# Patient Record
Sex: FEMALE | Race: WHITE | Employment: UNEMPLOYED | URBAN - METROPOLITAN AREA
[De-identification: names, ages, dates, MRNs, and addresses within clinical notes are randomized per-mention and may not be internally consistent; named-entity substitution may affect disease eponyms.]

---

## 2018-03-15 ENCOUNTER — OFFICE VISIT (OUTPATIENT)
Dept: FAMILY MEDICINE CLINIC | Age: 58
End: 2018-03-15
Payer: COMMERCIAL

## 2018-03-15 VITALS
BODY MASS INDEX: 26.16 KG/M2 | DIASTOLIC BLOOD PRESSURE: 90 MMHG | OXYGEN SATURATION: 97 % | HEIGHT: 65 IN | WEIGHT: 157 LBS | HEART RATE: 88 BPM | SYSTOLIC BLOOD PRESSURE: 140 MMHG

## 2018-03-15 DIAGNOSIS — M15.9 PRIMARY OSTEOARTHRITIS INVOLVING MULTIPLE JOINTS: ICD-10-CM

## 2018-03-15 DIAGNOSIS — G56.03 BILATERAL CARPAL TUNNEL SYNDROME: ICD-10-CM

## 2018-03-15 DIAGNOSIS — Z13.220 ENCOUNTER FOR LIPID SCREENING FOR CARDIOVASCULAR DISEASE: ICD-10-CM

## 2018-03-15 DIAGNOSIS — Z13.6 ENCOUNTER FOR LIPID SCREENING FOR CARDIOVASCULAR DISEASE: ICD-10-CM

## 2018-03-15 DIAGNOSIS — I10 ESSENTIAL HYPERTENSION: Primary | ICD-10-CM

## 2018-03-15 DIAGNOSIS — G89.29 CHRONIC MIDLINE LOW BACK PAIN WITHOUT SCIATICA: ICD-10-CM

## 2018-03-15 DIAGNOSIS — F17.200 TOBACCO DEPENDENCE: ICD-10-CM

## 2018-03-15 DIAGNOSIS — M54.50 CHRONIC MIDLINE LOW BACK PAIN WITHOUT SCIATICA: ICD-10-CM

## 2018-03-15 PROCEDURE — 99203 OFFICE O/P NEW LOW 30 MIN: CPT | Performed by: NURSE PRACTITIONER

## 2018-03-15 PROCEDURE — 96127 BRIEF EMOTIONAL/BEHAV ASSMT: CPT | Performed by: NURSE PRACTITIONER

## 2018-03-15 RX ORDER — LOSARTAN POTASSIUM 25 MG/1
25 TABLET ORAL
Status: ON HOLD | COMMUNITY
Start: 2017-12-04 | End: 2022-04-05 | Stop reason: HOSPADM

## 2018-03-15 RX ORDER — MELOXICAM 7.5 MG/1
15 TABLET ORAL
COMMUNITY
Start: 2017-12-04 | End: 2020-08-12 | Stop reason: ALTCHOICE

## 2018-03-15 RX ORDER — VITAMIN B COMPLEX
1 CAPSULE ORAL DAILY
COMMUNITY
End: 2022-04-14

## 2018-03-15 RX ORDER — AMLODIPINE BESYLATE 10 MG/1
10 TABLET ORAL
COMMUNITY
Start: 2017-12-04 | End: 2020-08-12

## 2018-03-15 ASSESSMENT — PATIENT HEALTH QUESTIONNAIRE - PHQ9
7. TROUBLE CONCENTRATING ON THINGS, SUCH AS READING THE NEWSPAPER OR WATCHING TELEVISION: 0
1. LITTLE INTEREST OR PLEASURE IN DOING THINGS: 3
SUM OF ALL RESPONSES TO PHQ9 QUESTIONS 1 & 2: 3
SUM OF ALL RESPONSES TO PHQ QUESTIONS 1-9: 12
9. THOUGHTS THAT YOU WOULD BE BETTER OFF DEAD, OR OF HURTING YOURSELF: 0
4. FEELING TIRED OR HAVING LITTLE ENERGY: 3
6. FEELING BAD ABOUT YOURSELF - OR THAT YOU ARE A FAILURE OR HAVE LET YOURSELF OR YOUR FAMILY DOWN: 0
5. POOR APPETITE OR OVEREATING: 3
3. TROUBLE FALLING OR STAYING ASLEEP: 3
2. FEELING DOWN, DEPRESSED OR HOPELESS: 0
8. MOVING OR SPEAKING SO SLOWLY THAT OTHER PEOPLE COULD HAVE NOTICED. OR THE OPPOSITE, BEING SO FIGETY OR RESTLESS THAT YOU HAVE BEEN MOVING AROUND A LOT MORE THAN USUAL: 0
10. IF YOU CHECKED OFF ANY PROBLEMS, HOW DIFFICULT HAVE THESE PROBLEMS MADE IT FOR YOU TO DO YOUR WORK, TAKE CARE OF THINGS AT HOME, OR GET ALONG WITH OTHER PEOPLE: 0
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
## 2018-03-22 ENCOUNTER — HOSPITAL ENCOUNTER (OUTPATIENT)
Age: 58
Discharge: HOME | End: 2018-03-24
Payer: COMMERCIAL

## 2018-03-22 ENCOUNTER — HOSPITAL ENCOUNTER (OUTPATIENT)
Dept: GENERAL RADIOLOGY | Age: 58
Discharge: HOME | End: 2018-03-24
Payer: COMMERCIAL

## 2018-03-22 ENCOUNTER — HOSPITAL ENCOUNTER (OUTPATIENT)
Age: 58
Discharge: HOME | End: 2018-03-22
Payer: COMMERCIAL

## 2018-03-22 DIAGNOSIS — Z13.6 ENCOUNTER FOR LIPID SCREENING FOR CARDIOVASCULAR DISEASE: ICD-10-CM

## 2018-03-22 DIAGNOSIS — I10 ESSENTIAL HYPERTENSION: ICD-10-CM

## 2018-03-22 DIAGNOSIS — Z13.220 ENCOUNTER FOR LIPID SCREENING FOR CARDIOVASCULAR DISEASE: ICD-10-CM

## 2018-03-22 DIAGNOSIS — M54.50 CHRONIC MIDLINE LOW BACK PAIN WITHOUT SCIATICA: ICD-10-CM

## 2018-03-22 DIAGNOSIS — R73.01 IFG (IMPAIRED FASTING GLUCOSE): Primary | ICD-10-CM

## 2018-03-22 DIAGNOSIS — G89.29 CHRONIC MIDLINE LOW BACK PAIN WITHOUT SCIATICA: ICD-10-CM

## 2018-03-22 LAB
ALBUMIN SERPL-MCNC: 4.6 G/DL (ref 3.5–5.2)
ALBUMIN/GLOBULIN RATIO: ABNORMAL (ref 1–2.5)
ALP BLD-CCNC: 105 U/L (ref 35–104)
ALT SERPL-CCNC: 20 U/L (ref 5–33)
ANION GAP SERPL CALCULATED.3IONS-SCNC: 11 MMOL/L (ref 9–17)
AST SERPL-CCNC: 22 U/L
BILIRUB SERPL-MCNC: 0.62 MG/DL (ref 0.3–1.2)
BUN BLDV-MCNC: 16 MG/DL (ref 6–20)
BUN/CREAT BLD: ABNORMAL (ref 9–20)
CALCIUM SERPL-MCNC: 9.2 MG/DL (ref 8.6–10.4)
CHLORIDE BLD-SCNC: 99 MMOL/L (ref 98–107)
CHOLESTEROL/HDL RATIO: 3.1
CHOLESTEROL: 208 MG/DL
CO2: 28 MMOL/L (ref 20–31)
CREAT SERPL-MCNC: 0.5 MG/DL (ref 0.5–0.9)
GFR AFRICAN AMERICAN: >60 ML/MIN
GFR NON-AFRICAN AMERICAN: >60 ML/MIN
GFR SERPL CREATININE-BSD FRML MDRD: ABNORMAL ML/MIN/{1.73_M2}
GFR SERPL CREATININE-BSD FRML MDRD: ABNORMAL ML/MIN/{1.73_M2}
GLUCOSE BLD-MCNC: 150 MG/DL (ref 70–99)
HDLC SERPL-MCNC: 67 MG/DL
LDL CHOLESTEROL: 129 MG/DL (ref 0–130)
POTASSIUM SERPL-SCNC: 4.4 MMOL/L (ref 3.7–5.3)
SODIUM BLD-SCNC: 138 MMOL/L (ref 135–144)
TOTAL PROTEIN: 7.4 G/DL (ref 6.4–8.3)
TRIGL SERPL-MCNC: 59 MG/DL
VLDLC SERPL CALC-MCNC: ABNORMAL MG/DL (ref 1–30)

## 2018-03-22 PROCEDURE — 72100 X-RAY EXAM L-S SPINE 2/3 VWS: CPT

## 2018-03-22 PROCEDURE — 80053 COMPREHEN METABOLIC PANEL: CPT

## 2018-03-22 PROCEDURE — 80061 LIPID PANEL: CPT

## 2018-03-22 PROCEDURE — 36415 COLL VENOUS BLD VENIPUNCTURE: CPT

## 2018-03-26 ENCOUNTER — OFFICE VISIT (OUTPATIENT)
Dept: ORTHOPEDIC SURGERY | Age: 58
End: 2018-03-26
Payer: COMMERCIAL

## 2018-03-26 DIAGNOSIS — M19.041 OSTEOARTHRITIS OF BOTH HANDS, UNSPECIFIED OSTEOARTHRITIS TYPE: ICD-10-CM

## 2018-03-26 DIAGNOSIS — M19.042 OSTEOARTHRITIS OF BOTH HANDS, UNSPECIFIED OSTEOARTHRITIS TYPE: ICD-10-CM

## 2018-03-26 DIAGNOSIS — M79.641 PAIN IN BOTH HANDS: Primary | ICD-10-CM

## 2018-03-26 DIAGNOSIS — M79.642 PAIN IN BOTH HANDS: Primary | ICD-10-CM

## 2018-03-26 PROCEDURE — 99203 OFFICE O/P NEW LOW 30 MIN: CPT | Performed by: ORTHOPAEDIC SURGERY

## 2018-03-26 NOTE — LETTER
3/26/2018    Emanuel Sanches NP  [address] [facility], [address] [facility]    RE: [facility]    Dear Dr. Megan Vargas,    Thank you for allowing me to participate in the care of Ms. Chuck Appiah. I had the opportunity to evaluate the patient on 3/26/2018. I believe she has osteoarthritis involving both hands. She may benefit from a work up for rheumatoid disease given the bilateral nature of her symptoms and involvement of multiple joints. Attached you will find my evaluation and recommendations. Thanks again for the confidence you have expressed in me by allowing my participation in the care of your patient. I will keep you apprised of further developments in the patients treatment course as it progresses. If I can be of further assistance in any fashion, please feel free to contact me at your convenience.     Sincerely,        Chinmay Rhoades  Shoulder and Elbow Surgery

## 2018-03-26 NOTE — PROGRESS NOTES
ORTHOPEDIC PATIENT EVALUATION      HPI / Chief Complaint  Sameer Ribeiro is a 60 y.o. old right-hand-dominant female who presents for evaluation of bilateral hand pain that has been ongoing for the past 2 months. She denies any precipitating trauma or injury. Her pain diffusely involves both hands and is intermittent in occurrence. It hurts the most after use although she reports that it can occur with or without activity. She reports stiffness especially in the morning as well as intermittent swelling, again especially in the morning. She denies any numbness or tingling. Her symptoms have gotten worse over the course of the past month. She has been on Mobic apparently for a long period of time due arthritis involving multiple joints. Past Medical History  Nyasia Mackay  has a past medical history of Chronic back pain; Hypertension; Osteoarthritis; and Pneumothorax. Past Surgical History  Nyasia Mackay  has a past surgical history that includes back surgery; Carpal tunnel release (Right); Appendectomy; and Tubal ligation. Current Medications  Current Outpatient Prescriptions   Medication Sig Dispense Refill    amLODIPine (NORVASC) 10 MG tablet Take 10 mg by mouth      losartan (COZAAR) 25 MG tablet Take 25 mg by mouth      meloxicam (MOBIC) 7.5 MG tablet Take 15 mg by mouth      b complex vitamins capsule Take 1 capsule by mouth daily       No current facility-administered medications for this visit. Allergies  Allergies have been reviewed. Destiny is allergic to penicillins. Social History  Destiny  reports that she has been smoking Cigarettes. She has a 20.00 pack-year smoking history. She has never used smokeless tobacco. She reports that she does not drink alcohol or use drugs. Family History  Destiny's family history includes Diabetes in her father; Heart Disease in her father and mother; High Blood Pressure in her father; High Cholesterol in her father and mother.       Review of Systems   History

## 2018-03-29 ENCOUNTER — INITIAL CONSULT (OUTPATIENT)
Dept: PAIN MANAGEMENT | Age: 58
End: 2018-03-29
Payer: COMMERCIAL

## 2018-03-29 VITALS
RESPIRATION RATE: 16 BRPM | WEIGHT: 156.4 LBS | HEART RATE: 85 BPM | BODY MASS INDEX: 26.06 KG/M2 | HEIGHT: 65 IN | TEMPERATURE: 99 F | SYSTOLIC BLOOD PRESSURE: 128 MMHG | DIASTOLIC BLOOD PRESSURE: 73 MMHG | OXYGEN SATURATION: 99 %

## 2018-03-29 DIAGNOSIS — Z98.890 HISTORY OF LUMBAR LAMINECTOMY: ICD-10-CM

## 2018-03-29 DIAGNOSIS — G89.29 CHRONIC BILATERAL LOW BACK PAIN WITHOUT SCIATICA: ICD-10-CM

## 2018-03-29 DIAGNOSIS — G89.29 CHRONIC NECK PAIN: ICD-10-CM

## 2018-03-29 DIAGNOSIS — M25.511 CHRONIC RIGHT SHOULDER PAIN: ICD-10-CM

## 2018-03-29 DIAGNOSIS — M54.50 CHRONIC BILATERAL LOW BACK PAIN WITHOUT SCIATICA: ICD-10-CM

## 2018-03-29 DIAGNOSIS — M54.2 CHRONIC NECK PAIN: ICD-10-CM

## 2018-03-29 DIAGNOSIS — M51.36 DDD (DEGENERATIVE DISC DISEASE), LUMBAR: ICD-10-CM

## 2018-03-29 DIAGNOSIS — G89.29 CHRONIC RIGHT SHOULDER PAIN: ICD-10-CM

## 2018-03-29 DIAGNOSIS — M79.643 CHRONIC HAND PAIN, UNSPECIFIED LATERALITY: Primary | ICD-10-CM

## 2018-03-29 DIAGNOSIS — G89.29 CHRONIC HAND PAIN, UNSPECIFIED LATERALITY: Primary | ICD-10-CM

## 2018-03-29 PROBLEM — M51.369 DDD (DEGENERATIVE DISC DISEASE), LUMBAR: Status: ACTIVE | Noted: 2018-03-29

## 2018-03-29 PROCEDURE — 99244 OFF/OP CNSLTJ NEW/EST MOD 40: CPT | Performed by: ANESTHESIOLOGY

## 2018-03-29 RX ORDER — DULOXETIN HYDROCHLORIDE 20 MG/1
20 CAPSULE, DELAYED RELEASE ORAL DAILY
Qty: 30 CAPSULE | Refills: 1 | Status: SHIPPED | OUTPATIENT
Start: 2018-03-29 | End: 2020-08-12 | Stop reason: ALTCHOICE

## 2018-03-29 ASSESSMENT — ENCOUNTER SYMPTOMS
GASTROINTESTINAL NEGATIVE: 1
EYES NEGATIVE: 1
ALLERGIC/IMMUNOLOGIC NEGATIVE: 1
BACK PAIN: 1
RESPIRATORY NEGATIVE: 1
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
## 2018-08-05 ENCOUNTER — HOSPITAL ENCOUNTER (EMERGENCY)
Age: 58
Discharge: HOME | End: 2018-08-05
Attending: EMERGENCY MEDICINE
Payer: COMMERCIAL

## 2018-08-05 VITALS
RESPIRATION RATE: 16 BRPM | BODY MASS INDEX: 24.99 KG/M2 | HEART RATE: 98 BPM | TEMPERATURE: 99.1 F | DIASTOLIC BLOOD PRESSURE: 70 MMHG | WEIGHT: 150 LBS | HEIGHT: 65 IN | SYSTOLIC BLOOD PRESSURE: 164 MMHG | OXYGEN SATURATION: 95 %

## 2018-08-05 DIAGNOSIS — M79.641 CHRONIC PAIN OF RIGHT HAND: Primary | ICD-10-CM

## 2018-08-05 DIAGNOSIS — G89.29 CHRONIC PAIN OF RIGHT HAND: Primary | ICD-10-CM

## 2018-08-05 PROCEDURE — 6370000000 HC RX 637 (ALT 250 FOR IP): Performed by: PHYSICIAN ASSISTANT

## 2018-08-05 PROCEDURE — 99283 EMERGENCY DEPT VISIT LOW MDM: CPT

## 2018-08-05 RX ORDER — HYDROCODONE BITARTRATE AND ACETAMINOPHEN 5; 325 MG/1; MG/1
1 TABLET ORAL EVERY 6 HOURS PRN
Qty: 12 TABLET | Refills: 0 | Status: SHIPPED | OUTPATIENT
Start: 2018-08-05 | End: 2018-08-08

## 2018-08-05 RX ORDER — HYDROCODONE BITARTRATE AND ACETAMINOPHEN 5; 325 MG/1; MG/1
1 TABLET ORAL ONCE
Status: COMPLETED | OUTPATIENT
Start: 2018-08-05 | End: 2018-08-05

## 2018-08-05 RX ADMIN — HYDROCODONE BITARTRATE AND ACETAMINOPHEN 1 TABLET: 5; 325 TABLET ORAL at 18:49

## 2018-08-05 ASSESSMENT — PAIN DESCRIPTION - PAIN TYPE: TYPE: ACUTE PAIN

## 2018-08-05 ASSESSMENT — PAIN DESCRIPTION - ORIENTATION: ORIENTATION: RIGHT

## 2018-08-05 ASSESSMENT — PAIN SCALES - GENERAL
PAINLEVEL_OUTOF10: 8
PAINLEVEL_OUTOF10: 8

## 2018-08-05 ASSESSMENT — PAIN DESCRIPTION - LOCATION: LOCATION: ARM;WRIST

## 2018-08-05 ASSESSMENT — PAIN DESCRIPTION - DESCRIPTORS: DESCRIPTORS: ACHING

## 2018-08-05 NOTE — ED NOTES
Pt c/o chronic right forearm and wrist pain . Pt has been dx with arthritis to the right hand . Pt states pain has worsened and is not tolerable .       Ivonne Fernandez RN  08/05/18 [id number]
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
## 2018-08-17 ENCOUNTER — TELEPHONE (OUTPATIENT)
Dept: ORTHOPEDIC SURGERY | Age: 58
End: 2018-08-17

## 2018-09-04 ENCOUNTER — OFFICE VISIT (OUTPATIENT)
Dept: ORTHOPEDIC SURGERY | Age: 58
End: 2018-09-04
Payer: COMMERCIAL

## 2018-09-04 VITALS — HEIGHT: 65 IN | WEIGHT: 150 LBS | BODY MASS INDEX: 24.99 KG/M2

## 2018-09-04 DIAGNOSIS — M67.40 GANGLION OF JOINT: ICD-10-CM

## 2018-09-04 DIAGNOSIS — M79.643 CHRONIC HAND PAIN, UNSPECIFIED LATERALITY: Primary | ICD-10-CM

## 2018-09-04 DIAGNOSIS — M18.0 ARTHRITIS OF CARPOMETACARPAL (CMC) JOINT OF BOTH THUMBS: ICD-10-CM

## 2018-09-04 DIAGNOSIS — G89.29 CHRONIC HAND PAIN, UNSPECIFIED LATERALITY: Primary | ICD-10-CM

## 2018-09-04 PROCEDURE — 99203 OFFICE O/P NEW LOW 30 MIN: CPT | Performed by: ORTHOPAEDIC SURGERY

## 2018-09-04 RX ORDER — NAPROXEN 500 MG/1
500 TABLET ORAL DAILY
Qty: 60 TABLET | Refills: 0 | Status: ON HOLD | OUTPATIENT
Start: 2018-09-04 | End: 2022-04-05 | Stop reason: HOSPADM

## 2020-08-12 ENCOUNTER — OFFICE VISIT (OUTPATIENT)
Dept: FAMILY MEDICINE CLINIC | Age: 60
End: 2020-08-12
Payer: COMMERCIAL

## 2020-08-12 VITALS
WEIGHT: 158 LBS | HEART RATE: 85 BPM | DIASTOLIC BLOOD PRESSURE: 88 MMHG | BODY MASS INDEX: 26.29 KG/M2 | SYSTOLIC BLOOD PRESSURE: 130 MMHG | TEMPERATURE: 97.8 F | OXYGEN SATURATION: 99 %

## 2020-08-12 PROCEDURE — 99213 OFFICE O/P EST LOW 20 MIN: CPT | Performed by: NURSE PRACTITIONER

## 2020-08-12 PROCEDURE — G0444 DEPRESSION SCREEN ANNUAL: HCPCS | Performed by: NURSE PRACTITIONER

## 2020-08-12 RX ORDER — IBUPROFEN 800 MG/1
TABLET ORAL
Status: ON HOLD | COMMUNITY
End: 2022-04-05 | Stop reason: HOSPADM

## 2020-08-12 ASSESSMENT — ENCOUNTER SYMPTOMS
CONSTIPATION: 0
NAUSEA: 0
SHORTNESS OF BREATH: 0
ABDOMINAL PAIN: 0
COUGH: 0
RESPIRATORY NEGATIVE: 1
GASTROINTESTINAL NEGATIVE: 1
EYES NEGATIVE: 1
VOMITING: 0
DIARRHEA: 0

## 2020-08-12 ASSESSMENT — PATIENT HEALTH QUESTIONNAIRE - PHQ9
SUM OF ALL RESPONSES TO PHQ QUESTIONS 1-9: 6
4. FEELING TIRED OR HAVING LITTLE ENERGY: 0
5. POOR APPETITE OR OVEREATING: 0
10. IF YOU CHECKED OFF ANY PROBLEMS, HOW DIFFICULT HAVE THESE PROBLEMS MADE IT FOR YOU TO DO YOUR WORK, TAKE CARE OF THINGS AT HOME, OR GET ALONG WITH OTHER PEOPLE: 0
8. MOVING OR SPEAKING SO SLOWLY THAT OTHER PEOPLE COULD HAVE NOTICED. OR THE OPPOSITE, BEING SO FIGETY OR RESTLESS THAT YOU HAVE BEEN MOVING AROUND A LOT MORE THAN USUAL: 0
2. FEELING DOWN, DEPRESSED OR HOPELESS: 0
1. LITTLE INTEREST OR PLEASURE IN DOING THINGS: 3
3. TROUBLE FALLING OR STAYING ASLEEP: 3
6. FEELING BAD ABOUT YOURSELF - OR THAT YOU ARE A FAILURE OR HAVE LET YOURSELF OR YOUR FAMILY DOWN: 0
9. THOUGHTS THAT YOU WOULD BE BETTER OFF DEAD, OR OF HURTING YOURSELF: 0
SUM OF ALL RESPONSES TO PHQ QUESTIONS 1-9: 6
SUM OF ALL RESPONSES TO PHQ9 QUESTIONS 1 & 2: 3
7. TROUBLE CONCENTRATING ON THINGS, SUCH AS READING THE NEWSPAPER OR WATCHING TELEVISION: 0

## 2020-08-12 NOTE — PATIENT INSTRUCTIONS
Patient Education        Hand Arthritis: Exercises  Introduction  Here are some examples of exercises for you to try. The exercises may be suggested for a condition or for rehabilitation. Start each exercise slowly. Ease off the exercises if you start to have pain. You will be told when to start these exercises and which ones will work best for you. How to do the exercises  Tendon glides   1. In this exercise, the steps follow one another to a make a continuous movement. 2. With your affected hand, point your fingers and thumb straight up. Your wrist should be relaxed, following the line of your fingers and thumb. 3. Curl your fingers so that the top two joints in them are bent, and your fingers wrap down. Your fingertips should touch or be near the base of your fingers. Your fingers will look like a hook. 4. Make a fist by bending your knuckles. Your thumb can gently rest against your index (pointing) finger. 5. Unwind your fingers slightly so that your fingertips can touch the base of your palm. Your thumb can rest against your index finger. 6. Move back to your starting position, with your fingers and thumb pointing up. 7. Repeat the series of motions 8 to 12 times. 8. Switch hands and repeat steps 1 through 6, even if only one hand is sore. Intrinsic flexion   1. Rest your affected hand on a table and bend the large joints where your fingers connect to your hand. Keep your thumb and the other joints in your fingers straight. 2. Slowly straighten your fingers. Your wrist should be relaxed, following the line of your fingers and thumb. 3. Move back to your starting position, with your hand bent. 4. Repeat 8 to 12 times. 5. Switch hands and repeat steps 1 through 4, even if only one hand is sore. Finger extension   1. Place your affected hand flat on a table. 2. Lift and then lower one finger at a time off the table. 3. Repeat 8 to 12 times.   4. Switch hands and repeat steps 1 through 3, even if only one hand is sore. MP extension   1. Place your good hand on a table, palm up. Put your affected hand on top of your good hand with your fingers wrapped around the thumb of your good hand like you are making a fist.  2. Slowly uncurl the joints of your affected hand where your fingers connect to your hand so that only the top two joints of your fingers are bent. Your fingers will look like a hook. 3. Move back to your starting position, with your fingers wrapped around your good thumb. 4. Repeat 8 to 12 times. 5. Switch hands and repeat steps 1 through 4, even if only one hand is sore. PIP extension (with MP extension)   1. Place your good hand on a table, palm up. Put your affected hand on top of your good hand, palm up. 2. Use the thumb and fingers of your good hand to grasp below the middle joint of one finger of your affected hand. 3. Straighten the last two joints of that finger. 4. Repeat 8 to 12 times. 5. Repeat steps 1 through 4 with each finger. 6. Switch hands and repeat steps 1 through 5, even if only one hand is sore. DIP flexion   1. With your good hand, grasp one finger of your affected hand. Your thumb will be on the top side of your finger just below the joint that is closest to your fingernail. 2. Slowly bend your affected finger only at the joint closest to your fingernail. 3. Repeat 8 to 12 times. 4. Repeat steps 1 through 3 with each finger. 5. Switch hands and repeat steps 1 through 4, even if only one hand is sore. Follow-up care is a key part of your treatment and safety. Be sure to make and go to all appointments, and call your doctor if you are having problems. It's also a good idea to know your test results and keep a list of the medicines you take. Where can you learn more? Go to https://[employer]. org and sign in to your [facility] account. Enter U798 in the [facility] box to learn more about \"Hand Arthritis: Exercises. \"     If you do not have an account, please click on the \"Sign Up Now\" link. Current as of: March 2, 2020               Content Version: 12.5  © 2006-2020 [employer]. Care instructions adapted under license by [facility] ([facility]). If you have questions about a medical condition or this instruction, always ask your healthcare professional. [address] any warranty or liability for your use of this information.
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
## 2020-08-23 ASSESSMENT — ENCOUNTER SYMPTOMS: BACK PAIN: 1

## 2021-01-30 NOTE — COMMUNICATION BODY
pain  Aching throbbing pain that fluctuates with activity level onset several years ago symptoms have been steady over years. Pain aggravates with lifting arm. No previous diagnostic workup, injections, surgery for right shoulder    1. Chronic hand pain, unspecified laterality     2. DDD (degenerative disc disease), lumbar     3. History of lumbar laminectomy     4. Chronic bilateral low back pain without sciatica  DULoxetine (CYMBALTA) 20 MG extended release capsule    Ambulatory referral to Physical Therapy   5. Chronic neck pain  DULoxetine (CYMBALTA) 20 MG extended release capsule    Ambulatory referral to Physical Therapy    XR CERVICAL SPINE (2-3 VIEWS)   6. Chronic right shoulder pain  DULoxetine (CYMBALTA) 20 MG extended release capsule    Ambulatory referral to Physical Therapy    US Extremity Right Non Vasc Complete           Plan:     - Advised the patient to proceed with occupational therapy and discussed rheumatology referral with primary care physician as advised by orthopedic physician for chronic bilateral hand pain    - Plain lumbar spine x-rays films were reviewed it showed multilevel lumbar facet arthropathy, degenerative disc disease and laminectomy. Her clinical presentation is suggestive of facet mediated pain. Patient have not tried conservative measures with formal course of physical therapy therefore I will refer her for physical therapy and will start her on Cymbalta for musculoskeletal pain. - Physical exam for right shoulder suggest that chronic right shoulder pain could potentially be related to right rotator cuff impingement.   I will obtain an ultrasound of right shoulder    Orders Placed This Encounter   Procedures    XR CERVICAL SPINE (2-3 VIEWS)     Standing Status:   Future     Standing Expiration Date:   3/29/2019    US Extremity Right Non Vasc Complete     Standing Status:   Future     Standing Expiration Date:   3/29/2019    Ambulatory referral to Physical Therapy
Discussed risks of drinking

## 2022-03-11 ENCOUNTER — TELEPHONE (OUTPATIENT)
Dept: FAMILY MEDICINE CLINIC | Age: 62
End: 2022-03-11

## 2022-03-11 NOTE — TELEPHONE ENCOUNTER
lmovm for Patient to call the office. We need to find out what Urgent Care she was seen at.  We need to get the records

## 2022-03-11 NOTE — TELEPHONE ENCOUNTER
Spoke to the Physician at [facility]. She states the A1C was 12.9 %. Patient can  a copy.    lmovm for Destiny to call the office. [facility] will give Destiny a print out of A1C, but states she can't fax it to the office. \She wasn't there for that, so she didn't really stress on that. Told patient to follow up with PCP sooner than later.

## 2022-03-11 NOTE — TELEPHONE ENCOUNTER
Pt called in stating that she went to Urgent care for a uti and she stated they gave her the results and they told her that she has a lot of sugar in her urine it was a  12.9%.     Please advise

## 2022-03-11 NOTE — TELEPHONE ENCOUNTER
Which urgent care did she go to? I would like to get the results.  Please inform patient that we will have to review results before we can change any plan of care and may wait to do so until her apt 3/29

## 2022-03-22 ENCOUNTER — APPOINTMENT (OUTPATIENT)
Dept: GENERAL RADIOLOGY | Age: 62
DRG: 234 | End: 2022-03-22
Payer: COMMERCIAL

## 2022-03-22 ENCOUNTER — HOSPITAL ENCOUNTER (INPATIENT)
Age: 62
LOS: 15 days | Discharge: HOME HEALTH | DRG: 234 | End: 2022-04-06
Attending: EMERGENCY MEDICINE | Admitting: INTERNAL MEDICINE
Payer: COMMERCIAL

## 2022-03-22 DIAGNOSIS — I21.4 NSTEMI (NON-ST ELEVATED MYOCARDIAL INFARCTION) (HCC): ICD-10-CM

## 2022-03-22 DIAGNOSIS — E87.1 HYPONATREMIA: ICD-10-CM

## 2022-03-22 DIAGNOSIS — R07.9 CHEST PAIN, UNSPECIFIED TYPE: Primary | ICD-10-CM

## 2022-03-22 DIAGNOSIS — G89.18 POST-OP PAIN: ICD-10-CM

## 2022-03-22 PROBLEM — E11.9 TYPE 2 DIABETES MELLITUS WITHOUT COMPLICATION, WITHOUT LONG-TERM CURRENT USE OF INSULIN (HCC): Status: ACTIVE | Noted: 2022-03-22

## 2022-03-22 PROBLEM — R77.8 ELEVATED TROPONIN: Status: ACTIVE | Noted: 2022-03-22

## 2022-03-22 PROBLEM — R79.89 ELEVATED TROPONIN: Status: ACTIVE | Noted: 2022-03-22

## 2022-03-22 LAB
ABSOLUTE EOS #: 0.29 K/UL (ref 0–0.44)
ABSOLUTE IMMATURE GRANULOCYTE: 0.03 K/UL (ref 0–0.3)
ABSOLUTE LYMPH #: 1.94 K/UL (ref 1.1–3.7)
ABSOLUTE MONO #: 0.6 K/UL (ref 0.1–1.2)
ANION GAP SERPL CALCULATED.3IONS-SCNC: 14 MMOL/L (ref 9–17)
BASOPHILS # BLD: 1 % (ref 0–2)
BASOPHILS ABSOLUTE: 0.1 K/UL (ref 0–0.2)
BUN BLDV-MCNC: 11 MG/DL (ref 8–23)
CALCIUM SERPL-MCNC: 9.4 MG/DL (ref 8.6–10.4)
CHLORIDE BLD-SCNC: 98 MMOL/L (ref 98–107)
CO2: 21 MMOL/L (ref 20–31)
CREAT SERPL-MCNC: 0.62 MG/DL (ref 0.5–0.9)
D-DIMER QUANTITATIVE: 0.38 MG/L FEU
EOSINOPHILS RELATIVE PERCENT: 4 % (ref 1–4)
FLU A ANTIGEN: NEGATIVE
FLU B ANTIGEN: NEGATIVE
GFR AFRICAN AMERICAN: >60 ML/MIN
GFR NON-AFRICAN AMERICAN: >60 ML/MIN
GFR SERPL CREATININE-BSD FRML MDRD: ABNORMAL ML/MIN/{1.73_M2}
GLUCOSE BLD-MCNC: 329 MG/DL (ref 70–99)
HCT VFR BLD CALC: 43.3 % (ref 36.3–47.1)
HCT VFR BLD CALC: 45.2 % (ref 36.3–47.1)
HEMOGLOBIN: 14.9 G/DL (ref 11.9–15.1)
HEMOGLOBIN: 15.5 G/DL (ref 11.9–15.1)
IMMATURE GRANULOCYTES: 0 %
LYMPHOCYTES # BLD: 26 % (ref 24–43)
MAGNESIUM: 2 MG/DL (ref 1.6–2.6)
MCH RBC QN AUTO: 30.3 PG (ref 25.2–33.5)
MCH RBC QN AUTO: 30.3 PG (ref 25.2–33.5)
MCHC RBC AUTO-ENTMCNC: 34.3 G/DL (ref 28.4–34.8)
MCHC RBC AUTO-ENTMCNC: 34.4 G/DL (ref 28.4–34.8)
MCV RBC AUTO: 88 FL (ref 82.6–102.9)
MCV RBC AUTO: 88.3 FL (ref 82.6–102.9)
MONOCYTES # BLD: 8 % (ref 3–12)
NRBC AUTOMATED: 0 PER 100 WBC
NRBC AUTOMATED: 0 PER 100 WBC
PARTIAL THROMBOPLASTIN TIME: 23.6 SEC (ref 20.5–30.5)
PARTIAL THROMBOPLASTIN TIME: 25.5 SEC (ref 20.5–30.5)
PDW BLD-RTO: 12 % (ref 11.8–14.4)
PDW BLD-RTO: 12.1 % (ref 11.8–14.4)
PLATELET # BLD: 230 K/UL (ref 138–453)
PLATELET # BLD: 234 K/UL (ref 138–453)
PMV BLD AUTO: 10.8 FL (ref 8.1–13.5)
PMV BLD AUTO: 11.2 FL (ref 8.1–13.5)
POTASSIUM SERPL-SCNC: 3.8 MMOL/L (ref 3.7–5.3)
PRO-BNP: 20 PG/ML
RBC # BLD: 4.92 M/UL (ref 3.95–5.11)
RBC # BLD: 5.12 M/UL (ref 3.95–5.11)
SARS-COV-2, RAPID: NOT DETECTED
SEG NEUTROPHILS: 61 % (ref 36–65)
SEGMENTED NEUTROPHILS ABSOLUTE COUNT: 4.52 K/UL (ref 1.5–8.1)
SODIUM BLD-SCNC: 133 MMOL/L (ref 135–144)
SPECIMEN DESCRIPTION: NORMAL
TROPONIN, HIGH SENSITIVITY: 16 NG/L (ref 0–14)
TROPONIN, HIGH SENSITIVITY: 55 NG/L (ref 0–14)
TROPONIN, HIGH SENSITIVITY: 76 NG/L (ref 0–14)
TROPONIN, HIGH SENSITIVITY: 81 NG/L (ref 0–14)
WBC # BLD: 7.3 K/UL (ref 3.5–11.3)
WBC # BLD: 7.5 K/UL (ref 3.5–11.3)

## 2022-03-22 PROCEDURE — 6360000002 HC RX W HCPCS

## 2022-03-22 PROCEDURE — 85379 FIBRIN DEGRADATION QUANT: CPT

## 2022-03-22 PROCEDURE — 80048 BASIC METABOLIC PNL TOTAL CA: CPT

## 2022-03-22 PROCEDURE — 71045 X-RAY EXAM CHEST 1 VIEW: CPT

## 2022-03-22 PROCEDURE — 87635 SARS-COV-2 COVID-19 AMP PRB: CPT

## 2022-03-22 PROCEDURE — 7100000000 HC PACU RECOVERY - FIRST 15 MIN

## 2022-03-22 PROCEDURE — 2500000003 HC RX 250 WO HCPCS

## 2022-03-22 PROCEDURE — 93458 L HRT ARTERY/VENTRICLE ANGIO: CPT

## 2022-03-22 PROCEDURE — C1894 INTRO/SHEATH, NON-LASER: HCPCS

## 2022-03-22 PROCEDURE — 85027 COMPLETE CBC AUTOMATED: CPT

## 2022-03-22 PROCEDURE — 85025 COMPLETE CBC W/AUTO DIFF WBC: CPT

## 2022-03-22 PROCEDURE — 2709999900 HC NON-CHARGEABLE SUPPLY

## 2022-03-22 PROCEDURE — 6360000004 HC RX CONTRAST MEDICATION

## 2022-03-22 PROCEDURE — 85730 THROMBOPLASTIN TIME PARTIAL: CPT

## 2022-03-22 PROCEDURE — 87804 INFLUENZA ASSAY W/OPTIC: CPT

## 2022-03-22 PROCEDURE — 93005 ELECTROCARDIOGRAM TRACING: CPT | Performed by: STUDENT IN AN ORGANIZED HEALTH CARE EDUCATION/TRAINING PROGRAM

## 2022-03-22 PROCEDURE — 6360000002 HC RX W HCPCS: Performed by: STUDENT IN AN ORGANIZED HEALTH CARE EDUCATION/TRAINING PROGRAM

## 2022-03-22 PROCEDURE — 6370000000 HC RX 637 (ALT 250 FOR IP): Performed by: STUDENT IN AN ORGANIZED HEALTH CARE EDUCATION/TRAINING PROGRAM

## 2022-03-22 PROCEDURE — 83735 ASSAY OF MAGNESIUM: CPT

## 2022-03-22 PROCEDURE — 2580000003 HC RX 258: Performed by: STUDENT IN AN ORGANIZED HEALTH CARE EDUCATION/TRAINING PROGRAM

## 2022-03-22 PROCEDURE — 99284 EMERGENCY DEPT VISIT MOD MDM: CPT

## 2022-03-22 PROCEDURE — B2151ZZ FLUOROSCOPY OF LEFT HEART USING LOW OSMOLAR CONTRAST: ICD-10-PCS | Performed by: INTERNAL MEDICINE

## 2022-03-22 PROCEDURE — 2060000000 HC ICU INTERMEDIATE R&B

## 2022-03-22 PROCEDURE — 4A023N7 MEASUREMENT OF CARDIAC SAMPLING AND PRESSURE, LEFT HEART, PERCUTANEOUS APPROACH: ICD-10-PCS | Performed by: INTERNAL MEDICINE

## 2022-03-22 PROCEDURE — 84484 ASSAY OF TROPONIN QUANT: CPT

## 2022-03-22 PROCEDURE — 83880 ASSAY OF NATRIURETIC PEPTIDE: CPT

## 2022-03-22 PROCEDURE — 36415 COLL VENOUS BLD VENIPUNCTURE: CPT

## 2022-03-22 PROCEDURE — 99223 1ST HOSP IP/OBS HIGH 75: CPT | Performed by: INTERNAL MEDICINE

## 2022-03-22 PROCEDURE — 6370000000 HC RX 637 (ALT 250 FOR IP): Performed by: INTERNAL MEDICINE

## 2022-03-22 PROCEDURE — B2111ZZ FLUOROSCOPY OF MULTIPLE CORONARY ARTERIES USING LOW OSMOLAR CONTRAST: ICD-10-PCS | Performed by: INTERNAL MEDICINE

## 2022-03-22 PROCEDURE — 7100000001 HC PACU RECOVERY - ADDTL 15 MIN

## 2022-03-22 PROCEDURE — C1887 CATHETER, GUIDING: HCPCS

## 2022-03-22 RX ORDER — SODIUM CHLORIDE 9 MG/ML
INJECTION, SOLUTION INTRAVENOUS CONTINUOUS
Status: DISCONTINUED | OUTPATIENT
Start: 2022-03-22 | End: 2022-03-27

## 2022-03-22 RX ORDER — DEXTROSE MONOHYDRATE 25 G/50ML
12.5 INJECTION, SOLUTION INTRAVENOUS PRN
Status: DISCONTINUED | OUTPATIENT
Start: 2022-03-22 | End: 2022-03-29

## 2022-03-22 RX ORDER — SODIUM CHLORIDE 0.9 % (FLUSH) 0.9 %
10 SYRINGE (ML) INJECTION PRN
Status: DISCONTINUED | OUTPATIENT
Start: 2022-03-22 | End: 2022-03-29

## 2022-03-22 RX ORDER — SODIUM CHLORIDE 0.9 % (FLUSH) 0.9 %
5-40 SYRINGE (ML) INJECTION EVERY 12 HOURS SCHEDULED
Status: DISCONTINUED | OUTPATIENT
Start: 2022-03-22 | End: 2022-03-29

## 2022-03-22 RX ORDER — SODIUM CHLORIDE 9 MG/ML
25 INJECTION, SOLUTION INTRAVENOUS PRN
Status: DISCONTINUED | OUTPATIENT
Start: 2022-03-22 | End: 2022-03-23

## 2022-03-22 RX ORDER — ACETAMINOPHEN 650 MG/1
650 SUPPOSITORY RECTAL EVERY 6 HOURS PRN
Status: DISCONTINUED | OUTPATIENT
Start: 2022-03-22 | End: 2022-03-22

## 2022-03-22 RX ORDER — ACETAMINOPHEN 325 MG/1
650 TABLET ORAL EVERY 4 HOURS PRN
Status: DISCONTINUED | OUTPATIENT
Start: 2022-03-22 | End: 2022-03-22

## 2022-03-22 RX ORDER — NICOTINE POLACRILEX 4 MG
15 LOZENGE BUCCAL PRN
Status: DISCONTINUED | OUTPATIENT
Start: 2022-03-22 | End: 2022-03-29

## 2022-03-22 RX ORDER — LOSARTAN POTASSIUM 25 MG/1
25 TABLET ORAL DAILY
Status: DISCONTINUED | OUTPATIENT
Start: 2022-03-22 | End: 2022-03-23

## 2022-03-22 RX ORDER — SODIUM CHLORIDE 9 MG/ML
25 INJECTION, SOLUTION INTRAVENOUS PRN
Status: DISCONTINUED | OUTPATIENT
Start: 2022-03-22 | End: 2022-03-29

## 2022-03-22 RX ORDER — ONDANSETRON 4 MG/1
4 TABLET, ORALLY DISINTEGRATING ORAL EVERY 8 HOURS PRN
Status: DISCONTINUED | OUTPATIENT
Start: 2022-03-22 | End: 2022-03-29

## 2022-03-22 RX ORDER — HEPARIN SODIUM 1000 [USP'U]/ML
2000 INJECTION, SOLUTION INTRAVENOUS; SUBCUTANEOUS PRN
Status: DISCONTINUED | OUTPATIENT
Start: 2022-03-22 | End: 2022-03-29

## 2022-03-22 RX ORDER — ASPIRIN 81 MG/1
81 TABLET, CHEWABLE ORAL DAILY
Status: DISCONTINUED | OUTPATIENT
Start: 2022-03-22 | End: 2022-03-29

## 2022-03-22 RX ORDER — ONDANSETRON 2 MG/ML
4 INJECTION INTRAMUSCULAR; INTRAVENOUS EVERY 6 HOURS PRN
Status: DISCONTINUED | OUTPATIENT
Start: 2022-03-22 | End: 2022-03-29

## 2022-03-22 RX ORDER — METOPROLOL TARTRATE 50 MG/1
25 TABLET, FILM COATED ORAL 2 TIMES DAILY
Status: DISCONTINUED | OUTPATIENT
Start: 2022-03-22 | End: 2022-03-29

## 2022-03-22 RX ORDER — HEPARIN SODIUM 1000 [USP'U]/ML
4000 INJECTION, SOLUTION INTRAVENOUS; SUBCUTANEOUS PRN
Status: DISCONTINUED | OUTPATIENT
Start: 2022-03-22 | End: 2022-03-29

## 2022-03-22 RX ORDER — DEXTROSE MONOHYDRATE 50 MG/ML
100 INJECTION, SOLUTION INTRAVENOUS PRN
Status: DISCONTINUED | OUTPATIENT
Start: 2022-03-22 | End: 2022-03-29

## 2022-03-22 RX ORDER — ACETAMINOPHEN 325 MG/1
650 TABLET ORAL EVERY 6 HOURS PRN
Status: DISCONTINUED | OUTPATIENT
Start: 2022-03-22 | End: 2022-03-22

## 2022-03-22 RX ORDER — HEPARIN SODIUM 1000 [USP'U]/ML
4000 INJECTION, SOLUTION INTRAVENOUS; SUBCUTANEOUS ONCE
Status: COMPLETED | OUTPATIENT
Start: 2022-03-22 | End: 2022-03-22

## 2022-03-22 RX ORDER — SODIUM CHLORIDE 0.9 % (FLUSH) 0.9 %
5-40 SYRINGE (ML) INJECTION PRN
Status: DISCONTINUED | OUTPATIENT
Start: 2022-03-22 | End: 2022-03-29

## 2022-03-22 RX ORDER — ATORVASTATIN CALCIUM 80 MG/1
40 TABLET, FILM COATED ORAL NIGHTLY
Status: DISCONTINUED | OUTPATIENT
Start: 2022-03-22 | End: 2022-03-29

## 2022-03-22 RX ADMIN — ATORVASTATIN CALCIUM 40 MG: 80 TABLET, FILM COATED ORAL at 23:18

## 2022-03-22 RX ADMIN — HEPARIN SODIUM 4000 UNITS: 1000 INJECTION INTRAVENOUS; SUBCUTANEOUS at 11:34

## 2022-03-22 RX ADMIN — LOSARTAN POTASSIUM 25 MG: 25 TABLET, FILM COATED ORAL at 13:16

## 2022-03-22 RX ADMIN — METOPROLOL TARTRATE 25 MG: 25 TABLET ORAL at 23:18

## 2022-03-22 RX ADMIN — METOPROLOL TARTRATE 25 MG: 25 TABLET ORAL at 13:16

## 2022-03-22 RX ADMIN — Medication 12 UNITS/KG/HR: at 11:40

## 2022-03-22 RX ADMIN — HEPARIN SODIUM 4000 UNITS: 1000 INJECTION INTRAVENOUS; SUBCUTANEOUS at 23:01

## 2022-03-22 ASSESSMENT — ENCOUNTER SYMPTOMS
SHORTNESS OF BREATH: 1
NAUSEA: 0
SINUS PRESSURE: 0
RHINORRHEA: 0
BACK PAIN: 0
CHEST TIGHTNESS: 1
WHEEZING: 0
PHOTOPHOBIA: 0
VOMITING: 0
ABDOMINAL PAIN: 0
DIARRHEA: 0
CHEST TIGHTNESS: 0
SORE THROAT: 0
COUGH: 0

## 2022-03-22 NOTE — Clinical Note
Discharge Plan[de-identified] Other/[facility])   Telemetry/Cardiac Monitoring Required?: No

## 2022-03-22 NOTE — ED PROVIDER NOTES
portions of this note were completed with a voice recognition program.  Efforts were made to edit the dictations but occasionally words are mis-transcribed.)    Klaudia Medel MD,, MD, F.A.C.E.P.   Attending Emergency Physician       Klaudia Medel MD  03/22/22 [id number]

## 2022-03-22 NOTE — ED PROVIDER NOTES
[address]  ED  Emergency Department Encounter  EmergencyMedicine Resident     Pt Name:Destiny Chacon  MRN: [id number]  [place] 1960  Date of evaluation: 3/22/22  PCP:  LAKSHMI Kumari CNP    CHIEF COMPLAINT       Chief Complaint   Patient presents with    Chest Pain       HISTORY OF PRESENT ILLNESS  (Location/Symptom, Timing/Onset, Context/Setting, Quality, Duration, Modifying Factors, Severity.)      Chapis Lizarraga is a 60 y.o. female who presents with midsternal chest pain. Patient reports that she woke up from her sleep tonight and had midsternal chest pressure. States pain is nonradiating. Did have some mild associated shortness of breath and diaphoresis. Patient received aspirin and nitroglycerin by EMS prior to arrival.  She denies any significant cardiac history. Does report that she is a former smoker, last smoked in November. Has never been formally diagnosed with COPD. PAST MEDICAL / SURGICAL / SOCIAL / FAMILY HISTORY      has a past medical history of Chronic back pain, Hypertension, Osteoarthritis, and Pneumothorax.       has a past surgical history that includes back surgery; Carpal tunnel release (Right); Appendectomy; and Tubal ligation.       Social History     Socioeconomic History    Marital status: Single     Spouse name: Not on file    Number of children: Not on file    Years of education: Not on file    Highest education level: Not on file   Occupational History    Occupation: [employer]    Tobacco Use    Smoking status: Current Every Day Smoker     Packs/day: 0.50     Years: 40.00     Pack years: 20.00     Types: Cigarettes    Smokeless tobacco: Never Used    Tobacco comment: \"off and on throughout the years\"   Vaping Use    Vaping Use: Never used   Substance and Sexual Activity    Alcohol use: No    Drug use: No    Sexual activity: Not on file   Other Topics Concern    Not on file   Social History Narrative    Not on file     Social Determinants of Health     Financial Resource Strain:     Difficulty of Paying Living Expenses: Not on file   Food Insecurity:     Worried About Running Out of Food in the Last Year: Not on file    Brian of Food in the Last Year: Not on file   Transportation Needs:     Lack of Transportation (Medical): Not on file    Lack of Transportation (Non-Medical):  Not on file   Physical Activity:     Days of Exercise per Week: Not on file    Minutes of Exercise per Session: Not on file   Stress:     Feeling of Stress : Not on file   Social Connections:     Frequency of Communication with Friends and Family: Not on file    Frequency of Social Gatherings with Friends and Family: Not on file    Attends Buddhist Services: Not on file    Active Member of [address] [place] [address] [place] or Organizations: Not on file    Attends Club or Organization Meetings: Not on file    Marital Status: Not on file   Intimate Partner Violence:     Fear of Current or Ex-Partner: Not on file    Emotionally Abused: Not on file    Physically Abused: Not on file    Sexually Abused: Not on file   Housing Stability:     Unable to Pay for Housing in the Last Year: Not on file    Number of [place] in the Last Year: Not on file    Unstable Housing in the Last Year: Not on file       Family History   Problem Relation Age of Onset    Heart Disease Mother         secondary to scarlet fever    High Cholesterol Mother     Heart Disease Father     High Blood Pressure Father     High Cholesterol Father     Diabetes Father     Heart Attack Father     Heart Surgery Father         CABG     No Known Problems Sister     No Known Problems Maternal Grandmother     No Known Problems Maternal Grandfather     No Known Problems Paternal Grandmother     Diabetes Paternal Grandfather     Diabetes Brother     Liver Disease Brother     Alcohol Abuse Brother     Diabetes Paternal Cousin         COD        Allergies:  Penicillins    Home Medications:  Prior to Admission medications    Medication Sig Start Date End Date Taking? Authorizing Provider   ibuprofen (ADVIL;MOTRIN) 800 MG tablet ibuprofen 800 mg tablet    Historical Provider, MD   naproxen (NAPROSYN) 500 MG tablet Take 1 tablet by mouth daily With meals 9/4/18 9/4/19  Desire Daugherty MD   losartan (COZAAR) 25 MG tablet Take 25 mg by mouth 12/4/17   Historical Provider, MD   b complex vitamins capsule Take 1 capsule by mouth daily    Historical Provider, MD       REVIEW OF SYSTEMS    (2-9 systems for level 4, 10 or more for level 5)      Review of Systems   Constitutional: Positive for diaphoresis and fatigue. Eyes: Negative for visual disturbance. Respiratory: Positive for chest tightness and shortness of breath. Negative for cough and wheezing. Cardiovascular: Positive for chest pain. Gastrointestinal: Negative for abdominal pain, nausea and vomiting. Genitourinary: Negative for dysuria and flank pain. Musculoskeletal: Negative for neck pain. Skin: Negative for rash and wound. Neurological: Negative for syncope, weakness, light-headedness, numbness and headaches. Psychiatric/Behavioral: Negative for confusion. PHYSICAL EXAM   (up to 7 for level 4, 8 or more for level 5)      INITIAL VITALS:   BP (!) 177/101   Pulse 98   Temp 98.7 °F (37.1 °C) (Oral)   Resp 27   Wt 178 lb (80.7 kg)   SpO2 90%   BMI 29.62 kg/m²     Physical Exam  Constitutional:       General: She is not in acute distress. Appearance: She is not toxic-appearing. HENT:      Head: Normocephalic and atraumatic. Cardiovascular:      Rate and Rhythm: Normal rate. Heart sounds: No murmur heard. No friction rub. No gallop. Pulmonary:      Breath sounds: No wheezing, rhonchi or rales. Abdominal:      Tenderness: There is no abdominal tenderness. Musculoskeletal:      Right lower leg: No edema. Left lower leg: No edema. Skin:     Findings: No bruising or rash.    Neurological:      Mental Status: She is alert. Motor: No weakness. DIFFERENTIAL  DIAGNOSIS     PLAN (LABS / IMAGING / EKG):  Orders Placed This Encounter   Procedures    XR CHEST PORTABLE    CBC with Auto Differential    Troponin    Basic Metabolic Panel w/ Reflex to MG    D-Dimer, Quantitative    Brain Natriuretic Peptide    EKG 12 Lead       MEDICATIONS ORDERED:  No orders of the defined types were placed in this encounter.       DDX: STEMI, NSTEMI, coronary vasospasm, bronchitis, pneumonia, pulmonary embolism    DIAGNOSTIC RESULTS / EMERGENCY DEPARTMENT COURSE / MDM   LAB RESULTS:  Results for orders placed or performed during the hospital encounter of 03/22/22   CBC with Auto Differential   Result Value Ref Range    WBC 7.5 3.5 - 11.3 k/uL    RBC 4.92 3.95 - 5.11 m/uL    Hemoglobin 14.9 11.9 - 15.1 g/dL    Hematocrit 43.3 36.3 - 47.1 %    MCV 88.0 82.6 - 102.9 fL    MCH 30.3 25.2 - 33.5 pg    MCHC 34.4 28.4 - 34.8 g/dL    RDW 12.1 11.8 - 14.4 %    Platelets 761 895 - 281 k/uL    MPV 10.8 8.1 - 13.5 fL    NRBC Automated 0.0 0.0 per 100 WBC    Seg Neutrophils 61 36 - 65 %    Lymphocytes 26 24 - 43 %    Monocytes 8 3 - 12 %    Eosinophils % 4 1 - 4 %    Basophils 1 0 - 2 %    Immature Granulocytes 0 0 %    Segs Absolute 4.52 1.50 - 8.10 k/uL    Absolute Lymph # 1.94 1.10 - 3.70 k/uL    Absolute Mono # 0.60 0.10 - 1.20 k/uL    Absolute Eos # 0.29 0.00 - 0.44 k/uL    Basophils Absolute 0.10 0.00 - 0.20 k/uL    Absolute Immature Granulocyte 0.03 0.00 - 0.30 k/uL   Troponin   Result Value Ref Range    Troponin, High Sensitivity 16 (H) 0 - 14 ng/L   Basic Metabolic Panel w/ Reflex to MG   Result Value Ref Range    Glucose 329 (H) 70 - 99 mg/dL    BUN 11 8 - 23 mg/dL    CREATININE 0.62 0.50 - 0.90 mg/dL    Calcium 9.4 8.6 - 10.4 mg/dL    Sodium 133 (L) 135 - 144 mmol/L    Potassium 3.8 3.7 - 5.3 mmol/L    Chloride 98 98 - 107 mmol/L    CO2 21 20 - 31 mmol/L    Anion Gap 14 9 - 17 mmol/L    GFR Non-African American >60 >60 mL/min    GFR  >60 >60 mL/min    GFR Comment         D-Dimer, Quantitative   Result Value Ref Range    D-Dimer, Quant 0.38 mg/L FEU   Brain Natriuretic Peptide   Result Value Ref Range    Pro-BNP 20 <300 pg/mL       IMPRESSION/ ED Course: 79-year-old female no acute distress presenting with midsternal chest pressure. Received aspirin and nitroglycerin prior to arrival by EMS. Hemodynamically stable. Patient states chest pain is significantly improved at this time compared to when she called EMS. Labs remarkable for hyperglycemia glucose 329 but no anion gap elevation. D-dimer negative. CBC unremarkable. BNP normal.  Initial troponin 16. Awaiting repeat troponin, radiologist her potation chest x-ray. Patient signed out to oncoming resident. Anticipate admission    RADIOLOGY:  Chest x-ray ordered, radiologist read pending      EKG  EKG Interpretation    Interpreted by me    Rhythm: normal sinus   Rate: normal  Axis: Left axis deviation  Ectopy: none  Conduction: normal  ST Segments: no acute change  T Waves: no acute change  Q Waves: none    Clinical Impression: Nonspecific EKG, left axis deviation, no evidence of acute ischemia    All EKG's are interpreted by the Emergency Department Physician who either signs or Co-signs this chart in the absence of a cardiologist.      CONSULTS:  None    CRITICAL CARE:  Please see attending note    FINAL IMPRESSION      1. Chest pain, unspecified type          DISPOSITION / PLAN     DISPOSITION        PATIENT REFERRED TO:  No follow-up provider specified.     DISCHARGE MEDICATIONS:  New Prescriptions    No medications on file       Jl Nuñez DO  Emergency Medicine Resident    (Please note that portions of thisnote were completed with a voice recognition program.  Efforts were made to edit the dictations but occasionally words are mis-transcribed.)        Jl Nuñez DO  Resident  03/22/22 [id number]

## 2022-03-22 NOTE — ED NOTES
Transport bedside to transport pt to [facility], pt denies needs prior to transport.       Rand Pascual RN  03/22/22 2003

## 2022-03-22 NOTE — ED NOTES
Resident at bedside at this time to discuss plan of care for admission.       Rosa Elena Mccoy RN  03/22/22 0884

## 2022-03-22 NOTE — ED NOTES
Report given to Cath Lab RN who verbalized understanding. Pt to go to cath lab with transport.       Stu Scanlon RN  03/22/22 [id number]

## 2022-03-22 NOTE — OP NOTE
[facility]    CARDIAC CATHETERIZATION    Date:   3/22/2022  Patient name:  Megan Malin  Date of admission:  3/22/2022  5:31 AM  MRN:   [id number]  YOB: 1960    Operators:  Primary:   Petey Forbes MD (Attending Physician)        Procedure performed:       [x] Left Heart Catheterization. [] Graft Angiography. [x] Left Ventriculography. [] Right Heart Catheterization. [x] Coronary Angiography. [] Aortic Valve Studies. [] PCI:      [] Other:       Pre Procedure Conscious Sedation Data:  ASA Class:    [] I [] II [x] III [] IV    Mallampati Class:  [x] I [] II [] III [] IV      Indication:  -ACS    Procedure:  Access:  [] Femoral  [x] Radial  artery       [x] Right  [] Left    Procedure: After informed consent was obtained with explanation of the risks and benefits, patient was brought to the cath lab. The access area was prepped and draped in sterile fashion. 1% lidocaine was used for local block. The artery was cannulated with 6  Fr sheath with brisk arterial blood return. The side port was frequently flushed and aspirated with normal saline. LMCA: Ostial 25% stenosis    LAD: Mid 100% stenosis, collateral from RCA    D1: Proximal 70% stenosis, and mid 90% stenosis    LCx: Mid 75% stenosis    OM1 and OM2 ostial 80% stenosis    RCA: Ostial 30% stenosis    PDA: Proximal 80% stenosis    PL branch 75% stenosis      Procedure Summary  Multivessel CAD  Preserved LV function  Recommendations  CV surgery consult for CABG      Estimated Blood Loss: 5 mL        ____________________________________________________________________    History and Risk Factors    [x] Hypertension     [] Family history of CAD  [x] Hyperlipidemia     [] Cerebrovascular Disease   [] Prior MI       [] Peripheral Vascular disease   [] Prior PCI              [] Diabetes Mellitus    [] Left Main PCI. [] Currently on Dialysis. [] Prior CABG. [] Currently smoker.     [] Cardiac Arrest outside of healthcare facility. [] Yes    [] No        Witnessed     [] Yes   [] No     Arrest after arrival of EMS  [] Yes   [] No     [] Cardiac Arrest at other Facility. [] Yes   [] No    Pre-Procedure Information. Heart Failure       [] Yes    [x] No        Class  [] I      [] II  [] III    [] IV. New Diagnosis    [] Yes  [] No    HF Type      [] Systolic   [] Diastolic          [] Unknown. Diagnostic Test:   EKG       [] Normal   [x] Abnormal    New antiarrhythmia medications:    [] Yes   [] No   New onset atrial fibrillation / Flutter     [] Yes   [] No   ECG Abnormalities:      [] V. Fib   [] Marcie V. Tach           [] NS V. T   [] New LBBB           [] T. Inv  []  ST dev > 0.5 mm         [] PVC's freq  [] PVC's infrequent    Stress Test Performed:      [] Yes    [x] No     Type:     [] Stress Echo   [] Exercise Stress Test (no imaging)      [] Stress Nuclear  [] Stress Imaging     Results   [] Negative   [] Positive        [] Indeterminate  [] Unavailable     If Positive/ Risk / Extent of Ischemia:       [] Low  [] Intermediate         [] High  [] Unavailable      Cardiac CTA Performed:     [] Yes    [x] No      Results   [] CAD   [] Non obstructive CAD      [] No CAD   [] Uncertain      [] Unknown   [] Structural Disease. Pre Procedure Medications:   [x] Yes    [] No         [x] ASA   [] Beta Blockers      [] Nitrate   [] Ca Channel Blockers      [] Ranolazine   [] Statin       [] Plavix/Others antiplatelets      Electronically signed on 3/22/2022 at 12:56 PM by:    Emily Irby MD  Fellow, [address]         I have reviewed the case / procedure with resident / fellow  I have examined the patient personally  Patient agree with treatment plan as discussed before, final arrangement based on my evaluation and exam.    Risk and benefit of procedure planned were explained in details.     Procedure was performed by me personally, with all aspect of the procedure being done using standard protocol. Note was modified based on my own assessment and treatment.     Michael Vogel MD  [facility]

## 2022-03-22 NOTE — PROGRESS NOTES
Orders to stop Heparin gtt until vasc band removed. Heparin gtt stopped at 18:45     Start air removal at 20:45     Resume Heparin when vasc band is removed and pressure dressing in place.

## 2022-03-22 NOTE — PROGRESS NOTES
[facility]   Department of Internal Medicine - Staff Internal Medicine Teaching Service        Senior Note    Date: 3/22/2022  Patient Name: Syd Mathias  MRN: [id number]  Date of Admission: 3/22/2022  5:31 AM  YOB: 1960  Primary Care Physician: LAKSHMI Mata - CNP  Attending Physician: Maggie Metzger MD   Room: 17/17    Brief Summary:-  Syd Mathias is an 60 y.o. female who presented to the ER on 3/22/22 with chief complaints of left sided chest pressure that woke her up at 4am. Pain was described as pressure like, lasting for an hour and only resolved after she was given Nitroglycerin by EMS. No similar episode in the past.    She also reports a 3 week history of exertional dyspnea that resolves if she takes a break to rest. She reports going for a walk a week ago and needing to sit down at every bench. This is not her baseline. She quit smoking in November, 2021 but reports smoking a half pack on average for about 40 years. She has family history of heart attack in her mother around age 36 and a heart attack in her father around age 79. She does not routinely follow up with a PCP and has not been diagnosed with hypertension or diabetes but chart review suggests that she has these conditions. Recent medical history significant for Covid-19 infection in January 2022 where her only symptoms were fatigue that she states she has never fully recovered from. She also had a UTI in the last month for which she was prescribed antibiotics. Review of Systems   Constitutional: Negative for chills, diaphoresis and fever. HENT: Negative for rhinorrhea, sinus pressure and sore throat. Eyes: Negative for photophobia. Respiratory: Positive for shortness of breath. Negative for cough and chest tightness. Cardiovascular: Negative for chest pain (resolved). Gastrointestinal: Negative for abdominal pain, diarrhea, nausea and vomiting. Genitourinary: Negative for dysuria, frequency and urgency. Musculoskeletal: Negative for back pain and neck stiffness. Skin: Negative for rash. Neurological: Negative for seizures and speech difficulty. Psychiatric/Behavioral: Negative for agitation, confusion and decreased concentration. Physical Exam -  Constitutional:  Alert, cooperative and no distress. On 2L O2 through NC satting at 93%. Mental Status:  Oriented to person, place and time and normal affect. Lungs:  Bilateral air entry present, lung fields clear. Normal effort. Heart:  Regular rate and rhythm, no murmur. Abdomen:  Soft, nontender, nondistended, normal bowel sounds. Extremities:  No edema, redness, tenderness in the calves. Skin:  Warm, dry, no gross lesions or rashes. Assessment and Plan:-    Principal Problem:    NSTEMI (non-ST elevated myocardial infarction) [facility])  Active Problems:    Essential hypertension    Type 2 diabetes mellitus without complication, without long-term current use of insulin (HCC)    Elevated troponin  Resolved Problems:    * No resolved hospital problems. *    Patient is 65-year-old female likely with hypertension, diabetes, long-term smoker presenting with chest pain suggestive of angina. Troponins uptrending. EKG does not show ischemic changes. Patient was loaded with aspirin and given nitro by EMS. Cardiology was consulted in the ER. Heparin drip started. Patient is being admitted to [facility] internal medicine team for NSTEMI. Appreciate cardiology recommendations for further management.     Vince Rapp MD  PGY-3, Internal Medicine Resident  [facility] [employer]         3/22/2022, 10:39 AM

## 2022-03-22 NOTE — CARE COORDINATION
Case Management Initial Discharge Plan  Destiny Bueno,             Met with:patient to discuss discharge plans. Information verified: address, contacts, phone number, , insurance Yes  Insurance Provider: jefry    Emergency Contact/Next of Kin name & number: Louie Srinivasan verified as per face sheet  Who are involved in patient's support system? Daughter and 3 grandsons live with pt    PCP: LAKSHMI Kelley CNP  Date of last visit:  but has apt pending       Discharge Planning    Living Arrangements:    Family lives with pt    Home has 2 stories  2 stairs to climb to get into front door, flight of stairs to climb to reach second floor  Location of bedroom/bathroom in home main    Patient able to perform ADL's:Independent    Current Services (outpatient & in home) DME  DME equipment: none  DME provider: na    Is patient receiving oral anticoagulation therapy? No    If indicated:   Physician managing anticoagulation treatment: na  Where does patient obtain lab work for ATC treatment? na      Potential Assistance Needed:       Patient agreeable to home care: No  [place] of choice provided:  n/a    Prior SNF/Rehab Placement and Facility: none  Agreeable to SNF/Rehab: No  [place] of choice provided: n/a     Evaluation: n/a    Expected Discharge date:       Patient expects to be discharged to:   home    If home: is the family and/or caregiver wiling & able to provide support at home? yes  Who will be providing this support? Daughter and her children live with pt    Follow Up Appointment: Best Day/ Time:      Transportation provider: daughter   Transportation arrangements needed for discharge: No    Readmission Risk              Risk of Unplanned Readmission:  5             Does patient have a readmission risk score greater than 14?: No  If yes, follow-up appointment must be made within 7 days of discharge.      Goals of Care: pain control      Educated pt on transitional options, provided freedom of choice and are agreeable with plan      Discharge Plan: goal is home, has transportation          Electronically signed by Saloni Miller RN on 3/22/22 at 10:19 AM EDT

## 2022-03-22 NOTE — ED PROVIDER NOTES
[address] [facility]     Emergency Department     Faculty Attestation    I performed a history and physical examination of the patient and discussed management with the resident. I have reviewed and agree with the residents findings including all diagnostic interpretations, and treatment plans as written. Any areas of disagreement are noted on the chart. I was personally present for the key portions of any procedures. I have documented in the chart those procedures where I was not present during the key portions. I have reviewed the emergency nurses triage note. I agree with the chief complaint, past medical history, past surgical history, allergies, medications, social and family history as documented unless otherwise noted below. Documentation of the HPI, Physical Exam and Medical Decision Making performed by scribmarco a is based on my personal performance of the HPI, PE and MDM. For Physician Assistant/ Nurse Practitioner cases/documentation I have personally evaluated this patient and have completed at least one if not all key elements of the E/M (history, physical exam, and MDM). Additional findings are as noted. 59 yo F c/o cp with diaphoresis, no nausea, no injury  Asa 324 mg per ems  pe Summer RN escort for exam: sbp 170's, gcs 15, neck supple, abdomen non tender, no distension, no rigidity, no guarding, no calf tenderness or swelling,     D dimer-, trop 16, care turned over to day shift,     EKG Interpretation    Interpreted by me  Sinus, heart rate 95, no ischemia, left axis, QT corrected 459    CRITICAL CARE: There was a high probability of clinically significant/life threatening deterioration in this patient's condition which required my urgent intervention. Total critical care time was 5 minutes. This excludes any time for separately reportable procedures.        Lee 24, DO  03/22/22 John 18, DO  03/22/22 [id number]
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
## 2022-03-22 NOTE — ED PROVIDER NOTES
[address] [facility]  Emergency Department  Emergency Medicine Resident Sign-out     Care of Max Nunes was assumed from Dr. Jose F Nguyen and is being seen for Chest Pain  . The patient's initial evaluation and plan have been discussed with the prior provider who initially evaluated the patient. EMERGENCY DEPARTMENT COURSE / MEDICAL DECISION MAKING:       MEDICATIONS GIVEN:  Orders Placed This Encounter   Medications    heparin (porcine) injection 4,000 Units    heparin (porcine) injection 4,000 Units    heparin (porcine) injection 2,000 Units    heparin 25,000 units in 0.9% sodium chloride 250 mL infusion    losartan (COZAAR) tablet 25 mg    metoprolol tartrate (LOPRESSOR) tablet 25 mg       LABS / RADIOLOGY:     Labs Reviewed   TROPONIN - Abnormal; Notable for the following components:       Result Value    Troponin, High Sensitivity 16 (*)     All other components within normal limits   TROPONIN - Abnormal; Notable for the following components:    Troponin, High Sensitivity 55 (*)     All other components within normal limits   BASIC METABOLIC PANEL W/ REFLEX TO MG FOR LOW K - Abnormal; Notable for the following components:    Glucose 329 (*)     Sodium 133 (*)     All other components within normal limits   TROPONIN - Abnormal; Notable for the following components:    Troponin, High Sensitivity 76 (*)     All other components within normal limits   CBC - Abnormal; Notable for the following components:    RBC 5.12 (*)     Hemoglobin 15.5 (*)     All other components within normal limits   COVID-19, RAPID   RAPID INFLUENZA A/B ANTIGENS   CULTURE, URINE   CBC WITH AUTO DIFFERENTIAL   D-DIMER, QUANTITATIVE   BRAIN NATRIURETIC PEPTIDE   APTT   TROPONIN   TROPONIN   APTT   APTT   URINALYSIS   TROPONIN   POCT GLUCOSE   POCT GLUCOSE       No results found. RECENT VITALS:     Temp: 98.7 °F (37.1 °C),  Pulse: 80, Resp: 17, BP: 139/85, SpO2: 91 %      This patient is a 60 y.o.  Female with complaints of chest pain, received aspirin and nitro per EMS. Patient initial troponin of 16, awaiting repeat troponin. On chart review, patient does not have any information for cardiac work-up. Plan to admit patient to observation unit for cardiac with stratification. Heart score for ACS = 6  1. History - 2  Slightly suspicious: 0  Moderately suspicious: 1  Highly suspicious: 2  2. EKG - 1  Normal: 0  Non-specific repolarization disturbance:1  Significant ST depression: 2  3. Age - 1  <45: 0  45-64: 1  >65: 2  4. Risk Factors - 1  None known: 0  1-2: 1  >3: 2  5. Initial Troponin - 1  Normal limit: 0  1-3 x normal limit: 1  >3 x normal limit: 2        ED Course as of 03/22/22 1611   Tue Mar 22, 2022   0729 Reassessed, she is 90% on room air, placed on 2 L nasal cannula. [AN]   [id number] Patient found to have an increase in her troponin from 16-56. Patient to be started on heparin for NSTEMI. Cardiology on board. Patient admitted to medicine team.   [AN]      ED Course User Index  [AN] Iram Bryan MD       OUTSTANDING TASKS / RECOMMENDATIONS:    1. Repeat troponin  2. Placement to OBS     FINAL IMPRESSION:     1. Chest pain, unspecified type    2. NSTEMI (non-ST elevated myocardial infarction) ([facility] [address]        DISPOSITION:         DISPOSITION:  []  Discharge   []  Transfer -    [x]  Admission -  Internal medicine   []  Against Medical Advice   []  Eloped   FOLLOW-UP: No follow-up provider specified.    DISCHARGE MEDICATIONS: New Prescriptions    No medications on file          Iram Bryan MD  Emergency Medicine Resident  [facility]        Iram Bryan MD  Resident  03/22/22 3233       Iram Bryan MD  Resident  03/22/22 7621

## 2022-03-22 NOTE — PROGRESS NOTES
Patient received post cath to [facility] room 6. Assessment obtained. Post cath pathway initiated. Right radial site with TR band inflated with 10 mL of air intact. No hematoma noted. Patient without complaints.

## 2022-03-22 NOTE — CONSULTS
[facility]    Consult                        Today's Date: 3/22/2022  Patient Name: Ramses Gonzalez  Date of admission: 3/22/2022  5:31 AM  Patient's age: 60 y. o., 1960  Admission Dx: NSTEMI (non-ST elevated myocardial infarction) ([facility] [I21.4]    Reason for Consult: NSTEMI     Requesting Physician: No admitting provider for patient encounter. CHIEF COMPLAINT:  Chest pain    History Obtained From:  patient    HISTORY OF PRESENT ILLNESS:      The patient is a 60 y.o.  female who is admitted to the hospital for Chest pain  Chest pain, left sided. Lasted for one hour, resolved by nitro. Associated with SOB. No dizziness or syncope. Past Medical History:   has a past medical history of Chronic back pain, Hypertension, Osteoarthritis, and Pneumothorax. Past Surgical History:   has a past surgical history that includes back surgery; Carpal tunnel release (Right); Appendectomy; and Tubal ligation. Home Medications:    Prior to Admission medications    Medication Sig Start Date End Date Taking? Authorizing Provider   ibuprofen (ADVIL;MOTRIN) 800 MG tablet ibuprofen 800 mg tablet    Historical Provider, MD   naproxen (NAPROSYN) 500 MG tablet Take 1 tablet by mouth daily With meals 9/4/18 9/4/19  Faith López MD   losartan (COZAAR) 25 MG tablet Take 25 mg by mouth 12/4/17   Historical Provider, MD   b complex vitamins capsule Take 1 capsule by mouth daily    Historical Provider, MD       Allergies:  Penicillins    Social History:   reports that she has been smoking cigarettes. She has a 20.00 pack-year smoking history. She has never used smokeless tobacco. She reports that she does not drink alcohol and does not use drugs.      Family History: family history includes Alcohol Abuse in her brother; Diabetes in her brother, father, paternal cousin, and paternal grandfather; Heart Attack in her father; Heart Disease in her father and mother; Heart Surgery in her father; High Blood Pressure in her father; High Cholesterol in her father and mother; Liver Disease in her brother; No Known Problems in her maternal grandfather, maternal grandmother, paternal grandmother, and sister. No h/o sudden cardiac death. No for premature CAD    REVIEW OF SYSTEMS:    · Constitutional: there has been no unanticipated weight loss. There's been No change in energy level, No change in activity level. · Eyes: No visual changes or diplopia. No scleral icterus. · ENT: No Headaches, hearing loss or vertigo. No mouth sores or sore throat. · Cardiovascular: Yes chest pain, Yes dyspnea on exertion, No palpitations or No loss of consciousness. No cough, hemoptysis, No pleuritic pain, or phlebitis. · Respiratory: No cough or wheezing, no sputum production. No hematemesis. · Gastrointestinal: No abdominal pain, appetite loss, blood in stools. No change in bowel or bladder habits. · Genitourinary: No dysuria, trouble voiding, or hematuria. · Musculoskeletal:  No gait disturbance, No weakness or joint complaints. · Integumentary: No rash or pruritis. · Neurological: No headache, diplopia, change in muscle strength, numbness or tingling. No change in gait, balance, coordination, mood, affect, memory, mentation, behavior. · Psychiatric: No anxiety, or depression. · Endocrine: No temperature intolerance. No excessive thirst, fluid intake, or urination. No tremor. · Hematologic/Lymphatic: No abnormal bruising or bleeding, blood clots or swollen lymph nodes. · Allergic/Immunologic: No nasal congestion or hives. PHYSICAL EXAM:      BP (!) 177/101   Pulse 90   Temp 98.7 °F (37.1 °C) (Oral)   Resp 27   Wt 178 lb (80.7 kg)   SpO2 90%   BMI 29.62 kg/m²    Constitutional and General Appearance: alert, cooperative, no distress and appears stated age  HEENT: PERRL, no cervical lymphadenopathy. No masses palpable.  Normal oral mucosa  Respiratory:  · Normal excursion and expansion without use of accessory muscles  · Resp Auscultation: Good respiratory effort. No for increased work of breathing. On auscultation: clear to auscultation bilaterally  Cardiovascular:  · The apical impulse is not displaced  · Heart tones are crisp and normal. regular S1 and S2.  · Jugular venous pulsation Normal  · The carotid upstroke is normal in amplitude and contour without delay or bruit  · Peripheral pulses are symmetrical and full  Abdomen:  · No masses or tenderness  · Bowel sounds present  Extremities:  ·  No Cyanosis or Clubbing  ·  Lower extremity edema: No  · Skin: Warm and dry  Neurological:  · Alert and oriented. · Moves all extremities well  · No abnormalities of mood, affect, memory, mentation, or behavior are noted    DATA:    Diagnostics:    EKG: NSR. Nonspecific ST changes. Labs:     CBC:   Recent Labs     03/22/22  0549 03/22/22  1039   WBC 7.5 7.3   HGB 14.9 15.5*   HCT 43.3 45.2    234     BMP:   Recent Labs     03/22/22  0549   *   K 3.8   CO2 21   BUN 11   CREATININE 0.62   LABGLOM >60   GLUCOSE 329*     BNP: No results for input(s): BNP in the last 72 hours. PT/INR: No results for input(s): PROTIME, INR in the last 72 hours. APTT:  Recent Labs     03/22/22  1039   APTT 23.6     CARDIAC ENZYMES:No results for input(s): CKTOTAL, CKMB, CKMBINDEX, TROPONINI in the last 72 hours. FASTING LIPID PANEL:  Lab Results   Component Value Date    HDL 67 03/22/2018    TRIG 59 03/22/2018     LIVER PROFILE:No results for input(s): AST, ALT, LABALBU in the last 72 hours. IMPRESSION/RECOMMENDATIONS:  NSTEMI. Currently chest pain free. IV heparin. Discussed proceeding with coronary angiography and possible PCI. Benefits, risks and alternatives explained and agree to proceed. Discussed with patient and Nurse.     Jeff Pardo MD, MD  [facility] Cardiology Consult           [phone]

## 2022-03-22 NOTE — ED TRIAGE NOTES
Patient arrived to ED via EMS for c/o sudden CP that occurred an hour ago. Patient reports she was woken up by CP mid-sternal that radiates to left arm. EMS reports giving patient 324 mg of aspirin and 0.8 mg of Nitro. Patient denies SOB, N/V, and dizziness at this time.   Dr. Rocael Estrella and Dr. Gloria Barrios is at bedside for eval.

## 2022-03-23 ENCOUNTER — APPOINTMENT (OUTPATIENT)
Dept: CT IMAGING | Age: 62
DRG: 234 | End: 2022-03-23
Payer: COMMERCIAL

## 2022-03-23 LAB
-: NORMAL
ABSOLUTE EOS #: 0.31 K/UL (ref 0–0.44)
ABSOLUTE IMMATURE GRANULOCYTE: 0.03 K/UL (ref 0–0.3)
ABSOLUTE LYMPH #: 3.03 K/UL (ref 1.1–3.7)
ABSOLUTE MONO #: 0.58 K/UL (ref 0.1–1.2)
ANION GAP SERPL CALCULATED.3IONS-SCNC: 13 MMOL/L (ref 9–17)
BASOPHILS # BLD: 1 % (ref 0–2)
BASOPHILS ABSOLUTE: 0.1 K/UL (ref 0–0.2)
BILIRUBIN URINE: NEGATIVE
BUN BLDV-MCNC: 11 MG/DL (ref 8–23)
CALCIUM SERPL-MCNC: 9 MG/DL (ref 8.6–10.4)
CASTS UA: NORMAL /LPF (ref 0–8)
CHLORIDE BLD-SCNC: 102 MMOL/L (ref 98–107)
CO2: 22 MMOL/L (ref 20–31)
COLOR: YELLOW
CREAT SERPL-MCNC: 0.66 MG/DL (ref 0.5–0.9)
CULTURE: NORMAL
EKG ATRIAL RATE: 79 BPM
EKG ATRIAL RATE: 84 BPM
EKG ATRIAL RATE: 95 BPM
EKG P AXIS: 50 DEGREES
EKG P AXIS: 50 DEGREES
EKG P AXIS: 59 DEGREES
EKG P-R INTERVAL: 172 MS
EKG P-R INTERVAL: 182 MS
EKG P-R INTERVAL: 196 MS
EKG Q-T INTERVAL: 366 MS
EKG Q-T INTERVAL: 386 MS
EKG Q-T INTERVAL: 398 MS
EKG QRS DURATION: 88 MS
EKG QRS DURATION: 90 MS
EKG QRS DURATION: 92 MS
EKG QTC CALCULATION (BAZETT): 442 MS
EKG QTC CALCULATION (BAZETT): 459 MS
EKG QTC CALCULATION (BAZETT): 470 MS
EKG R AXIS: -24 DEGREES
EKG R AXIS: -24 DEGREES
EKG R AXIS: -25 DEGREES
EKG T AXIS: 31 DEGREES
EKG T AXIS: 34 DEGREES
EKG T AXIS: 41 DEGREES
EKG VENTRICULAR RATE: 79 BPM
EKG VENTRICULAR RATE: 84 BPM
EKG VENTRICULAR RATE: 95 BPM
EOSINOPHILS RELATIVE PERCENT: 4 % (ref 1–4)
EPITHELIAL CELLS UA: NORMAL /HPF (ref 0–5)
ESTIMATED AVERAGE GLUCOSE: 278 MG/DL
GFR AFRICAN AMERICAN: >60 ML/MIN
GFR NON-AFRICAN AMERICAN: >60 ML/MIN
GFR SERPL CREATININE-BSD FRML MDRD: ABNORMAL ML/MIN/{1.73_M2}
GLUCOSE BLD-MCNC: 185 MG/DL (ref 65–105)
GLUCOSE BLD-MCNC: 215 MG/DL (ref 65–105)
GLUCOSE BLD-MCNC: 261 MG/DL (ref 70–99)
GLUCOSE BLD-MCNC: 294 MG/DL (ref 65–105)
GLUCOSE URINE: ABNORMAL
HBA1C MFR BLD: 11.3 % (ref 4–6)
HCT VFR BLD CALC: 42.5 % (ref 36.3–47.1)
HEMOGLOBIN: 14.6 G/DL (ref 11.9–15.1)
IMMATURE GRANULOCYTES: 0 %
INR BLD: 1.1
KETONES, URINE: NEGATIVE
LEUKOCYTE ESTERASE, URINE: ABNORMAL
LV EF: 52 %
LVEF MODALITY: NORMAL
LYMPHOCYTES # BLD: 38 % (ref 24–43)
MCH RBC QN AUTO: 30.7 PG (ref 25.2–33.5)
MCHC RBC AUTO-ENTMCNC: 34.4 G/DL (ref 28.4–34.8)
MCV RBC AUTO: 89.5 FL (ref 82.6–102.9)
MONOCYTES # BLD: 7 % (ref 3–12)
NITRITE, URINE: NEGATIVE
NRBC AUTOMATED: 0 PER 100 WBC
PARTIAL THROMBOPLASTIN TIME: 49.1 SEC (ref 20.5–30.5)
PARTIAL THROMBOPLASTIN TIME: 56 SEC (ref 20.5–30.5)
PARTIAL THROMBOPLASTIN TIME: 60.6 SEC (ref 20.5–30.5)
PARTIAL THROMBOPLASTIN TIME: >120 SEC (ref 20.5–30.5)
PDW BLD-RTO: 12 % (ref 11.8–14.4)
PH UA: 5.5 (ref 5–8)
PLATELET # BLD: 209 K/UL (ref 138–453)
PMV BLD AUTO: 10.8 FL (ref 8.1–13.5)
POTASSIUM SERPL-SCNC: 4.1 MMOL/L (ref 3.7–5.3)
PROTEIN UA: NEGATIVE
PROTHROMBIN TIME: 11.4 SEC (ref 9.1–12.3)
RBC # BLD: 4.75 M/UL (ref 3.95–5.11)
RBC UA: NORMAL /HPF (ref 0–4)
SEG NEUTROPHILS: 50 % (ref 36–65)
SEGMENTED NEUTROPHILS ABSOLUTE COUNT: 4 K/UL (ref 1.5–8.1)
SODIUM BLD-SCNC: 137 MMOL/L (ref 135–144)
SPECIFIC GRAVITY UA: 1.01 (ref 1–1.03)
SPECIMEN DESCRIPTION: NORMAL
TROPONIN, HIGH SENSITIVITY: 65 NG/L (ref 0–14)
TURBIDITY: CLEAR
URINE HGB: NEGATIVE
UROBILINOGEN, URINE: NORMAL
WBC # BLD: 8.1 K/UL (ref 3.5–11.3)
WBC UA: NORMAL /HPF (ref 0–5)

## 2022-03-23 PROCEDURE — 6370000000 HC RX 637 (ALT 250 FOR IP): Performed by: STUDENT IN AN ORGANIZED HEALTH CARE EDUCATION/TRAINING PROGRAM

## 2022-03-23 PROCEDURE — 6360000002 HC RX W HCPCS: Performed by: STUDENT IN AN ORGANIZED HEALTH CARE EDUCATION/TRAINING PROGRAM

## 2022-03-23 PROCEDURE — 87086 URINE CULTURE/COLONY COUNT: CPT

## 2022-03-23 PROCEDURE — 2060000000 HC ICU INTERMEDIATE R&B

## 2022-03-23 PROCEDURE — 82947 ASSAY GLUCOSE BLOOD QUANT: CPT

## 2022-03-23 PROCEDURE — 97161 PT EVAL LOW COMPLEX 20 MIN: CPT

## 2022-03-23 PROCEDURE — 36415 COLL VENOUS BLD VENIPUNCTURE: CPT

## 2022-03-23 PROCEDURE — 93970 EXTREMITY STUDY: CPT

## 2022-03-23 PROCEDURE — 87641 MR-STAPH DNA AMP PROBE: CPT

## 2022-03-23 PROCEDURE — 93010 ELECTROCARDIOGRAM REPORT: CPT | Performed by: INTERNAL MEDICINE

## 2022-03-23 PROCEDURE — 94761 N-INVAS EAR/PLS OXIMETRY MLT: CPT

## 2022-03-23 PROCEDURE — 81001 URINALYSIS AUTO W/SCOPE: CPT

## 2022-03-23 PROCEDURE — 6370000000 HC RX 637 (ALT 250 FOR IP): Performed by: NURSE PRACTITIONER

## 2022-03-23 PROCEDURE — 83036 HEMOGLOBIN GLYCOSYLATED A1C: CPT

## 2022-03-23 PROCEDURE — 84484 ASSAY OF TROPONIN QUANT: CPT

## 2022-03-23 PROCEDURE — 93880 EXTRACRANIAL BILAT STUDY: CPT

## 2022-03-23 PROCEDURE — 99233 SBSQ HOSP IP/OBS HIGH 50: CPT | Performed by: INTERNAL MEDICINE

## 2022-03-23 PROCEDURE — 86900 BLOOD TYPING SEROLOGIC ABO: CPT

## 2022-03-23 PROCEDURE — 97535 SELF CARE MNGMENT TRAINING: CPT

## 2022-03-23 PROCEDURE — 85025 COMPLETE CBC W/AUTO DIFF WBC: CPT

## 2022-03-23 PROCEDURE — 6370000000 HC RX 637 (ALT 250 FOR IP)

## 2022-03-23 PROCEDURE — 86901 BLOOD TYPING SEROLOGIC RH(D): CPT

## 2022-03-23 PROCEDURE — 2580000003 HC RX 258: Performed by: STUDENT IN AN ORGANIZED HEALTH CARE EDUCATION/TRAINING PROGRAM

## 2022-03-23 PROCEDURE — 93931 UPPER EXTREMITY STUDY: CPT

## 2022-03-23 PROCEDURE — 71250 CT THORAX DX C-: CPT

## 2022-03-23 PROCEDURE — 93306 TTE W/DOPPLER COMPLETE: CPT

## 2022-03-23 PROCEDURE — 85610 PROTHROMBIN TIME: CPT

## 2022-03-23 PROCEDURE — 97166 OT EVAL MOD COMPLEX 45 MIN: CPT

## 2022-03-23 PROCEDURE — 97116 GAIT TRAINING THERAPY: CPT

## 2022-03-23 PROCEDURE — 93005 ELECTROCARDIOGRAM TRACING: CPT | Performed by: STUDENT IN AN ORGANIZED HEALTH CARE EDUCATION/TRAINING PROGRAM

## 2022-03-23 PROCEDURE — 80048 BASIC METABOLIC PNL TOTAL CA: CPT

## 2022-03-23 PROCEDURE — 86850 RBC ANTIBODY SCREEN: CPT

## 2022-03-23 PROCEDURE — 86920 COMPATIBILITY TEST SPIN: CPT

## 2022-03-23 PROCEDURE — 99254 IP/OBS CNSLTJ NEW/EST MOD 60: CPT | Performed by: NURSE PRACTITIONER

## 2022-03-23 PROCEDURE — 85730 THROMBOPLASTIN TIME PARTIAL: CPT

## 2022-03-23 RX ORDER — AMIODARONE HYDROCHLORIDE 200 MG/1
200 TABLET ORAL 3 TIMES DAILY
Status: DISCONTINUED | OUTPATIENT
Start: 2022-03-23 | End: 2022-03-29

## 2022-03-23 RX ORDER — LOSARTAN POTASSIUM 50 MG/1
50 TABLET ORAL DAILY
Status: DISCONTINUED | OUTPATIENT
Start: 2022-03-24 | End: 2022-03-29

## 2022-03-23 RX ADMIN — METOPROLOL TARTRATE 25 MG: 25 TABLET ORAL at 20:32

## 2022-03-23 RX ADMIN — INSULIN LISPRO 3 UNITS: 100 INJECTION, SOLUTION INTRAVENOUS; SUBCUTANEOUS at 20:34

## 2022-03-23 RX ADMIN — ATORVASTATIN CALCIUM 40 MG: 80 TABLET, FILM COATED ORAL at 20:32

## 2022-03-23 RX ADMIN — INSULIN LISPRO 4 UNITS: 100 INJECTION, SOLUTION INTRAVENOUS; SUBCUTANEOUS at 13:45

## 2022-03-23 RX ADMIN — HEPARIN SODIUM 2000 UNITS: 1000 INJECTION INTRAVENOUS; SUBCUTANEOUS at 05:51

## 2022-03-23 RX ADMIN — Medication 18 UNITS/KG/HR: at 13:45

## 2022-03-23 RX ADMIN — INSULIN LISPRO 4 UNITS: 100 INJECTION, SOLUTION INTRAVENOUS; SUBCUTANEOUS at 08:30

## 2022-03-23 RX ADMIN — LOSARTAN POTASSIUM 25 MG: 25 TABLET, FILM COATED ORAL at 08:41

## 2022-03-23 RX ADMIN — ASPIRIN 81 MG: 81 TABLET, CHEWABLE ORAL at 08:41

## 2022-03-23 RX ADMIN — AMIODARONE HYDROCHLORIDE 200 MG: 200 TABLET ORAL at 20:32

## 2022-03-23 RX ADMIN — INSULIN LISPRO 2 UNITS: 100 INJECTION, SOLUTION INTRAVENOUS; SUBCUTANEOUS at 17:57

## 2022-03-23 RX ADMIN — AMIODARONE HYDROCHLORIDE 200 MG: 200 TABLET ORAL at 17:52

## 2022-03-23 RX ADMIN — METOPROLOL TARTRATE 25 MG: 25 TABLET ORAL at 08:41

## 2022-03-23 ASSESSMENT — PAIN SCALES - GENERAL
PAINLEVEL_OUTOF10: 0

## 2022-03-23 ASSESSMENT — PAIN - FUNCTIONAL ASSESSMENT: PAIN_FUNCTIONAL_ASSESSMENT: 0-10

## 2022-03-23 NOTE — CARE COORDINATION
Received call from [facility] stating pt's insurance wants her transferred to an in [facility] hospital. Attempted to meet with pt. She is off the unit. PS sent to medicine resident    1800 met with pt along with Dr Susie Kwon.  Pt does not want to be transferred to another hospital as she has surgery scheduled for Friday

## 2022-03-23 NOTE — PROGRESS NOTES
Report called to [facility]. Patient transported to room 2014. Right wrist assessed by Rivera Parry and . Vasc band intact. No bleeding or hematoma noted. Radial pulse palpable.

## 2022-03-23 NOTE — PROGRESS NOTES
[facility]  Internal Medicine Teaching Residency Program  Inpatient Daily Progress Note  ______________________________________________________________________________    Patient: Candida Huang  YOB: 1960   Mandy Novel: [de-identified]     Room: 2014/2014-01  Admit date: 3/22/2022  Today's date: 03/23/22  Number of days in the hospital: 1    SUBJECTIVE   Admitting Diagnosis: NSTEMI (non-ST elevated myocardial infarction) ([facility] [address]  CC: Chest pain    - Pt examined at bedside. Chart & results reviewed. - No acute events overnight  - Patient resting comfortably in bed this morning  - Status post cardiac catheterization revealing multivessel coronary artery disease  - Patient is chest pain-free this morning  - Patient denies any shortness of breath, diaphoresis or nausea or vomiting  - Afebrile  - Vital signs stable    Plan for today:  - Continue with current medical management  - Awaiting CT surgery recommendations for CABG    ROS:  Constitutional:  negative for chills, fevers, sweats  Respiratory:  negative for cough, dyspnea on exertion, hemoptysis, shortness of breath, wheezing  Cardiovascular:  negative for chest pain, chest pressure/discomfort, lower extremity edema, palpitations  Gastrointestinal:  negative for abdominal pain, constipation, diarrhea, nausea, vomiting  Neurological:  negative for dizziness, headache    BRIEF HISTORY     The patient is a pleasant 60 y.o. female with a no PMHx formally diagnosed per the pt, EMR shows history of chronic back pain, hypertension, osteoarthritis, COPD and pneumothorax. Pt presented with a chief complaint of chest pain. Patient has midsternal chest pain that woke her up from sleep around 4 in the morning. Patient states the pain is radiating to her neck into her jaw bilaterally.   Patient states it is hard to determine if the pain is traveling down either arm especially the left arm she has had rotator cuff surgery in the past.  Patient did endorse some mild associated shortness of breath and diaphoresis early this morning. Patient received aspirin and nitroglycerin by EMS prior to arrival which alleviated the chest pain. Patient denies any significant cardiac history. Patient's blood pressure on admission was 177/101. While in the emergency department patient's systolic blood pressure got up to 196. Patient received Lopressor 25 mg and was started on Cozaar 25 mg. Patient's blood pressure is improved. OBJECTIVE     Vital Signs:  /78   Pulse 94   Temp 98.7 °F (37.1 °C) (Oral)   Resp 19   Wt 178 lb (80.7 kg)   SpO2 91%   BMI 29.62 kg/m²     Temp (24hrs), Av.7 °F (37.1 °C), Min:98.7 °F (37.1 °C), Max:98.7 °F (37.1 °C)    No intake/output data recorded. Physical Exam:  Physical Exam  Vitals and nursing note reviewed. Constitutional:       Appearance: Normal appearance. HENT:      Head: Normocephalic and atraumatic. Nose: Nose normal.      Mouth/Throat:      Mouth: Mucous membranes are moist.      Pharynx: Oropharynx is clear. Eyes:      Extraocular Movements: Extraocular movements intact. Conjunctiva/sclera: Conjunctivae normal.      Pupils: Pupils are equal, round, and reactive to light. Cardiovascular:      Rate and Rhythm: Normal rate and regular rhythm. Pulses: Normal pulses. Heart sounds: Normal heart sounds. No murmur heard. No friction rub. No gallop. Pulmonary:      Effort: Pulmonary effort is normal. No respiratory distress. Breath sounds: Normal breath sounds. No stridor. No wheezing, rhonchi or rales. Chest:      Chest wall: No tenderness. Abdominal:      General: Abdomen is flat. Bowel sounds are normal. There is no distension. Palpations: Abdomen is soft. There is no mass. Tenderness: There is no abdominal tenderness. There is no guarding or rebound. Hernia: No hernia is present.    Musculoskeletal:         General: No swelling, tenderness, deformity or signs of injury. Normal range of motion. Cervical back: Normal range of motion. Right lower leg: No edema. Left lower leg: No edema. Skin:     General: Skin is warm. Neurological:      General: No focal deficit present. Mental Status: She is alert and oriented to person, place, and time. Mental status is at baseline. Psychiatric:         Mood and Affect: Mood normal.         Behavior: Behavior normal.         Thought Content: Thought content normal.         Judgment: Judgment normal.           Medications:  Scheduled Medications:    sodium chloride flush  5-40 mL IntraVENous 2 times per day    losartan  25 mg Oral Daily    metoprolol tartrate  25 mg Oral BID    aspirin  81 mg Oral Daily    atorvastatin  40 mg Oral Nightly    sodium chloride flush  5-40 mL IntraVENous 2 times per day    insulin lispro  0-6 Units SubCUTAneous TID WC    insulin lispro  0-3 Units SubCUTAneous Nightly     Continuous Infusions:    heparin (PORCINE) Infusion 16 Units/kg/hr (03/22/22 2304)    sodium chloride      sodium chloride      sodium chloride      dextrose       PRN Medicationsheparin (porcine), 4,000 Units, PRN  heparin (porcine), 2,000 Units, PRN  sodium chloride flush, 10 mL, PRN  sodium chloride, 25 mL, PRN  ondansetron, 4 mg, Q8H PRN   Or  ondansetron, 4 mg, Q6H PRN  magnesium hydroxide, 30 mL, Daily PRN  sodium chloride flush, 5-40 mL, PRN  sodium chloride, 25 mL, PRN  glucose, 15 g, PRN  dextrose, 12.5 g, PRN  glucagon (rDNA), 1 mg, PRN  dextrose, 100 mL/hr, PRN        Diagnostic Labs:  CBC:   Recent Labs     03/22/22  0549 03/22/22  1039   WBC 7.5 7.3   RBC 4.92 5.12*   HGB 14.9 15.5*   HCT 43.3 45.2   MCV 88.0 88.3   RDW 12.1 12.0    234     BMP:   Recent Labs     03/22/22  0549   *   K 3.8   CL 98   CO2 21   BUN 11   CREATININE 0.62     BNP: No results for input(s): BNP in the last 72 hours.   PT/INR: No results for input(s): PROTIME, INR in the last 72 hours. APTT:   Recent Labs     03/22/22  1039 03/22/22  2230   APTT 23.6 25.5     CARDIAC ENZYMES: No results for input(s): CKMB, CKMBINDEX, TROPONINI in the last 72 hours. Invalid input(s): CKTOTAL;3  FASTING LIPID PANEL:  Lab Results   Component Value Date    CHOL 208 (H) 03/22/2018    HDL 67 03/22/2018    TRIG 59 03/22/2018     LIVER PROFILE: No results for input(s): AST, ALT, ALB, BILIDIR, BILITOT, ALKPHOS in the last 72 hours. MICROBIOLOGY: No results found for: CULTURE    Imaging:    XR CHEST PORTABLE    Result Date: 3/22/2022  No acute cardiopulmonary abnormality. ASSESSMENT & PLAN     Assessment and Plan:    Principal Problem:    NSTEMI (non-ST elevated myocardial infarction) ([address]  Active Problems:    Essential hypertension    Type 2 diabetes mellitus without complication, without long-term current use of insulin (HCC)  Resolved Problems:    * No resolved hospital problems. *      IMPRESSION  This is a 60 y.o. female who presented with chest pain and found to have elevated troponins. Patient admitted to inpatient status for NSTEMI and further ischemic work-up.     Principal Problem:  NSTEMI (non-ST elevated myocardial infarction)   - Troponin 16 > 55 > 76  - Continue to monitor troponins every 4 hours  - Heparin drip  - Daily labs  - NPO until after procedure and cardiology clears   - Cardiology following and plans for cardiac catheterization on 3/22/2022 > showed multivessel coronary artery disease  - Awaiting CT surgery recommendations regarding CABG     Active Problems:  Essential hypertension - improving   - Patient denies any formal diagnosis of essential hypertension. EMR shows patient was taking Cozaar. Upon examination patient denied taking any medication as she does not want to taking handfuls of medication daily.   Patient received 25 mg of Lopressor will be started on Cozaar 25 mg.  - /101 on admission   - Continue to monitor blood pressure     Type 2 diabetes mellitus without complication, without long-term current use of insulin   - Medium dose sliding scale   - Glucose checks 4 times daily before meals and at bedtime  -Hemoglobin A1c  - Urine analysis and urine culture  - Hemoglobin A1c 11. 3        Diet: NPO until after cardiac catheterization and cardiology clears  DVT ppx: Heparin  GI ppx: None     PT/OT/SW: Consulted. Will identify needs. Discharge Planning: In process. Atiya Cronin MD  Internal Medicine Resident, PGY-1  [facility]; [place], [place]   4:02 AM 3/23/2022     Please note that part of this chart was generated using voice recognition dictation software. Although every effort was made to ensure the accuracy of this automated transcription, some errors in transcription may have occurred. Attending Physician Statement  I have discussed the care of [address] and I have examined the patient myselft and taken ros and hpi , including pertinent history and exam findings,  with the resident. I have reviewed the key elements of all parts of the encounter with the resident. I agree with the assessment, plan and orders as documented by the resident.       Electronically signed by Nithin Ramirez MD
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
## 2022-03-23 NOTE — PROGRESS NOTES
Occupational Therapy   Occupational Therapy Initial Assessment  Date: 3/23/2022   Patient Name: Herlinda Aleman  MRN: [id number]     : 1960    Date of Service: 3/23/2022  Chief Complaint   Patient presents with    Chest Pain       Discharge Recommendations: Pt limited mainly by fatigue this date, overall doing well, continue to assess pending any surgery this admission. OT Equipment Recommendations  Equipment Needed: Yes  Mobility Devices: ADL Assistive Devices  ADL Assistive Devices: Transfer Tub Bench    Assessment   Performance deficits / Impairments: Decreased functional mobility ; Decreased endurance;Decreased ADL status; Decreased high-level IADLs  Prognosis: Good  Decision Making: Medium Complexity  OT Education: OT Role;Plan of Care;Energy Conservation;Equipment; Family Education  Patient Education: Good return from pt and dtr in room this date  REQUIRES OT FOLLOW UP: Yes  Activity Tolerance  Activity Tolerance: Patient Tolerated treatment well;Patient limited by fatigue  Safety Devices  Safety Devices in place: Yes  Type of devices: Gait belt;Call light within reach;Nurse notified; Left in bed  Restraints  Initially in place: No           Patient Diagnosis(es): The primary encounter diagnosis was Chest pain, unspecified type. A diagnosis of NSTEMI (non-ST elevated myocardial infarction) [facility]) was also pertinent to this visit. has a past medical history of Chronic back pain, Hypertension, Osteoarthritis, and Pneumothorax.   has a past surgical history that includes back surgery; Carpal tunnel release (Right); Appendectomy; Tubal ligation; and Cardiac catheterization (2022).          Restrictions  Restrictions/Precautions  Restrictions/Precautions: General Precautions  Required Braces or Orthoses?: No  Position Activity Restriction  Other position/activity restrictions: up w/assist; Recent L rotator cuff repair    Subjective   General  Patient assessed for rehabilitation services?: Yes  Family / Caregiver Present: Yes (Dtr, grandson)  Diagnosis: NSTEMI  Patient Currently in Pain: Denies  Pain Assessment  Pain Assessment: 0-10  Pain Level: 0  Vital Signs  Patient Currently in Pain: Denies  Social/Functional History  Social/Functional History  Lives With: Daughter (3 adult grandsons)  Type of Home: House  Home Layout: Two level,Performs ADL's on one level,Able to Live on Main level with bedroom/bathroom  Home Access: Stairs to enter without rails  Entrance Stairs - Number of Steps: 2  Bathroom Shower/Tub: Tub/Shower unit  Bathroom Toilet: Standard  Bathroom Accessibility: Accessible  Home Equipment: Crutches (not using)  ADL Assistance: Independent  Homemaking Assistance: Independent  Homemaking Responsibilities: Yes  Ambulation Assistance: Independent  Transfer Assistance: Independent  Active : Yes (However not often since Rotator cuff sx a few months ago, family assists)  Mode of Transportation: Family  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)  Leisure & Hobbies: walking, used to make crafts  Additional Comments: Recent L rotator cuff sx     Objective   Vision: Impaired  Vision Exceptions: Wears glasses at all times  Hearing: Within functional limits    Orientation  Overall Orientation Status: Within Functional Limits     Balance  Sitting Balance: Independent  Standing Balance: Modified independent   Standing Balance  Time: 14 min  Activity: Pt stood bedside, func mob for short community distances, sinkside  Functional Mobility  Functional - Mobility Device: Rolling Walker  Activity: To/from bathroom; Other  Assist Level: Supervision  Functional Mobility Comments: No major LOB noted this date, however, pt required a seated rest break during short community distance this date, pt returned to room and was able to stand sinkside during oral care without sitting down  ADL  Feeding: Independent  Grooming: Modified independent   UE Bathing: Supervision; Increased time to complete  LE Bathing: Stand by assistance; Increased time to complete  UE Dressing: Supervision; Increased time to complete  LE Dressing: Stand by assistance; Increased time to complete  Toileting: Stand by assistance; Increased time to complete  Additional Comments: Pt fatigues quickly when on feet, pt donned B/L socks and gown on back while sitting on EOB this date, pt stood sinkside for full oral care activity  Tone RUE  RUE Tone: Normotonic  Tone LUE  LUE Tone: Normotonic  Coordination  Movements Are Fluid And Coordinated: No  Coordination and Movement description: Decreased speed;Left UE     Bed mobility  Supine to Sit: Modified independent  Sit to Supine: Unable to assess  Scooting: Modified independent  Comment: Pt supine in bed upon arrival, retired 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  [employer] to stand: Supervision  Stand to sit: Modified independent  Transfer Comments:  On room air, No DME needed     Cognition  Overall Cognitive Status: WFL        Sensation  Overall Sensation Status: WFL      LUE AROM (degrees)  LUE AROM : WFL  RUE AROM (degrees)  RUE AROM : WFL  LUE Strength  Gross LUE Strength: Exceptions to [facility] Shoulder Flex: NT (recent L rotator cuff repair)  L Elbow Flex: 5/5  L Elbow Ext: 5/5  L Hand General: 5/5  RUE Strength  Gross RUE Strength: WFL  R Hand General: 5/5      Plan   Plan  Times per week: 2-4x  Current Treatment Recommendations: Endurance Training,Self-Care / ADL           AM-PAC Score        AM-[facility] Inpatient Daily Activity Raw Score: 20 (03/23/22 1557)  AM-PAC Inpatient ADL T-Scale Score : 42.03 (03/23/22 1557)  ADL Inpatient CMS 0-100% Score: 38.32 (03/23/22 1557)  ADL Inpatient CMS G-Code Modifier : Ti Farooq (03/23/22 1557)    Goals  Short term goals  Time Frame for Short term goals: Pt will by discharge  Short term goal 1: demo/identify 2 EC/WS techniques during func activity with 1 vc  Short term goal 2: demo ADL UB/LB dressing/bathing activity at mod I, including pt setting up entire activity  Short term goal 3: demo good safety awareness during func mob around room at (I)  Short term goal 4: demo bending/reaching func activity while standing using LRD PRN and mod I  Short term goal 5: demo standing during func activity for 15 min+ using LRD PRN, without seated rest break     Therapy Time   Individual Concurrent Group Co-treatment   Time In 0956         Time Out 1022         Minutes 26         Timed Code Treatment Minutes: [id number] [facility], OTR/L

## 2022-03-23 NOTE — PLAN OF CARE
Problem:  Activity:  Goal: Risk for activity intolerance will decrease  Description: Risk for activity intolerance will decrease  3/23/2022 0608 by Winsome Isaac RN  Outcome: Ongoing  3/23/2022 0608 by Winsome Isaac RN  Outcome: Ongoing  Goal: Will verbalize the importance of balancing activity with adequate rest periods  Description: Will verbalize the importance of balancing activity with adequate rest periods  3/23/2022 0608 by Winsome Isaac RN  Outcome: Ongoing  3/23/2022 0608 by Winsome Isaac RN  Outcome: Ongoing  Goal: Ability to tolerate increased activity will improve  Description: Ability to tolerate increased activity will improve  Outcome: Ongoing     Problem: Cardiac:  Goal: Ability to maintain an adequate cardiac output will improve  Description: Ability to maintain an adequate cardiac output will improve  3/23/2022 0608 by Winsome Isaac RN  Outcome: Ongoing  3/23/2022 0608 by Winsome Isaac RN  Outcome: Ongoing  Goal: Hemodynamic stability will improve  Description: Hemodynamic stability will improve  3/23/2022 0608 by Winsome Isaac RN  Outcome: Ongoing  3/23/2022 0608 by Winsome Isaac RN  Outcome: Ongoing  Goal: Diagnostic test results will improve  Description: Diagnostic test results will improve  3/23/2022 0608 by Wnisome Isaac RN  Outcome: Ongoing  3/23/2022 0608 by Winsome Isaac RN  Outcome: Ongoing  Goal: Complications related to the disease process, condition or treatment will be avoided or minimized  Description: Complications related to the disease process, condition or treatment will be avoided or minimized  Outcome: Ongoing  Goal: Cerebral tissue perfusion will improve  Description: Cerebral tissue perfusion will improve  Outcome: Ongoing     Problem: Coping:  Goal: Ability to verbalize feelings about condition will improve  Description: Ability to verbalize feelings about condition will improve  3/23/2022 0608 by Winsome Isaac RN  Outcome: Ongoing  3/23/2022 4030 by Zachariah Varela RN  Outcome: Ongoing  Goal: Ability to identify strategies to decrease anxiety will improve  Description: Ability to identify strategies to decrease anxiety will improve  3/23/2022 0608 by Zachariah Varela RN  Outcome: Ongoing  3/23/2022 0608 by Zachariah Varela RN  Outcome: Ongoing  Goal: Ability to identify and alter barriers to satisfying sexual function will improve  Description: Ability to identify and alter barriers to satisfying sexual function will improve  3/23/2022 0608 by Zachariah Varela RN  Outcome: Ongoing  3/23/2022 0608 by Zachariah Varela RN  Outcome: Ongoing  Goal: Ability to identify and develop effective coping behavior will improve  Description: Ability to identify and develop effective coping behavior will improve  Outcome: Ongoing     Problem: Health Behavior:  Goal: Ability to identify changes in lifestyle to reduce recurrence of condition will improve  Description: Ability to identify changes in lifestyle to reduce recurrence of condition will improve  3/23/2022 0608 by Zachariah Varela RN  Outcome: Ongoing  3/23/2022 0608 by Zachariah Varela RN  Outcome: Ongoing  Goal: Ability to manage health-related needs will improve  Description: Ability to manage health-related needs will improve  3/23/2022 0608 by Zachariah Varela RN  Outcome: Ongoing  3/23/2022 0608 by Zachariah Varela RN  Outcome: Ongoing  Goal: Identification of resources available to assist in meeting health care needs will improve  Description: Identification of resources available to assist in meeting health care needs will improve  Outcome: Ongoing     Problem: Nutritional:  Goal: Ability to identify appropriate dietary choices will improve  Description: Ability to identify appropriate dietary choices will improve  3/23/2022 0608 by Zachariah Varela RN  Outcome: Ongoing  3/23/2022 0608 by Zachariah Varela RN  Outcome: Ongoing     Problem: Respiratory:  Goal: Ability to maintain adequate ventilation will improve  Description: Ability to maintain adequate ventilation will improve  3/23/2022 7010 by Manoj Franklin RN  Outcome: Ongoing  3/23/2022 0608 by Manoj Franklin RN  Outcome: Ongoing  Goal: Levels of oxygenation will improve  Description: Levels of oxygenation will improve  3/23/2022 0608 by Manoj Franklin RN  Outcome: Ongoing  3/23/2022 0608 by Manoj Franklin RN  Outcome: Ongoing     Problem: Sensory:  Goal: Pain level will decrease  Description: Pain level will decrease  3/23/2022 0608 by Manoj Franklin RN  Outcome: Ongoing  3/23/2022 0608 by Manoj Franklin RN  Outcome: Ongoing

## 2022-03-23 NOTE — PROGRESS NOTES
[facility]   Progress Note                   Date:   3/23/2022  Patient name: Misti Lombardo  Date of admission:  3/22/2022  5:31 AM  MRN:   [id number]  YOB: 1960  PCP: LAKSHMI Goldstein CNP    Reason for Admission: NSTEMI (non-ST elevated myocardial infarction) [facility]) [I21.4]  Chest pain, unspecified type [R07.9]    Subjective:       Clinical Changes / Abnormalities:Pt seen and examined in the room. Family in room. Pt denies any CP or SOB currently. Medications:   Scheduled Meds:   insulin lispro  0-12 Units SubCUTAneous TID WC    insulin lispro  0-6 Units SubCUTAneous Nightly    sodium chloride flush  5-40 mL IntraVENous 2 times per day    losartan  25 mg Oral Daily    metoprolol tartrate  25 mg Oral BID    aspirin  81 mg Oral Daily    atorvastatin  40 mg Oral Nightly    sodium chloride flush  5-40 mL IntraVENous 2 times per day     Continuous Infusions:   heparin (PORCINE) Infusion 18 Units/kg/hr (03/23/22 0555)    sodium chloride      sodium chloride      dextrose       CBC:   Recent Labs     03/22/22  0549 03/22/22  1039 03/23/22  0508   WBC 7.5 7.3 8.1   HGB 14.9 15.5* 14.6    234 209     BMP:    Recent Labs     03/22/22  0549 03/23/22  0508   * 137   K 3.8 4.1   CL 98 102   CO2 21 22   BUN 11 11   CREATININE 0.62 0.66   GLUCOSE 329* 261*     Hepatic: No results for input(s): AST, ALT, ALB, BILITOT, ALKPHOS in the last 72 hours. Troponin:   Recent Labs     03/22/22  1427 03/22/22  2230 03/23/22  0508   TROPHS 76* 81* 65*     BNP: No results for input(s): BNP in the last 72 hours. Lipids: No results for input(s): CHOL, HDL in the last 72 hours. Invalid input(s): LDLCALCU  INR: No results for input(s): INR in the last 72 hours.       CARDIAC CATH 3/23/22  LMCA: Ostial 25% stenosis     LAD: Mid 100% stenosis, collateral from RCA     D1: Proximal 70% stenosis, and mid 90% stenosis     LCx: Mid 75% stenosis     OM1 and OM2 ostial 80%

## 2022-03-23 NOTE — PROGRESS NOTES
Physical Therapy    Facility/Department: [facility] CAR 2  Initial Assessment    NAME: Pricila Mota  : 1960  MRN: [id number]    Date of Service: 3/23/2022  Chief Complaint   Patient presents with    Chest Pain       Discharge Recommendations:    No further therapy required at discharge. PT Equipment Recommendations  Equipment Needed: No    Assessment   Body structures, Functions, Activity limitations: Decreased functional mobility ; Decreased endurance  Assessment: Pt ambulated 100ft with no AD, followed by 80ft SBA, pt requiring seated rest break of ~2 minutes due to shortness of breath and endurance deficits. Pt would benefit from continued skilled acute care therapy to address these endurance deficits and stair negotiation to return to prior level of independence. Prognosis: Good  Decision Making: Low Complexity  PT Education: Goals;Plan of Care;PT Role;Energy Conservation  REQUIRES PT FOLLOW UP: Yes  Activity Tolerance  Activity Tolerance: Patient limited by endurance       Patient Diagnosis(es): The primary encounter diagnosis was Chest pain, unspecified type. A diagnosis of NSTEMI (non-ST elevated myocardial infarction) [facility]) was also pertinent to this visit. has a past medical history of Chronic back pain, Hypertension, Osteoarthritis, and Pneumothorax.   has a past surgical history that includes back surgery; Carpal tunnel release (Right); Appendectomy; Tubal ligation; and Cardiac catheterization (2022).     Restrictions  Restrictions/Precautions  Required Braces or Orthoses?: No  Position Activity Restriction  Other position/activity restrictions: up w/assist; Recent L rotator cuff repair  Vision/Hearing  Vision: Impaired  Vision Exceptions: Wears glasses at all times  Hearing: Within functional limits     Subjective  General  Patient assessed for rehabilitation services?: Yes  Response To Previous Treatment: Not applicable  Family / Caregiver Present: No  Follows Commands: Within Functional Limits  Subjective  Subjective: RN and pt in agreement for PT eval; pt supine in bed upon PT arrival, pt very pleasant and cooperative throughout session  Pain Screening  Patient Currently in Pain: Denies  Vital Signs  Patient Currently in Pain: Denies       Orientation  Orientation  Overall Orientation Status: Within Functional Limits  Social/Functional History  Social/Functional History  Lives With: Daughter (3 adult grandsons)  Type of Home: House  Home Layout: Two level,Performs ADL's on one level,Able to Live on Main level with bedroom/bathroom  Home Access: Stairs to enter without rails  Entrance Stairs - Number of Steps: 2  Bathroom Shower/Tub: Tub/Shower unit  Bathroom Toilet: Standard  Bathroom Accessibility: Accessible  Home Equipment: Crutches (not using)  ADL Assistance: [facility] [facility] [address]: Independent  Homemaking Responsibilities: Yes  Ambulation Assistance: Independent  Transfer Assistance: Independent  Active : Yes (However not often since Rotator cuff sx a few months ago, family assists)  Mode of Transportation: Family  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)  Leisure & Hobbies: walking, used to make crafts  Additional Comments: Recent L rotator cuff sx  Cognition   Cognition  Overall Cognitive Status: WFL    Objective  Joint Mobility  Spine: WFL  ROM RLE: WFL  ROM LLE: WFL  ROM RUE: WFL  ROM LUE: WFL  Strength RLE  Strength RLE: WFL  Strength LLE  Strength LLE: WFL  Strength RUE  Strength RUE: WFL  Strength LUE  Strength LUE: Exception  Comment: Shoulder not assessed due to recent rotator cuff repair; Elbow, wrist, hand WFL  Motor Control  Gross Motor?: WFL  Sensation  Overall Sensation Status: WFL (pt denies numbness/tingling)  Bed mobility  Supine to Sit: Modified independent  Sit to Supine:  (Did not assess- pt seated on bedside upon writer's exit)  Comment: HOB elevated to ~50 degrees  Transfers  Sit to Stand: Stand by assistance  Stand to sit: Stand by assistance  Comment: SBA provided for safety  Ambulation  Ambulation?: Yes  Ambulation 1  Surface: level tile  Device: No Device  Assistance: Stand by assistance  Gait Deviations: Slow Angella;Decreased step length  Distance: 100ft; 80ft  Comments: Pt required seated rest break following ambulation of 100ft due to shortness of breath and fatigue of ~2 minutes. HR 100bpm following ambulation. Stairs/Curb  Stairs?: No     Balance  Posture: Fair  Sitting - Static: Good  Sitting - Dynamic: Good  Standing - Static: Good  Standing - Dynamic: Good;-  Comments: standing balance assessed w/ no AD; pt able to sit EOB independently        Plan   Plan  Times per week: 4-5x/week  Current Treatment Recommendations: Home Exercise Program,Strengthening,ROM,Safety Education & Training,Endurance Training,Transfer Training,Gait Training,Stair training  Safety Devices  Type of devices: Left in bed,Gait belt,Call light within reach  Restraints  Initially in place: No    AM-PAC Score  AM-PAC Inpatient Mobility Raw Score : 22 (03/23/22 1148)  AM-PAC Inpatient T-Scale Score : 53.28 (03/23/22 1148)  Mobility Inpatient CMS 0-100% Score: 20.91 (03/23/22 1148)  Mobility Inpatient CMS G-Code Modifier : CJ (03/23/22 1148)          Goals  Short term goals  Time Frame for Short term goals: 6  Short term goal 1: Pt to perform bed mobility and functional transfers independently  Short term goal 2: Pt to ambulate 400ft w/ no AD independently  Short term goal 3: Ascend/descend 2 stairs with no rail independently  Short term goal 4: Tolerate 30 minutes of therapy to demo increased endurance  Patient Goals   Patient goals :  To go home       Therapy Time   Individual Concurrent Group Co-treatment   Time In 0956         Time Out 1022         Minutes 26         Timed Code Treatment Minutes: 8 Minutes       Joan Morgan, PT

## 2022-03-23 NOTE — PLAN OF CARE
Problem:  Activity:  Goal: Risk for activity intolerance will decrease  Description: Risk for activity intolerance will decrease  3/23/2022 0937 by Jocelyn Jarrett RN  Outcome: Ongoing  3/23/2022 0608 by Keagan Castaneda RN  Outcome: Ongoing  3/23/2022 0608 by Keagan Castaneda RN  Outcome: Ongoing  Goal: Will verbalize the importance of balancing activity with adequate rest periods  Description: Will verbalize the importance of balancing activity with adequate rest periods  3/23/2022 0937 by Jocelyn Jarrett RN  Outcome: Ongoing  3/23/2022 0608 by Keagan Castaneda RN  Outcome: Ongoing  3/23/2022 0608 by Keagan Castaneda RN  Outcome: Ongoing  Goal: Ability to tolerate increased activity will improve  Description: Ability to tolerate increased activity will improve  3/23/2022 0937 by Jocelyn Jarrett RN  Outcome: Ongoing  3/23/2022 0608 by Keagan Castaneda RN  Outcome: Ongoing     Problem: Cardiac:  Goal: Ability to maintain an adequate cardiac output will improve  Description: Ability to maintain an adequate cardiac output will improve  3/23/2022 0937 by Jocelyn Jarrett RN  Outcome: Ongoing  3/23/2022 0608 by Keagan Castaneda RN  Outcome: Ongoing  3/23/2022 0608 by Keagan Castaneda RN  Outcome: Ongoing  Goal: Hemodynamic stability will improve  Description: Hemodynamic stability will improve  3/23/2022 0937 by Jocelyn Jarrett RN  Outcome: Ongoing  3/23/2022 0608 by Keagan Castaneda RN  Outcome: Ongoing  3/23/2022 0608 by Keagan Castaneda RN  Outcome: Ongoing  Goal: Diagnostic test results will improve  Description: Diagnostic test results will improve  3/23/2022 0937 by Jocelyn Jarrett RN  Outcome: Ongoing  3/23/2022 0608 by Keagan Castaneda RN  Outcome: Ongoing  3/23/2022 0608 by Keagan Castaneda RN  Outcome: Ongoing  Goal: Complications related to the disease process, condition or treatment will be avoided or minimized  Description: Complications related to the disease process, condition or treatment will be avoided or minimized  3/23/2022 0937 by Marcial Mittal RN  Outcome: Ongoing  3/23/2022 0608 by Trang Mars RN  Outcome: Ongoing  Goal: Cerebral tissue perfusion will improve  Description: Cerebral tissue perfusion will improve  3/23/2022 0937 by Marcial Mittal RN  Outcome: Ongoing  3/23/2022 0608 by Trang Mars RN  Outcome: Ongoing     Problem: Coping:  Goal: Ability to verbalize feelings about condition will improve  Description: Ability to verbalize feelings about condition will improve  3/23/2022 0937 by Marcial Mittal RN  Outcome: Ongoing  3/23/2022 0608 by Trang Mars RN  Outcome: Ongoing  3/23/2022 0608 by Trang Mars RN  Outcome: Ongoing  Goal: Ability to identify strategies to decrease anxiety will improve  Description: Ability to identify strategies to decrease anxiety will improve  3/23/2022 0937 by Marcial Mittal RN  Outcome: Ongoing  3/23/2022 0608 by Trang Mars RN  Outcome: Ongoing  3/23/2022 0608 by Trang Mars RN  Outcome: Ongoing  Goal: Ability to identify and alter barriers to satisfying sexual function will improve  Description: Ability to identify and alter barriers to satisfying sexual function will improve  3/23/2022 0937 by Marcial Mittal RN  Outcome: Ongoing  3/23/2022 0608 by Trang Mars RN  Outcome: Ongoing  3/23/2022 0608 by Trang Mars RN  Outcome: Ongoing  Goal: Ability to identify and develop effective coping behavior will improve  Description: Ability to identify and develop effective coping behavior will improve  3/23/2022 0937 by Marcial Mittal RN  Outcome: Ongoing  3/23/2022 0608 by Trang Mars RN  Outcome: Ongoing

## 2022-03-24 ENCOUNTER — ANESTHESIA EVENT (OUTPATIENT)
Dept: OPERATING ROOM | Age: 62
End: 2022-03-24

## 2022-03-24 PROBLEM — I25.10 3-VESSEL CAD: Status: ACTIVE | Noted: 2022-03-24

## 2022-03-24 LAB
ABSOLUTE EOS #: 0.25 K/UL (ref 0–0.44)
ABSOLUTE IMMATURE GRANULOCYTE: 0.04 K/UL (ref 0–0.3)
ABSOLUTE LYMPH #: 3.04 K/UL (ref 1.1–3.7)
ABSOLUTE MONO #: 0.68 K/UL (ref 0.1–1.2)
ANION GAP SERPL CALCULATED.3IONS-SCNC: 11 MMOL/L (ref 9–17)
BASOPHILS # BLD: 1 % (ref 0–2)
BASOPHILS ABSOLUTE: 0.09 K/UL (ref 0–0.2)
BUN BLDV-MCNC: 13 MG/DL (ref 8–23)
CALCIUM SERPL-MCNC: 8.9 MG/DL (ref 8.6–10.4)
CHLORIDE BLD-SCNC: 104 MMOL/L (ref 98–107)
CO2: 23 MMOL/L (ref 20–31)
CREAT SERPL-MCNC: 0.58 MG/DL (ref 0.5–0.9)
CULTURE: NORMAL
EOSINOPHILS RELATIVE PERCENT: 3 % (ref 1–4)
GFR AFRICAN AMERICAN: >60 ML/MIN
GFR NON-AFRICAN AMERICAN: >60 ML/MIN
GFR SERPL CREATININE-BSD FRML MDRD: ABNORMAL ML/MIN/{1.73_M2}
GLUCOSE BLD-MCNC: 198 MG/DL (ref 65–105)
GLUCOSE BLD-MCNC: 209 MG/DL (ref 65–105)
GLUCOSE BLD-MCNC: 222 MG/DL (ref 65–105)
GLUCOSE BLD-MCNC: 228 MG/DL (ref 65–105)
GLUCOSE BLD-MCNC: 254 MG/DL (ref 70–99)
GLUCOSE BLD-MCNC: 328 MG/DL (ref 65–105)
HCT VFR BLD CALC: 43 % (ref 36.3–47.1)
HEMOGLOBIN: 14.5 G/DL (ref 11.9–15.1)
IMMATURE GRANULOCYTES: 1 %
LYMPHOCYTES # BLD: 39 % (ref 24–43)
MAGNESIUM: 1.9 MG/DL (ref 1.6–2.6)
MCH RBC QN AUTO: 30 PG (ref 25.2–33.5)
MCHC RBC AUTO-ENTMCNC: 33.7 G/DL (ref 28.4–34.8)
MCV RBC AUTO: 88.8 FL (ref 82.6–102.9)
MONOCYTES # BLD: 9 % (ref 3–12)
MRSA, DNA, NASAL: NEGATIVE
NRBC AUTOMATED: 0 PER 100 WBC
PARTIAL THROMBOPLASTIN TIME: 60.7 SEC (ref 20.5–30.5)
PDW BLD-RTO: 11.9 % (ref 11.8–14.4)
PLATELET # BLD: 215 K/UL (ref 138–453)
PMV BLD AUTO: 10.9 FL (ref 8.1–13.5)
POTASSIUM SERPL-SCNC: 3.9 MMOL/L (ref 3.7–5.3)
RBC # BLD: 4.84 M/UL (ref 3.95–5.11)
SEG NEUTROPHILS: 47 % (ref 36–65)
SEGMENTED NEUTROPHILS ABSOLUTE COUNT: 3.7 K/UL (ref 1.5–8.1)
SODIUM BLD-SCNC: 138 MMOL/L (ref 135–144)
SPECIMEN DESCRIPTION: NORMAL
SPECIMEN DESCRIPTION: NORMAL
WBC # BLD: 7.8 K/UL (ref 3.5–11.3)

## 2022-03-24 PROCEDURE — 2060000000 HC ICU INTERMEDIATE R&B

## 2022-03-24 PROCEDURE — 97116 GAIT TRAINING THERAPY: CPT

## 2022-03-24 PROCEDURE — 6370000000 HC RX 637 (ALT 250 FOR IP): Performed by: STUDENT IN AN ORGANIZED HEALTH CARE EDUCATION/TRAINING PROGRAM

## 2022-03-24 PROCEDURE — 6360000002 HC RX W HCPCS: Performed by: STUDENT IN AN ORGANIZED HEALTH CARE EDUCATION/TRAINING PROGRAM

## 2022-03-24 PROCEDURE — 6370000000 HC RX 637 (ALT 250 FOR IP): Performed by: NURSE PRACTITIONER

## 2022-03-24 PROCEDURE — 83735 ASSAY OF MAGNESIUM: CPT

## 2022-03-24 PROCEDURE — 85025 COMPLETE CBC W/AUTO DIFF WBC: CPT

## 2022-03-24 PROCEDURE — 99233 SBSQ HOSP IP/OBS HIGH 50: CPT | Performed by: INTERNAL MEDICINE

## 2022-03-24 PROCEDURE — 2580000003 HC RX 258: Performed by: STUDENT IN AN ORGANIZED HEALTH CARE EDUCATION/TRAINING PROGRAM

## 2022-03-24 PROCEDURE — 6370000000 HC RX 637 (ALT 250 FOR IP)

## 2022-03-24 PROCEDURE — 82947 ASSAY GLUCOSE BLOOD QUANT: CPT

## 2022-03-24 PROCEDURE — 36415 COLL VENOUS BLD VENIPUNCTURE: CPT

## 2022-03-24 PROCEDURE — 80048 BASIC METABOLIC PNL TOTAL CA: CPT

## 2022-03-24 PROCEDURE — 85730 THROMBOPLASTIN TIME PARTIAL: CPT

## 2022-03-24 RX ORDER — HYDROCHLOROTHIAZIDE 25 MG/1
25 TABLET ORAL DAILY
Status: DISCONTINUED | OUTPATIENT
Start: 2022-03-24 | End: 2022-03-29

## 2022-03-24 RX ORDER — INSULIN GLARGINE 100 [IU]/ML
0.1 INJECTION, SOLUTION SUBCUTANEOUS DAILY
Status: DISCONTINUED | OUTPATIENT
Start: 2022-03-24 | End: 2022-03-25

## 2022-03-24 RX ORDER — POTASSIUM CHLORIDE 20 MEQ/1
40 TABLET, EXTENDED RELEASE ORAL PRN
Status: DISCONTINUED | OUTPATIENT
Start: 2022-03-24 | End: 2022-03-29

## 2022-03-24 RX ORDER — POTASSIUM CHLORIDE 7.45 MG/ML
10 INJECTION INTRAVENOUS PRN
Status: DISCONTINUED | OUTPATIENT
Start: 2022-03-24 | End: 2022-03-29

## 2022-03-24 RX ADMIN — INSULIN LISPRO 4 UNITS: 100 INJECTION, SOLUTION INTRAVENOUS; SUBCUTANEOUS at 07:42

## 2022-03-24 RX ADMIN — METOPROLOL TARTRATE 25 MG: 25 TABLET ORAL at 22:00

## 2022-03-24 RX ADMIN — AMIODARONE HYDROCHLORIDE 200 MG: 200 TABLET ORAL at 07:39

## 2022-03-24 RX ADMIN — AMIODARONE HYDROCHLORIDE 200 MG: 200 TABLET ORAL at 22:00

## 2022-03-24 RX ADMIN — HYDROCHLOROTHIAZIDE 25 MG: 25 TABLET ORAL at 11:41

## 2022-03-24 RX ADMIN — ATORVASTATIN CALCIUM 40 MG: 80 TABLET, FILM COATED ORAL at 22:00

## 2022-03-24 RX ADMIN — SODIUM CHLORIDE, PRESERVATIVE FREE 10 ML: 5 INJECTION INTRAVENOUS at 22:00

## 2022-03-24 RX ADMIN — INSULIN GLARGINE 8 UNITS: 100 INJECTION, SOLUTION SUBCUTANEOUS at 11:41

## 2022-03-24 RX ADMIN — ASPIRIN 81 MG: 81 TABLET, CHEWABLE ORAL at 09:32

## 2022-03-24 RX ADMIN — LOSARTAN POTASSIUM 50 MG: 50 TABLET, FILM COATED ORAL at 07:39

## 2022-03-24 RX ADMIN — INSULIN LISPRO 4 UNITS: 100 INJECTION, SOLUTION INTRAVENOUS; SUBCUTANEOUS at 11:41

## 2022-03-24 RX ADMIN — INSULIN LISPRO 4 UNITS: 100 INJECTION, SOLUTION INTRAVENOUS; SUBCUTANEOUS at 21:17

## 2022-03-24 RX ADMIN — METOPROLOL TARTRATE 25 MG: 25 TABLET ORAL at 07:40

## 2022-03-24 RX ADMIN — INSULIN LISPRO 2 UNITS: 100 INJECTION, SOLUTION INTRAVENOUS; SUBCUTANEOUS at 17:28

## 2022-03-24 RX ADMIN — Medication 18 UNITS/KG/HR: at 07:42

## 2022-03-24 RX ADMIN — AMIODARONE HYDROCHLORIDE 200 MG: 200 TABLET ORAL at 15:36

## 2022-03-24 ASSESSMENT — PAIN SCALES - GENERAL
PAINLEVEL_OUTOF10: 0

## 2022-03-24 NOTE — PROGRESS NOTES
Pt to room from [address]. Pt connected to telemetry and VSS.  Will continue to monitor

## 2022-03-24 NOTE — PLAN OF CARE
Problem:  Activity:  Goal: Risk for activity intolerance will decrease  Description: Risk for activity intolerance will decrease  3/24/2022 1049 by Tatiana Bain RN  Outcome: Ongoing  3/24/2022 0601 by Jez Lyons RN  Outcome: Ongoing  Goal: Will verbalize the importance of balancing activity with adequate rest periods  Description: Will verbalize the importance of balancing activity with adequate rest periods  3/24/2022 1049 by Tatiana Bain RN  Outcome: Ongoing  3/24/2022 0601 by Jez Lyons RN  Outcome: Ongoing  Goal: Ability to tolerate increased activity will improve  Description: Ability to tolerate increased activity will improve  3/24/2022 1049 by Tatiana Bain RN  Outcome: Ongoing  3/24/2022 0601 by Jez Lyons RN  Outcome: Ongoing     Problem: Cardiac:  Goal: Ability to maintain an adequate cardiac output will improve  Description: Ability to maintain an adequate cardiac output will improve  3/24/2022 1049 by Tatiana Bain RN  Outcome: Ongoing  3/24/2022 0601 by Jez Lyons RN  Outcome: Ongoing  Goal: Hemodynamic stability will improve  Description: Hemodynamic stability will improve  3/24/2022 1049 by Tatiana Bain RN  Outcome: Ongoing  3/24/2022 0601 by Jez Lyons RN  Outcome: Ongoing  Goal: Diagnostic test results will improve  Description: Diagnostic test results will improve  3/24/2022 1049 by Tatiana Bain RN  Outcome: Ongoing  3/24/2022 0601 by Jez Lyons RN  Outcome: Ongoing  Goal: Complications related to the disease process, condition or treatment will be avoided or minimized  Description: Complications related to the disease process, condition or treatment will be avoided or minimized  3/24/2022 1049 by Tatiana Bain RN  Outcome: Ongoing  3/24/2022 0601 by Jez Lyons RN  Outcome: Ongoing  Goal: Cerebral tissue perfusion will improve  Description: Cerebral tissue perfusion will improve  3/24/2022 1049 by Tatiana Bain RN  Outcome: Ongoing  3/24/2022 0601 by Dee Major RN  Outcome: Ongoing     Problem: Coping:  Goal: Ability to verbalize feelings about condition will improve  Description: Ability to verbalize feelings about condition will improve  3/24/2022 1049 by Rabia Rush RN  Outcome: Ongoing  3/24/2022 0601 by Dee Major RN  Outcome: Ongoing  Goal: Ability to identify strategies to decrease anxiety will improve  Description: Ability to identify strategies to decrease anxiety will improve  3/24/2022 1049 by Rabia Rush RN  Outcome: Ongoing  3/24/2022 0601 by Dee Major RN  Outcome: Ongoing  Goal: Ability to identify and alter barriers to satisfying sexual function will improve  Description: Ability to identify and alter barriers to satisfying sexual function will improve  3/24/2022 1049 by Rabia Rush RN  Outcome: Ongoing  3/24/2022 0601 by Dee Major RN  Outcome: Ongoing  Goal: Ability to identify and develop effective coping behavior will improve  Description: Ability to identify and develop effective coping behavior will improve  3/24/2022 1049 by Rabia Rush RN  Outcome: Ongoing  3/24/2022 0601 by Dee Major RN  Outcome: Ongoing     Problem: Health Behavior:  Goal: Ability to identify changes in lifestyle to reduce recurrence of condition will improve  Description: Ability to identify changes in lifestyle to reduce recurrence of condition will improve  3/24/2022 1049 by Rabia Rush RN  Outcome: Ongoing  3/24/2022 0601 by Dee Major RN  Outcome: Ongoing  Goal: Ability to manage health-related needs will improve  Description: Ability to manage health-related needs will improve  3/24/2022 1049 by Rabia Rush RN  Outcome: Ongoing  3/24/2022 0601 by Dee Major RN  Outcome: Ongoing  Goal: Identification of resources available to assist in meeting health care needs will improve  Description: Identification of resources available to assist in meeting health care needs will improve  3/24/2022 1049 by Lencho Winkler RN  Outcome: Ongoing  3/24/2022 0601 by Dylan Nino RN  Outcome: Ongoing     Problem: Nutritional:  Goal: Ability to identify appropriate dietary choices will improve  Description: Ability to identify appropriate dietary choices will improve  3/24/2022 1049 by Lencho Winkler RN  Outcome: Ongoing  3/24/2022 0601 by Dylan Nino RN  Outcome: Ongoing     Problem: Respiratory:  Goal: Ability to maintain adequate ventilation will improve  Description: Ability to maintain adequate ventilation will improve  3/24/2022 1049 by Lencho Winkler RN  Outcome: Ongoing  3/24/2022 0601 by Dylan Nino RN  Outcome: Ongoing  Goal: Levels of oxygenation will improve  Description: Levels of oxygenation will improve  3/24/2022 1049 by Lencho Winkler RN  Outcome: Ongoing  3/24/2022 0601 by Dylan Nino RN  Outcome: Ongoing     Problem: Sensory:  Goal: Pain level will decrease  Description: Pain level will decrease  3/24/2022 1049 by Lencho Winkler RN  Outcome: Ongoing  3/24/2022 0601 by Dylan Nino RN  Outcome: Ongoing     Problem: Falls - Risk of:  Goal: Will remain free from falls  Description: Will remain free from falls  3/24/2022 1049 by Lencho Winkler RN  Outcome: Ongoing  3/24/2022 0601 by Dylan Nino RN  Outcome: Ongoing  Goal: Absence of physical injury  Description: Absence of physical injury  3/24/2022 1049 by Lencho Winkler RN  Outcome: Ongoing  3/24/2022 0601 by Dylan Nino RN  Outcome: Ongoing    Electronically signed by Lencho Winkler RN on 3/24/2022 at 10:49 AM
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
## 2022-03-24 NOTE — PROGRESS NOTES
[facility]  Internal Medicine Teaching Residency Program  Inpatient Daily Progress Note  ______________________________________________________________________________    Patient: Lynne Beckham  YOB: 1960   Tatiana Diaz: [de-identified]     Room: 2014/2014-01  Admit date: 3/22/2022  Today's date: 03/24/22  Number of days in the hospital: 2    SUBJECTIVE   Admitting Diagnosis: NSTEMI (non-ST elevated myocardial infarction) ([facility] [address]  CC: Chest pain     - Pt examined at bedside. Chart & results reviewed. - No acute events overnight  - Patient resting comfortably in bed this morning although she states she is anxious for the procedure tomorrow  - Patient denies any chest pain shortness of breath nausea vomiting or diaphoresis  - Afebrile  - Vital signs stable    Plan for today:  - Continue current medical management  - Starting 8 units of Lantus daily   - Plan for CABG tomorrow  - NPO at midnight    ROS:  Constitutional:  negative for chills, fevers, sweats  Respiratory:  negative for cough, dyspnea on exertion, hemoptysis, shortness of breath, wheezing  Cardiovascular:  negative for chest pain, chest pressure/discomfort, lower extremity edema, palpitations  Gastrointestinal:  negative for abdominal pain, constipation, diarrhea, nausea, vomiting  Neurological:  negative for dizziness, headache    BRIEF HISTORY     The patient is a pleasant 70 y. o. female with a no PMHx formally diagnosed per the pt, EMR shows history of chronic back pain, hypertension, osteoarthritis, COPD and pneumothorax.  Pt presented with a chief complaint of chest pain.  Patient has midsternal chest pain that woke her up from sleep around 4 in the morning.  Patient states the pain is radiating to her neck into her jaw bilaterally.  Patient states it is hard to determine if the pain is traveling down either arm especially the left arm she has had rotator cuff surgery in the past.  Patient did endorse some mild associated shortness of breath and diaphoresis early this morning.  Patient received aspirin and nitroglycerin by EMS prior to arrival which alleviated the chest pain.  Patient denies any significant cardiac history.  Patient's blood pressure on admission was 177/101.  While in the emergency department patient's systolic blood pressure got up to 196.  Patient received Lopressor 25 mg and was started on Cozaar 25 mg.  Patient's blood pressure is improved. OBJECTIVE     Vital Signs:  BP (!) 145/78   Pulse 72   Temp 97.7 °F (36.5 °C) (Oral)   Resp 14   Wt 178 lb (80.7 kg)   SpO2 95%   BMI 29.62 kg/m²     Temp (24hrs), Av.2 °F (36.8 °C), Min:97.7 °F (36.5 °C), Max:98.6 °F (37 °C)    No intake/output data recorded. Physical Exam:  Physical Exam  Vitals and nursing note reviewed. Constitutional:       Appearance: Normal appearance. HENT:      Head: Normocephalic and atraumatic. Nose: Nose normal.      Mouth/Throat:      Mouth: Mucous membranes are moist.      Pharynx: Oropharynx is clear. Eyes:      Extraocular Movements: Extraocular movements intact. Conjunctiva/sclera: Conjunctivae normal.      Pupils: Pupils are equal, round, and reactive to light. Cardiovascular:      Rate and Rhythm: Normal rate and regular rhythm. Pulses: Normal pulses. Heart sounds: Normal heart sounds. No murmur heard. No friction rub. No gallop. Pulmonary:      Effort: Pulmonary effort is normal. No respiratory distress. Breath sounds: Normal breath sounds. No stridor. No wheezing, rhonchi or rales. Chest:      Chest wall: No tenderness. Abdominal:      General: Abdomen is flat. Bowel sounds are normal. There is no distension. Palpations: Abdomen is soft. There is no mass. Tenderness: There is no abdominal tenderness. There is no guarding or rebound. Hernia: No hernia is present.    Musculoskeletal:         General: No swelling, tenderness, deformity or signs of injury. Normal range of motion. Cervical back: Normal range of motion. Right lower leg: No edema. Left lower leg: No edema. Skin:     General: Skin is warm. Neurological:      General: No focal deficit present. Mental Status: She is alert and oriented to person, place, and time. Mental status is at baseline. Psychiatric:         Mood and Affect: Mood normal.         Behavior: Behavior normal.         Thought Content:  Thought content normal.         Judgment: Judgment normal.           Medications:  Scheduled Medications:    insulin lispro  0-12 Units SubCUTAneous TID WC    insulin lispro  0-6 Units SubCUTAneous Nightly    losartan  50 mg Oral Daily    amiodarone  200 mg Oral TID    sodium chloride flush  5-40 mL IntraVENous 2 times per day    metoprolol tartrate  25 mg Oral BID    aspirin  81 mg Oral Daily    atorvastatin  40 mg Oral Nightly    sodium chloride flush  5-40 mL IntraVENous 2 times per day     Continuous Infusions:    heparin (PORCINE) Infusion 18 Units/kg/hr (03/24/22 0742)    sodium chloride      sodium chloride      dextrose       PRN Medicationsheparin (porcine), 4,000 Units, PRN  heparin (porcine), 2,000 Units, PRN  sodium chloride flush, 10 mL, PRN  sodium chloride, 25 mL, PRN  ondansetron, 4 mg, Q8H PRN   Or  ondansetron, 4 mg, Q6H PRN  magnesium hydroxide, 30 mL, Daily PRN  sodium chloride flush, 5-40 mL, PRN  glucose, 15 g, PRN  dextrose, 12.5 g, PRN  glucagon (rDNA), 1 mg, PRN  dextrose, 100 mL/hr, PRN        Diagnostic Labs:  CBC:   Recent Labs     03/22/22  1039 03/23/22  0508 03/24/22  0556   WBC 7.3 8.1 7.8   RBC 5.12* 4.75 4.84   HGB 15.5* 14.6 14.5   HCT 45.2 42.5 43.0   MCV 88.3 89.5 88.8   RDW 12.0 12.0 11.9    209 215     BMP:   Recent Labs     03/22/22  0549 03/23/22  0508 03/24/22  0556   * 137 138   K 3.8 4.1 3.9   CL 98 102 104   CO2 21 22 23   BUN 11 11 13   CREATININE 0.62 0.66 0.58     BNP: No results for input(s): BNP in the last 72 hours. PT/INR:   Recent Labs     03/23/22  1533   PROTIME 11.4   INR 1.1     APTT:   Recent Labs     03/23/22 2058 03/23/22  2227 03/24/22  0556   APTT >120.0* 56.0* 60.7*     CARDIAC ENZYMES: No results for input(s): CKMB, CKMBINDEX, TROPONINI in the last 72 hours. Invalid input(s): CKTOTAL;3  FASTING LIPID PANEL:  Lab Results   Component Value Date    CHOL 208 (H) 03/22/2018    HDL 67 03/22/2018    TRIG 59 03/22/2018     LIVER PROFILE: No results for input(s): AST, ALT, ALB, BILIDIR, BILITOT, ALKPHOS in the last 72 hours. MICROBIOLOGY:   Lab Results   Component Value Date/Time    CULTURE WRONG TEST ORDERED 03/23/2022 03:22 PM       Imaging:    CT CHEST WO CONTRAST    Result Date: 3/23/2022  1. Probable scarring/rounded atelectasis bilateral lung bases, more nodular in appearance on the right compared to the left. 2. No pulmonary infiltrate or definite suspicious lesion evident. 3. Calcific atherosclerosis aorta and coronary arteries. 4. Hepatic steatosis. RECOMMENDATIONS: Unavailable     XR CHEST PORTABLE    Result Date: 3/22/2022  No acute cardiopulmonary abnormality. ASSESSMENT & PLAN     Assessment and Plan:    Principal Problem:    NSTEMI (non-ST elevated myocardial infarction) ([facility] [address]  Active Problems:    Essential hypertension    Type 2 diabetes mellitus without complication, without long-term current use of insulin (HCC)  Resolved Problems:    * No resolved hospital problems. *      IMPRESSION  This is a 60 y. o. female who presented with chest pain and found to have elevated troponins.  Patient admitted to inpatient status for NSTEMI and further ischemic work-up.     Principal Problem:  NSTEMI (non-ST elevated myocardial infarction)   - Troponin 16 > 55 > 76  - Continue to monitor troponins every 4 hours  - Heparin drip  - Daily labs  - NPO until after procedure and cardiology clears   - Cardiology following and plans for cardiac catheterization on 3/22/2022 > showed multivessel coronary artery disease  - CABG tomorrow      Active Problems:  Essential hypertension - improving   - Patient denies any formal diagnosis of essential hypertension.  EMR shows patient was taking Cozaar.  Upon examination patient denied taking any medication as she does not want to taking handfuls of medication daily.  Patient received 25 mg of Lopressor will be started on Cozaar 25 mg.  - /101 on admission   - Continue to monitor blood pressure     Type 2 diabetes mellitus without complication, without long-term current use of insulin   - Medium dose sliding scale   - Glucose checks 4 times daily before meals and at bedtime  - Urine analysis and urine culture  - Hemoglobin A1c 11.3  - Starting Lantus 8U daily         Diet: Carb controlled diet until midnight > NPO at midnight   DVT ppx: Heparin  GI ppx: None     PT/OT/SW: Consulted. Marcie Hamm identify needs. Discharge Planning: In process. Zeenat Luong MD  Internal Medicine Resident, PGY-1  [facility]; [place], [place]   7:42 AM 3/24/2022     Please note that part of this chart was generated using voice recognition dictation software. Although every effort was made to ensure the accuracy of this automated transcription, some errors in transcription may have occurred. I have discussed the care of Jaime Whaley , including pertinent history and exam findings,    today with the resident. I have seen and examined the patient and the key elements of all parts of the encounter have been performed by me . I agree with the assessment, plan and orders as documented by the resident. Principal Problem:    NSTEMI (non-ST elevated myocardial infarction) [facility])  Active Problems:    Essential hypertension    Type 2 diabetes mellitus without complication, without long-term current use of insulin (HCC)    3-vessel CAD  Resolved Problems:    * No resolved hospital problems.  *        Overall  course ; are improving over time.         Patient doing much better  Multivessel coronary artery disease  Plan for CABG            Electronically signed by Sal Jean MD

## 2022-03-24 NOTE — PROGRESS NOTES
Report called to Kaila Anna on [address] [facility].      Electronically signed by Alan Ramsay RN on 3/24/2022 at 10:49 AM

## 2022-03-24 NOTE — PLAN OF CARE
Problem:  Activity:  Goal: Risk for activity intolerance will decrease  Description: Risk for activity intolerance will decrease  Outcome: Ongoing  Goal: Will verbalize the importance of balancing activity with adequate rest periods  Description: Will verbalize the importance of balancing activity with adequate rest periods  Outcome: Ongoing  Goal: Ability to tolerate increased activity will improve  Description: Ability to tolerate increased activity will improve  Outcome: Ongoing     Problem: Cardiac:  Goal: Ability to maintain an adequate cardiac output will improve  Description: Ability to maintain an adequate cardiac output will improve  Outcome: Ongoing  Goal: Hemodynamic stability will improve  Description: Hemodynamic stability will improve  Outcome: Ongoing  Goal: Diagnostic test results will improve  Description: Diagnostic test results will improve  Outcome: Ongoing  Goal: Complications related to the disease process, condition or treatment will be avoided or minimized  Description: Complications related to the disease process, condition or treatment will be avoided or minimized  Outcome: Ongoing  Goal: Cerebral tissue perfusion will improve  Description: Cerebral tissue perfusion will improve  Outcome: Ongoing     Problem: Coping:  Goal: Ability to verbalize feelings about condition will improve  Description: Ability to verbalize feelings about condition will improve  Outcome: Ongoing  Goal: Ability to identify strategies to decrease anxiety will improve  Description: Ability to identify strategies to decrease anxiety will improve  Outcome: Ongoing  Goal: Ability to identify and alter barriers to satisfying sexual function will improve  Description: Ability to identify and alter barriers to satisfying sexual function will improve  Outcome: Ongoing  Goal: Ability to identify and develop effective coping behavior will improve  Description: Ability to identify and develop effective coping behavior will improve  Outcome: Ongoing     Problem: Health Behavior:  Goal: Ability to identify changes in lifestyle to reduce recurrence of condition will improve  Description: Ability to identify changes in lifestyle to reduce recurrence of condition will improve  Outcome: Ongoing  Goal: Ability to manage health-related needs will improve  Description: Ability to manage health-related needs will improve  Outcome: Ongoing  Goal: Identification of resources available to assist in meeting health care needs will improve  Description: Identification of resources available to assist in meeting health care needs will improve  Outcome: Ongoing     Problem: Nutritional:  Goal: Ability to identify appropriate dietary choices will improve  Description: Ability to identify appropriate dietary choices will improve  Outcome: Ongoing     Problem: Respiratory:  Goal: Ability to maintain adequate ventilation will improve  Description: Ability to maintain adequate ventilation will improve  Outcome: Ongoing  Goal: Levels of oxygenation will improve  Description: Levels of oxygenation will improve  Outcome: Ongoing     Problem: Sensory:  Goal: Pain level will decrease  Description: Pain level will decrease  Outcome: Ongoing     Problem: Falls - Risk of:  Goal: Will remain free from falls  Description: Will remain free from falls  Outcome: Ongoing  Goal: Absence of physical injury  Description: Absence of physical injury  Outcome: Ongoing

## 2022-03-24 NOTE — PROGRESS NOTES
[facility]   Progress Note                   Date:   3/24/2022  Patient name: Chapis Lizarraga  Date of admission:  3/22/2022  5:31 AM  MRN:   [id number]  YOB: 1960  PCP: LAKSHMI Kumari CNP    Reason for Admission: NSTEMI (non-ST elevated myocardial infarction) [facility]) [I21.4]  Chest pain, unspecified type [R07.9]    Subjective:       Clinical Changes / Abnormalities:  Patient seen and examined in bed in room. Denies chest pain or SOB. S/p CATH with MV-CAD and referral to CTS for evaluation. Pre-op testing ongoing with possible plan for OR later this week per note. SR on tele HR 75   Heparin drip on. Medications:   Scheduled Meds:   insulin lispro  0-12 Units SubCUTAneous TID WC    insulin lispro  0-6 Units SubCUTAneous Nightly    losartan  50 mg Oral Daily    amiodarone  200 mg Oral TID    sodium chloride flush  5-40 mL IntraVENous 2 times per day    metoprolol tartrate  25 mg Oral BID    aspirin  81 mg Oral Daily    atorvastatin  40 mg Oral Nightly    sodium chloride flush  5-40 mL IntraVENous 2 times per day     Continuous Infusions:   heparin (PORCINE) Infusion 18 Units/kg/hr (03/24/22 0742)    sodium chloride      sodium chloride      dextrose       CBC:   Recent Labs     03/22/22  1039 03/23/22  0508 03/24/22  0556   WBC 7.3 8.1 7.8   HGB 15.5* 14.6 14.5    209 215     BMP:    Recent Labs     03/22/22  0549 03/23/22  0508 03/24/22  0556   * 137 138   K 3.8 4.1 3.9   CL 98 102 104   CO2 21 22 23   BUN 11 11 13   CREATININE 0.62 0.66 0.58   GLUCOSE 329* 261* 254*     Hepatic: No results for input(s): AST, ALT, ALB, BILITOT, ALKPHOS in the last 72 hours. Troponin:   Recent Labs     03/22/22  1427 03/22/22  2230 03/23/22  0508   TROPHS 76* 81* 65*     BNP: No results for input(s): BNP in the last 72 hours. Lipids: No results for input(s): CHOL, HDL in the last 72 hours.     Invalid input(s): LDLCALCU  INR:   Recent Labs     03/23/22  153 INR 1.1     DIAGNOSTIC DATA:    EKG 3/23/2022  Normal sinus rhythm  Minimal voltage criteria for LVH, may be normal variant    CARDIAC CATH 3/23/22  LMCA: Ostial 25% stenosis     LAD: Mid 100% stenosis, collateral from RCA     D1: Proximal 70% stenosis, and mid 90% stenosis     LCx: Mid 75% stenosis     OM1 and OM2 ostial 80% stenosis     RCA: Ostial 30% stenosis     PDA: Proximal 80% stenosis     PL branch 75% stenosis        Procedure Summary  Multivessel CAD  Preserved LV function  Recommendations  CV surgery consult for CABG    ECHO 3/23/2022  Summary  Left ventricle is normal in size, global left ventricular systolic function  is low normal, calculated ejection fraction is 52%. Tricuspid valve was not well visualized. Trivial tricuspid regurgitation. Insignificant tricuspid regurgitation, unable to estimate RVSP. Objective:   Vitals: BP (!) 145/78   Pulse 72   Temp 97.7 °F (36.5 °C) (Oral)   Resp 14   Wt 178 lb (80.7 kg)   SpO2 95%   BMI 29.62 kg/m²   General appearance: alert and cooperative with exam  HEENT: Head: Normocephalic, no lesions, without obvious abnormality. Neck: no JVD, trachea midline, no adenopathy  Lungs: Clear to auscultation  Heart: Regular rate and rhythm, s1/s2 auscultated, no murmurs  Abdomen: soft, non-tender, bowel sounds active  Extremities: no edema  Neurologic: not done        Assessment / Acute Cardiac Problems:   1. NSTEMI  2. HTN     Patient Active Problem List:     Essential hypertension     Tobacco dependence     History of lumbar laminectomy     DDD (degenerative disc disease), lumbar     Chronic hand pain     Ganglion of joint     Arthritis of carpometacarpal (CMC) joint of both thumbs     NSTEMI (non-ST elevated myocardial infarction) ([address]     Type 2 diabetes mellitus without complication, without long-term current use of insulin ([address]      Plan of Treatment:   1. MVD s/p cardiac cath. Per CTS note possible OR later this week.  Continue ASA, statin, ARB, BB, On heparin gtt   2. HTN. Continue BB and ARB. 3. Keep K > 4.0 and Mag > 2.0  K 3.9 today  Will order [address] lab.     4. Rest of care managed per Primary Team.    Electronically signed by LAKSHMI Ortiz NP on 3/24/2022 at [address]. [phone]

## 2022-03-24 NOTE — PROGRESS NOTES
Physical Therapy  Facility/Department: [facility]  Daily Treatment Note  NAME: Radha Marino  : 1960  MRN: [id number]    Date of Service: 3/24/2022    Discharge Recommendations:  Patient would benefit from continued therapy after discharge   PT Equipment Recommendations  Equipment Needed: No    Assessment   Body structures, Functions, Activity limitations: Decreased functional mobility ; Decreased endurance  Assessment: Pt ambulated 240 ft  x2 and 20 ft x2 standby assist than CGA due to mild unsteadiness, SBA, Pt would benefit from continued skilled acute care therapy to address these endurance deficits and stair negotiation to return to prior level of independence. Prognosis: Good  Decision Making: Low Complexity  PT Education: Goals;Plan of Care;PT Role;Energy Conservation  REQUIRES PT FOLLOW UP: Yes  Activity Tolerance  Activity Tolerance: Patient Tolerated treatment well; Other (unsteady without U UE support)     Patient Diagnosis(es): The primary encounter diagnosis was Chest pain, unspecified type. A diagnosis of NSTEMI (non-ST elevated myocardial infarction) [facility]) was also pertinent to this visit. has a past medical history of Chronic back pain, Hypertension, Osteoarthritis, and Pneumothorax.   has a past surgical history that includes back surgery; Carpal tunnel release (Right); Appendectomy; Tubal ligation; and Cardiac catheterization (2022). Restrictions  Restrictions/Precautions  Restrictions/Precautions: General Precautions  Required Braces or Orthoses?: No  Position Activity Restriction  Other position/activity restrictions: up w/assist; Recent L rotator cuff repair  Subjective   General  Chart Reviewed: Yes  Response To Previous Treatment: Patient with no complaints from previous session.   Family / Caregiver Present: No  Subjective  Subjective: RN and pt in agreement for PT eval; pt supine in bed upon PT arrival, pt very pleasant and cooperative throughout session  General Comment  Comments: Pt retired to bed, given [employer]  Pain Screening  Patient Currently in Pain: No  Vital Signs  Patient Currently in Pain: No       Orientation  Orientation  Overall Orientation Status: Within Normal Limits  Cognition   Cognition  Overall Cognitive Status: WFL  Objective   Bed mobility  Supine to Sit: Modified independent  Sit to Supine: Modified independent  Scooting: Modified independent  Transfers- toilet transfer with SBA  Sit to Stand: Stand by assistance  Stand to sit: Stand by assistance  Comment: SBA provided for safety  Ambulation  Ambulation?: Yes  Ambulation 1  Surface: level tile  Device: No Device/IV pole  Assistance: Stand by assistance  Gait Deviations: Slow Angella;Decreased step length, midly unsteady at first  Distance: 240 ft x2, and 20 ft x2  Comments: Pt became unsteady, able to ambulate with IV pole and CGA and remain stable  Stairs/Curb  Stairs?: No     Balance  Posture: Fair  Sitting - Static: Good  Sitting - Dynamic: Good  Standing - Static: Fair;+  Standing - Dynamic: Fair;-  Comments: standing balance assessed w/ no AD, pt became unsteady amb with IV pole for unilateral; UE support,  pt able to sit EOB independently   Exercise deferred: Ambulated pt only, pt talking with family over phone, heart surgery in am.  Goals  Short term goals  Time Frame for Short term goals: 6  Short term goal 1: Pt to perform bed mobility and functional transfers independently  Short term goal 2: Pt to ambulate 400ft w/ no AD independently  Short term goal 3: Ascend/descend 2 stairs with no rail independently  Short term goal 4: Tolerate 30 minutes of therapy to demo increased endurance  Patient Goals   Patient goals :  To go home    Plan    Plan  Times per week: 4-5x/week  Times per day: Daily  Current Treatment Recommendations: Home Exercise Program,Strengthening,ROM,Safety Education & Training,Endurance Training,Transfer Training,Gait Training,Stair training  Safety Devices  Type of devices: Left in bed,Gait belt,Call light within reach  Restraints  Initially in place: No     Therapy Time   Individual Concurrent Group Co-treatment   Time In 1555         Time Out 1604         Minutes 9         Timed Code Treatment Minutes: [address] [facility], PTA

## 2022-03-25 ENCOUNTER — ANESTHESIA (OUTPATIENT)
Dept: OPERATING ROOM | Age: 62
End: 2022-03-25

## 2022-03-25 LAB
ABSOLUTE EOS #: 0.27 K/UL (ref 0–0.44)
ABSOLUTE IMMATURE GRANULOCYTE: 0.05 K/UL (ref 0–0.3)
ABSOLUTE LYMPH #: 3.18 K/UL (ref 1.1–3.7)
ABSOLUTE MONO #: 0.57 K/UL (ref 0.1–1.2)
ANION GAP SERPL CALCULATED.3IONS-SCNC: 14 MMOL/L (ref 9–17)
BASOPHILS # BLD: 1 % (ref 0–2)
BASOPHILS ABSOLUTE: 0.12 K/UL (ref 0–0.2)
BUN BLDV-MCNC: 12 MG/DL (ref 8–23)
CALCIUM SERPL-MCNC: 9.4 MG/DL (ref 8.6–10.4)
CHLORIDE BLD-SCNC: 99 MMOL/L (ref 98–107)
CO2: 21 MMOL/L (ref 20–31)
CREAT SERPL-MCNC: 0.61 MG/DL (ref 0.5–0.9)
EOSINOPHILS RELATIVE PERCENT: 3 % (ref 1–4)
GFR AFRICAN AMERICAN: >60 ML/MIN
GFR NON-AFRICAN AMERICAN: >60 ML/MIN
GFR SERPL CREATININE-BSD FRML MDRD: ABNORMAL ML/MIN/{1.73_M2}
GLUCOSE BLD-MCNC: 202 MG/DL (ref 65–105)
GLUCOSE BLD-MCNC: 236 MG/DL (ref 65–105)
GLUCOSE BLD-MCNC: 243 MG/DL (ref 65–105)
GLUCOSE BLD-MCNC: 260 MG/DL (ref 70–99)
GLUCOSE BLD-MCNC: 292 MG/DL (ref 65–105)
HCT VFR BLD CALC: 43.6 % (ref 36.3–47.1)
HEMOGLOBIN: 15 G/DL (ref 11.9–15.1)
IMMATURE GRANULOCYTES: 1 %
LYMPHOCYTES # BLD: 34 % (ref 24–43)
MCH RBC QN AUTO: 30.5 PG (ref 25.2–33.5)
MCHC RBC AUTO-ENTMCNC: 34.4 G/DL (ref 28.4–34.8)
MCV RBC AUTO: 88.8 FL (ref 82.6–102.9)
MONOCYTES # BLD: 6 % (ref 3–12)
NRBC AUTOMATED: 0 PER 100 WBC
PARTIAL THROMBOPLASTIN TIME: 57.7 SEC (ref 20.5–30.5)
PDW BLD-RTO: 11.9 % (ref 11.8–14.4)
PLATELET # BLD: 247 K/UL (ref 138–453)
PMV BLD AUTO: 11.1 FL (ref 8.1–13.5)
POTASSIUM SERPL-SCNC: 3.8 MMOL/L (ref 3.7–5.3)
RBC # BLD: 4.91 M/UL (ref 3.95–5.11)
SEG NEUTROPHILS: 55 % (ref 36–65)
SEGMENTED NEUTROPHILS ABSOLUTE COUNT: 5.3 K/UL (ref 1.5–8.1)
SODIUM BLD-SCNC: 134 MMOL/L (ref 135–144)
WBC # BLD: 9.5 K/UL (ref 3.5–11.3)

## 2022-03-25 PROCEDURE — 6370000000 HC RX 637 (ALT 250 FOR IP): Performed by: STUDENT IN AN ORGANIZED HEALTH CARE EDUCATION/TRAINING PROGRAM

## 2022-03-25 PROCEDURE — 97110 THERAPEUTIC EXERCISES: CPT

## 2022-03-25 PROCEDURE — 80048 BASIC METABOLIC PNL TOTAL CA: CPT

## 2022-03-25 PROCEDURE — 6370000000 HC RX 637 (ALT 250 FOR IP): Performed by: NURSE PRACTITIONER

## 2022-03-25 PROCEDURE — 6370000000 HC RX 637 (ALT 250 FOR IP)

## 2022-03-25 PROCEDURE — 2060000000 HC ICU INTERMEDIATE R&B

## 2022-03-25 PROCEDURE — 36415 COLL VENOUS BLD VENIPUNCTURE: CPT

## 2022-03-25 PROCEDURE — 85730 THROMBOPLASTIN TIME PARTIAL: CPT

## 2022-03-25 PROCEDURE — 6360000002 HC RX W HCPCS: Performed by: STUDENT IN AN ORGANIZED HEALTH CARE EDUCATION/TRAINING PROGRAM

## 2022-03-25 PROCEDURE — 94761 N-INVAS EAR/PLS OXIMETRY MLT: CPT

## 2022-03-25 PROCEDURE — 97116 GAIT TRAINING THERAPY: CPT

## 2022-03-25 PROCEDURE — 99232 SBSQ HOSP IP/OBS MODERATE 35: CPT | Performed by: INTERNAL MEDICINE

## 2022-03-25 PROCEDURE — 85025 COMPLETE CBC W/AUTO DIFF WBC: CPT

## 2022-03-25 PROCEDURE — 82947 ASSAY GLUCOSE BLOOD QUANT: CPT

## 2022-03-25 PROCEDURE — 97530 THERAPEUTIC ACTIVITIES: CPT

## 2022-03-25 RX ORDER — SODIUM CHLORIDE 0.9 % (FLUSH) 0.9 %
10 SYRINGE (ML) INJECTION PRN
Status: DISCONTINUED | OUTPATIENT
Start: 2022-03-25 | End: 2022-03-29

## 2022-03-25 RX ORDER — CHLORHEXIDINE GLUCONATE 4 G/100ML
SOLUTION TOPICAL SEE ADMIN INSTRUCTIONS
Status: DISCONTINUED | OUTPATIENT
Start: 2022-03-25 | End: 2022-03-29 | Stop reason: HOSPADM

## 2022-03-25 RX ORDER — CHLORHEXIDINE GLUCONATE 0.12 MG/ML
15 RINSE ORAL ONCE
Status: DISCONTINUED | OUTPATIENT
Start: 2022-03-25 | End: 2022-03-29

## 2022-03-25 RX ORDER — MAGNESIUM SULFATE 1 G/100ML
1000 INJECTION INTRAVENOUS PRN
Status: DISCONTINUED | OUTPATIENT
Start: 2022-03-25 | End: 2022-03-28

## 2022-03-25 RX ORDER — SODIUM CHLORIDE 9 MG/ML
25 INJECTION, SOLUTION INTRAVENOUS PRN
Status: DISCONTINUED | OUTPATIENT
Start: 2022-03-25 | End: 2022-03-29

## 2022-03-25 RX ORDER — ACETAMINOPHEN 325 MG/1
650 TABLET ORAL EVERY 4 HOURS PRN
Status: DISCONTINUED | OUTPATIENT
Start: 2022-03-25 | End: 2022-03-29

## 2022-03-25 RX ORDER — ASPIRIN 81 MG/1
81 TABLET ORAL
Status: ACTIVE | OUTPATIENT
Start: 2022-03-25 | End: 2022-03-25

## 2022-03-25 RX ORDER — INSULIN GLARGINE 100 [IU]/ML
15 INJECTION, SOLUTION SUBCUTANEOUS DAILY
Status: DISCONTINUED | OUTPATIENT
Start: 2022-03-25 | End: 2022-03-26

## 2022-03-25 RX ORDER — SODIUM CHLORIDE 0.9 % (FLUSH) 0.9 %
5-40 SYRINGE (ML) INJECTION EVERY 12 HOURS SCHEDULED
Status: DISCONTINUED | OUTPATIENT
Start: 2022-03-25 | End: 2022-03-29

## 2022-03-25 RX ORDER — SODIUM CHLORIDE 9 MG/ML
INJECTION, SOLUTION INTRAVENOUS CONTINUOUS
Status: DISCONTINUED | OUTPATIENT
Start: 2022-03-26 | End: 2022-03-29

## 2022-03-25 RX ADMIN — LOSARTAN POTASSIUM 50 MG: 50 TABLET, FILM COATED ORAL at 08:39

## 2022-03-25 RX ADMIN — AMIODARONE HYDROCHLORIDE 200 MG: 200 TABLET ORAL at 08:39

## 2022-03-25 RX ADMIN — ATORVASTATIN CALCIUM 40 MG: 80 TABLET, FILM COATED ORAL at 21:17

## 2022-03-25 RX ADMIN — INSULIN GLARGINE 15 UNITS: 100 INJECTION, SOLUTION SUBCUTANEOUS at 08:44

## 2022-03-25 RX ADMIN — Medication 18 UNITS/KG/HR: at 22:02

## 2022-03-25 RX ADMIN — METOPROLOL TARTRATE 25 MG: 25 TABLET ORAL at 21:17

## 2022-03-25 RX ADMIN — INSULIN LISPRO 4 UNITS: 100 INJECTION, SOLUTION INTRAVENOUS; SUBCUTANEOUS at 08:44

## 2022-03-25 RX ADMIN — ACETAMINOPHEN 650 MG: 325 TABLET ORAL at 10:51

## 2022-03-25 RX ADMIN — INSULIN LISPRO 4 UNITS: 100 INJECTION, SOLUTION INTRAVENOUS; SUBCUTANEOUS at 17:08

## 2022-03-25 RX ADMIN — INSULIN LISPRO 2 UNITS: 100 INJECTION, SOLUTION INTRAVENOUS; SUBCUTANEOUS at 20:30

## 2022-03-25 RX ADMIN — ASPIRIN 81 MG: 81 TABLET, CHEWABLE ORAL at 08:39

## 2022-03-25 RX ADMIN — AMIODARONE HYDROCHLORIDE 200 MG: 200 TABLET ORAL at 14:11

## 2022-03-25 RX ADMIN — MUPIROCIN: 20 OINTMENT TOPICAL at 20:34

## 2022-03-25 RX ADMIN — HYDROCHLOROTHIAZIDE 25 MG: 25 TABLET ORAL at 08:39

## 2022-03-25 RX ADMIN — INSULIN LISPRO 6 UNITS: 100 INJECTION, SOLUTION INTRAVENOUS; SUBCUTANEOUS at 11:42

## 2022-03-25 RX ADMIN — METOPROLOL TARTRATE 25 MG: 25 TABLET ORAL at 08:38

## 2022-03-25 RX ADMIN — AMIODARONE HYDROCHLORIDE 200 MG: 200 TABLET ORAL at 21:18

## 2022-03-25 ASSESSMENT — PAIN SCALES - GENERAL
PAINLEVEL_OUTOF10: 5
PAINLEVEL_OUTOF10: 0
PAINLEVEL_OUTOF10: 5
PAINLEVEL_OUTOF10: 5
PAINLEVEL_OUTOF10: 0

## 2022-03-25 ASSESSMENT — PAIN DESCRIPTION - DESCRIPTORS: DESCRIPTORS: HEADACHE

## 2022-03-25 ASSESSMENT — PAIN DESCRIPTION - PAIN TYPE: TYPE: ACUTE PAIN

## 2022-03-25 ASSESSMENT — PAIN DESCRIPTION - LOCATION: LOCATION: HEAD

## 2022-03-25 ASSESSMENT — PAIN DESCRIPTION - FREQUENCY: FREQUENCY: INTERMITTENT

## 2022-03-25 NOTE — PROGRESS NOTES
[facility]   Progress Note                   Date:   3/25/2022  Patient name: Lynne Beckham  Date of admission:  3/22/2022  5:31 AM  MRN:   [id number]  YOB: 1960  PCP: LAKSHMI Frye CNP    Reason for Admission: NSTEMI (non-ST elevated myocardial infarction) [facility]) [I21.4]  Chest pain, unspecified type [R07.9]    Subjective:       Clinical Changes / Abnormalities:  Patient seen and examined sitting up in chair in room with sister present. Denies chest pain or SOB. S/p CATH with MV-CAD and referral to CTS for evaluation. Pre-op testing ongoing with plan for OR to be determined. SR on tele HR 69  Heparin drip on.        Medications:   Scheduled Meds:   sodium chloride flush  5-40 mL IntraVENous 2 times per day    metoprolol tartrate  12.5 mg Oral Once    aspirin  81 mg Oral On Call to OR    mupirocin   Nasal BID    chlorhexidine  15 mL Mouth/Throat Once    chlorhexidine   Topical See Admin Instructions    vancomycin (VANCOCIN) IV  1,500 mg IntraVENous On Call to OR    insulin glargine  15 Units SubCUTAneous Daily    hydroCHLOROthiazide  25 mg Oral Daily    insulin lispro  0-12 Units SubCUTAneous TID WC    insulin lispro  0-6 Units SubCUTAneous Nightly    losartan  50 mg Oral Daily    amiodarone  200 mg Oral TID    sodium chloride flush  5-40 mL IntraVENous 2 times per day    metoprolol tartrate  25 mg Oral BID    aspirin  81 mg Oral Daily    atorvastatin  40 mg Oral Nightly    sodium chloride flush  5-40 mL IntraVENous 2 times per day     Continuous Infusions:   [START ON 3/26/2022] sodium chloride      sodium chloride      heparin (PORCINE) Infusion 18 Units/kg/hr (03/25/22 0627)    sodium chloride      sodium chloride      dextrose       CBC:   Recent Labs     03/23/22  0508 03/24/22  0556 03/25/22  0614   WBC 8.1 7.8 9.5   HGB 14.6 14.5 15.0    215 247     BMP:    Recent Labs     03/23/22  0508 03/24/22  0556 03/25/22  0614    138 134* Patient Active Problem List:     Essential hypertension     Tobacco dependence     History of lumbar laminectomy     DDD (degenerative disc disease), lumbar     Chronic hand pain     Ganglion of joint     Arthritis of carpometacarpal (CMC) joint of both thumbs     NSTEMI (non-ST elevated myocardial infarction) ([facility] [address]     Type 2 diabetes mellitus without complication, without long-term current use of insulin ([facility] [address]      Plan of Treatment:   1. MVD s/p cardiac cath. CTS plan for OR to be determined. Continue ASA, statin, ARB, BB,  On heparin gtt   2. HTN. Continue BB and ARB. 3. Keep K > 4.0 and Mag > 2.0  K 3.8 today  Mag 1.9 yesterday.       4. Rest of care managed per Primary Team.    Electronically signed by LAKSHMI Olmedo NP on 3/25/2022 at 1:10 PM  [address]  [phone]

## 2022-03-25 NOTE — PROGRESS NOTES
[facility]  Internal Medicine Teaching Residency Program  Inpatient Daily Progress Note  ______________________________________________________________________________    Patient: Allie Díaz  YOB: 1960   VYE:[id number]    Acct: [de-identified]     Room: [address] [place] [address]  Admit date: 3/22/2022  Today's date: 03/25/22  Number of days in the hospital: 3    SUBJECTIVE   Admitting Diagnosis: NSTEMI (non-ST elevated myocardial infarction) ([facility] [address]  CC: Chest pain    - Pt examined at bedside. Chart & results reviewed. - No acute events overnight  - Patient resting comfortably bed this morning  - Patient denies any chest pain shortness of breath diaphoresis nausea vomiting  - Afebrile  - Vital signs stable    Plan for today:  - CABG canceled due to emergent case  - We will resume diet  - Continue with current medical management    ROS:  Constitutional:  negative for chills, fevers, sweats  Respiratory:  negative for cough, dyspnea on exertion, hemoptysis, shortness of breath, wheezing  Cardiovascular:  negative for chest pain, chest pressure/discomfort, lower extremity edema, palpitations  Gastrointestinal:  negative for abdominal pain, constipation, diarrhea, nausea, vomiting  Neurological:  negative for dizziness, headache    BRIEF HISTORY     The patient is a pleasant 58 y. o. female with a no PMHx formally diagnosed per the pt, EMR shows history of chronic back pain, hypertension, osteoarthritis, COPD and pneumothorax.  Pt presented with a chief complaint of chest pain.  Patient has midsternal chest pain that woke her up from sleep around 4 in the morning.  Patient states the pain is radiating to her neck into her jaw bilaterally.  Patient states it is hard to determine if the pain is traveling down either arm especially the left arm she has had rotator cuff surgery in the past.  Patient did endorse some mild associated shortness of breath and diaphoresis early this morning.  Patient received aspirin and nitroglycerin by EMS prior to arrival which alleviated the chest pain.  Patient denies any significant cardiac history.  Patient's blood pressure on admission was 177/101.  While in the emergency department patient's systolic blood pressure got up to 196.  Patient received Lopressor 25 mg and was started on Cozaar 25 mg.  Patient's blood pressure is improved. OBJECTIVE     Vital Signs:  BP (!) 155/88   Pulse 81   Temp 98.2 °F (36.8 °C)   Resp 17   Ht 5' 5\" (1.651 m)   Wt 195 lb 1.7 oz (88.5 kg)   SpO2 92%   BMI 32.47 kg/m²     Temp (24hrs), Av °F (36.7 °C), Min:97.4 °F (36.3 °C), Max:98.7 °F (37.1 °C)    In: 859.6   Out: 400 [Urine:400]    Physical Exam:  Physical Exam  Vitals and nursing note reviewed. Constitutional:       Appearance: Normal appearance. HENT:      Head: Normocephalic and atraumatic. Nose: Nose normal.      Mouth/Throat:      Mouth: Mucous membranes are moist.      Pharynx: Oropharynx is clear. Eyes:      Extraocular Movements: Extraocular movements intact. Conjunctiva/sclera: Conjunctivae normal.      Pupils: Pupils are equal, round, and reactive to light. Cardiovascular:      Rate and Rhythm: Normal rate and regular rhythm. Pulses: Normal pulses. Heart sounds: Normal heart sounds. No murmur heard. No friction rub. No gallop. Pulmonary:      Effort: Pulmonary effort is normal. No respiratory distress. Breath sounds: Normal breath sounds. No stridor. No wheezing, rhonchi or rales. Chest:      Chest wall: No tenderness. Abdominal:      General: Abdomen is flat. Bowel sounds are normal. There is no distension. Palpations: Abdomen is soft. There is no mass. Tenderness: There is no abdominal tenderness. There is no guarding or rebound. Hernia: No hernia is present. Musculoskeletal:         General: No swelling, tenderness, deformity or signs of injury. Normal range of motion.       Cervical back: Normal range of motion. Right lower leg: No edema. Left lower leg: No edema. Skin:     General: Skin is warm. Neurological:      General: No focal deficit present. Mental Status: She is alert and oriented to person, place, and time. Mental status is at baseline. Psychiatric:         Mood and Affect: Mood normal.         Behavior: Behavior normal.         Thought Content:  Thought content normal.         Judgment: Judgment normal.           Medications:  Scheduled Medications:    sodium chloride flush  5-40 mL IntraVENous 2 times per day    metoprolol tartrate  12.5 mg Oral Once    aspirin  81 mg Oral On Call to OR    mupirocin   Nasal BID    chlorhexidine  15 mL Mouth/Throat Once    chlorhexidine   Topical See Admin Instructions    vancomycin (VANCOCIN) IV  1,500 mg IntraVENous On Call to OR    insulin glargine  0.1 Units/kg SubCUTAneous Daily    hydroCHLOROthiazide  25 mg Oral Daily    insulin lispro  0-12 Units SubCUTAneous TID WC    insulin lispro  0-6 Units SubCUTAneous Nightly    losartan  50 mg Oral Daily    amiodarone  200 mg Oral TID    sodium chloride flush  5-40 mL IntraVENous 2 times per day    metoprolol tartrate  25 mg Oral BID    aspirin  81 mg Oral Daily    atorvastatin  40 mg Oral Nightly    sodium chloride flush  5-40 mL IntraVENous 2 times per day     Continuous Infusions:    [START ON 3/26/2022] sodium chloride      sodium chloride      heparin (PORCINE) Infusion 18 Units/kg/hr (03/25/22 0627)    sodium chloride      sodium chloride      dextrose       PRN Medicationssodium chloride flush, 10 mL, PRN  sodium chloride, 25 mL, PRN  potassium chloride, 40 mEq, PRN   Or  potassium alternative oral replacement, 40 mEq, PRN   Or  potassium chloride, 10 mEq, PRN  heparin (porcine), 4,000 Units, PRN  heparin (porcine), 2,000 Units, PRN  sodium chloride flush, 10 mL, PRN  sodium chloride, 25 mL, PRN  ondansetron, 4 mg, Q8H PRN   Or  ondansetron, 4 mg, Q6H PRN  magnesium hydroxide, 30 mL, Daily PRN  sodium chloride flush, 5-40 mL, PRN  glucose, 15 g, PRN  dextrose, 12.5 g, PRN  glucagon (rDNA), 1 mg, PRN  dextrose, 100 mL/hr, PRN        Diagnostic Labs:  CBC:   Recent Labs     03/23/22  0508 03/24/22  0556 03/25/22  0614   WBC 8.1 7.8 9.5   RBC 4.75 4.84 4.91   HGB 14.6 14.5 15.0   HCT 42.5 43.0 43.6   MCV 89.5 88.8 88.8   RDW 12.0 11.9 11.9    215 247     BMP:   Recent Labs     03/23/22  0508 03/24/22  0556 03/25/22  0614    138 134*   K 4.1 3.9 3.8    104 99   CO2 22 23 21   BUN 11 13 12   CREATININE 0.66 0.58 0.61     BNP: No results for input(s): BNP in the last 72 hours. PT/INR:   Recent Labs     03/23/22  1533   PROTIME 11.4   INR 1.1     APTT:   Recent Labs     03/23/22  2227 03/24/22  0556 03/25/22  0614   APTT 56.0* 60.7* 57.7*     CARDIAC ENZYMES: No results for input(s): CKMB, CKMBINDEX, TROPONINI in the last 72 hours. Invalid input(s): CKTOTAL;3  FASTING LIPID PANEL:  Lab Results   Component Value Date    CHOL 208 (H) 03/22/2018    HDL 67 03/22/2018    TRIG 59 03/22/2018     LIVER PROFILE: No results for input(s): AST, ALT, ALB, BILIDIR, BILITOT, ALKPHOS in the last 72 hours. MICROBIOLOGY:   Lab Results   Component Value Date/Time    CULTURE WRONG TEST ORDERED 03/23/2022 03:22 PM       Imaging:    CT CHEST WO CONTRAST    Result Date: 3/23/2022  1. Probable scarring/rounded atelectasis bilateral lung bases, more nodular in appearance on the right compared to the left. 2. No pulmonary infiltrate or definite suspicious lesion evident. 3. Calcific atherosclerosis aorta and coronary arteries. 4. Hepatic steatosis. RECOMMENDATIONS: Unavailable     XR CHEST PORTABLE    Result Date: 3/22/2022  No acute cardiopulmonary abnormality.        ASSESSMENT & PLAN     Assessment and Plan:    Principal Problem:    NSTEMI (non-ST elevated myocardial infarction) ([facility] [address]  Active Problems:    Essential hypertension    Type 2 diabetes mellitus without complication, without long-term current use of insulin ([address]    3-vessel CAD  Resolved Problems:    * No resolved hospital problems. *      IMPRESSION  This is a 60 y. o. female who presented with chest pain and found to have elevated troponins. Patient admitted to inpatient status for NSTEMI and further ischemic work-up.     Principal Problem:  NSTEMI (non-ST elevated myocardial infarction)   - Troponin 16 > 55 > 76  - Continue to monitor troponins every 4 hours  - Heparin drip  - Daily labs  - NPO until after procedure and cardiology clears   - Cardiology following and plans for cardiac catheterization on 3/22/2022 > showed multivessel coronary artery disease  - CABG over the weekend or Monday tentatively     Active Problems:  Essential hypertension - stable  - Patient denies any formal diagnosis of essential hypertension.  EMR shows patient was taking [address] examination patient denied taking any medication as she does not want to taking handfuls of medication daily. Patient received 25 mg of Lopressor will be started on Cozaar 25 mg.  - /101 on admission   - Continue to monitor blood pressure     Type 2 diabetes mellitus without complication, without long-term current use of insulin   - Medium dose sliding scale   - Glucose checks 4 times daily before meals and at bedtime  - Urine analysis and urine culture  - Hemoglobin A1c 11.3  - Starting Lantus 8U daily         Diet: Carb controlled diet until midnight > NPO at midnight   DVT ppx: Heparin  GI ppx: None     PT/OT/SW: Consulted. Jonas Patella identify needs. Discharge Planning: In process. Keya Landry MD  Internal Medicine Resident, PGY-1  [facility]; [place], [place]   7:00 AM 3/25/2022     Please note that part of this chart was generated using voice recognition dictation software. Although every effort was made to ensure the accuracy of this automated transcription, some errors in transcription may have occurred.     I have discussed the care of Jamia Driver , including pertinent history and exam findings,    today with the resident. I have seen and examined the patient and the key elements of all parts of the encounter have been performed by me . I agree with the assessment, plan and orders as documented by the resident. Principal Problem:    NSTEMI (non-ST elevated myocardial infarction) [facility])  Active Problems:    Essential hypertension    Type 2 diabetes mellitus without complication, without long-term current use of insulin (HCC)    3-vessel CAD  Resolved Problems:    * No resolved hospital problems. *        Overall  course ;                                   are improving over time.         Adjusting insulin  Likely have CABG tomorrow or Monday          Electronically signed by Lit Peter MD

## 2022-03-25 NOTE — PLAN OF CARE
Problem: Falls - Risk of:  Goal: Will remain free from falls  Description: Will remain free from falls  3/24/2022 2147 by Mohan Khan RN  Outcome: Met This Shift  3/24/2022 1049 by Kierra Puentes RN  Outcome: Ongoing  Goal: Absence of physical injury  Description: Absence of physical injury  3/24/2022 2147 by Mohan Khan RN  Outcome: Met This Shift  3/24/2022 1049 by Kierra Puentes RN  Outcome: Ongoing     Problem: Infection:  Goal: Will remain free from infection  Description: Will remain free from infection  Outcome: Met This Shift     Problem: Safety:  Goal: Free from accidental physical injury  Description: Free from accidental physical injury  Outcome: Met This Shift  Goal: Free from intentional harm  Description: Free from intentional harm  Outcome: Met This Shift     Problem: Daily Care:  Goal: Daily care needs are met  Description: Daily care needs are met  Outcome: Met This Shift     Problem: Pain:  Goal: Patient's pain/discomfort is manageable  Description: Patient's pain/discomfort is manageable  Outcome: Met This Shift     Problem: Skin Integrity:  Goal: Skin integrity will stabilize  Description: Skin integrity will stabilize  Outcome: Met This Shift     Problem:  Activity:  Goal: Risk for activity intolerance will decrease  Description: Risk for activity intolerance will decrease  3/24/2022 2147 by Mohan Khan RN  Outcome: Ongoing  3/24/2022 1049 by Kierra Puentes RN  Outcome: Ongoing  Goal: Will verbalize the importance of balancing activity with adequate rest periods  Description: Will verbalize the importance of balancing activity with adequate rest periods  3/24/2022 2147 by Mohan Khan RN  Outcome: Ongoing  3/24/2022 1049 by Kierra Puentes RN  Outcome: Ongoing  Goal: Ability to tolerate increased activity will improve  Description: Ability to tolerate increased activity will improve  3/24/2022 2147 by Mohan Khan RN  Outcome: Ongoing  3/24/2022 1049 by Kierra Puentes RN  Outcome: Ongoing     Problem: Cardiac:  Goal: Ability to maintain an adequate cardiac output will improve  Description: Ability to maintain an adequate cardiac output will improve  3/24/2022 2147 by José Hull RN  Outcome: Ongoing  3/24/2022 2147 by José Hull RN  Outcome: Ongoing  3/24/2022 1049 by Charles House RN  Outcome: Ongoing  Goal: Hemodynamic stability will improve  Description: Hemodynamic stability will improve  3/24/2022 2147 by José Hull RN  Outcome: Ongoing  3/24/2022 2147 by José Hull RN  Outcome: Ongoing  3/24/2022 1049 by Charles House RN  Outcome: Ongoing  Goal: Diagnostic test results will improve  Description: Diagnostic test results will improve  3/24/2022 1049 by Charles House RN  Outcome: Ongoing  Goal: Complications related to the disease process, condition or treatment will be avoided or minimized  Description: Complications related to the disease process, condition or treatment will be avoided or minimized  3/24/2022 1049 by Charles House RN  Outcome: Ongoing  Goal: Cerebral tissue perfusion will improve  Description: Cerebral tissue perfusion will improve  3/24/2022 1049 by Charles House RN  Outcome: Ongoing     Problem: Coping:  Goal: Ability to verbalize feelings about condition will improve  Description: Ability to verbalize feelings about condition will improve  3/24/2022 1049 by Charles House RN  Outcome: Ongoing  Goal: Ability to identify strategies to decrease anxiety will improve  Description: Ability to identify strategies to decrease anxiety will improve  3/24/2022 1049 by Charles House RN  Outcome: Ongoing  Goal: Ability to identify and alter barriers to satisfying sexual function will improve  Description: Ability to identify and alter barriers to satisfying sexual function will improve  3/24/2022 1049 by Charles House RN  Outcome: Ongoing  Goal: Ability to identify and develop effective coping behavior will improve  Description: Ability to identify and develop effective coping behavior will improve  3/24/2022 1049 by Sachi Flores RN  Outcome: Ongoing     Problem: Health Behavior:  Goal: Ability to identify changes in lifestyle to reduce recurrence of condition will improve  Description: Ability to identify changes in lifestyle to reduce recurrence of condition will improve  3/24/2022 1049 by Sachi Flores RN  Outcome: Ongoing  Goal: Ability to manage health-related needs will improve  Description: Ability to manage health-related needs will improve  3/24/2022 1049 by Sachi Flores RN  Outcome: Ongoing  Goal: Identification of resources available to assist in meeting health care needs will improve  Description: Identification of resources available to assist in meeting health care needs will improve  3/24/2022 1049 by Sachi Flores RN  Outcome: Ongoing     Problem: Nutritional:  Goal: Ability to identify appropriate dietary choices will improve  Description: Ability to identify appropriate dietary choices will improve  3/24/2022 1049 by Sachi Flores RN  Outcome: Ongoing     Problem: Respiratory:  Goal: Ability to maintain adequate ventilation will improve  Description: Ability to maintain adequate ventilation will improve  3/24/2022 1049 by Sachi Flores RN  Outcome: Ongoing  Goal: Levels of oxygenation will improve  Description: Levels of oxygenation will improve  3/24/2022 1049 by Sachi Flores RN  Outcome: Ongoing     Problem: Sensory:  Goal: Pain level will decrease  Description: Pain level will decrease  3/24/2022 1049 by Sachi Flores RN  Outcome: Ongoing

## 2022-03-25 NOTE — PROGRESS NOTES
Physical Therapy  Facility/Department: [facility] 2  Daily Treatment Note  NAME: Naya Haro  : 1960  MRN: [id number]    Date of Service: 3/25/2022    Discharge Recommendations:  Patient would benefit from continued therapy after discharge   PT Equipment Recommendations  Equipment Needed: No    Assessment   Body structures, Functions, Activity limitations: Decreased functional mobility ; Decreased endurance  Assessment: Pt ambulated 240 ft  x3 with CGA due to mild unsteadiness, no SOB thhis visit. Pt would benefit from continued skilled acute care therapy to address these endurance deficits and stair negotiation to return to prior level of independence. Prognosis: Good  Decision Making: Low Complexity  PT Education: Goals;Plan of Care;PT Role;Energy Conservation  REQUIRES PT FOLLOW UP: Yes  Activity Tolerance  Activity Tolerance: Patient Tolerated treatment well; Other     Patient Diagnosis(es): The primary encounter diagnosis was Chest pain, unspecified type. A diagnosis of NSTEMI (non-ST elevated myocardial infarction) [facility]) was also pertinent to this visit. has a past medical history of Chronic back pain, Hypertension, Osteoarthritis, and Pneumothorax.   has a past surgical history that includes back surgery; Carpal tunnel release (Right); Appendectomy; Tubal ligation; and Cardiac catheterization (2022). Restrictions  Restrictions/Precautions  Restrictions/Precautions: General Precautions  Required Braces or Orthoses?: No  Position Activity Restriction  Other position/activity restrictions: up w/assist; Recent L rotator cuff repair  Subjective   General  Chart Reviewed: Yes  Response To Previous Treatment: Patient with no complaints from previous session.   Family / Caregiver Present: No  Subjective  Subjective: RN and pt in agreement for PT eval; pt supine up in chair upon PT arrival, pt very pleasant and cooperative throughout session  General Comment  Comments: Pt retired to recliner chair  Pain Screening  Patient Currently in Pain: No  Vital Signs  Patient Currently in Pain: No       Orientation  Orientation  Overall Orientation Status: Within Normal Limits  Cognition   Cognition  Overall Cognitive Status: WFL  Objective   Bed mobility  Supine to Sit: Unable to assess  Sit to Supine: Unable to assess  Scooting: Modified independent  Transfers  Sit to Stand: Stand by assistance  Stand to sit: Stand by assistance  Bed to Chair: Stand by assistance  Comment: SBA provided for safety  Ambulation  Ambulation?: Yes  Ambulation 1  Surface: level tile  Device: No Device (IV pole)  Assistance: Stand by assistance  Quality of Gait: unsteady at times  Gait Deviations: Slow Angella;Decreased step length  Distance: 240 ft   Pt required rest break after first \"lap  \" 240 ft, than amb an additional 240 ft x2 with CGA and IV pole. Exercises  Comments: Standing exercise program: Heel/toe raises, hip flexion, hip abduction, and hamstring curls. Reps: 10-15 as kimberly    Goals  Short term goals  Time Frame for Short term goals: 6  Short term goal 1: Pt to perform bed mobility and functional transfers independently  Short term goal 2: Pt to ambulate 400ft w/ no AD independently  Short term goal 3: Ascend/descend 2 stairs with no rail independently  Short term goal 4: Tolerate 30 minutes of therapy to demo increased endurance  Patient Goals   Patient goals :  To go home    Plan    Plan  Times per week: 4-5x/week  Times per day: Daily  Current Treatment Recommendations: Home Exercise Program,Strengthening,ROM,Safety Education & Training,Endurance Training,Transfer Training,Gait Training,Stair training  Safety Devices  Type of devices: Gait belt,Call light within reach,Left in chair  Restraints  Initially in place: No     Therapy Time   Individual Concurrent Group Co-treatment   Time In 0845         Time Out 0925         Minutes 40         Timed Code Treatment Minutes: [facility], [facility]

## 2022-03-25 NOTE — FLOWSHEET NOTE
Pt admitted to room 2003. Vitals taken, pt orientated to room. Call light within reach. See further documentation.     Mendy TRIANA

## 2022-03-26 PROBLEM — E87.6 HYPOKALEMIA: Status: ACTIVE | Noted: 2022-03-26

## 2022-03-26 PROBLEM — E83.42 HYPOMAGNESEMIA: Status: ACTIVE | Noted: 2022-03-26

## 2022-03-26 LAB
ABSOLUTE EOS #: 0.27 K/UL (ref 0–0.44)
ABSOLUTE IMMATURE GRANULOCYTE: 0.05 K/UL (ref 0–0.3)
ABSOLUTE LYMPH #: 3.34 K/UL (ref 1.1–3.7)
ABSOLUTE MONO #: 0.68 K/UL (ref 0.1–1.2)
ANION GAP SERPL CALCULATED.3IONS-SCNC: 12 MMOL/L (ref 9–17)
BASOPHILS # BLD: 1 % (ref 0–2)
BASOPHILS ABSOLUTE: 0.13 K/UL (ref 0–0.2)
BUN BLDV-MCNC: 10 MG/DL (ref 8–23)
CALCIUM SERPL-MCNC: 9.2 MG/DL (ref 8.6–10.4)
CHLORIDE BLD-SCNC: 95 MMOL/L (ref 98–107)
CO2: 23 MMOL/L (ref 20–31)
CREAT SERPL-MCNC: 0.64 MG/DL (ref 0.5–0.9)
EOSINOPHILS RELATIVE PERCENT: 3 % (ref 1–4)
GFR AFRICAN AMERICAN: >60 ML/MIN
GFR NON-AFRICAN AMERICAN: >60 ML/MIN
GFR SERPL CREATININE-BSD FRML MDRD: ABNORMAL ML/MIN/{1.73_M2}
GLUCOSE BLD-MCNC: 156 MG/DL (ref 65–105)
GLUCOSE BLD-MCNC: 259 MG/DL (ref 65–105)
GLUCOSE BLD-MCNC: 261 MG/DL (ref 70–99)
GLUCOSE BLD-MCNC: 317 MG/DL (ref 65–105)
HCT VFR BLD CALC: 41.5 % (ref 36.3–47.1)
HEMOGLOBIN: 14.4 G/DL (ref 11.9–15.1)
IMMATURE GRANULOCYTES: 1 %
LYMPHOCYTES # BLD: 37 % (ref 24–43)
MAGNESIUM: 1.7 MG/DL (ref 1.6–2.6)
MCH RBC QN AUTO: 30.6 PG (ref 25.2–33.5)
MCHC RBC AUTO-ENTMCNC: 34.7 G/DL (ref 28.4–34.8)
MCV RBC AUTO: 88.1 FL (ref 82.6–102.9)
MONOCYTES # BLD: 8 % (ref 3–12)
NRBC AUTOMATED: 0 PER 100 WBC
PARTIAL THROMBOPLASTIN TIME: 62.6 SEC (ref 20.5–30.5)
PDW BLD-RTO: 11.8 % (ref 11.8–14.4)
PLATELET # BLD: 232 K/UL (ref 138–453)
PMV BLD AUTO: 11.2 FL (ref 8.1–13.5)
POTASSIUM SERPL-SCNC: 3.5 MMOL/L (ref 3.7–5.3)
RBC # BLD: 4.71 M/UL (ref 3.95–5.11)
SEG NEUTROPHILS: 50 % (ref 36–65)
SEGMENTED NEUTROPHILS ABSOLUTE COUNT: 4.51 K/UL (ref 1.5–8.1)
SODIUM BLD-SCNC: 130 MMOL/L (ref 135–144)
WBC # BLD: 9 K/UL (ref 3.5–11.3)

## 2022-03-26 PROCEDURE — 6370000000 HC RX 637 (ALT 250 FOR IP): Performed by: NURSE PRACTITIONER

## 2022-03-26 PROCEDURE — 6360000002 HC RX W HCPCS

## 2022-03-26 PROCEDURE — 6370000000 HC RX 637 (ALT 250 FOR IP)

## 2022-03-26 PROCEDURE — 36415 COLL VENOUS BLD VENIPUNCTURE: CPT

## 2022-03-26 PROCEDURE — 6370000000 HC RX 637 (ALT 250 FOR IP): Performed by: STUDENT IN AN ORGANIZED HEALTH CARE EDUCATION/TRAINING PROGRAM

## 2022-03-26 PROCEDURE — 6360000002 HC RX W HCPCS: Performed by: NURSE PRACTITIONER

## 2022-03-26 PROCEDURE — 82947 ASSAY GLUCOSE BLOOD QUANT: CPT

## 2022-03-26 PROCEDURE — 83735 ASSAY OF MAGNESIUM: CPT

## 2022-03-26 PROCEDURE — 85730 THROMBOPLASTIN TIME PARTIAL: CPT

## 2022-03-26 PROCEDURE — 80048 BASIC METABOLIC PNL TOTAL CA: CPT

## 2022-03-26 PROCEDURE — 94761 N-INVAS EAR/PLS OXIMETRY MLT: CPT

## 2022-03-26 PROCEDURE — 2580000003 HC RX 258: Performed by: NURSE PRACTITIONER

## 2022-03-26 PROCEDURE — 85025 COMPLETE CBC W/AUTO DIFF WBC: CPT

## 2022-03-26 PROCEDURE — 2060000000 HC ICU INTERMEDIATE R&B

## 2022-03-26 PROCEDURE — 99232 SBSQ HOSP IP/OBS MODERATE 35: CPT | Performed by: INTERNAL MEDICINE

## 2022-03-26 RX ORDER — MAGNESIUM SULFATE IN WATER 40 MG/ML
2000 INJECTION, SOLUTION INTRAVENOUS ONCE
Status: COMPLETED | OUTPATIENT
Start: 2022-03-26 | End: 2022-03-26

## 2022-03-26 RX ORDER — POTASSIUM CHLORIDE 20 MEQ/1
40 TABLET, EXTENDED RELEASE ORAL ONCE
Status: COMPLETED | OUTPATIENT
Start: 2022-03-26 | End: 2022-03-26

## 2022-03-26 RX ORDER — INSULIN GLARGINE 100 [IU]/ML
25 INJECTION, SOLUTION SUBCUTANEOUS DAILY
Status: DISCONTINUED | OUTPATIENT
Start: 2022-03-27 | End: 2022-03-28

## 2022-03-26 RX ORDER — INSULIN GLARGINE 100 [IU]/ML
20 INJECTION, SOLUTION SUBCUTANEOUS DAILY
Status: DISCONTINUED | OUTPATIENT
Start: 2022-03-26 | End: 2022-03-26

## 2022-03-26 RX ADMIN — SODIUM CHLORIDE, PRESERVATIVE FREE 10 ML: 5 INJECTION INTRAVENOUS at 08:55

## 2022-03-26 RX ADMIN — POTASSIUM CHLORIDE 10 MEQ: 7.46 INJECTION, SOLUTION INTRAVENOUS at 05:58

## 2022-03-26 RX ADMIN — METOPROLOL TARTRATE 25 MG: 25 TABLET ORAL at 20:34

## 2022-03-26 RX ADMIN — ATORVASTATIN CALCIUM 40 MG: 80 TABLET, FILM COATED ORAL at 20:34

## 2022-03-26 RX ADMIN — AMIODARONE HYDROCHLORIDE 200 MG: 200 TABLET ORAL at 20:34

## 2022-03-26 RX ADMIN — INSULIN LISPRO 2 UNITS: 100 INJECTION, SOLUTION INTRAVENOUS; SUBCUTANEOUS at 16:28

## 2022-03-26 RX ADMIN — ASPIRIN 81 MG: 81 TABLET, CHEWABLE ORAL at 08:54

## 2022-03-26 RX ADMIN — INSULIN GLARGINE 20 UNITS: 100 INJECTION, SOLUTION SUBCUTANEOUS at 10:00

## 2022-03-26 RX ADMIN — AMIODARONE HYDROCHLORIDE 200 MG: 200 TABLET ORAL at 08:54

## 2022-03-26 RX ADMIN — SODIUM CHLORIDE: 9 INJECTION, SOLUTION INTRAVENOUS at 00:07

## 2022-03-26 RX ADMIN — HYDROCHLOROTHIAZIDE 25 MG: 25 TABLET ORAL at 08:54

## 2022-03-26 RX ADMIN — INSULIN LISPRO 6 UNITS: 100 INJECTION, SOLUTION INTRAVENOUS; SUBCUTANEOUS at 12:11

## 2022-03-26 RX ADMIN — ACETAMINOPHEN 650 MG: 325 TABLET ORAL at 06:25

## 2022-03-26 RX ADMIN — POTASSIUM CHLORIDE 40 MEQ: 20 TABLET, EXTENDED RELEASE ORAL at 12:03

## 2022-03-26 RX ADMIN — AMIODARONE HYDROCHLORIDE 200 MG: 200 TABLET ORAL at 16:25

## 2022-03-26 RX ADMIN — MAGNESIUM SULFATE 2000 MG: 2 INJECTION INTRAVENOUS at 09:03

## 2022-03-26 RX ADMIN — INSULIN LISPRO 4 UNITS: 100 INJECTION, SOLUTION INTRAVENOUS; SUBCUTANEOUS at 20:40

## 2022-03-26 RX ADMIN — METOPROLOL TARTRATE 25 MG: 25 TABLET ORAL at 08:55

## 2022-03-26 RX ADMIN — LOSARTAN POTASSIUM 50 MG: 50 TABLET, FILM COATED ORAL at 08:54

## 2022-03-26 ASSESSMENT — PAIN SCALES - GENERAL
PAINLEVEL_OUTOF10: 0
PAINLEVEL_OUTOF10: 0
PAINLEVEL_OUTOF10: 6

## 2022-03-26 NOTE — PROGRESS NOTES
[facility]   Progress Note                   Date:   3/26/2022  Patient name: Naya Haro  Date of admission:  3/22/2022  5:31 AM  MRN:   [id number]  YOB: 1960  PCP: LAKSHMI Brito CNP    Reason for Admission: NSTEMI (non-ST elevated myocardial infarction) [facility]) [I21.4]  Chest pain, unspecified type [R07.9]    Subjective:       Clinical Changes / Abnormalities:  Patient seen and examined resting in bed. She denies any CP or SOB. On heparin gtt. CTS cancelled yesterday due to emergent case.         Medications:   Scheduled Meds:   potassium chloride  40 mEq Oral Once    magnesium sulfate  2,000 mg IntraVENous Once    [START ON 3/27/2022] insulin glargine  25 Units SubCUTAneous Daily    sodium chloride flush  5-40 mL IntraVENous 2 times per day    metoprolol tartrate  12.5 mg Oral Once    mupirocin   Nasal BID    chlorhexidine  15 mL Mouth/Throat Once    chlorhexidine   Topical See Admin Instructions    hydroCHLOROthiazide  25 mg Oral Daily    insulin lispro  0-12 Units SubCUTAneous TID WC    insulin lispro  0-6 Units SubCUTAneous Nightly    losartan  50 mg Oral Daily    amiodarone  200 mg Oral TID    sodium chloride flush  5-40 mL IntraVENous 2 times per day    metoprolol tartrate  25 mg Oral BID    aspirin  81 mg Oral Daily    atorvastatin  40 mg Oral Nightly    sodium chloride flush  5-40 mL IntraVENous 2 times per day     Continuous Infusions:   sodium chloride 75 mL/hr at 03/26/22 0007    sodium chloride      heparin (PORCINE) Infusion 18 Units/kg/hr (03/25/22 2202)    sodium chloride      sodium chloride      dextrose       CBC:   Recent Labs     03/24/22  0556 03/25/22  0614 03/26/22  0233   WBC 7.8 9.5 9.0   HGB 14.5 15.0 14.4    247 232     BMP:    Recent Labs     03/24/22  0556 03/25/22  0614 03/26/22  0233    134* 130*   K 3.9 3.8 3.5*    99 95*   CO2 23 21 23   BUN 13 12 10   CREATININE 0.58 0.61 0.64   GLUCOSE 254* 260* 261*     Hepatic: No results for input(s): AST, ALT, ALB, BILITOT, ALKPHOS in the last 72 hours. Troponin:   No results for input(s): TROPHS in the last 72 hours. BNP: No results for input(s): BNP in the last 72 hours. Lipids: No results for input(s): CHOL, HDL in the last 72 hours. Invalid input(s): LDLCALCU  INR:   Recent Labs     03/23/22  1533   INR 1.1     DIAGNOSTIC DATA:    EKG 3/23/2022  Normal sinus rhythm  Minimal voltage criteria for LVH, may be normal variant    CARDIAC CATH 3/23/22  LMCA: Ostial 25% stenosis     LAD: Mid 100% stenosis, collateral from RCA     D1: Proximal 70% stenosis, and mid 90% stenosis     LCx: Mid 75% stenosis     OM1 and OM2 ostial 80% stenosis     RCA: Ostial 30% stenosis     PDA: Proximal 80% stenosis     PL branch 75% stenosis        Procedure Summary  Multivessel CAD  Preserved LV function  Recommendations  CV surgery consult for CABG    ECHO 3/23/2022  Summary  Left ventricle is normal in size, global left ventricular systolic function  is low normal, calculated ejection fraction is 52%. Tricuspid valve was not well visualized. Trivial tricuspid regurgitation. Insignificant tricuspid regurgitation, unable to estimate RVSP. Objective:   Vitals: /73   Pulse 66   Temp 98.2 °F (36.8 °C) (Oral)   Resp 18   Ht 5' 5\" (1.651 m)   Wt 176 lb 5.9 oz (80 kg)   SpO2 92%   BMI 29.35 kg/m²   General appearance: alert and cooperative with exam  HEENT: Head: Normocephalic, no lesions, without obvious abnormality. Neck: no JVD, trachea midline, no adenopathy  Lungs: Clear to auscultation  Heart: Regular rate and rhythm, s1/s2 auscultated, no murmurs  Abdomen: soft, non-tender, bowel sounds active  Extremities: no edema  Neurologic: not done        Assessment / Acute Cardiac Problems:   1. NSTEMI MV-CAD CTS consult  2.  HTN     Patient Active Problem List:     Essential hypertension     Tobacco dependence     History of lumbar laminectomy     DDD (degenerative disc disease), lumbar     Chronic hand pain     Ganglion of joint     Arthritis of carpometacarpal (CMC) joint of both thumbs     NSTEMI (non-ST elevated myocardial infarction) [address] [address]     Type 2 diabetes mellitus without complication, without long-term current use of insulin ([facility] [address]      Plan of Treatment:   1. MVD s/p cardiac cath. CTS plan for OR to be determined. Continue ASA, statin, ARB, BB,  On heparin gtt   2. HTN. Continue BB and ARB.    3. Keep K > 4.0 and Mag > 2.0      Electronically signed by LAKSHMI Suggs CNP on 3/26/2022 at 10:35 [address].  [phone]

## 2022-03-26 NOTE — PROGRESS NOTES
[facility]  Internal Medicine Teaching Residency Program  Inpatient Daily Progress Note  ______________________________________________________________________________    Patient: Misti Lombardo  YOB: 1960   Darrick Varela: [de-identified]     Room: 2003/2003-01  Admit date: 3/22/2022  Today's date: 03/26/22  Number of days in the hospital: 4    SUBJECTIVE   Admitting Diagnosis: NSTEMI (non-ST elevated myocardial infarction) ([facility] [address]  CC: Chest pain     - Pt examined at bedside. Chart & results reviewed. - No acute events overnight  - Patient resting comfortably in bed this morning  - Hypokalemia  - Hypomagnesemia  - Afebrile  - Vital signs stable    Plan for today:  - Keep patient NPO for now, possible CABG this afternoon > awaiting CT surgery confirmation  - Lantus 25U  - The surgery should be delayed until Monday we will give patient carb controlled diet  - Replace electrolytes  - Diabetic education     ROS:  Constitutional:  negative for chills, fevers, sweats  Respiratory:  negative for cough, dyspnea on exertion, hemoptysis, shortness of breath, wheezing  Cardiovascular:  negative for chest pain, chest pressure/discomfort, lower extremity edema, palpitations  Gastrointestinal:  negative for abdominal pain, constipation, diarrhea, nausea, vomiting  Neurological:  negative for dizziness, headache    BRIEF HISTORY     The patient is a pleasant 58 y. o. female with a no PMHx formally diagnosed per the pt, EMR shows history of chronic back pain, hypertension, osteoarthritis, COPD and pneumothorax.  Pt presented with a chief complaint of chest pain.  Patient has midsternal chest pain that woke her up from sleep around 4 in the morning.  Patient states the pain is radiating to her neck into her jaw bilaterally.  Patient states it is hard to determine if the pain is traveling down either arm especially the left arm she has had rotator cuff surgery in the past.  Patient did endorse some mild associated shortness of breath and diaphoresis early this morning.  Patient received aspirin and nitroglycerin by EMS prior to arrival which alleviated the chest pain.  Patient denies any significant cardiac history.  Patient's blood pressure on admission was 177/101.  While in the emergency department patient's systolic blood pressure got up to 196.  Patient received Lopressor 25 mg and was started on Cozaar 25 mg.  Patient's blood pressure is improved. OBJECTIVE     Vital Signs:  BP (!) 99/54   Pulse 65   Temp 98.2 °F (36.8 °C) (Oral)   Resp 18   Ht 5' 5\" (1.651 m)   Wt 176 lb 5.9 oz (80 kg)   SpO2 93%   BMI 29.35 kg/m²     Temp (24hrs), Av.2 °F (36.8 °C), Min:97.7 °F (36.5 °C), Max:98.6 °F (37 °C)    In: 1109.6   Out: -     Physical Exam:  Physical Exam  Vitals and nursing note reviewed. Constitutional:       Appearance: Normal appearance. HENT:      Head: Normocephalic and atraumatic. Nose: Nose normal.      Mouth/Throat:      Mouth: Mucous membranes are moist.      Pharynx: Oropharynx is clear. Eyes:      Extraocular Movements: Extraocular movements intact. Conjunctiva/sclera: Conjunctivae normal.      Pupils: Pupils are equal, round, and reactive to light. Cardiovascular:      Rate and Rhythm: Normal rate and regular rhythm. Pulses: Normal pulses. Heart sounds: Normal heart sounds. No murmur heard. No friction rub. No gallop. Pulmonary:      Effort: Pulmonary effort is normal. No respiratory distress. Breath sounds: Normal breath sounds. No stridor. No wheezing, rhonchi or rales. Chest:      Chest wall: No tenderness. Abdominal:      General: Abdomen is flat. Bowel sounds are normal. There is no distension. Palpations: Abdomen is soft. There is no mass. Tenderness: There is no abdominal tenderness. There is no guarding or rebound. Hernia: No hernia is present.    Musculoskeletal:         General: No swelling, tenderness, deformity or signs of injury. Normal range of motion. Cervical back: Normal range of motion. Right lower leg: No edema. Left lower leg: No edema. Skin:     General: Skin is warm. Neurological:      General: No focal deficit present. Mental Status: She is alert and oriented to person, place, and time. Mental status is at baseline. Psychiatric:         Mood and Affect: Mood normal.         Behavior: Behavior normal.         Thought Content:  Thought content normal.         Judgment: Judgment normal.           Medications:  Scheduled Medications:    insulin glargine  20 Units SubCUTAneous Daily    potassium chloride  40 mEq Oral Once    sodium chloride flush  5-40 mL IntraVENous 2 times per day    metoprolol tartrate  12.5 mg Oral Once    mupirocin   Nasal BID    chlorhexidine  15 mL Mouth/Throat Once    chlorhexidine   Topical See Admin Instructions    hydroCHLOROthiazide  25 mg Oral Daily    insulin lispro  0-12 Units SubCUTAneous TID WC    insulin lispro  0-6 Units SubCUTAneous Nightly    losartan  50 mg Oral Daily    amiodarone  200 mg Oral TID    sodium chloride flush  5-40 mL IntraVENous 2 times per day    metoprolol tartrate  25 mg Oral BID    aspirin  81 mg Oral Daily    atorvastatin  40 mg Oral Nightly    sodium chloride flush  5-40 mL IntraVENous 2 times per day     Continuous Infusions:    sodium chloride 75 mL/hr at 03/26/22 0007    sodium chloride      heparin (PORCINE) Infusion 18 Units/kg/hr (03/25/22 2202)    sodium chloride      sodium chloride      dextrose       PRN Medicationssodium chloride flush, 10 mL, PRN  sodium chloride, 25 mL, PRN  acetaminophen, 650 mg, Q4H PRN  magnesium sulfate, 1,000 mg, PRN  potassium chloride, 40 mEq, PRN   Or  potassium alternative oral replacement, 40 mEq, PRN   Or  potassium chloride, 10 mEq, PRN  heparin (porcine), 4,000 Units, PRN  heparin (porcine), 2,000 Units, PRN  sodium chloride flush, 10 mL, PRN  sodium chloride, 25 mL, PRN  ondansetron, 4 mg, Q8H PRN   Or  ondansetron, 4 mg, Q6H PRN  magnesium hydroxide, 30 mL, Daily PRN  sodium chloride flush, 5-40 mL, PRN  glucose, 15 g, PRN  dextrose, 12.5 g, PRN  glucagon (rDNA), 1 mg, PRN  dextrose, 100 mL/hr, PRN        Diagnostic Labs:  CBC:   Recent Labs     03/24/22  0556 03/25/22  0614 03/26/22  0233   WBC 7.8 9.5 9.0   RBC 4.84 4.91 4.71   HGB 14.5 15.0 14.4   HCT 43.0 43.6 41.5   MCV 88.8 88.8 88.1   RDW 11.9 11.9 11.8    247 232     BMP:   Recent Labs     03/24/22  0556 03/25/22  0614 03/26/22  0233    134* 130*   K 3.9 3.8 3.5*    99 95*   CO2 23 21 23   BUN 13 12 10   CREATININE 0.58 0.61 0.64     BNP: No results for input(s): BNP in the last 72 hours. PT/INR:   Recent Labs     03/23/22  1533   PROTIME 11.4   INR 1.1     APTT:   Recent Labs     03/23/22 2227 03/24/22  0556 03/25/22  0614   APTT 56.0* 60.7* 57.7*     CARDIAC ENZYMES: No results for input(s): CKMB, CKMBINDEX, TROPONINI in the last 72 hours. Invalid input(s): CKTOTAL;3  FASTING LIPID PANEL:  Lab Results   Component Value Date    CHOL 208 (H) 03/22/2018    HDL 67 03/22/2018    TRIG 59 03/22/2018     LIVER PROFILE: No results for input(s): AST, ALT, ALB, BILIDIR, BILITOT, ALKPHOS in the last 72 hours. MICROBIOLOGY:   Lab Results   Component Value Date/Time    CULTURE WRONG TEST ORDERED 03/23/2022 03:22 PM       Imaging:    CT CHEST WO CONTRAST    Result Date: 3/23/2022  1. Probable scarring/rounded atelectasis bilateral lung bases, more nodular in appearance on the right compared to the left. 2. No pulmonary infiltrate or definite suspicious lesion evident. 3. Calcific atherosclerosis aorta and coronary arteries. 4. Hepatic steatosis. RECOMMENDATIONS: Unavailable     XR CHEST PORTABLE    Result Date: 3/22/2022  No acute cardiopulmonary abnormality.        ASSESSMENT & PLAN     Assessment and Plan:    Principal Problem:    NSTEMI (non-ST elevated myocardial infarction) [facility])  Active Problems:    Essential hypertension    Type 2 diabetes mellitus without complication, without long-term current use of insulin ([address]    3-vessel CAD  Resolved Problems:    * No resolved hospital problems. *      IMPRESSION  This is a 60 y. o. female who presented with chest pain and found to have elevated troponins. Patient admitted to inpatient status for NSTEMI and further ischemic work-up.     Principal Problem:  NSTEMI (non-ST elevated myocardial infarction)   - Troponin 16 > 55 > 76  - Continue to monitor troponins every 4 hours  - Heparin drip  - Daily labs  - NPO until after procedure and CTS clears   - Cardiology following and plans for cardiac catheterization on 3/22/2022 > showed multivessel coronary artery disease  - CABG over the weekend or Monday tentatively     Active Problems:  Essential hypertension - stable  - Patient denies any formal diagnosis of essential hypertension.  EMR shows patient was taking [address] examination patient denied taking any medication as she does not want to taking handfuls of medication daily. Patient received 25 mg of Lopressor will be started on Cozaar 25 mg.  - /101 on admission   - Continue to monitor blood pressure     Type 2 diabetes mellitus without complication, without long-term current use of insulin   - Medium dose sliding scale   - Glucose checks 4 times daily before meals and at bedtime  - Urine analysis and urine culture  - Hemoglobin A1c 11.3  - Starting Lantus 25U daily  - Diabetic education       Hypokalemia  -Daily labs  -Replace electrolytes as needed    Hypomagnesemia  - Daily lab  - Replace electrolytes as needed     Diet: NPO  DVT ppx: Heparin  GI ppx: None     PT/OT/SW: Consulted. Darren Mohs identify needs. Discharge Planning: In process, pending CABG and recovery. Bonnie Lyle MD  Internal Medicine Resident, PGY-1  [address], [place]   7:00 AM 3/26/2022     Please note that part of this chart was generated using voice recognition dictation software. Although every effort was made to ensure the accuracy of this automated transcription, some errors in transcription may have occurred. I have discussed the care of Cristina Sanchez , including pertinent history and exam findings,    today with the resident. I have seen and examined the patient and the key elements of all parts of the encounter have been performed by me . I agree with the assessment, plan and orders as documented by the resident. Principal Problem:    3-vessel CAD  Active Problems:    Essential hypertension    NSTEMI (non-ST elevated myocardial infarction) ([facility] [address]    Type 2 diabetes mellitus without complication, without long-term current use of insulin (HCC)    Hypokalemia    Hypomagnesemia  Resolved Problems:    * No resolved hospital problems. *        Overall  course ;                                   are improving over time.         Plan for CABG   Increasing insulin           Electronically signed by Nayely Schwarz MD

## 2022-03-26 NOTE — PLAN OF CARE
Problem:  Activity:  Goal: Risk for activity intolerance will decrease  Description: Risk for activity intolerance will decrease  Outcome: Ongoing  Goal: Will verbalize the importance of balancing activity with adequate rest periods  Description: Will verbalize the importance of balancing activity with adequate rest periods  Outcome: Ongoing  Goal: Ability to tolerate increased activity will improve  Description: Ability to tolerate increased activity will improve  Outcome: Ongoing     Problem: Cardiac:  Goal: Ability to maintain an adequate cardiac output will improve  Description: Ability to maintain an adequate cardiac output will improve  Outcome: Ongoing  Goal: Hemodynamic stability will improve  Description: Hemodynamic stability will improve  Outcome: Ongoing  Goal: Diagnostic test results will improve  Description: Diagnostic test results will improve  Outcome: Ongoing  Goal: Complications related to the disease process, condition or treatment will be avoided or minimized  Description: Complications related to the disease process, condition or treatment will be avoided or minimized  Outcome: Ongoing  Goal: Cerebral tissue perfusion will improve  Description: Cerebral tissue perfusion will improve  Outcome: Ongoing     Problem: Coping:  Goal: Ability to verbalize feelings about condition will improve  Description: Ability to verbalize feelings about condition will improve  Outcome: Ongoing  Goal: Ability to identify strategies to decrease anxiety will improve  Description: Ability to identify strategies to decrease anxiety will improve  Outcome: Ongoing  Goal: Ability to identify and alter barriers to satisfying sexual function will improve  Description: Ability to identify and alter barriers to satisfying sexual function will improve  Outcome: Ongoing  Goal: Ability to identify and develop effective coping behavior will improve  Description: Ability to identify and develop effective coping behavior will improve  Outcome: Ongoing     Problem: Health Behavior:  Goal: Ability to identify changes in lifestyle to reduce recurrence of condition will improve  Description: Ability to identify changes in lifestyle to reduce recurrence of condition will improve  Outcome: Ongoing  Goal: Ability to manage health-related needs will improve  Description: Ability to manage health-related needs will improve  Outcome: Ongoing  Goal: Identification of resources available to assist in meeting health care needs will improve  Description: Identification of resources available to assist in meeting health care needs will improve  Outcome: Ongoing     Problem: Nutritional:  Goal: Ability to identify appropriate dietary choices will improve  Description: Ability to identify appropriate dietary choices will improve  Outcome: Ongoing     Problem: Respiratory:  Goal: Ability to maintain adequate ventilation will improve  Description: Ability to maintain adequate ventilation will improve  Outcome: Ongoing  Goal: Levels of oxygenation will improve  Description: Levels of oxygenation will improve  Outcome: Ongoing     Problem: Sensory:  Goal: Pain level will decrease  Description: Pain level will decrease  Outcome: Ongoing     Problem: Falls - Risk of:  Goal: Will remain free from falls  Description: Will remain free from falls  Outcome: Ongoing  Goal: Absence of physical injury  Description: Absence of physical injury  Outcome: Ongoing     Problem: Infection:  Goal: Will remain free from infection  Description: Will remain free from infection  Outcome: Ongoing     Problem: Safety:  Goal: Free from accidental physical injury  Description: Free from accidental physical injury  Outcome: Ongoing  Goal: Free from intentional harm  Description: Free from intentional harm  Outcome: Ongoing     Problem: Daily Care:  Goal: Daily care needs are met  Description: Daily care needs are met  Outcome: Ongoing     Problem: Pain:  Goal: Pain level will decrease  Description: Pain level will decrease  Outcome: Ongoing  Goal: Patient's pain/discomfort is manageable  Description: Patient's pain/discomfort is manageable  Outcome: Ongoing  Goal: Control of acute pain  Description: Control of acute pain  Outcome: Ongoing  Goal: Control of chronic pain  Description: Control of chronic pain  Outcome: Ongoing     Problem: Skin Integrity:  Goal: Skin integrity will stabilize  Description: Skin integrity will stabilize  Outcome: Ongoing     Problem: Discharge Planning:  Goal: Patients continuum of care needs are met  Description: Patients continuum of care needs are met  Outcome: Ongoing

## 2022-03-26 NOTE — FLOWSHEET NOTE
Assessment: Patient is a 60 y.o. female who arrived to the hospital due to \"chest heaviness. \" Patient was sitting up in hospital bed, when  visited. Intervention:  visited patient per initial rounding visits and per surgery list.  introduced herself to patient and learned about her well-being. Per patient, she was scheduled to have \"valve replacement surgery\" today, however, it was postponed until tomorrow. Patient shared that her daughter is her main support and that she will be coming tonight with dinner. Patient was in good spirits, coping well and thanked  for visit. Outcome: Patient was receptive of 's visit and support. Plan: Chaplains can make follow-up visit, per request. Parris Watson can be reached 24/7 via [employer]     03/25/22 2058   Encounter Summary   Services provided to: Patient   Referral/Consult From: Rounding;Multi-disciplinary team  (Surgery List)   [address] [id number] Visiting   (3/25/2022)   Complexity of Encounter Low   Length of Encounter 15 minutes   Spiritual Assessment Completed Yes   Routine   Type Pre-procedure   Spiritual/Mandaeism   Type Spiritual support   Assessment Calm; Approachable; Hopeful;Coping;Helplessness   Intervention Active listening;Explored feelings, thoughts, concerns;Explored coping resources;Nurtured hope;Sustaining presence/ Ministry of presence; Discussed belief system/Hinduism practices/don;Discussed illness/injury and it's impact   Outcome Comfort;Expressed gratitude;Coping;Receptive

## 2022-03-27 LAB
ABO/RH: NORMAL
ABSOLUTE EOS #: 0.28 K/UL (ref 0–0.44)
ABSOLUTE IMMATURE GRANULOCYTE: 0.04 K/UL (ref 0–0.3)
ABSOLUTE LYMPH #: 3.12 K/UL (ref 1.1–3.7)
ABSOLUTE MONO #: 0.74 K/UL (ref 0.1–1.2)
ANION GAP SERPL CALCULATED.3IONS-SCNC: 14 MMOL/L (ref 9–17)
ANTIBODY SCREEN: NEGATIVE
ARM BAND NUMBER: NORMAL
BASOPHILS # BLD: 2 % (ref 0–2)
BASOPHILS ABSOLUTE: 0.17 K/UL (ref 0–0.2)
BLD PROD TYP BPU: NORMAL
BLD PROD TYP BPU: NORMAL
BUN BLDV-MCNC: 10 MG/DL (ref 8–23)
CALCIUM SERPL-MCNC: 9.1 MG/DL (ref 8.6–10.4)
CHLORIDE BLD-SCNC: 101 MMOL/L (ref 98–107)
CO2: 20 MMOL/L (ref 20–31)
CREAT SERPL-MCNC: 0.54 MG/DL (ref 0.5–0.9)
CROSSMATCH RESULT: NORMAL
CROSSMATCH RESULT: NORMAL
DISPENSE STATUS BLOOD BANK: NORMAL
DISPENSE STATUS BLOOD BANK: NORMAL
EOSINOPHILS RELATIVE PERCENT: 3 % (ref 1–4)
EXPIRATION DATE: NORMAL
GFR AFRICAN AMERICAN: >60 ML/MIN
GFR NON-AFRICAN AMERICAN: >60 ML/MIN
GFR SERPL CREATININE-BSD FRML MDRD: ABNORMAL ML/MIN/{1.73_M2}
GLUCOSE BLD-MCNC: 181 MG/DL (ref 65–105)
GLUCOSE BLD-MCNC: 191 MG/DL (ref 65–105)
GLUCOSE BLD-MCNC: 196 MG/DL (ref 65–105)
GLUCOSE BLD-MCNC: 225 MG/DL (ref 70–99)
GLUCOSE BLD-MCNC: 248 MG/DL (ref 65–105)
HCT VFR BLD CALC: 40.5 % (ref 36.3–47.1)
HEMOGLOBIN: 14.2 G/DL (ref 11.9–15.1)
IMMATURE GRANULOCYTES: 1 %
LYMPHOCYTES # BLD: 35 % (ref 24–43)
MCH RBC QN AUTO: 31.4 PG (ref 25.2–33.5)
MCHC RBC AUTO-ENTMCNC: 35.1 G/DL (ref 28.4–34.8)
MCV RBC AUTO: 89.6 FL (ref 82.6–102.9)
MONOCYTES # BLD: 8 % (ref 3–12)
NRBC AUTOMATED: 0 PER 100 WBC
PARTIAL THROMBOPLASTIN TIME: 49 SEC (ref 20.5–30.5)
PARTIAL THROMBOPLASTIN TIME: 75 SEC (ref 20.5–30.5)
PDW BLD-RTO: 11.9 % (ref 11.8–14.4)
PLATELET # BLD: 199 K/UL (ref 138–453)
PMV BLD AUTO: 10.9 FL (ref 8.1–13.5)
POTASSIUM SERPL-SCNC: 4.1 MMOL/L (ref 3.7–5.3)
RBC # BLD: 4.52 M/UL (ref 3.95–5.11)
SEG NEUTROPHILS: 51 % (ref 36–65)
SEGMENTED NEUTROPHILS ABSOLUTE COUNT: 4.47 K/UL (ref 1.5–8.1)
SODIUM BLD-SCNC: 135 MMOL/L (ref 135–144)
TRANSFUSION STATUS: NORMAL
TRANSFUSION STATUS: NORMAL
UNIT DIVISION: 0
UNIT DIVISION: 0
UNIT NUMBER: NORMAL
UNIT NUMBER: NORMAL
WBC # BLD: 8.8 K/UL (ref 3.5–11.3)

## 2022-03-27 PROCEDURE — 6360000002 HC RX W HCPCS: Performed by: STUDENT IN AN ORGANIZED HEALTH CARE EDUCATION/TRAINING PROGRAM

## 2022-03-27 PROCEDURE — 6370000000 HC RX 637 (ALT 250 FOR IP): Performed by: STUDENT IN AN ORGANIZED HEALTH CARE EDUCATION/TRAINING PROGRAM

## 2022-03-27 PROCEDURE — 85730 THROMBOPLASTIN TIME PARTIAL: CPT

## 2022-03-27 PROCEDURE — 2060000000 HC ICU INTERMEDIATE R&B

## 2022-03-27 PROCEDURE — 6370000000 HC RX 637 (ALT 250 FOR IP): Performed by: NURSE PRACTITIONER

## 2022-03-27 PROCEDURE — 80048 BASIC METABOLIC PNL TOTAL CA: CPT

## 2022-03-27 PROCEDURE — 36415 COLL VENOUS BLD VENIPUNCTURE: CPT

## 2022-03-27 PROCEDURE — 99232 SBSQ HOSP IP/OBS MODERATE 35: CPT | Performed by: INTERNAL MEDICINE

## 2022-03-27 PROCEDURE — 85025 COMPLETE CBC W/AUTO DIFF WBC: CPT

## 2022-03-27 PROCEDURE — 6370000000 HC RX 637 (ALT 250 FOR IP)

## 2022-03-27 PROCEDURE — 2580000003 HC RX 258: Performed by: STUDENT IN AN ORGANIZED HEALTH CARE EDUCATION/TRAINING PROGRAM

## 2022-03-27 PROCEDURE — 94761 N-INVAS EAR/PLS OXIMETRY MLT: CPT

## 2022-03-27 PROCEDURE — 2580000003 HC RX 258: Performed by: NURSE PRACTITIONER

## 2022-03-27 PROCEDURE — 82947 ASSAY GLUCOSE BLOOD QUANT: CPT

## 2022-03-27 RX ADMIN — INSULIN LISPRO 2 UNITS: 100 INJECTION, SOLUTION INTRAVENOUS; SUBCUTANEOUS at 17:51

## 2022-03-27 RX ADMIN — METOPROLOL TARTRATE 25 MG: 25 TABLET ORAL at 19:43

## 2022-03-27 RX ADMIN — INSULIN LISPRO 2 UNITS: 100 INJECTION, SOLUTION INTRAVENOUS; SUBCUTANEOUS at 09:25

## 2022-03-27 RX ADMIN — HYDROCHLOROTHIAZIDE 25 MG: 25 TABLET ORAL at 09:13

## 2022-03-27 RX ADMIN — MUPIROCIN: 20 OINTMENT TOPICAL at 09:14

## 2022-03-27 RX ADMIN — METOPROLOL TARTRATE 25 MG: 25 TABLET ORAL at 09:13

## 2022-03-27 RX ADMIN — HEPARIN SODIUM 2000 UNITS: 1000 INJECTION INTRAVENOUS; SUBCUTANEOUS at 18:45

## 2022-03-27 RX ADMIN — ATORVASTATIN CALCIUM 40 MG: 80 TABLET, FILM COATED ORAL at 19:43

## 2022-03-27 RX ADMIN — INSULIN GLARGINE 25 UNITS: 100 INJECTION, SOLUTION SUBCUTANEOUS at 09:25

## 2022-03-27 RX ADMIN — AMIODARONE HYDROCHLORIDE 200 MG: 200 TABLET ORAL at 19:43

## 2022-03-27 RX ADMIN — AMIODARONE HYDROCHLORIDE 200 MG: 200 TABLET ORAL at 09:13

## 2022-03-27 RX ADMIN — LOSARTAN POTASSIUM 50 MG: 50 TABLET, FILM COATED ORAL at 09:13

## 2022-03-27 RX ADMIN — Medication 18 UNITS/KG/HR: at 18:48

## 2022-03-27 RX ADMIN — INSULIN LISPRO 2 UNITS: 100 INJECTION, SOLUTION INTRAVENOUS; SUBCUTANEOUS at 13:06

## 2022-03-27 RX ADMIN — AMIODARONE HYDROCHLORIDE 200 MG: 200 TABLET ORAL at 15:34

## 2022-03-27 RX ADMIN — Medication 16 UNITS/KG/HR: at 11:07

## 2022-03-27 RX ADMIN — ASPIRIN 81 MG: 81 TABLET, CHEWABLE ORAL at 09:13

## 2022-03-27 RX ADMIN — SODIUM CHLORIDE, PRESERVATIVE FREE 10 ML: 5 INJECTION INTRAVENOUS at 09:16

## 2022-03-27 RX ADMIN — INSULIN LISPRO 2 UNITS: 100 INJECTION, SOLUTION INTRAVENOUS; SUBCUTANEOUS at 19:47

## 2022-03-27 ASSESSMENT — PAIN SCALES - GENERAL
PAINLEVEL_OUTOF10: 0

## 2022-03-27 NOTE — PROGRESS NOTES
[facility]  Internal Medicine Teaching Residency Program  Inpatient Daily Progress Note  ______________________________________________________________________________    Patient: Tess Cancino  YOB: 1960   Domingo Mathias: [de-identified]     Room: 2003/2003-01  Admit date: 3/22/2022  Today's date: 03/27/22  Number of days in the hospital: 5    SUBJECTIVE   Admitting Diagnosis: 3-vessel CAD  CC: Chest pain    - Pt examined at bedside. Chart & results reviewed. - No acute events overnight  - Patient resting comfortably in bed this morning  - Patient denies any chest pain or shortness of breath  - Afebrile  - Vital signs stable    Plan for today:  - Continue with current medical management  - Plan for CABG on Tuesday    ROS:  Constitutional:  negative for chills, fevers, sweats  Respiratory:  negative for cough, dyspnea on exertion, hemoptysis, shortness of breath, wheezing  Cardiovascular:  negative for chest pain, chest pressure/discomfort, lower extremity edema, palpitations  Gastrointestinal:  negative for abdominal pain, constipation, diarrhea, nausea, vomiting  Neurological:  negative for dizziness, headache    BRIEF HISTORY     The patient is a pleasant 58 y. o. female with a no PMHx formally diagnosed per the pt, EMR shows history of chronic back pain, hypertension, osteoarthritis, COPD and pneumothorax.  Pt presented with a chief complaint of chest pain.  Patient has midsternal chest pain that woke her up from sleep around 4 in the morning.  Patient states the pain is radiating to her neck into her jaw bilaterally.  Patient states it is hard to determine if the pain is traveling down either arm especially the left arm she has had rotator cuff surgery in the past.  Patient did endorse some mild associated shortness of breath and diaphoresis early this morning.  Patient received aspirin and nitroglycerin by EMS prior to arrival which alleviated the chest pain.  Patient denies any significant cardiac history.  Patient's blood pressure on admission was 177/101.  While in the emergency department patient's systolic blood pressure got up to 196.  Patient received Lopressor 25 mg and was started on Cozaar 25 mg.  Patient's blood pressure is improved.       OBJECTIVE     Vital Signs:  /71   Pulse 76   Temp 97 °F (36.1 °C) (Oral)   Resp 16   Ht 5' 5\" (1.651 m)   Wt 176 lb 5.9 oz (80 kg)   SpO2 92%   BMI 29.35 kg/m²     Temp (24hrs), Av °F (36.7 °C), Min:97 °F (36.1 °C), Max:98.4 °F (36.9 °C)    In: 1400   Out: -     Physical Exam:  Physical Exam  Vitals and nursing note reviewed. Constitutional:       Appearance: Normal appearance. HENT:      Head: Normocephalic and atraumatic. Nose: Nose normal.      Mouth/Throat:      Mouth: Mucous membranes are moist.      Pharynx: Oropharynx is clear. Eyes:      Extraocular Movements: Extraocular movements intact. Conjunctiva/sclera: Conjunctivae normal.      Pupils: Pupils are equal, round, and reactive to light. Cardiovascular:      Rate and Rhythm: Normal rate and regular rhythm. Pulses: Normal pulses. Heart sounds: Normal heart sounds. No murmur heard. No friction rub. No gallop. Pulmonary:      Effort: Pulmonary effort is normal. No respiratory distress. Breath sounds: Normal breath sounds. No stridor. No wheezing, rhonchi or rales. Chest:      Chest wall: No tenderness. Abdominal:      General: Abdomen is flat. Bowel sounds are normal. There is no distension. Palpations: Abdomen is soft. There is no mass. Tenderness: There is no abdominal tenderness. There is no guarding or rebound. Hernia: No hernia is present. Musculoskeletal:         General: No swelling, tenderness, deformity or signs of injury. Normal range of motion. Cervical back: Normal range of motion. Right lower leg: No edema. Left lower leg: No edema.    Skin:     General: Skin is warm. Neurological:      General: No focal deficit present. Mental Status: She is alert and oriented to person, place, and time. Mental status is at baseline. Psychiatric:         Mood and Affect: Mood normal.         Behavior: Behavior normal.         Thought Content:  Thought content normal.         Judgment: Judgment normal.           Medications:  Scheduled Medications:    insulin glargine  25 Units SubCUTAneous Daily    sodium chloride flush  5-40 mL IntraVENous 2 times per day    metoprolol tartrate  12.5 mg Oral Once    mupirocin   Nasal BID    chlorhexidine  15 mL Mouth/Throat Once    chlorhexidine   Topical See Admin Instructions    hydroCHLOROthiazide  25 mg Oral Daily    insulin lispro  0-12 Units SubCUTAneous TID WC    insulin lispro  0-6 Units SubCUTAneous Nightly    losartan  50 mg Oral Daily    amiodarone  200 mg Oral TID    sodium chloride flush  5-40 mL IntraVENous 2 times per day    metoprolol tartrate  25 mg Oral BID    aspirin  81 mg Oral Daily    atorvastatin  40 mg Oral Nightly    sodium chloride flush  5-40 mL IntraVENous 2 times per day     Continuous Infusions:    sodium chloride 75 mL/hr at 03/26/22 0007    sodium chloride      heparin (PORCINE) Infusion 18 Units/kg/hr (03/25/22 2202)    sodium chloride      sodium chloride      dextrose       PRN Medicationssodium chloride flush, 10 mL, PRN  sodium chloride, 25 mL, PRN  acetaminophen, 650 mg, Q4H PRN  magnesium sulfate, 1,000 mg, PRN  potassium chloride, 40 mEq, PRN   Or  potassium alternative oral replacement, 40 mEq, PRN   Or  potassium chloride, 10 mEq, PRN  heparin (porcine), 4,000 Units, PRN  heparin (porcine), 2,000 Units, PRN  sodium chloride flush, 10 mL, PRN  sodium chloride, 25 mL, PRN  ondansetron, 4 mg, Q8H PRN   Or  ondansetron, 4 mg, Q6H PRN  magnesium hydroxide, 30 mL, Daily PRN  sodium chloride flush, 5-40 mL, PRN  glucose, 15 g, PRN  dextrose, 12.5 g, PRN  glucagon (rDNA), 1 mg, PRN  dextrose, 100 mL/hr, PRN        Diagnostic Labs:  CBC:   Recent Labs     03/24/22  0556 03/25/22  0614 03/26/22  0233   WBC 7.8 9.5 9.0   RBC 4.84 4.91 4.71   HGB 14.5 15.0 14.4   HCT 43.0 43.6 41.5   MCV 88.8 88.8 88.1   RDW 11.9 11.9 11.8    247 232     BMP:   Recent Labs     03/24/22  0556 03/25/22  0614 03/26/22  0233    134* 130*   K 3.9 3.8 3.5*    99 95*   CO2 23 21 23   BUN 13 12 10   CREATININE 0.58 0.61 0.64     BNP: No results for input(s): BNP in the last 72 hours. PT/INR: No results for input(s): PROTIME, INR in the last 72 hours. APTT:   Recent Labs     03/24/22  0556 03/25/22  0614 03/26/22  0716   APTT 60.7* 57.7* 62.6*     CARDIAC ENZYMES: No results for input(s): CKMB, CKMBINDEX, TROPONINI in the last 72 hours. Invalid input(s): CKTOTAL;3  FASTING LIPID PANEL:  Lab Results   Component Value Date    CHOL 208 (H) 03/22/2018    HDL 67 03/22/2018    TRIG 59 03/22/2018     LIVER PROFILE: No results for input(s): AST, ALT, ALB, BILIDIR, BILITOT, ALKPHOS in the last 72 hours. MICROBIOLOGY:   Lab Results   Component Value Date/Time    CULTURE WRONG TEST ORDERED 03/23/2022 03:22 PM       Imaging:    CT CHEST WO CONTRAST    Result Date: 3/23/2022  1. Probable scarring/rounded atelectasis bilateral lung bases, more nodular in appearance on the right compared to the left. 2. No pulmonary infiltrate or definite suspicious lesion evident. 3. Calcific atherosclerosis aorta and coronary arteries. 4. Hepatic steatosis. RECOMMENDATIONS: Unavailable     XR CHEST PORTABLE    Result Date: 3/22/2022  No acute cardiopulmonary abnormality.        ASSESSMENT & PLAN     Assessment and Plan:    Principal Problem:    3-vessel CAD  Active Problems:    Essential hypertension    NSTEMI (non-ST elevated myocardial infarction) ([facility] [address]    Type 2 diabetes mellitus without complication, without long-term current use of insulin (HCC)    Hypokalemia    Hypomagnesemia  Resolved Problems:    * No resolved hospital problems. *      IMPRESSION  This is a 60 y. o. female who presented with chest pain and found to have elevated troponins. Patient admitted to inpatient status for NSTEMI and further ischemic work-up.     Principal Problem:  NSTEMI (non-ST elevated myocardial infarction)   - Troponin 16 > 55 > 76  - Continue to monitor troponins every 4 hours  - Heparin drip  - Daily labs  - Carb diet  - Cardiology following and plans for cardiac catheterization on 3/22/2022 > showed multivessel coronary artery disease  - CABG tentatively Tuesday     Active Problems:  Essential hypertension - stable  - Patient denies any formal diagnosis of essential hypertension.  EMR shows patient was taking Cozaar.  Upon examination patient denied taking any medication as she does not want to taking handfuls of medication daily. Patient received 25 mg of Lopressor will be started on Cozaar 25 mg.  - /101 on admission   - Continue to monitor blood pressure     Type 2 diabetes mellitus without complication, without long-term current use of insulin   - Medium dose sliding scale   - Glucose checks 4 times daily before meals and at bedtime  - Urine analysis and urine culture  - Hemoglobin A1c 11.3  - Starting Lantus 25U daily  - Diabetic education       Hypokalemia  -Daily labs  -Replace electrolytes as needed     Hypomagnesemia  - Daily lab  - Replace electrolytes as needed      Diet: Carb controlled diet  DVT ppx: Heparin  GI ppx: None     PT/OT/SW: Consulted. Cristino Adames identify needs. Discharge Planning: In process, pending CABG and recovery.         Derian Hudson MD  Internal Medicine Resident, PGY-1  [facility]; [place], [place]   12:15 AM 3/27/2022     Please note that part of this chart was generated using voice recognition dictation software. Although every effort was made to ensure the accuracy of this automated transcription, some errors in transcription may have occurred.     I have discussed the care of Destiny HORVATH Mari Mccloud , including pertinent history and exam findings,    today with the resident. I have seen and examined the patient and the key elements of all parts of the encounter have been performed by me . I agree with the assessment, plan and orders as documented by the resident. Principal Problem:    3-vessel CAD  Active Problems:    Essential hypertension    NSTEMI (non-ST elevated myocardial infarction) ([facility] [address]    Type 2 diabetes mellitus without complication, without long-term current use of insulin (HCC)    Hypokalemia    Hypomagnesemia  Resolved Problems:    * No resolved hospital problems. *        Overall  course ;                                   are improving over time.         Plan for CABG  Blood sugar better control          Electronically signed by James Amezquita MD

## 2022-03-27 NOTE — PLAN OF CARE
[facility]. Can not call in during weekends.

## 2022-03-28 ENCOUNTER — ANESTHESIA EVENT (OUTPATIENT)
Dept: OPERATING ROOM | Age: 62
DRG: 234 | End: 2022-03-28
Payer: COMMERCIAL

## 2022-03-28 LAB
ABSOLUTE EOS #: 0.27 K/UL (ref 0–0.44)
ABSOLUTE IMMATURE GRANULOCYTE: 0.05 K/UL (ref 0–0.3)
ABSOLUTE LYMPH #: 2.91 K/UL (ref 1.1–3.7)
ABSOLUTE MONO #: 0.66 K/UL (ref 0.1–1.2)
ANION GAP SERPL CALCULATED.3IONS-SCNC: 14 MMOL/L (ref 9–17)
BASOPHILS # BLD: 1 % (ref 0–2)
BASOPHILS ABSOLUTE: 0.12 K/UL (ref 0–0.2)
BUN BLDV-MCNC: 11 MG/DL (ref 8–23)
CALCIUM SERPL-MCNC: 9.5 MG/DL (ref 8.6–10.4)
CHLORIDE BLD-SCNC: 97 MMOL/L (ref 98–107)
CO2: 22 MMOL/L (ref 20–31)
CREAT SERPL-MCNC: 0.6 MG/DL (ref 0.5–0.9)
EOSINOPHILS RELATIVE PERCENT: 3 % (ref 1–4)
GFR AFRICAN AMERICAN: >60 ML/MIN
GFR NON-AFRICAN AMERICAN: >60 ML/MIN
GFR SERPL CREATININE-BSD FRML MDRD: ABNORMAL ML/MIN/{1.73_M2}
GLUCOSE BLD-MCNC: 168 MG/DL (ref 65–105)
GLUCOSE BLD-MCNC: 209 MG/DL (ref 65–105)
GLUCOSE BLD-MCNC: 215 MG/DL (ref 65–105)
GLUCOSE BLD-MCNC: 218 MG/DL (ref 65–105)
GLUCOSE BLD-MCNC: 221 MG/DL (ref 70–99)
GLUCOSE BLD-MCNC: 241 MG/DL (ref 65–105)
HCT VFR BLD CALC: 40.7 % (ref 36.3–47.1)
HEMOGLOBIN: 14.3 G/DL (ref 11.9–15.1)
IMMATURE GRANULOCYTES: 1 %
LYMPHOCYTES # BLD: 32 % (ref 24–43)
MAGNESIUM: 1.6 MG/DL (ref 1.6–2.6)
MCH RBC QN AUTO: 30.9 PG (ref 25.2–33.5)
MCHC RBC AUTO-ENTMCNC: 35.1 G/DL (ref 28.4–34.8)
MCV RBC AUTO: 87.9 FL (ref 82.6–102.9)
MONOCYTES # BLD: 7 % (ref 3–12)
NRBC AUTOMATED: 0 PER 100 WBC
PARTIAL THROMBOPLASTIN TIME: 65 SEC (ref 20.5–30.5)
PARTIAL THROMBOPLASTIN TIME: 69.6 SEC (ref 20.5–30.5)
PDW BLD-RTO: 11.9 % (ref 11.8–14.4)
PLATELET # BLD: 248 K/UL (ref 138–453)
PMV BLD AUTO: 11.5 FL (ref 8.1–13.5)
POTASSIUM SERPL-SCNC: 3.8 MMOL/L (ref 3.7–5.3)
RBC # BLD: 4.63 M/UL (ref 3.95–5.11)
SEG NEUTROPHILS: 56 % (ref 36–65)
SEGMENTED NEUTROPHILS ABSOLUTE COUNT: 5.02 K/UL (ref 1.5–8.1)
SODIUM BLD-SCNC: 133 MMOL/L (ref 135–144)
WBC # BLD: 9 K/UL (ref 3.5–11.3)

## 2022-03-28 PROCEDURE — 2580000003 HC RX 258: Performed by: NURSE PRACTITIONER

## 2022-03-28 PROCEDURE — 83735 ASSAY OF MAGNESIUM: CPT

## 2022-03-28 PROCEDURE — 6370000000 HC RX 637 (ALT 250 FOR IP): Performed by: STUDENT IN AN ORGANIZED HEALTH CARE EDUCATION/TRAINING PROGRAM

## 2022-03-28 PROCEDURE — 85025 COMPLETE CBC W/AUTO DIFF WBC: CPT

## 2022-03-28 PROCEDURE — 6370000000 HC RX 637 (ALT 250 FOR IP): Performed by: NURSE PRACTITIONER

## 2022-03-28 PROCEDURE — 97116 GAIT TRAINING THERAPY: CPT

## 2022-03-28 PROCEDURE — 80048 BASIC METABOLIC PNL TOTAL CA: CPT

## 2022-03-28 PROCEDURE — 6370000000 HC RX 637 (ALT 250 FOR IP)

## 2022-03-28 PROCEDURE — 97535 SELF CARE MNGMENT TRAINING: CPT

## 2022-03-28 PROCEDURE — 99232 SBSQ HOSP IP/OBS MODERATE 35: CPT | Performed by: INTERNAL MEDICINE

## 2022-03-28 PROCEDURE — 94761 N-INVAS EAR/PLS OXIMETRY MLT: CPT

## 2022-03-28 PROCEDURE — 2060000000 HC ICU INTERMEDIATE R&B

## 2022-03-28 PROCEDURE — G0108 DIAB MANAGE TRN  PER INDIV: HCPCS

## 2022-03-28 PROCEDURE — 36415 COLL VENOUS BLD VENIPUNCTURE: CPT

## 2022-03-28 PROCEDURE — 82947 ASSAY GLUCOSE BLOOD QUANT: CPT

## 2022-03-28 PROCEDURE — 6370000000 HC RX 637 (ALT 250 FOR IP): Performed by: INTERNAL MEDICINE

## 2022-03-28 PROCEDURE — 97110 THERAPEUTIC EXERCISES: CPT

## 2022-03-28 PROCEDURE — 6360000002 HC RX W HCPCS: Performed by: NURSE PRACTITIONER

## 2022-03-28 PROCEDURE — 97530 THERAPEUTIC ACTIVITIES: CPT

## 2022-03-28 PROCEDURE — 85730 THROMBOPLASTIN TIME PARTIAL: CPT

## 2022-03-28 RX ORDER — POTASSIUM CHLORIDE 20 MEQ/1
40 TABLET, EXTENDED RELEASE ORAL ONCE
Status: DISCONTINUED | OUTPATIENT
Start: 2022-03-28 | End: 2022-03-28

## 2022-03-28 RX ORDER — MAGNESIUM SULFATE 1 G/100ML
1000 INJECTION INTRAVENOUS ONCE
Status: DISCONTINUED | OUTPATIENT
Start: 2022-03-28 | End: 2022-03-28

## 2022-03-28 RX ORDER — INSULIN GLARGINE 100 [IU]/ML
30 INJECTION, SOLUTION SUBCUTANEOUS DAILY
Status: DISCONTINUED | OUTPATIENT
Start: 2022-03-28 | End: 2022-03-29

## 2022-03-28 RX ADMIN — AMIODARONE HYDROCHLORIDE 200 MG: 200 TABLET ORAL at 09:27

## 2022-03-28 RX ADMIN — INSULIN LISPRO 4 UNITS: 100 INJECTION, SOLUTION INTRAVENOUS; SUBCUTANEOUS at 13:34

## 2022-03-28 RX ADMIN — AMIODARONE HYDROCHLORIDE 200 MG: 200 TABLET ORAL at 20:28

## 2022-03-28 RX ADMIN — ASPIRIN 81 MG: 81 TABLET, CHEWABLE ORAL at 10:18

## 2022-03-28 RX ADMIN — INSULIN LISPRO 4 UNITS: 100 INJECTION, SOLUTION INTRAVENOUS; SUBCUTANEOUS at 17:53

## 2022-03-28 RX ADMIN — METOPROLOL TARTRATE 25 MG: 25 TABLET ORAL at 09:27

## 2022-03-28 RX ADMIN — HYDROCHLOROTHIAZIDE 25 MG: 25 TABLET ORAL at 09:27

## 2022-03-28 RX ADMIN — METOPROLOL TARTRATE 25 MG: 25 TABLET ORAL at 20:28

## 2022-03-28 RX ADMIN — SODIUM CHLORIDE, PRESERVATIVE FREE 10 ML: 5 INJECTION INTRAVENOUS at 09:33

## 2022-03-28 RX ADMIN — MAGNESIUM SULFATE HEPTAHYDRATE 1000 MG: 1 INJECTION, SOLUTION INTRAVENOUS at 11:53

## 2022-03-28 RX ADMIN — SODIUM CHLORIDE, PRESERVATIVE FREE 10 ML: 5 INJECTION INTRAVENOUS at 20:28

## 2022-03-28 RX ADMIN — POTASSIUM CHLORIDE 30 MEQ: 20 TABLET, EXTENDED RELEASE ORAL at 09:28

## 2022-03-28 RX ADMIN — AMIODARONE HYDROCHLORIDE 200 MG: 200 TABLET ORAL at 13:34

## 2022-03-28 RX ADMIN — INSULIN LISPRO 4 UNITS: 100 INJECTION, SOLUTION INTRAVENOUS; SUBCUTANEOUS at 09:30

## 2022-03-28 RX ADMIN — MUPIROCIN: 20 OINTMENT TOPICAL at 20:28

## 2022-03-28 RX ADMIN — LOSARTAN POTASSIUM 50 MG: 50 TABLET, FILM COATED ORAL at 09:27

## 2022-03-28 RX ADMIN — MAGNESIUM SULFATE HEPTAHYDRATE 1000 MG: 1 INJECTION, SOLUTION INTRAVENOUS at 10:22

## 2022-03-28 RX ADMIN — MUPIROCIN: 20 OINTMENT TOPICAL at 09:33

## 2022-03-28 RX ADMIN — ATORVASTATIN CALCIUM 40 MG: 80 TABLET, FILM COATED ORAL at 20:28

## 2022-03-28 RX ADMIN — INSULIN GLARGINE 30 UNITS: 100 INJECTION, SOLUTION SUBCUTANEOUS at 09:29

## 2022-03-28 ASSESSMENT — PAIN SCALES - GENERAL
PAINLEVEL_OUTOF10: 0

## 2022-03-28 NOTE — PROGRESS NOTES
Physical Therapy  Facility/Department: [facility] CAR 2  Daily Treatment Note  NAME: Godfrey Quintanilla  : 1960  MRN: [id number]    Date of Service: 3/28/2022    Discharge Recommendations:  Patient would benefit from continued therapy after discharge   PT Equipment Recommendations  Equipment Needed: No    Assessment   Body structures, Functions, Activity limitations: Decreased functional mobility ; Decreased endurance  Assessment: Pt stated her open heart surgery is scheduled for 3/29, pt educated on PT aspect of her upcoming surgery, good feedback, Pt ambulated 300ft w/ IV pole and 60 ft with SBA , no SOB this visit, stated weakness in legs with rest break taken berfore there ex. Pt would benefit from continued skilled acute care therapy to address these endurance deficits and stair negotiation to return to prior level of independence. Prognosis: Good  Decision Making: Low Complexity  PT Education: Goals;Plan of Care;PT Role;Energy Conservation;Home Exercise Program  Patient Education: ther ex  REQUIRES PT FOLLOW UP: Yes  Activity Tolerance  Activity Tolerance: Patient Tolerated treatment well  Activity Tolerance: amb 300 ft and 60 ft in room, c/o of little weakness in legs/tired legs, no SOB today. also stated she is anxious     Patient Diagnosis(es): The primary encounter diagnosis was Chest pain, unspecified type. A diagnosis of NSTEMI (non-ST elevated myocardial infarction) [facility]) was also pertinent to this visit. has a past medical history of Chronic back pain, Hypertension, Osteoarthritis, and Pneumothorax.   has a past surgical history that includes back surgery; Carpal tunnel release (Right); Appendectomy; Tubal ligation; and Cardiac catheterization (2022).     Restrictions  Restrictions/Precautions  Restrictions/Precautions: General Precautions  Required Braces or Orthoses?: No  Position Activity Restriction  Other position/activity restrictions: up w/assist; Recent L rotator cuff repair  Subjective General  Chart Reviewed: Yes  Response To Previous Treatment: Patient with no complaints from previous session. Family / Caregiver Present: No  Subjective  Subjective: RN and pt in agreement for PT , pt supine up in chair upon PT arrival, pt very pleasant and cooperative throughout session  General Comment  Comments: Pt retired to recliner chair  Pain Screening  Patient Currently in Pain: No  Vital Signs  Patient Currently in Pain: No       Orientation  Orientation  Overall Orientation Status: Within Normal Limits  Cognition   Cognition  Overall Cognitive Status: WFL  Objective   Bed mobility  Supine to Sit: Unable to assess  Sit to Supine: Unable to assess  Scooting: Modified independent  Transfers  Sit to Stand: Stand by assistance  Stand to sit: Stand by assistance  Bed to Chair: Stand by assistance  Comment: SBA provided for safety  Ambulation  Ambulation?: Yes  Ambulation 1  Surface: level tile  Device: No Device (IV pole)  Quality of Gait: steadier this visit, cautious  Gait Deviations: Slow Angella;Decreased step length  Distance: 300 ft and 60 ft  Comments: Stated after 300 ft her legs felt a tad weak, rest break taken  Stairs/Curb  Stairs?: No     Balance  Posture: Fair  Sitting - Static: Good  Sitting - Dynamic: Good  Standing - Static: Fair  Standing - Dynamic: Fair  Comments: Standing balance assessed with IV pole  Exercises  Comments:Standing exercise program: Heel/toe raises, hip flexion, hip abduction, mini squats, hip extension, and hamstring curls. Reps:10-15 as kimberly  Other exercises  Other exercises?: Yes  Other exercises 1:Seated LE exercise program: Long Arc Quads, hip abduction/adduction, heel/toe raises, and marches.  Reps:15    AM-PAC Score  AM-PAC Inpatient Mobility Raw Score : 21 (03/28/22 1333)  AM-PAC Inpatient T-Scale Score : 50.25 (03/28/22 1333)  Mobility Inpatient CMS 0-100% Score: 28.97 (03/28/22 1333)  Mobility Inpatient CMS G-Code Modifier : CJ (03/28/22 1333)          Goals  Short term goals  Time Frame for Short term goals: 6  Short term goal 1: Pt to perform bed mobility and functional transfers independently  Short term goal 2: Pt to ambulate 400ft w/ no AD independently  Short term goal 3: Ascend/descend 2 stairs with no rail independently  Short term goal 4: Tolerate 30 minutes of therapy to demo increased endurance  Patient Goals   Patient goals :  To go home    Plan    Plan  Times per week: 4-5x/week  Times per day: Daily  Current Treatment Recommendations: Home Exercise Program,Strengthening,ROM,Safety Education & Training,Endurance Training,Transfer Training,Gait Training,Stair training  Safety Devices  Type of devices: Gait belt,Call light within reach,Left in chair  Restraints  Initially in place: No     Therapy Time   Individual Concurrent Group Co-treatment   Time In 1033         Time Out 1127         Minutes 54         Timed Code Treatment Minutes: [address] [facility]

## 2022-03-28 NOTE — PROGRESS NOTES
Ben Mcknight,  Seen for evaluation and education on Type 2 uncontrolled. Pt had HgbA1c of 6.4% in 2016. Admits to not following up with a doctor on a regular basis. Doesn't like to go to the doctor. States awareness of possibility of diabetes as she took care of her dad and brother who had diabetes. She doesn't remember details of mgt. Aware of insulin and smbg. Pt to have CABGx3 tomorrow am.    Lab Results   Component Value Date    LABA1C 11.3 (H) 03/23/2022     Lab Results   Component Value Date     03/23/2022     Explained details of HgbA1c and diagnosis of  Diabetes as well as pathophysiology of prediabetes to diabetes. Briefly discussed role of diet, physical activity, daily use of medications and self monitoring for good diabetes control, provided diabetes education folder. Also referred patient to view diabetes education programs on education TV - Channel 75 and 76 at 9am, 3pm 6pm and 9pm     Provided instruction on the two types of insulin pt is receiving and why and final discharge meds closer to discharge. Showed pt insulin pen procedure and had her do teachback - 2nd time through no reminders needed. Encourage her to do injections herself w nurse observation when she is feeling ready. Discussed need to be aware of sign and symptoms of hypoglycemia and hyperglycemia  and treatment for hypoglycemia and hyperglycemia. When to call PCP for advice / sick day plan. Provided patient with card and contact information for out patient  Diabetes education services and encouraged follow up with a PCP. Would recommend follow -up education at outpatient diabetes education. Need to find out pt current insurance. If straight [place] (HMO) will need to attend at [facility]. Care Everywhere shows pt might have [employer]? ? If has another insurance possibly could come to diabetes education at. [facility]. An ordered is needed for this service and can be placed via EHR. Discharge Navigator --- Med Reconciliation -- New orders for discharge tab -- search diabetic ed - choose REF 20 -- review and sign     Patient will need prescriptions for the following supplies at discharge: Preferred / formulary blood glucose meter, with strips and lancets for 1-4 times per day blood glucose testing  Insulin pen needle tips - 4mm boris for insulin injections 1-4 times per day or Insulin needles 50 ml, 6mm  for insulin injections 1-4 times per day. Depending on discharge medications ordered and patient insurance. Patient verbalizes, demonstrates and needs reinforcement. understanding  of survival skills education. Pt receptive to information given. Pt lives with daughter and 3 grandchildren. Daughter very supportive.             Adolph Gerber, RD, LD

## 2022-03-28 NOTE — PROGRESS NOTES
Occupational Therapy  Facility/Department: [facility] CAR 2  Daily Treatment Note  NAME: Nolan Cushing  : 1960  MRN: [id number]    Date of Service: 3/28/2022    Discharge Recommendations:  Patient would benefit from continued therapy after discharge       Assessment   Performance deficits / Impairments: Decreased functional mobility ; Decreased endurance;Decreased ADL status; Decreased high-level IADLs  Assessment: Pt would benefit from continued acute care and post acute care OT to address above listed deficits. Treatment Diagnosis: NSTEMI  Prognosis: Good  REQUIRES OT FOLLOW UP: Yes  Activity Tolerance  Activity Tolerance: Patient Tolerated treatment well;Patient limited by fatigue  Safety Devices  Safety Devices in place: Yes  Type of devices: Call light within reach; Left in chair;Nurse notified         Patient Diagnosis(es): The primary encounter diagnosis was Chest pain, unspecified type. A diagnosis of NSTEMI (non-ST elevated myocardial infarction) [facility]) was also pertinent to this visit. has a past medical history of Chronic back pain, Hypertension, Osteoarthritis, and Pneumothorax.   has a past surgical history that includes back surgery; Carpal tunnel release (Right); Appendectomy; Tubal ligation; and Cardiac catheterization (2022). Restrictions  Restrictions/Precautions  Restrictions/Precautions: General Precautions,Up as Tolerated  Required Braces or Orthoses?: No  Position Activity Restriction  Other position/activity restrictions: Recent left rotator cuff repair  Subjective   General  Patient assessed for rehabilitation services?: Yes  Family / Caregiver Present: No  Diagnosis: NSTEMI  Vital Signs  Patient Currently in Pain: Denies   Orientation  Orientation  Overall Orientation Status: Within Functional Limits  Objective    ADL  Feeding: Independent (able to open all containers and feed self)  Grooming: Stand by assistance;Setup; Increased time to complete (standing at sink to brush teeth, stood in shower to wash face and hair w/no LOB)  UE Bathing: Minimal assistance;Setup; Increased time to complete (assist to wash back standing in shower)  LE Bathing: Stand by assistance;Setup; Increased time to complete (stood to bend forward to wash BLE holding onto grab bar)  UE Dressing: Minimal assistance;Setup; Increased time to complete (assist to doff and don gown-snap, thread arms and tie gown d/t IV line in standing)  LE Dressing: Stand by assistance;Setup; Increased time to complete (seated on toilet to doff/don footies )  Toileting: Stand by assistance; Increased time to complete (completed nallely care after toileting seated on toilet and in standing in shower)    Pt up in chair upon arrival. Penny Mireles per RN for pt to shower. Pt transferred to bathroom and stood in shower and at sink to complete self care using sx soap (see above for LOF). Pt on RA, min SOB with increased activity. Increased time and assist needed along w/rest breaks d/t fatigue. Balance  Sitting Balance: Independent  Standing Balance: Stand by assistance  Standing Balance  Time: Pt stood for approx 30-35 min total for transfers, func mob and self care  Activity: static/dynamic standing  Comment: No AD and No LOB  Functional Mobility  Functional - Mobility Device: No device (IV Pole)  Activity: To/from bathroom; Other  Assist Level: Supervision  Functional Mobility Comments: No LOB  Toilet Transfers  Toilet - Technique: Ambulating  Equipment Used: Standard toilet  Toilet Transfer: Stand by assistance  Shower Transfers  Shower - Transfer To:  Standing  Shower - Technique: Ambulating  Shower Transfers: Stand by assistance  Shower Transfers Comments: No LOB, use of grab bars     Transfers  Stand Step Transfers: Stand by assistance  Sit to stand: Stand by assistance  Stand to sit: Stand by assistance  Transfer Comments: No AD, No LOB     Cognition  Overall Cognitive Status: [facility]    Plan   Plan  Times per week: 2-4x  Current Treatment Recommendations: Endurance Training,Self-Care / ADL    AM-PAC Score  AM-PAC Inpatient Daily Activity Raw Score: 21 (03/28/22 1511)  AM-PAC Inpatient ADL T-Scale Score : 44.27 (03/28/22 1511)  ADL Inpatient CMS 0-100% Score: 32.79 (03/28/22 1511)  ADL Inpatient CMS G-Code Modifier : CJ (03/28/22 1511)    Goals  Short term goals  Time Frame for Short term goals: Pt will by discharge  Short term goal 1: demo/identify 2 EC/WS techniques during func activity with 1 vc  Short term goal 2: demo ADL UB/LB dressing/bathing activity at mod I, including pt setting up entire activity  Short term goal 3: demo good safety awareness during func mob around room at (I)  Short term goal 4: demo bending/reaching func activity while standing using LRD PRN and mod I  Short term goal 5: demo standing during func activity for 15 min+ using LRD PRN, without seated rest break     Therapy Time   Individual Concurrent Group Co-treatment   Time In  1425         Time Out  1525         Minutes  60 total tx time                 PENNIE MOE/WILLAM

## 2022-03-28 NOTE — PROGRESS NOTES
[facility]   Progress Note                   Date:   3/28/2022  Patient name: Shruti Melendez  Date of admission:  3/22/2022  5:31 AM  MRN:   [id number]  YOB: 1960  PCP: LAKSHMI Anguiano CNP    Reason for Admission: NSTEMI (non-ST elevated myocardial infarction) [facility]) [I21.4]  Chest pain, unspecified type [R07.9]    Subjective:       Clinical Changes / Abnormalities:  Patient seen and examined prior to working with PT. Denies chest pain or SOB. Plan for CABG tomorrow. SR on tele      Medications:   Scheduled Meds:   insulin glargine  30 Units SubCUTAneous Daily    sodium chloride flush  5-40 mL IntraVENous 2 times per day    metoprolol tartrate  12.5 mg Oral Once    mupirocin   Nasal BID    chlorhexidine  15 mL Mouth/Throat Once    chlorhexidine   Topical See Admin Instructions    hydroCHLOROthiazide  25 mg Oral Daily    insulin lispro  0-12 Units SubCUTAneous TID WC    insulin lispro  0-6 Units SubCUTAneous Nightly    losartan  50 mg Oral Daily    amiodarone  200 mg Oral TID    sodium chloride flush  5-40 mL IntraVENous 2 times per day    metoprolol tartrate  25 mg Oral BID    aspirin  81 mg Oral Daily    atorvastatin  40 mg Oral Nightly    sodium chloride flush  5-40 mL IntraVENous 2 times per day     Continuous Infusions:   sodium chloride 75 mL/hr at 03/26/22 0007    sodium chloride      heparin (PORCINE) Infusion 18 Units/kg/hr (03/27/22 1848)    sodium chloride      dextrose       CBC:   Recent Labs     03/26/22  0233 03/27/22  0615 03/28/22  0541   WBC 9.0 8.8 9.0   HGB 14.4 14.2 14.3    199 248     BMP:    Recent Labs     03/26/22  0233 03/27/22  0615 03/28/22  0541   * 135 133*   K 3.5* 4.1 3.8   CL 95* 101 97*   CO2 23 20 22   BUN 10 10 11   CREATININE 0.64 0.54 0.60   GLUCOSE 261* 225* 221*     Hepatic: No results for input(s): AST, ALT, ALB, BILITOT, ALKPHOS in the last 72 hours.   Troponin:   No results for input(s): TROPHS in the last 72 hours. BNP: No results for input(s): BNP in the last 72 hours. Lipids: No results for input(s): CHOL, HDL in the last 72 hours. Invalid input(s): LDLCALCU  INR:   No results for input(s): INR in the last 72 hours. DIAGNOSTIC DATA:    EKG 3/23/2022  Normal sinus rhythm  Minimal voltage criteria for LVH, may be normal variant    CARDIAC CATH 3/23/22  LMCA: Ostial 25% stenosis     LAD: Mid 100% stenosis, collateral from RCA     D1: Proximal 70% stenosis, and mid 90% stenosis     LCx: Mid 75% stenosis     OM1 and OM2 ostial 80% stenosis     RCA: Ostial 30% stenosis     PDA: Proximal 80% stenosis     PL branch 75% stenosis        Procedure Summary  Multivessel CAD  Preserved LV function  Recommendations  CV surgery consult for CABG    ECHO 3/23/2022  Summary  Left ventricle is normal in size, global left ventricular systolic function  is low normal, calculated ejection fraction is 52%. Tricuspid valve was not well visualized. Trivial tricuspid regurgitation. Insignificant tricuspid regurgitation, unable to estimate RVSP. Objective:   Vitals: /68   Pulse 67   Temp 98.2 °F (36.8 °C) (Oral)   Resp 16   Ht 5' 5\" (1.651 m)   Wt 176 lb 5.9 oz (80 kg)   SpO2 97%   BMI 29.35 kg/m²   General appearance: alert and cooperative with exam  HEENT: Head: Normocephalic, no lesions, without obvious abnormality. Neck: no JVD, trachea midline, no adenopathy  Lungs: Clear to auscultation  Heart: Regular rate and rhythm, s1/s2 auscultated, no murmurs. SR   Abdomen: soft, non-tender, bowel sounds active  Extremities: no edema  Neurologic: not done        Assessment / Acute Cardiac Problems:   1. NSTEMI MV-CAD CTS consult  2.  HTN     Patient Active Problem List:     Essential hypertension     Tobacco dependence     History of lumbar laminectomy     DDD (degenerative disc disease), lumbar     Chronic hand pain     Ganglion of joint     Arthritis of carpometacarpal (CMC) joint of both thumbs     NSTEMI (non-ST elevated myocardial infarction) ([facility] [address]     Type 2 diabetes mellitus without complication, without long-term current use of insulin [facility]      Plan of Treatment:   1. MVD s/p cardiac cath. CTS plan for OR tomorrow. Continue ASA, statin, ARB, BB,  On heparin gtt   2. HTN. Continue BB, ARB, HCTZ  3. Keep K > 4.0 and Mag > 2.0  K 3.8 and Mag 1.6 today. Replace today  4.  Rest of care managed per Primary Team.     Electronically signed by LAKSHMI Sethi NP on 3/28/2022 at 10:48 AM  [address]  [phone]

## 2022-03-28 NOTE — PROGRESS NOTES
[facility]  Internal Medicine Teaching Residency Program  Inpatient Daily Progress Note  ______________________________________________________________________________    Patient: Naya Haro  YOB: 1960   Rashel Mcclure: [de-identified]     Room: 2003/2003-01  Admit date: 3/22/2022  Today's date: 03/28/22  Number of days in the hospital: 6    SUBJECTIVE   Admitting Diagnosis: 3-vessel CAD  CC: Chest Pain   Pt examined at bedside. Chart & results reviewed. No acute episodes overnight  Patient is heme stable and afebrile  Tolerating oral diet well  Sugars better controlled  AM CBC and BMP reviewed  Potassium and Magnesium replaced    ROS:  Constitutional:  negative for chills, fevers, sweats  Respiratory:  negative for cough, dyspnea on exertion, hemoptysis, shortness of breath, wheezing  Cardiovascular:  negative for chest pain, chest pressure/discomfort, lower extremity edema, palpitations  Gastrointestinal:  negative for abdominal pain, constipation, diarrhea, nausea, vomiting  Neurological:  negative for dizziness, headache    BRIEF HISTORY     The patient is a pleasant 58 y. o. female with a no PMHx formally diagnosed per the pt, EMR shows history of chronic back pain, hypertension, osteoarthritis, COPD and pneumothorax.  Pt presented with a chief complaint of chest pain.  Patient has midsternal chest pain that woke her up from sleep around 4 in the morning.  Patient states the pain is radiating to her neck into her jaw bilaterally.  Patient states it is hard to determine if the pain is traveling down either arm especially the left arm she has had rotator cuff surgery in the past.  Patient did endorse some mild associated shortness of breath and diaphoresis early this morning.  Patient received aspirin and nitroglycerin by EMS prior to arrival which alleviated the chest pain.  Patient denies any significant cardiac history.  Patient's blood pressure on admission was 177/101.  While in the emergency department patient's systolic blood pressure got up to 196.  Patient received Lopressor 25 mg and was started on Cozaar 25 mg.  Patient's blood pressure is improved. OBJECTIVE     Vital Signs:  /72   Pulse 73   Temp 98.5 °F (36.9 °C) (Oral)   Resp 18   Ht 5' 5\" (1.651 m)   Wt 176 lb 5.9 oz (80 kg)   SpO2 97%   BMI 29.35 kg/m²     Temp (24hrs), Av.4 °F (36.9 °C), Min:97.9 °F (36.6 °C), Max:98.7 °F (37.1 °C)    In: 4473.2   Out: 500 [Urine:500]    Physical Exam:  Vitals and nursing note reviewed. Constitutional:       Appearance: Normal appearance. HENT:      Head: Normocephalic and atraumatic. Nose: Nose normal.      Mouth/Throat:      Mouth: Mucous membranes are moist.      Pharynx: Oropharynx is clear. Eyes:      Extraocular Movements: Extraocular movements intact. Conjunctiva/sclera: Conjunctivae normal.      Pupils: Pupils are equal, round, and reactive to light. Cardiovascular:      Rate and Rhythm: Normal rate and regular rhythm. Pulses: Normal pulses. Heart sounds: Normal heart sounds. No murmur heard. No friction rub. No gallop. Pulmonary:      Effort: Pulmonary effort is normal. No respiratory distress. Breath sounds: Normal breath sounds. No stridor. No wheezing, rhonchi or rales. Chest:      Chest wall: No tenderness. Abdominal:      General: Abdomen is flat. Bowel sounds are normal. There is no distension. Palpations: Abdomen is soft. There is no mass. Tenderness: There is no abdominal tenderness. There is no guarding or rebound. Hernia: No hernia is present. Musculoskeletal:         General: No swelling, tenderness, deformity or signs of injury. Normal range of motion. Cervical back: Normal range of motion. Right lower leg: No edema. Left lower leg: No edema. Skin:     General: Skin is warm. Neurological:      General: No focal deficit present. Labs:  CBC:   Recent Labs     03/26/22  0233 03/27/22  0615 03/28/22  0541   WBC 9.0 8.8 9.0   RBC 4.71 4.52 4.63   HGB 14.4 14.2 14.3   HCT 41.5 40.5 40.7   MCV 88.1 89.6 87.9   RDW 11.8 11.9 11.9    199 248     BMP:   Recent Labs     03/26/22  0233 03/27/22  0615 03/28/22  0541   * 135 133*   K 3.5* 4.1 3.8   CL 95* 101 97*   CO2 23 20 22   BUN 10 10 11   CREATININE 0.64 0.54 0.60     BNP: No results for input(s): BNP in the last 72 hours. PT/INR: No results for input(s): PROTIME, INR in the last 72 hours. APTT:   Recent Labs     03/27/22  1729 03/28/22  0048 03/28/22  0541   APTT 49.0* 69.6* 65.0*     CARDIAC ENZYMES: No results for input(s): CKMB, CKMBINDEX, TROPONINI in the last 72 hours. Invalid input(s): CKTOTAL;3  FASTING LIPID PANEL:  Lab Results   Component Value Date    CHOL 208 (H) 03/22/2018    HDL 67 03/22/2018    TRIG 59 03/22/2018     LIVER PROFILE: No results for input(s): AST, ALT, ALB, BILIDIR, BILITOT, ALKPHOS in the last 72 hours. MICROBIOLOGY:   Lab Results   Component Value Date/Time    CULTURE WRONG TEST ORDERED 03/23/2022 03:22 PM     Imaging:    CT CHEST WO CONTRAST    Result Date: 3/23/2022  1. Probable scarring/rounded atelectasis bilateral lung bases, more nodular in appearance on the right compared to the left. 2. No pulmonary infiltrate or definite suspicious lesion evident. 3. Calcific atherosclerosis aorta and coronary arteries. 4. Hepatic steatosis. RECOMMENDATIONS: Unavailable     XR CHEST PORTABLE    Result Date: 3/22/2022  No acute cardiopulmonary abnormality.      ASSESSMENT & PLAN     ASSESSMENT / PLAN:     Essential hypertension - stable  -Cozaar 50 mg PO daily  -HCTZ 25 mg PO daily  -Lopressor 25 PO BID      Type 2 diabetes mellitus without complication, without long-term current use of insulin   - Medium dose sliding scale   - Starting Lantus 30 Units daily  - Glucose checks 4 times daily before meals and at bedtime  - Hemoglobin A1c 11.3  - Diabetic education       Hypokalemia  -Daily labs  -Replace electrolytes as needed     Hypomagnesemia  - Daily lab  - Replace electrolytes as needed      DVT ppx: Heparin     PT/OT/SW: Consulted. Will identify needs. Discharge Planning: In process, pending CABG and recovery. Sung Paul MD  Internal Medicine Resident, PGY-2  [address], [place]  3/28/2022, 1:08 PM      I have discussed the care of [address] , including pertinent history and exam findings,    today with the resident. I have seen and examined the patient and the key elements of all parts of the encounter have been performed by me . I agree with the assessment, plan and orders as documented by the resident. Principal Problem:    3-vessel CAD  Active Problems:    Essential hypertension    NSTEMI (non-ST elevated myocardial infarction) ([facility] [address]    Type 2 diabetes mellitus without complication, without long-term current use of insulin (HCC)    Hypokalemia    Hypomagnesemia  Resolved Problems:    * No resolved hospital problems. *        Overall  course ;                                   are improving over time.         Plan for CABG            Electronically signed by Renny Farris MD
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
## 2022-03-29 ENCOUNTER — ANESTHESIA (OUTPATIENT)
Dept: OPERATING ROOM | Age: 62
DRG: 234 | End: 2022-03-29
Payer: COMMERCIAL

## 2022-03-29 ENCOUNTER — APPOINTMENT (OUTPATIENT)
Dept: GENERAL RADIOLOGY | Age: 62
DRG: 234 | End: 2022-03-29
Payer: COMMERCIAL

## 2022-03-29 VITALS
OXYGEN SATURATION: 100 % | SYSTOLIC BLOOD PRESSURE: 74 MMHG | TEMPERATURE: 97.4 F | DIASTOLIC BLOOD PRESSURE: 46 MMHG | RESPIRATION RATE: 10 BRPM

## 2022-03-29 LAB
ABSOLUTE EOS #: 0.3 K/UL (ref 0–0.44)
ABSOLUTE IMMATURE GRANULOCYTE: 0.09 K/UL (ref 0–0.3)
ABSOLUTE LYMPH #: 3.62 K/UL (ref 1.1–3.7)
ABSOLUTE MONO #: 0.78 K/UL (ref 0.1–1.2)
ANION GAP SERPL CALCULATED.3IONS-SCNC: 11 MMOL/L (ref 9–17)
ANION GAP SERPL CALCULATED.3IONS-SCNC: 16 MMOL/L (ref 9–17)
ANION GAP SERPL CALCULATED.3IONS-SCNC: 9 MMOL/L (ref 9–17)
ANION GAP: 13 MMOL/L (ref 7–16)
ANION GAP: 14 MMOL/L (ref 7–16)
BASOPHILS # BLD: 1 % (ref 0–2)
BASOPHILS ABSOLUTE: 0.16 K/UL (ref 0–0.2)
BUN BLDV-MCNC: 10 MG/DL (ref 8–23)
BUN BLDV-MCNC: 10 MG/DL (ref 8–23)
BUN BLDV-MCNC: 13 MG/DL (ref 8–23)
CALCIUM IONIZED: 1.12 MMOL/L (ref 1.13–1.33)
CALCIUM IONIZED: 1.13 MMOL/L (ref 1.13–1.33)
CALCIUM SERPL-MCNC: 7.6 MG/DL (ref 8.6–10.4)
CALCIUM SERPL-MCNC: 8.5 MG/DL (ref 8.6–10.4)
CALCIUM SERPL-MCNC: 9.4 MG/DL (ref 8.6–10.4)
CHLORIDE BLD-SCNC: 103 MMOL/L (ref 98–107)
CHLORIDE BLD-SCNC: 104 MMOL/L (ref 98–107)
CHLORIDE BLD-SCNC: 97 MMOL/L (ref 98–107)
CO2: 17 MMOL/L (ref 20–31)
CO2: 20 MMOL/L (ref 20–31)
CO2: 22 MMOL/L (ref 20–31)
CREAT SERPL-MCNC: 0.51 MG/DL (ref 0.5–0.9)
CREAT SERPL-MCNC: 0.56 MG/DL (ref 0.5–0.9)
CREAT SERPL-MCNC: 0.58 MG/DL (ref 0.5–0.9)
EOSINOPHILS RELATIVE PERCENT: 3 % (ref 1–4)
FIO2: 100
FIO2: 40
FIO2: 40
GFR AFRICAN AMERICAN: >60 ML/MIN
GFR NON-AFRICAN AMERICAN: >60 ML/MIN
GFR SERPL CREATININE-BSD FRML MDRD: >60 ML/MIN
GFR SERPL CREATININE-BSD FRML MDRD: ABNORMAL ML/MIN/{1.73_M2}
GFR SERPL CREATININE-BSD FRML MDRD: NORMAL ML/MIN/{1.73_M2}
GLUCOSE BLD-MCNC: 121 MG/DL (ref 65–105)
GLUCOSE BLD-MCNC: 124 MG/DL (ref 74–100)
GLUCOSE BLD-MCNC: 126 MG/DL (ref 70–99)
GLUCOSE BLD-MCNC: 133 MG/DL (ref 65–105)
GLUCOSE BLD-MCNC: 133 MG/DL (ref 70–99)
GLUCOSE BLD-MCNC: 133 MG/DL (ref 74–100)
GLUCOSE BLD-MCNC: 136 MG/DL (ref 65–105)
GLUCOSE BLD-MCNC: 150 MG/DL (ref 74–100)
GLUCOSE BLD-MCNC: 155 MG/DL (ref 65–105)
GLUCOSE BLD-MCNC: 158 MG/DL (ref 74–100)
GLUCOSE BLD-MCNC: 176 MG/DL (ref 74–100)
GLUCOSE BLD-MCNC: 184 MG/DL (ref 65–105)
GLUCOSE BLD-MCNC: 186 MG/DL (ref 65–105)
GLUCOSE BLD-MCNC: 189 MG/DL (ref 70–99)
GLUCOSE BLD-MCNC: 192 MG/DL (ref 74–100)
GLUCOSE BLD-MCNC: 196 MG/DL (ref 65–105)
GLUCOSE BLD-MCNC: 199 MG/DL (ref 74–100)
GLUCOSE BLD-MCNC: 217 MG/DL (ref 74–100)
GLUCOSE BLD-MCNC: 274 MG/DL (ref 65–105)
HCT VFR BLD CALC: 30.4 % (ref 36.3–47.1)
HCT VFR BLD CALC: 32.7 % (ref 36.3–47.1)
HCT VFR BLD CALC: 41.5 % (ref 36.3–47.1)
HEMOGLOBIN: 10.8 G/DL (ref 11.9–15.1)
HEMOGLOBIN: 11.5 G/DL (ref 11.9–15.1)
HEMOGLOBIN: 14.8 G/DL (ref 11.9–15.1)
IMMATURE GRANULOCYTES: 1 %
INR BLD: 1.1
INR BLD: 1.3
LYMPHOCYTES # BLD: 32 % (ref 24–43)
MAGNESIUM: 2.6 MG/DL (ref 1.6–2.6)
MCH RBC QN AUTO: 31 PG (ref 25.2–33.5)
MCH RBC QN AUTO: 31.3 PG (ref 25.2–33.5)
MCH RBC QN AUTO: 31.5 PG (ref 25.2–33.5)
MCHC RBC AUTO-ENTMCNC: 35.2 G/DL (ref 28.4–34.8)
MCHC RBC AUTO-ENTMCNC: 35.5 G/DL (ref 28.4–34.8)
MCHC RBC AUTO-ENTMCNC: 35.7 G/DL (ref 28.4–34.8)
MCV RBC AUTO: 86.8 FL (ref 82.6–102.9)
MCV RBC AUTO: 88.6 FL (ref 82.6–102.9)
MCV RBC AUTO: 89.1 FL (ref 82.6–102.9)
MODE: ABNORMAL
MONOCYTES # BLD: 7 % (ref 3–12)
NEGATIVE BASE EXCESS, ART: 2 (ref 0–2)
NEGATIVE BASE EXCESS, ART: 3 (ref 0–2)
NRBC AUTOMATED: 0 PER 100 WBC
O2 DEVICE/FLOW/%: ABNORMAL
PARTIAL THROMBOPLASTIN TIME: 27.1 SEC (ref 20.5–30.5)
PARTIAL THROMBOPLASTIN TIME: 77.1 SEC (ref 20.5–30.5)
PATIENT TEMP: 37
PDW BLD-RTO: 11.9 % (ref 11.8–14.4)
PDW BLD-RTO: 12 % (ref 11.8–14.4)
PDW BLD-RTO: 12.3 % (ref 11.8–14.4)
PLATELET # BLD: 172 K/UL (ref 138–453)
PLATELET # BLD: 215 K/UL (ref 138–453)
PLATELET # BLD: ABNORMAL K/UL (ref 138–453)
PLATELET, FLUORESCENCE: 132 K/UL (ref 138–453)
PLATELET, IMMATURE FRACTION: 5.7 % (ref 1.1–10.3)
PMV BLD AUTO: 11 FL (ref 8.1–13.5)
PMV BLD AUTO: 11.3 FL (ref 8.1–13.5)
POC BUN: 10 MG/DL (ref 8–26)
POC CHLORIDE: 102 MMOL/L (ref 98–107)
POC CHLORIDE: 98 MMOL/L (ref 98–107)
POC CREATININE: 0.69 MG/DL (ref 0.51–1.19)
POC HCO3: 20.7 MMOL/L (ref 21–28)
POC HCO3: 21.9 MMOL/L (ref 21–28)
POC HCO3: 22.1 MMOL/L (ref 21–28)
POC HCO3: 22.2 MMOL/L (ref 21–28)
POC HCO3: 22.6 MMOL/L (ref 21–28)
POC HCO3: 23.1 MMOL/L (ref 21–28)
POC HCO3: 23.4 MMOL/L (ref 21–28)
POC HCO3: 23.5 MMOL/L (ref 21–28)
POC HEMATOCRIT: 23 % (ref 36–46)
POC HEMATOCRIT: 24 % (ref 36–46)
POC HEMATOCRIT: 25 % (ref 36–46)
POC HEMATOCRIT: 29 % (ref 36–46)
POC HEMATOCRIT: 36 % (ref 36–46)
POC HEMATOCRIT: 39 % (ref 36–46)
POC HEMOGLOBIN: 10 G/DL (ref 12–16)
POC HEMOGLOBIN: 12.2 G/DL (ref 12–16)
POC HEMOGLOBIN: 13.2 G/DL (ref 12–16)
POC HEMOGLOBIN: 7.8 G/DL (ref 12–16)
POC HEMOGLOBIN: 8 G/DL (ref 12–16)
POC HEMOGLOBIN: 8.6 G/DL (ref 12–16)
POC IONIZED CALCIUM: 0.95 MMOL/L (ref 1.15–1.33)
POC IONIZED CALCIUM: 1.01 MMOL/L (ref 1.15–1.33)
POC IONIZED CALCIUM: 1.12 MMOL/L (ref 1.15–1.33)
POC IONIZED CALCIUM: 1.12 MMOL/L (ref 1.15–1.33)
POC IONIZED CALCIUM: 1.18 MMOL/L (ref 1.15–1.33)
POC IONIZED CALCIUM: 1.19 MMOL/L (ref 1.15–1.33)
POC LACTIC ACID: 1.31 MMOL/L (ref 0.56–1.39)
POC LACTIC ACID: 1.48 MMOL/L (ref 0.56–1.39)
POC O2 SATURATION: 100 % (ref 94–98)
POC O2 SATURATION: 93 % (ref 94–98)
POC O2 SATURATION: 95 % (ref 94–98)
POC O2 SATURATION: 99 % (ref 94–98)
POC PCO2: 28.4 MM HG (ref 35–48)
POC PCO2: 30.4 MM HG (ref 35–48)
POC PCO2: 34 MM HG (ref 35–48)
POC PCO2: 35.6 MM HG (ref 35–48)
POC PCO2: 37 MM HG (ref 35–48)
POC PCO2: 37.4 MM HG (ref 35–48)
POC PCO2: 38.9 MM HG (ref 35–48)
POC PCO2: 41.7 MM HG (ref 35–48)
POC PH: 7.35 (ref 7.35–7.45)
POC PH: 7.37 (ref 7.35–7.45)
POC PH: 7.38 (ref 7.35–7.45)
POC PH: 7.39 (ref 7.35–7.45)
POC PH: 7.4 (ref 7.35–7.45)
POC PH: 7.44 (ref 7.35–7.45)
POC PH: 7.45 (ref 7.35–7.45)
POC PH: 7.53 (ref 7.35–7.45)
POC PO2: 139.1 MM HG (ref 83–108)
POC PO2: 149.2 MM HG (ref 83–108)
POC PO2: 159.4 MM HG (ref 83–108)
POC PO2: 319.1 MM HG (ref 83–108)
POC PO2: 357.9 MM HG (ref 83–108)
POC PO2: 582.6 MM HG (ref 83–108)
POC PO2: 69.2 MM HG (ref 83–108)
POC PO2: 79.9 MM HG (ref 83–108)
POC POTASSIUM: 3.8 MMOL/L (ref 3.5–4.5)
POC POTASSIUM: 4 MMOL/L (ref 3.5–4.5)
POC POTASSIUM: 4.2 MMOL/L (ref 3.5–4.5)
POC POTASSIUM: 4.4 MMOL/L (ref 3.5–4.5)
POC POTASSIUM: 4.4 MMOL/L (ref 3.5–4.5)
POC POTASSIUM: 5 MMOL/L (ref 3.5–4.5)
POC SODIUM: 133 MMOL/L (ref 138–146)
POC SODIUM: 137 MMOL/L (ref 138–146)
POC TCO2: 23 MMOL/L (ref 22–30)
POSITIVE BASE EXCESS, ART: 0 (ref 0–3)
POSITIVE BASE EXCESS, ART: 1 (ref 0–3)
POTASSIUM SERPL-SCNC: 3.9 MMOL/L (ref 3.7–5.3)
POTASSIUM SERPL-SCNC: 4 MMOL/L (ref 3.7–5.3)
POTASSIUM SERPL-SCNC: 4.2 MMOL/L (ref 3.7–5.3)
PROTHROMBIN TIME: 12 SEC (ref 9.1–12.3)
PROTHROMBIN TIME: 13.5 SEC (ref 9.1–12.3)
RBC # BLD: 3.43 M/UL (ref 3.95–5.11)
RBC # BLD: 3.67 M/UL (ref 3.95–5.11)
RBC # BLD: 4.78 M/UL (ref 3.95–5.11)
REASON FOR REJECTION: NORMAL
SAMPLE SITE: ABNORMAL
SEG NEUTROPHILS: 56 % (ref 36–65)
SEGMENTED NEUTROPHILS ABSOLUTE COUNT: 6.38 K/UL (ref 1.5–8.1)
SODIUM BLD-SCNC: 130 MMOL/L (ref 135–144)
SODIUM BLD-SCNC: 132 MMOL/L (ref 135–144)
SODIUM BLD-SCNC: 137 MMOL/L (ref 135–144)
WBC # BLD: 11 K/UL (ref 3.5–11.3)
WBC # BLD: 11.3 K/UL (ref 3.5–11.3)
WBC # BLD: 18.3 K/UL (ref 3.5–11.3)
ZZ NTE CLEAN UP: ORDERED TEST: NORMAL
ZZ NTE WITH NAME CLEAN UP: SPECIMEN SOURCE: NORMAL

## 2022-03-29 PROCEDURE — 93005 ELECTROCARDIOGRAM TRACING: CPT | Performed by: THORACIC SURGERY (CARDIOTHORACIC VASCULAR SURGERY)

## 2022-03-29 PROCEDURE — 71045 X-RAY EXAM CHEST 1 VIEW: CPT

## 2022-03-29 PROCEDURE — 2580000003 HC RX 258: Performed by: THORACIC SURGERY (CARDIOTHORACIC VASCULAR SURGERY)

## 2022-03-29 PROCEDURE — 84132 ASSAY OF SERUM POTASSIUM: CPT

## 2022-03-29 PROCEDURE — 86900 BLOOD TYPING SEROLOGIC ABO: CPT

## 2022-03-29 PROCEDURE — 80051 ELECTROLYTE PANEL: CPT

## 2022-03-29 PROCEDURE — P9041 ALBUMIN (HUMAN),5%, 50ML: HCPCS

## 2022-03-29 PROCEDURE — 7100000000 HC PACU RECOVERY - FIRST 15 MIN

## 2022-03-29 PROCEDURE — 6370000000 HC RX 637 (ALT 250 FOR IP): Performed by: NURSE PRACTITIONER

## 2022-03-29 PROCEDURE — 86850 RBC ANTIBODY SCREEN: CPT

## 2022-03-29 PROCEDURE — 2709999900 HC NON-CHARGEABLE SUPPLY: Performed by: THORACIC SURGERY (CARDIOTHORACIC VASCULAR SURGERY)

## 2022-03-29 PROCEDURE — 94002 VENT MGMT INPAT INIT DAY: CPT

## 2022-03-29 PROCEDURE — 37799 UNLISTED PX VASCULAR SURGERY: CPT

## 2022-03-29 PROCEDURE — 85027 COMPLETE CBC AUTOMATED: CPT

## 2022-03-29 PROCEDURE — 3600000018 HC SURGERY OHS ADDTL 15MIN: Performed by: THORACIC SURGERY (CARDIOTHORACIC VASCULAR SURGERY)

## 2022-03-29 PROCEDURE — 85576 BLOOD PLATELET AGGREGATION: CPT

## 2022-03-29 PROCEDURE — 84295 ASSAY OF SERUM SODIUM: CPT

## 2022-03-29 PROCEDURE — 83735 ASSAY OF MAGNESIUM: CPT

## 2022-03-29 PROCEDURE — 33518 CABG ARTERY-VEIN TWO: CPT | Performed by: THORACIC SURGERY (CARDIOTHORACIC VASCULAR SURGERY)

## 2022-03-29 PROCEDURE — 6370000000 HC RX 637 (ALT 250 FOR IP): Performed by: THORACIC SURGERY (CARDIOTHORACIC VASCULAR SURGERY)

## 2022-03-29 PROCEDURE — 33508 ENDOSCOPIC VEIN HARVEST: CPT | Performed by: THORACIC SURGERY (CARDIOTHORACIC VASCULAR SURGERY)

## 2022-03-29 PROCEDURE — 82330 ASSAY OF CALCIUM: CPT

## 2022-03-29 PROCEDURE — 94640 AIRWAY INHALATION TREATMENT: CPT

## 2022-03-29 PROCEDURE — 80048 BASIC METABOLIC PNL TOTAL CA: CPT

## 2022-03-29 PROCEDURE — 6360000002 HC RX W HCPCS: Performed by: THORACIC SURGERY (CARDIOTHORACIC VASCULAR SURGERY)

## 2022-03-29 PROCEDURE — 83605 ASSAY OF LACTIC ACID: CPT

## 2022-03-29 PROCEDURE — 6360000002 HC RX W HCPCS

## 2022-03-29 PROCEDURE — 82435 ASSAY OF BLOOD CHLORIDE: CPT

## 2022-03-29 PROCEDURE — 2720000010 HC SURG SUPPLY STERILE: Performed by: THORACIC SURGERY (CARDIOTHORACIC VASCULAR SURGERY)

## 2022-03-29 PROCEDURE — 85014 HEMATOCRIT: CPT

## 2022-03-29 PROCEDURE — 02100Z9 BYPASS CORONARY ARTERY, ONE ARTERY FROM LEFT INTERNAL MAMMARY, OPEN APPROACH: ICD-10-PCS | Performed by: THORACIC SURGERY (CARDIOTHORACIC VASCULAR SURGERY)

## 2022-03-29 PROCEDURE — 5A1221Z PERFORMANCE OF CARDIAC OUTPUT, CONTINUOUS: ICD-10-PCS | Performed by: THORACIC SURGERY (CARDIOTHORACIC VASCULAR SURGERY)

## 2022-03-29 PROCEDURE — 33533 CABG ARTERIAL SINGLE: CPT | Performed by: THORACIC SURGERY (CARDIOTHORACIC VASCULAR SURGERY)

## 2022-03-29 PROCEDURE — 85610 PROTHROMBIN TIME: CPT

## 2022-03-29 PROCEDURE — 36415 COLL VENOUS BLD VENIPUNCTURE: CPT

## 2022-03-29 PROCEDURE — 82565 ASSAY OF CREATININE: CPT

## 2022-03-29 PROCEDURE — 85384 FIBRINOGEN ACTIVITY: CPT

## 2022-03-29 PROCEDURE — 2700000000 HC OXYGEN THERAPY PER DAY

## 2022-03-29 PROCEDURE — 6360000002 HC RX W HCPCS: Performed by: PHYSICIAN ASSISTANT

## 2022-03-29 PROCEDURE — 82803 BLOOD GASES ANY COMBINATION: CPT

## 2022-03-29 PROCEDURE — 2580000003 HC RX 258: Performed by: PHYSICIAN ASSISTANT

## 2022-03-29 PROCEDURE — 3700000001 HC ADD 15 MINUTES (ANESTHESIA): Performed by: THORACIC SURGERY (CARDIOTHORACIC VASCULAR SURGERY)

## 2022-03-29 PROCEDURE — 86920 COMPATIBILITY TEST SPIN: CPT

## 2022-03-29 PROCEDURE — 2580000003 HC RX 258: Performed by: NURSE PRACTITIONER

## 2022-03-29 PROCEDURE — 6360000002 HC RX W HCPCS: Performed by: NURSE PRACTITIONER

## 2022-03-29 PROCEDURE — 6370000000 HC RX 637 (ALT 250 FOR IP): Performed by: PHYSICIAN ASSISTANT

## 2022-03-29 PROCEDURE — 3700000000 HC ANESTHESIA ATTENDED CARE: Performed by: THORACIC SURGERY (CARDIOTHORACIC VASCULAR SURGERY)

## 2022-03-29 PROCEDURE — 94761 N-INVAS EAR/PLS OXIMETRY MLT: CPT

## 2022-03-29 PROCEDURE — 2500000003 HC RX 250 WO HCPCS

## 2022-03-29 PROCEDURE — 84520 ASSAY OF UREA NITROGEN: CPT

## 2022-03-29 PROCEDURE — 85390 FIBRINOLYSINS SCREEN I&R: CPT

## 2022-03-29 PROCEDURE — 85347 COAGULATION TIME ACTIVATED: CPT

## 2022-03-29 PROCEDURE — 2100000001 HC CVICU R&B

## 2022-03-29 PROCEDURE — 3600000008 HC SURGERY OHS BASE: Performed by: THORACIC SURGERY (CARDIOTHORACIC VASCULAR SURGERY)

## 2022-03-29 PROCEDURE — P9045 ALBUMIN (HUMAN), 5%, 250 ML: HCPCS

## 2022-03-29 PROCEDURE — 82947 ASSAY GLUCOSE BLOOD QUANT: CPT

## 2022-03-29 PROCEDURE — 021109W BYPASS CORONARY ARTERY, TWO ARTERIES FROM AORTA WITH AUTOLOGOUS VENOUS TISSUE, OPEN APPROACH: ICD-10-PCS | Performed by: THORACIC SURGERY (CARDIOTHORACIC VASCULAR SURGERY)

## 2022-03-29 PROCEDURE — 85025 COMPLETE CBC W/AUTO DIFF WBC: CPT

## 2022-03-29 PROCEDURE — 85055 RETICULATED PLATELET ASSAY: CPT

## 2022-03-29 PROCEDURE — 2500000003 HC RX 250 WO HCPCS: Performed by: THORACIC SURGERY (CARDIOTHORACIC VASCULAR SURGERY)

## 2022-03-29 PROCEDURE — 85730 THROMBOPLASTIN TIME PARTIAL: CPT

## 2022-03-29 PROCEDURE — 2580000003 HC RX 258

## 2022-03-29 PROCEDURE — 06BP4ZZ EXCISION OF RIGHT SAPHENOUS VEIN, PERCUTANEOUS ENDOSCOPIC APPROACH: ICD-10-PCS | Performed by: THORACIC SURGERY (CARDIOTHORACIC VASCULAR SURGERY)

## 2022-03-29 PROCEDURE — B246ZZ4 ULTRASONOGRAPHY OF RIGHT AND LEFT HEART, TRANSESOPHAGEAL: ICD-10-PCS | Performed by: THORACIC SURGERY (CARDIOTHORACIC VASCULAR SURGERY)

## 2022-03-29 PROCEDURE — 7100000001 HC PACU RECOVERY - ADDTL 15 MIN

## 2022-03-29 PROCEDURE — 86901 BLOOD TYPING SEROLOGIC RH(D): CPT

## 2022-03-29 RX ORDER — SODIUM CHLORIDE 0.9 % (FLUSH) 0.9 %
10 SYRINGE (ML) INJECTION PRN
Status: DISCONTINUED | OUTPATIENT
Start: 2022-03-29 | End: 2022-03-29

## 2022-03-29 RX ORDER — LIDOCAINE HYDROCHLORIDE 10 MG/ML
INJECTION, SOLUTION EPIDURAL; INFILTRATION; INTRACAUDAL; PERINEURAL PRN
Status: DISCONTINUED | OUTPATIENT
Start: 2022-03-29 | End: 2022-03-29 | Stop reason: SDUPTHER

## 2022-03-29 RX ORDER — OXYCODONE HYDROCHLORIDE AND ACETAMINOPHEN 5; 325 MG/1; MG/1
2 TABLET ORAL EVERY 4 HOURS PRN
Status: DISCONTINUED | OUTPATIENT
Start: 2022-03-29 | End: 2022-04-06 | Stop reason: HOSPADM

## 2022-03-29 RX ORDER — PROPOFOL 10 MG/ML
INJECTION, EMULSION INTRAVENOUS PRN
Status: DISCONTINUED | OUTPATIENT
Start: 2022-03-29 | End: 2022-03-29 | Stop reason: SDUPTHER

## 2022-03-29 RX ORDER — ALBUMIN, HUMAN INJ 5% 5 %
SOLUTION INTRAVENOUS
Status: COMPLETED
Start: 2022-03-29 | End: 2022-03-29

## 2022-03-29 RX ORDER — SODIUM CHLORIDE 9 MG/ML
25 INJECTION, SOLUTION INTRAVENOUS PRN
Status: DISCONTINUED | OUTPATIENT
Start: 2022-03-29 | End: 2022-04-06 | Stop reason: HOSPADM

## 2022-03-29 RX ORDER — ASPIRIN 81 MG/1
81 TABLET ORAL
Status: DISCONTINUED | OUTPATIENT
Start: 2022-03-29 | End: 2022-03-29

## 2022-03-29 RX ORDER — DIPHENHYDRAMINE HCL 25 MG
25 TABLET ORAL NIGHTLY PRN
Status: DISCONTINUED | OUTPATIENT
Start: 2022-03-30 | End: 2022-04-06 | Stop reason: HOSPADM

## 2022-03-29 RX ORDER — FENTANYL CITRATE 0.05 MG/ML
INJECTION, SOLUTION INTRAMUSCULAR; INTRAVENOUS PRN
Status: DISCONTINUED | OUTPATIENT
Start: 2022-03-29 | End: 2022-03-29 | Stop reason: SDUPTHER

## 2022-03-29 RX ORDER — DEXTROSE MONOHYDRATE 50 MG/ML
100 INJECTION, SOLUTION INTRAVENOUS PRN
Status: DISCONTINUED | OUTPATIENT
Start: 2022-03-29 | End: 2022-04-06 | Stop reason: HOSPADM

## 2022-03-29 RX ORDER — SODIUM CHLORIDE 9 MG/ML
INJECTION, SOLUTION INTRAVENOUS CONTINUOUS PRN
Status: DISCONTINUED | OUTPATIENT
Start: 2022-03-29 | End: 2022-03-29 | Stop reason: SDUPTHER

## 2022-03-29 RX ORDER — MAGNESIUM SULFATE IN WATER 40 MG/ML
2000 INJECTION, SOLUTION INTRAVENOUS PRN
Status: DISCONTINUED | OUTPATIENT
Start: 2022-03-29 | End: 2022-04-06 | Stop reason: HOSPADM

## 2022-03-29 RX ORDER — FENTANYL CITRATE 50 UG/ML
50 INJECTION, SOLUTION INTRAMUSCULAR; INTRAVENOUS
Status: DISCONTINUED | OUTPATIENT
Start: 2022-03-29 | End: 2022-04-06 | Stop reason: HOSPADM

## 2022-03-29 RX ORDER — NOREPINEPHRINE BIT/0.9 % NACL 16MG/250ML
.01-3.3 INFUSION BOTTLE (ML) INTRAVENOUS CONTINUOUS
Status: DISCONTINUED | OUTPATIENT
Start: 2022-03-29 | End: 2022-04-06 | Stop reason: HOSPADM

## 2022-03-29 RX ORDER — HEPARIN SODIUM 1000 [USP'U]/ML
INJECTION, SOLUTION INTRAVENOUS; SUBCUTANEOUS
Status: COMPLETED
Start: 2022-03-29 | End: 2022-03-29

## 2022-03-29 RX ORDER — NITROGLYCERIN 20 MG/100ML
5-200 INJECTION INTRAVENOUS CONTINUOUS
Status: DISCONTINUED | OUTPATIENT
Start: 2022-03-29 | End: 2022-04-06 | Stop reason: HOSPADM

## 2022-03-29 RX ORDER — PAPAVERINE HYDROCHLORIDE 30 MG/ML
INJECTION INTRAMUSCULAR; INTRAVENOUS
Status: DISCONTINUED
Start: 2022-03-29 | End: 2022-03-29

## 2022-03-29 RX ORDER — FENTANYL CITRATE 50 UG/ML
25 INJECTION, SOLUTION INTRAMUSCULAR; INTRAVENOUS
Status: DISCONTINUED | OUTPATIENT
Start: 2022-03-29 | End: 2022-04-06 | Stop reason: HOSPADM

## 2022-03-29 RX ORDER — ASPIRIN 81 MG/1
81 TABLET ORAL DAILY
Status: DISCONTINUED | OUTPATIENT
Start: 2022-03-29 | End: 2022-04-06 | Stop reason: HOSPADM

## 2022-03-29 RX ORDER — INSULIN GLARGINE 100 [IU]/ML
0.15 INJECTION, SOLUTION SUBCUTANEOUS NIGHTLY
Status: DISCONTINUED | OUTPATIENT
Start: 2022-03-30 | End: 2022-03-30

## 2022-03-29 RX ORDER — MAGNESIUM HYDROXIDE 1200 MG/15ML
LIQUID ORAL CONTINUOUS PRN
Status: COMPLETED | OUTPATIENT
Start: 2022-03-29 | End: 2022-03-29

## 2022-03-29 RX ORDER — ONDANSETRON 4 MG/1
4 TABLET, ORALLY DISINTEGRATING ORAL EVERY 8 HOURS PRN
Status: DISCONTINUED | OUTPATIENT
Start: 2022-03-29 | End: 2022-04-06 | Stop reason: HOSPADM

## 2022-03-29 RX ORDER — GLYCOPYRROLATE 1 MG/5 ML
SYRINGE (ML) INTRAVENOUS PRN
Status: DISCONTINUED | OUTPATIENT
Start: 2022-03-29 | End: 2022-03-29 | Stop reason: SDUPTHER

## 2022-03-29 RX ORDER — VANCOMYCIN HYDROCHLORIDE 1 G/200ML
1000 INJECTION, SOLUTION INTRAVENOUS EVERY 12 HOURS
Status: COMPLETED | OUTPATIENT
Start: 2022-03-29 | End: 2022-03-30

## 2022-03-29 RX ORDER — POTASSIUM CHLORIDE 29.8 MG/ML
20 INJECTION INTRAVENOUS PRN
Status: DISCONTINUED | OUTPATIENT
Start: 2022-03-29 | End: 2022-03-29

## 2022-03-29 RX ORDER — MIDAZOLAM HYDROCHLORIDE 1 MG/ML
INJECTION INTRAMUSCULAR; INTRAVENOUS PRN
Status: DISCONTINUED | OUTPATIENT
Start: 2022-03-29 | End: 2022-03-29 | Stop reason: SDUPTHER

## 2022-03-29 RX ORDER — AMIODARONE HYDROCHLORIDE 200 MG/1
200 TABLET ORAL 3 TIMES DAILY
Status: DISCONTINUED | OUTPATIENT
Start: 2022-03-29 | End: 2022-04-03

## 2022-03-29 RX ORDER — VANCOMYCIN HYDROCHLORIDE 1 G/20ML
INJECTION, POWDER, LYOPHILIZED, FOR SOLUTION INTRAVENOUS
Status: DISCONTINUED
Start: 2022-03-29 | End: 2022-03-29

## 2022-03-29 RX ORDER — LORAZEPAM 1 MG/1
2 TABLET ORAL
Status: DISCONTINUED | OUTPATIENT
Start: 2022-03-29 | End: 2022-03-29

## 2022-03-29 RX ORDER — HEPARIN SODIUM 1000 [USP'U]/ML
INJECTION, SOLUTION INTRAVENOUS; SUBCUTANEOUS PRN
Status: DISCONTINUED | OUTPATIENT
Start: 2022-03-29 | End: 2022-03-29 | Stop reason: SDUPTHER

## 2022-03-29 RX ORDER — ALBUMIN, HUMAN INJ 5% 5 %
25 SOLUTION INTRAVENOUS PRN
Status: DISCONTINUED | OUTPATIENT
Start: 2022-03-29 | End: 2022-04-06 | Stop reason: HOSPADM

## 2022-03-29 RX ORDER — SODIUM CHLORIDE 0.9 % (FLUSH) 0.9 %
10 SYRINGE (ML) INJECTION EVERY 12 HOURS SCHEDULED
Status: DISCONTINUED | OUTPATIENT
Start: 2022-03-29 | End: 2022-04-06 | Stop reason: HOSPADM

## 2022-03-29 RX ORDER — SODIUM CHLORIDE, SODIUM LACTATE, POTASSIUM CHLORIDE, CALCIUM CHLORIDE 600; 310; 30; 20 MG/100ML; MG/100ML; MG/100ML; MG/100ML
INJECTION, SOLUTION INTRAVENOUS CONTINUOUS PRN
Status: DISCONTINUED | OUTPATIENT
Start: 2022-03-29 | End: 2022-03-29 | Stop reason: SDUPTHER

## 2022-03-29 RX ORDER — SODIUM CHLORIDE, SODIUM LACTATE, POTASSIUM CHLORIDE, CALCIUM CHLORIDE 600; 310; 30; 20 MG/100ML; MG/100ML; MG/100ML; MG/100ML
INJECTION, SOLUTION INTRAVENOUS CONTINUOUS
Status: DISCONTINUED | OUTPATIENT
Start: 2022-03-29 | End: 2022-03-29

## 2022-03-29 RX ORDER — DEXTROSE MONOHYDRATE 25 G/50ML
12.5 INJECTION, SOLUTION INTRAVENOUS PRN
Status: DISCONTINUED | OUTPATIENT
Start: 2022-03-29 | End: 2022-04-06 | Stop reason: HOSPADM

## 2022-03-29 RX ORDER — MEPERIDINE HYDROCHLORIDE 50 MG/ML
25 INJECTION INTRAMUSCULAR; INTRAVENOUS; SUBCUTANEOUS
Status: ACTIVE | OUTPATIENT
Start: 2022-03-29 | End: 2022-03-29

## 2022-03-29 RX ORDER — KETOROLAC TROMETHAMINE 30 MG/ML
30 INJECTION, SOLUTION INTRAMUSCULAR; INTRAVENOUS ONCE
Status: COMPLETED | OUTPATIENT
Start: 2022-03-29 | End: 2022-03-29

## 2022-03-29 RX ORDER — POTASSIUM CHLORIDE 7.45 MG/ML
10 INJECTION INTRAVENOUS PRN
Status: DISCONTINUED | OUTPATIENT
Start: 2022-03-29 | End: 2022-04-06 | Stop reason: HOSPADM

## 2022-03-29 RX ORDER — ONDANSETRON 2 MG/ML
4 INJECTION INTRAMUSCULAR; INTRAVENOUS EVERY 6 HOURS PRN
Status: DISCONTINUED | OUTPATIENT
Start: 2022-03-29 | End: 2022-04-06 | Stop reason: HOSPADM

## 2022-03-29 RX ORDER — AMIODARONE HYDROCHLORIDE 200 MG/1
200 TABLET ORAL 3 TIMES DAILY
Status: DISCONTINUED | OUTPATIENT
Start: 2022-03-29 | End: 2022-03-29

## 2022-03-29 RX ORDER — SODIUM CHLORIDE 0.9 % (FLUSH) 0.9 %
5-40 SYRINGE (ML) INJECTION EVERY 12 HOURS SCHEDULED
Status: DISCONTINUED | OUTPATIENT
Start: 2022-03-29 | End: 2022-03-29

## 2022-03-29 RX ORDER — METOPROLOL TARTRATE 5 MG/5ML
2.5 INJECTION INTRAVENOUS EVERY 10 MIN PRN
Status: DISCONTINUED | OUTPATIENT
Start: 2022-03-29 | End: 2022-04-06 | Stop reason: HOSPADM

## 2022-03-29 RX ORDER — OXYCODONE HYDROCHLORIDE AND ACETAMINOPHEN 5; 325 MG/1; MG/1
1 TABLET ORAL EVERY 4 HOURS PRN
Status: DISCONTINUED | OUTPATIENT
Start: 2022-03-29 | End: 2022-04-06 | Stop reason: HOSPADM

## 2022-03-29 RX ORDER — NICOTINE POLACRILEX 4 MG
15 LOZENGE BUCCAL PRN
Status: DISCONTINUED | OUTPATIENT
Start: 2022-03-29 | End: 2022-04-06 | Stop reason: HOSPADM

## 2022-03-29 RX ORDER — CHLORHEXIDINE GLUCONATE 0.12 MG/ML
15 RINSE ORAL ONCE
Status: COMPLETED | OUTPATIENT
Start: 2022-03-29 | End: 2022-03-29

## 2022-03-29 RX ORDER — SODIUM CHLORIDE 9 MG/ML
25 INJECTION, SOLUTION INTRAVENOUS PRN
Status: DISCONTINUED | OUTPATIENT
Start: 2022-03-29 | End: 2022-03-29

## 2022-03-29 RX ORDER — CHLORHEXIDINE GLUCONATE 4 G/100ML
SOLUTION TOPICAL SEE ADMIN INSTRUCTIONS
Status: DISCONTINUED | OUTPATIENT
Start: 2022-03-29 | End: 2022-03-29

## 2022-03-29 RX ORDER — SODIUM CHLORIDE 0.9 % (FLUSH) 0.9 %
10 SYRINGE (ML) INJECTION PRN
Status: DISCONTINUED | OUTPATIENT
Start: 2022-03-29 | End: 2022-04-06 | Stop reason: HOSPADM

## 2022-03-29 RX ORDER — ROCURONIUM BROMIDE 10 MG/ML
INJECTION, SOLUTION INTRAVENOUS PRN
Status: DISCONTINUED | OUTPATIENT
Start: 2022-03-29 | End: 2022-03-29 | Stop reason: SDUPTHER

## 2022-03-29 RX ORDER — POTASSIUM CHLORIDE 20 MEQ/1
40 TABLET, EXTENDED RELEASE ORAL PRN
Status: DISCONTINUED | OUTPATIENT
Start: 2022-03-29 | End: 2022-04-06 | Stop reason: HOSPADM

## 2022-03-29 RX ORDER — PROTAMINE SULFATE 10 MG/ML
50 INJECTION, SOLUTION INTRAVENOUS
Status: ACTIVE | OUTPATIENT
Start: 2022-03-29 | End: 2022-03-29

## 2022-03-29 RX ORDER — PANTOPRAZOLE SODIUM 40 MG/1
40 TABLET, DELAYED RELEASE ORAL DAILY
Status: DISCONTINUED | OUTPATIENT
Start: 2022-03-29 | End: 2022-04-06 | Stop reason: HOSPADM

## 2022-03-29 RX ORDER — IPRATROPIUM BROMIDE AND ALBUTEROL SULFATE 2.5; .5 MG/3ML; MG/3ML
1 SOLUTION RESPIRATORY (INHALATION)
Status: DISCONTINUED | OUTPATIENT
Start: 2022-03-29 | End: 2022-04-06 | Stop reason: HOSPADM

## 2022-03-29 RX ORDER — CLOPIDOGREL BISULFATE 75 MG/1
75 TABLET ORAL DAILY
Status: DISCONTINUED | OUTPATIENT
Start: 2022-03-30 | End: 2022-04-06 | Stop reason: HOSPADM

## 2022-03-29 RX ORDER — PROPOFOL 10 MG/ML
10 INJECTION, EMULSION INTRAVENOUS CONTINUOUS
Status: DISCONTINUED | OUTPATIENT
Start: 2022-03-29 | End: 2022-04-06 | Stop reason: HOSPADM

## 2022-03-29 RX ORDER — NOREPINEPHRINE BIT/0.9 % NACL 16MG/250ML
INFUSION BOTTLE (ML) INTRAVENOUS CONTINUOUS PRN
Status: DISCONTINUED | OUTPATIENT
Start: 2022-03-29 | End: 2022-03-29 | Stop reason: SDUPTHER

## 2022-03-29 RX ORDER — PROPOFOL 10 MG/ML
INJECTION, EMULSION INTRAVENOUS
Status: COMPLETED
Start: 2022-03-29 | End: 2022-03-29

## 2022-03-29 RX ORDER — PHENYLEPHRINE HCL IN 0.9% NACL 1 MG/10 ML
SYRINGE (ML) INTRAVENOUS PRN
Status: DISCONTINUED | OUTPATIENT
Start: 2022-03-29 | End: 2022-03-29 | Stop reason: SDUPTHER

## 2022-03-29 RX ORDER — ALBUMIN, HUMAN INJ 5% 5 %
SOLUTION INTRAVENOUS PRN
Status: DISCONTINUED | OUTPATIENT
Start: 2022-03-29 | End: 2022-03-29 | Stop reason: SDUPTHER

## 2022-03-29 RX ORDER — HYDRALAZINE HYDROCHLORIDE 20 MG/ML
5 INJECTION INTRAMUSCULAR; INTRAVENOUS EVERY 5 MIN PRN
Status: DISCONTINUED | OUTPATIENT
Start: 2022-03-29 | End: 2022-04-06 | Stop reason: HOSPADM

## 2022-03-29 RX ORDER — PROTAMINE SULFATE 10 MG/ML
INJECTION, SOLUTION INTRAVENOUS PRN
Status: DISCONTINUED | OUTPATIENT
Start: 2022-03-29 | End: 2022-03-29 | Stop reason: SDUPTHER

## 2022-03-29 RX ORDER — ATORVASTATIN CALCIUM 20 MG/1
20 TABLET, FILM COATED ORAL NIGHTLY
Status: DISCONTINUED | OUTPATIENT
Start: 2022-03-30 | End: 2022-04-06 | Stop reason: HOSPADM

## 2022-03-29 RX ADMIN — ALBUMIN (HUMAN) 25 G: 12.5 INJECTION, SOLUTION INTRAVENOUS at 14:40

## 2022-03-29 RX ADMIN — Medication 0.2 MG: at 13:17

## 2022-03-29 RX ADMIN — FENTANYL CITRATE 50 MCG: 50 INJECTION, SOLUTION INTRAMUSCULAR; INTRAVENOUS at 17:32

## 2022-03-29 RX ADMIN — ROCURONIUM BROMIDE 50 MG: 10 INJECTION INTRAVENOUS at 13:07

## 2022-03-29 RX ADMIN — MUPIROCIN: 20 OINTMENT TOPICAL at 07:30

## 2022-03-29 RX ADMIN — FENTANYL CITRATE 25 MCG: 50 INJECTION, SOLUTION INTRAMUSCULAR; INTRAVENOUS at 19:44

## 2022-03-29 RX ADMIN — LIDOCAINE HYDROCHLORIDE 50 MG: 10 INJECTION, SOLUTION EPIDURAL; INFILTRATION; INTRACAUDAL; PERINEURAL at 09:52

## 2022-03-29 RX ADMIN — FENTANYL CITRATE 100 MCG: 50 INJECTION INTRAVENOUS at 13:25

## 2022-03-29 RX ADMIN — ALBUMIN (HUMAN) 12.5 G: 12.5 INJECTION, SOLUTION INTRAVENOUS at 14:09

## 2022-03-29 RX ADMIN — Medication 100 MCG: at 10:31

## 2022-03-29 RX ADMIN — FENTANYL CITRATE 100 MCG: 50 INJECTION INTRAVENOUS at 11:49

## 2022-03-29 RX ADMIN — Medication 0.2 MG: at 13:25

## 2022-03-29 RX ADMIN — HYDRALAZINE HYDROCHLORIDE 5 MG: 20 INJECTION INTRAMUSCULAR; INTRAVENOUS at 15:15

## 2022-03-29 RX ADMIN — Medication 50 MCG: at 11:06

## 2022-03-29 RX ADMIN — AMINOCAPROIC ACID 10 G/HR: 250 INJECTION, SOLUTION INTRAVENOUS at 10:17

## 2022-03-29 RX ADMIN — MIDAZOLAM HYDROCHLORIDE 2 MG: 1 INJECTION, SOLUTION INTRAMUSCULAR; INTRAVENOUS at 09:45

## 2022-03-29 RX ADMIN — ROCURONIUM BROMIDE 50 MG: 10 INJECTION INTRAVENOUS at 09:52

## 2022-03-29 RX ADMIN — ROCURONIUM BROMIDE 50 MG: 10 INJECTION INTRAVENOUS at 11:01

## 2022-03-29 RX ADMIN — FENTANYL CITRATE 50 MCG: 50 INJECTION INTRAVENOUS at 14:24

## 2022-03-29 RX ADMIN — AMIODARONE HYDROCHLORIDE 200 MG: 200 TABLET ORAL at 20:19

## 2022-03-29 RX ADMIN — OXYCODONE HYDROCHLORIDE AND ACETAMINOPHEN 2 TABLET: 5; 325 TABLET ORAL at 22:40

## 2022-03-29 RX ADMIN — HEPARIN SODIUM 30000 UNITS: 1000 INJECTION INTRAVENOUS; SUBCUTANEOUS at 11:38

## 2022-03-29 RX ADMIN — OXYCODONE HYDROCHLORIDE AND ACETAMINOPHEN 2 TABLET: 5; 325 TABLET ORAL at 17:24

## 2022-03-29 RX ADMIN — KETOROLAC TROMETHAMINE 30 MG: 30 INJECTION, SOLUTION INTRAMUSCULAR at 21:33

## 2022-03-29 RX ADMIN — FENTANYL CITRATE 100 MCG: 50 INJECTION INTRAVENOUS at 11:02

## 2022-03-29 RX ADMIN — FENTANYL CITRATE 50 MCG: 50 INJECTION INTRAVENOUS at 10:49

## 2022-03-29 RX ADMIN — SODIUM CHLORIDE: 9 INJECTION, SOLUTION INTRAVENOUS at 10:17

## 2022-03-29 RX ADMIN — PROPOFOL 150 MG: 10 INJECTION, EMULSION INTRAVENOUS at 09:52

## 2022-03-29 RX ADMIN — NITROGLYCERIN 20 MCG/MIN: 20 INJECTION INTRAVENOUS at 16:54

## 2022-03-29 RX ADMIN — PROPOFOL 30 MCG/KG/MIN: 10 INJECTION, EMULSION INTRAVENOUS at 13:51

## 2022-03-29 RX ADMIN — MUPIROCIN: 20 OINTMENT TOPICAL at 20:20

## 2022-03-29 RX ADMIN — HYDRALAZINE HYDROCHLORIDE 5 MG: 20 INJECTION INTRAMUSCULAR; INTRAVENOUS at 23:44

## 2022-03-29 RX ADMIN — Medication 100 MCG: at 13:40

## 2022-03-29 RX ADMIN — CHLORHEXIDINE GLUCONATE 15 ML: 1.2 RINSE ORAL at 07:28

## 2022-03-29 RX ADMIN — Medication 0.02 MCG/KG/MIN: at 14:27

## 2022-03-29 RX ADMIN — POTASSIUM BICARBONATE 40 MEQ: 782 TABLET, EFFERVESCENT ORAL at 15:46

## 2022-03-29 RX ADMIN — PROPOFOL 50 MG: 10 INJECTION, EMULSION INTRAVENOUS at 10:50

## 2022-03-29 RX ADMIN — FENTANYL CITRATE 25 MCG: 50 INJECTION, SOLUTION INTRAMUSCULAR; INTRAVENOUS at 20:39

## 2022-03-29 RX ADMIN — FENTANYL CITRATE 150 MCG: 50 INJECTION INTRAVENOUS at 10:15

## 2022-03-29 RX ADMIN — Medication 100 MCG: at 10:18

## 2022-03-29 RX ADMIN — Medication 200 MCG: at 10:26

## 2022-03-29 RX ADMIN — Medication 0.02 MCG/KG/MIN: at 12:53

## 2022-03-29 RX ADMIN — SODIUM CHLORIDE, POTASSIUM CHLORIDE, SODIUM LACTATE AND CALCIUM CHLORIDE: 600; 310; 30; 20 INJECTION, SOLUTION INTRAVENOUS at 10:19

## 2022-03-29 RX ADMIN — Medication 100 MCG: at 13:59

## 2022-03-29 RX ADMIN — SODIUM CHLORIDE, PRESERVATIVE FREE 10 ML: 5 INJECTION INTRAVENOUS at 20:19

## 2022-03-29 RX ADMIN — Medication 100 MCG: at 10:22

## 2022-03-29 RX ADMIN — FENTANYL CITRATE 100 MCG: 50 INJECTION INTRAVENOUS at 10:47

## 2022-03-29 RX ADMIN — FENTANYL CITRATE 50 MCG: 50 INJECTION INTRAVENOUS at 14:25

## 2022-03-29 RX ADMIN — PROTAMINE SULFATE 250 MG: 10 INJECTION, SOLUTION INTRAVENOUS at 13:23

## 2022-03-29 RX ADMIN — SODIUM CHLORIDE, POTASSIUM CHLORIDE, SODIUM LACTATE AND CALCIUM CHLORIDE: 600; 310; 30; 20 INJECTION, SOLUTION INTRAVENOUS at 10:50

## 2022-03-29 RX ADMIN — VANCOMYCIN HYDROCHLORIDE 1500 MG: 10 INJECTION, POWDER, LYOPHILIZED, FOR SOLUTION INTRAVENOUS at 10:29

## 2022-03-29 RX ADMIN — FENTANYL CITRATE 100 MCG: 50 INJECTION INTRAVENOUS at 10:12

## 2022-03-29 RX ADMIN — MIDAZOLAM HYDROCHLORIDE 2 MG: 1 INJECTION, SOLUTION INTRAMUSCULAR; INTRAVENOUS at 12:57

## 2022-03-29 RX ADMIN — ROCURONIUM BROMIDE 50 MG: 10 INJECTION INTRAVENOUS at 13:55

## 2022-03-29 RX ADMIN — AMIODARONE HYDROCHLORIDE 200 MG: 200 TABLET ORAL at 07:31

## 2022-03-29 RX ADMIN — METOPROLOL TARTRATE 12.5 MG: 25 TABLET, FILM COATED ORAL at 07:29

## 2022-03-29 RX ADMIN — IPRATROPIUM BROMIDE AND ALBUTEROL SULFATE 1 AMPULE: .5; 3 SOLUTION RESPIRATORY (INHALATION) at 15:37

## 2022-03-29 RX ADMIN — METOPROLOL TARTRATE 25 MG: 25 TABLET ORAL at 20:19

## 2022-03-29 RX ADMIN — ALBUMIN, HUMAN INJ 5% 25 G: 5 SOLUTION at 14:40

## 2022-03-29 RX ADMIN — SODIUM CHLORIDE, POTASSIUM CHLORIDE, SODIUM LACTATE AND CALCIUM CHLORIDE: 600; 310; 30; 20 INJECTION, SOLUTION INTRAVENOUS at 12:32

## 2022-03-29 RX ADMIN — SODIUM CHLORIDE, POTASSIUM CHLORIDE, SODIUM LACTATE AND CALCIUM CHLORIDE: 600; 310; 30; 20 INJECTION, SOLUTION INTRAVENOUS at 07:35

## 2022-03-29 RX ADMIN — Medication 100 MCG: at 10:29

## 2022-03-29 RX ADMIN — ASPIRIN 81 MG: 81 TABLET, COATED ORAL at 07:29

## 2022-03-29 RX ADMIN — FENTANYL CITRATE 100 MCG: 50 INJECTION INTRAVENOUS at 13:51

## 2022-03-29 RX ADMIN — ALBUMIN (HUMAN) 12.5 G: 12.5 INJECTION, SOLUTION INTRAVENOUS at 13:34

## 2022-03-29 ASSESSMENT — PULMONARY FUNCTION TESTS
PIF_VALUE: 15
PIF_VALUE: 1
PIF_VALUE: 1
PIF_VALUE: 17
PIF_VALUE: 22
PIF_VALUE: 17
PIF_VALUE: 15
PIF_VALUE: 21
PIF_VALUE: 13
PIF_VALUE: 2
PIF_VALUE: 1
PIF_VALUE: 16
PIF_VALUE: 21
PIF_VALUE: 15
PIF_VALUE: 15
PIF_VALUE: 14
PIF_VALUE: 15
PIF_VALUE: 1
PIF_VALUE: 15
PIF_VALUE: 15
PIF_VALUE: 1
PIF_VALUE: 15
PIF_VALUE: 16
PIF_VALUE: 0
PIF_VALUE: 13
PIF_VALUE: 16
PIF_VALUE: 1
PIF_VALUE: 17
PIF_VALUE: 25
PIF_VALUE: 16
PIF_VALUE: 13
PIF_VALUE: 13
PIF_VALUE: 0
PIF_VALUE: 15
PIF_VALUE: 17
PIF_VALUE: 0
PIF_VALUE: 14
PIF_VALUE: 13
PIF_VALUE: 18
PIF_VALUE: 15
PIF_VALUE: 13
PIF_VALUE: 21
PIF_VALUE: 0
PIF_VALUE: 13
PIF_VALUE: 16
PIF_VALUE: 16
PIF_VALUE: 1
PIF_VALUE: 15
PIF_VALUE: 1
PIF_VALUE: 15
PIF_VALUE: 21
PIF_VALUE: 1
PIF_VALUE: 16
PIF_VALUE: 15
PIF_VALUE: 16
PIF_VALUE: 18
PIF_VALUE: 13
PIF_VALUE: 1
PIF_VALUE: 16
PIF_VALUE: 15
PIF_VALUE: 15
PIF_VALUE: 0
PIF_VALUE: 2
PIF_VALUE: 17
PIF_VALUE: 1
PIF_VALUE: 1
PIF_VALUE: 15
PIF_VALUE: 13
PIF_VALUE: 18
PIF_VALUE: 1
PIF_VALUE: 13
PIF_VALUE: 15
PIF_VALUE: 16
PIF_VALUE: 16
PIF_VALUE: 17
PIF_VALUE: 15
PIF_VALUE: 15
PIF_VALUE: 21
PIF_VALUE: 16
PIF_VALUE: 15
PIF_VALUE: 14
PIF_VALUE: 15
PIF_VALUE: 1
PIF_VALUE: 11
PIF_VALUE: 17
PIF_VALUE: 19
PIF_VALUE: 17
PIF_VALUE: 15
PIF_VALUE: 1
PIF_VALUE: 15
PIF_VALUE: 14
PIF_VALUE: 15
PIF_VALUE: 17
PIF_VALUE: 17
PIF_VALUE: 0
PIF_VALUE: 0
PIF_VALUE: 16
PIF_VALUE: 15
PIF_VALUE: 15
PIF_VALUE: 1
PIF_VALUE: 2
PIF_VALUE: 1
PIF_VALUE: 15
PIF_VALUE: 1
PIF_VALUE: 0
PIF_VALUE: 1
PIF_VALUE: 16
PIF_VALUE: 15
PIF_VALUE: 15
PIF_VALUE: 17
PIF_VALUE: 13
PIF_VALUE: 9
PIF_VALUE: 15
PIF_VALUE: 16
PIF_VALUE: 1
PIF_VALUE: 22
PIF_VALUE: 17
PIF_VALUE: 13
PIF_VALUE: 16
PIF_VALUE: 15
PIF_VALUE: 1
PIF_VALUE: 15
PIF_VALUE: 14
PIF_VALUE: 16
PIF_VALUE: 16
PIF_VALUE: 15
PIF_VALUE: 0
PIF_VALUE: 13
PIF_VALUE: 16
PIF_VALUE: 0
PIF_VALUE: 0
PIF_VALUE: 16
PIF_VALUE: 16
PIF_VALUE: 15
PIF_VALUE: 15
PIF_VALUE: 1
PIF_VALUE: 12
PIF_VALUE: 16
PIF_VALUE: 0
PIF_VALUE: 17
PIF_VALUE: 16
PIF_VALUE: 19
PIF_VALUE: 1
PIF_VALUE: 16
PIF_VALUE: 21
PIF_VALUE: 1
PIF_VALUE: 14
PIF_VALUE: 15
PIF_VALUE: 0
PIF_VALUE: 0
PIF_VALUE: 15
PIF_VALUE: 12
PIF_VALUE: 17
PIF_VALUE: 0
PIF_VALUE: 16
PIF_VALUE: 14
PIF_VALUE: 0
PIF_VALUE: 16
PIF_VALUE: 15
PIF_VALUE: 0
PIF_VALUE: 16
PIF_VALUE: 16
PIF_VALUE: 17
PIF_VALUE: 13
PIF_VALUE: 15
PIF_VALUE: 1
PIF_VALUE: 0
PIF_VALUE: 1
PIF_VALUE: 13
PIF_VALUE: 0
PIF_VALUE: 14
PIF_VALUE: 15
PIF_VALUE: 1
PIF_VALUE: 0
PIF_VALUE: 15
PIF_VALUE: 15
PIF_VALUE: 16
PIF_VALUE: 0
PIF_VALUE: 15
PIF_VALUE: 15
PIF_VALUE: 18
PIF_VALUE: 14
PIF_VALUE: 17
PIF_VALUE: 15
PIF_VALUE: 15
PIF_VALUE: 1
PIF_VALUE: 21
PIF_VALUE: 23
PIF_VALUE: 15
PIF_VALUE: 21
PIF_VALUE: 13
PIF_VALUE: 3
PIF_VALUE: 0
PIF_VALUE: 1
PIF_VALUE: 16
PIF_VALUE: 0
PIF_VALUE: 15
PIF_VALUE: 13
PIF_VALUE: 0
PIF_VALUE: 18
PIF_VALUE: 0
PIF_VALUE: 13
PIF_VALUE: 5
PIF_VALUE: 14
PIF_VALUE: 16
PIF_VALUE: 16
PIF_VALUE: 0
PIF_VALUE: 15
PIF_VALUE: 14
PIF_VALUE: 17
PIF_VALUE: 1
PIF_VALUE: 15
PIF_VALUE: 27
PIF_VALUE: 13
PIF_VALUE: 22
PIF_VALUE: 19
PIF_VALUE: 17
PIF_VALUE: 15
PIF_VALUE: 22
PIF_VALUE: 1
PIF_VALUE: 1
PIF_VALUE: 13
PIF_VALUE: 1
PIF_VALUE: 0
PIF_VALUE: 15
PIF_VALUE: 17
PIF_VALUE: 17
PIF_VALUE: 7
PIF_VALUE: 15
PIF_VALUE: 17
PIF_VALUE: 1
PIF_VALUE: 15
PIF_VALUE: 13
PIF_VALUE: 14
PIF_VALUE: 15
PIF_VALUE: 15
PIF_VALUE: 17
PIF_VALUE: 17
PIF_VALUE: 18
PIF_VALUE: 19
PIF_VALUE: 14
PIF_VALUE: 13
PIF_VALUE: 15
PIF_VALUE: 16
PIF_VALUE: 16
PIF_VALUE: 19
PIF_VALUE: 1
PIF_VALUE: 15
PIF_VALUE: 17
PIF_VALUE: 0
PIF_VALUE: 13
PIF_VALUE: 16
PIF_VALUE: 18
PIF_VALUE: 15
PIF_VALUE: 14
PIF_VALUE: 1
PIF_VALUE: 15
PIF_VALUE: 15
PIF_VALUE: 17
PIF_VALUE: 1
PIF_VALUE: 13
PIF_VALUE: 21
PIF_VALUE: 17
PIF_VALUE: 15
PIF_VALUE: 15
PIF_VALUE: 13
PIF_VALUE: 15
PIF_VALUE: 14
PIF_VALUE: 16
PIF_VALUE: 15
PIF_VALUE: 0
PIF_VALUE: 17
PIF_VALUE: 16
PIF_VALUE: 16
PIF_VALUE: 15
PIF_VALUE: 0
PIF_VALUE: 13
PIF_VALUE: 15
PIF_VALUE: 14
PIF_VALUE: 0
PIF_VALUE: 15
PIF_VALUE: 15
PIF_VALUE: 13
PIF_VALUE: 0
PIF_VALUE: 17
PIF_VALUE: 14
PIF_VALUE: 16
PIF_VALUE: 15
PIF_VALUE: 16
PIF_VALUE: 15
PIF_VALUE: 14
PIF_VALUE: 0
PIF_VALUE: 13
PIF_VALUE: 18
PIF_VALUE: 14
PIF_VALUE: 13
PIF_VALUE: 1
PIF_VALUE: 17
PIF_VALUE: 15

## 2022-03-29 ASSESSMENT — PAIN SCALES - GENERAL
PAINLEVEL_OUTOF10: 0

## 2022-03-29 NOTE — PROGRESS NOTES
Comprehensive Nutrition Assessment    Type and Reason for Visit:  Initial (LOS)    Nutrition Recommendations/Plan: Continue NPO. Monitor plans for extubation. If nutrition support requested, suggest TF of Standard without Fiber formula goal rate 55 mL/hr = 1584 kcals, 73 gm pro/day. Will monitor plan of care. Nutrition Assessment:  Chart reviewed for length of stay. Admitted with c/o chest pain and SOB. PMH include: HTN. Pt NPO and off unit today for a CABG. Per chart review, recorded PO intakes of % of meals. Labs/Meds reviewed. Malnutrition Assessment:  Malnutrition Status:  Insufficient data    Context:  Acute Illness     Findings of the 6 clinical characteristics of malnutrition:  Energy Intake:  Mild decrease in energy intake   Weight Loss:  Unable to assess     Body Fat Loss:  Unable to assess   Muscle Mass Loss:  Unable to assess  Fluid Accumulation:  No significant fluid accumulation   Strength:  Not Performed    Estimated Daily Nutrient Needs:  Energy (kcal):  20-22 kcal/kg = 1489-4540 kcals/day; Weight Used for Energy Requirements:  Current     Protein (g):  1.2-1.5 gm/kg = 70-85 gm pro/day; Weight Used for Protein Requirements:  Ideal        Fluid (ml/day):  25 mL/kg = 2000 mL/day or per MD; Method Used for Fluid Requirements:  ml/Kg      Nutrition Related Findings:  Labs/Meds reviewed. Last BM 3/28. Wounds:  Multiple,Surgical Incision       Current Nutrition Therapies:    No diet orders on file    Anthropometric Measures:  · Height: 5' 5\" (165.1 cm)  · Current Body Weight: 174 lb 6.1 oz (79.1 kg)   · Admission Body Weight: 178 lb (80.7 kg)    · Ideal Body Weight: 125 lbs; % Ideal Body Weight 139.5 %   · BMI: 29  · BMI Categories: Overweight (BMI 25.0-29. 9)       Nutrition Diagnosis:   · Inadequate oral intake related to  (current condition; surgery) as evidenced by NPO or clear liquid status due to medical condition    Nutrition Interventions:   Food and/or Nutrient Delivery:  Continue NPO  Nutrition Education/Counseling:  No recommendation at this time   Coordination of Nutrition Care:  Continue to monitor while inpatient    Goals:  Meet % of estimated nutrition needs. Nutrition Monitoring and Evaluation:   Behavioral-Environmental Outcomes:  None Identified   Food/Nutrient Intake Outcomes:  Diet Advancement/Tolerance  Physical Signs/Symptoms Outcomes:  Biochemical Data,GI Status,Fluid Status or Edema,Hemodynamic Status,Nutrition Focused Physical Findings,Skin,Weight     Discharge Planning:     Too soon to determine     Electronically signed by Anna Gagnon RD, LD on 3/29/22 at 3:04 PM EDT    Contact: [phone]

## 2022-03-29 NOTE — PROGRESS NOTES
Physical Therapy        Physical Therapy Cancel Note      DATE: 3/29/2022    NAME: Lynne Beckham  MRN: [id number]   : 1960      Patient not seen this date for Physical Therapy due to:    Surgery/Procedure: CABG      Electronically signed by Dario Asencio PTA on 3/29/2022 at 10:18 AM

## 2022-03-29 NOTE — PLAN OF CARE
Nutrition Problem #1: Inadequate oral intake  Intervention: Food and/or Nutrient Delivery: Continue NPO  Nutritional Goals: Meet % of estimated nutrition needs.

## 2022-03-29 NOTE — ANESTHESIA PROCEDURE NOTES
Central Venous Line:    A central venous line was placed using surface landmarks, in the OR for the following indication(s): central venous access and CVP monitoring. Sterility preparation included the following: hand hygiene performed prior to procedure, maximum sterile barriers used and sterile technique used to drape from head to toe. The patient was placed in Trendelenburg position. The left subclavian vein was prepped. The site was prepped with Chloraprep. introducer slick was placed. Intravenous verification was obtained by venous blood return and GARRETT. Post insertion care included: all ports aspirated, all ports flushed easily, guidewire removed intact, Biopatch applied, line sutured in place and dressing applied. During the procedure the patient experienced: patient tolerated procedure well with no complications and EBL < 5mL.       Anesthesia type: general..No  Staffing  Anesthesiologist: Christopher Villasenor MD  Preanesthetic Checklist  Completed: patient identified, IV checked, site marked, risks and benefits discussed, surgical consent, monitors and equipment checked, pre-op evaluation, timeout performed, anesthesia consent given, oxygen available and patient being monitored

## 2022-03-29 NOTE — ANESTHESIA POSTPROCEDURE EVALUATION
Department of Anesthesiology  Postprocedure Note    Patient: Aubrey Valerio  MRN: [id number]  YOB: 1960  Date of evaluation: 3/29/2022  Time:  2:32 PM     Procedure Summary     Date: 03/29/22 Room / Location: [address]    Anesthesia Start: 0945 Anesthesia Stop: 0235    Procedure: CABG CORONARY ARTERY BYPASS X3 WITH GARRETT (N/A ) Diagnosis: (CORONARY ARTERY DISEASE)    Surgeons: Dave Benitez MD Responsible Provider: Nayely Marquez MD    Anesthesia Type: general ASA Status: 4          Anesthesia Type: general    Jacquelyn Phase I:      Jacquelyn Phase II:      Last vitals: Reviewed and per EMR flowsheets.        Anesthesia Post Evaluation    Patient location during evaluation: ICU  Patient participation: complete - patient participated  Level of consciousness: awake and alert  Pain score: 0  Airway patency: patent  Nausea & Vomiting: no nausea and no vomiting  Complications: no  Cardiovascular status: hemodynamically stable  Respiratory status: acceptable  Hydration status: stable

## 2022-03-29 NOTE — PROGRESS NOTES
Occupational [facility]  Occupational Therapy Not Seen Note    DATE: 3/29/2022    NAME: Misti Lombardo  MRN: [id number]   : 1960      Patient not seen this date for Occupational Therapy due to: OHS     Next Scheduled Treatment: 3/30/22 Needs Re-eval    Electronically signed by ELLY Temple on 3/29/2022 at 2:21 PM

## 2022-03-29 NOTE — CARE COORDINATION
[facility] Flow/Interdisciplinary Rounds Progress Note    Quality Flow Rounds held on March 29, 2022 at [address] Attending:  Bedside Nurse,  and Nursing Unit Leadership    Barriers to Discharge: OHS today    Anticipated Discharge Date:  Expected Discharge Date: 03/30/22    Anticipated Discharge Disposition:    Readmission Risk              Risk of Unplanned Readmission:  13           Discussed patient goal for the day, patient clinical progression, and barriers to discharge. The following Goal(s) of the Day/Commitment(s) have been identified:      Going for CABG today. Follow needs. Currently I/S with FiO2 60%, propofol/levo gtts.     Ellen Wells RN  March 29, 2022

## 2022-03-29 NOTE — PROGRESS NOTES
[facility]  Internal Medicine Teaching Residency Program  Inpatient Daily Progress Note  ______________________________________________________________________________    Patient: Jorge L Elkins  YOB: 1960   VNU:[id number]    Acct: [de-identified]     Room: [address] [place] [address]  Admit date: 3/22/2022  Today's date: 03/29/22  Number of days in the hospital: 7    SUBJECTIVE   Admitting Diagnosis: 3-vessel CAD  CC: Chest Pain  Pt examined at bedside. Chart & results reviewed. No acute episodes overnight  Patient is heme stable and afebrile  Patient is in good spirits  Daughter bedside with patient  NPO after midnight  Plan for CABG today    ROS:  Constitutional:  negative for chills, fevers, sweats  Respiratory:  negative for cough, dyspnea on exertion, hemoptysis, shortness of breath, wheezing  Cardiovascular:  negative for chest pain, chest pressure/discomfort, lower extremity edema, palpitations  Gastrointestinal:  negative for abdominal pain, constipation, diarrhea, nausea, vomiting  Neurological:  negative for dizziness, headache    BRIEF HISTORY     The patient is a pleasant 58 y. o. female with a no PMHx formally diagnosed per the pt, EMR shows history of chronic back pain, hypertension, osteoarthritis, COPD and pneumothorax.  Pt presented with a chief complaint of chest pain.  Patient has midsternal chest pain that woke her up from sleep around 4 in the morning.  Patient states the pain is radiating to her neck into her jaw bilaterally.  Patient states it is hard to determine if the pain is traveling down either arm especially the left arm she has had rotator cuff surgery in the past.  Patient did endorse some mild associated shortness of breath and diaphoresis early this morning.  Patient received aspirin and nitroglycerin by EMS prior to arrival which alleviated the chest pain.  Patient denies any significant cardiac history.  Patient's blood pressure on admission was 177/101.  While in the emergency department patient's systolic blood pressure got up to 196.  Patient received Lopressor 25 mg and was started on Cozaar 25 mg.  Patient's blood pressure is improved. OBJECTIVE     Vital Signs:  BP (!) 154/64   Pulse 82   Temp 97.8 °F (36.6 °C) (Oral)   Resp 19   Ht 5' 5\" (1.651 m)   Wt 174 lb 6.1 oz (79.1 kg)   SpO2 100%   BMI 29.02 kg/m²     Temp (24hrs), Av.8 °F (36 °C), Min:92 °F (33.3 °C), Max:98.6 °F (37 °C)    In: 3624.1   Out: 1400 [Urine:950]    Physical Exam:  Vitals and nursing note reviewed. Constitutional:       Appearance: Normal appearance. HENT:      Head: Normocephalic and atraumatic.      Nose: Nose normal.      Mouth/Throat:      Mouth: Mucous membranes are moist.      Pharynx: Oropharynx is clear. Eyes:      Extraocular Movements: Extraocular movements intact.      Conjunctiva/sclera: Conjunctivae normal.      Pupils: Pupils are equal, round, and reactive to light. Cardiovascular:      Rate and Rhythm: Normal rate and regular rhythm.      Pulses: Normal pulses.      Heart sounds: Normal heart sounds. No murmur heard. No friction rub. No gallop.    Pulmonary:      Effort: Pulmonary effort is normal. No respiratory distress.      Breath sounds: Normal breath sounds. No stridor. No wheezing, rhonchi or rales. Chest:      Chest wall: No tenderness. Abdominal:      General: Abdomen is flat. Bowel sounds are normal. There is no distension.      Palpations: Abdomen is soft. There is no mass.      Tenderness: There is no abdominal tenderness. There is no guarding or rebound.      Hernia: No hernia is present. Musculoskeletal:         General: No swelling, tenderness, deformity or signs of injury. Normal range of motion.      Cervical back: Normal range of motion.      Right lower leg: No edema.      Left lower leg: No edema. Skin:     General: Skin is warm.    Neurological:      General: No focal deficit present.      Mental Status: She is alert and oriented to person, place, and time. Mental status is at baseline. Psychiatric:         Mood and Affect: Mood normal.         BehaviorKeven Litter         Thought Content:  Thought content normal.         Judgment: Judgment normal.        Medications:  Scheduled Medications:    sodium chloride flush  10 mL IntraVENous 2 times per day    aspirin  81 mg Oral Daily    [START ON 3/30/2022] clopidogrel  75 mg Oral Daily    amiodarone  200 mg Oral TID    mupirocin   Nasal BID    metoprolol tartrate  25 mg Oral BID    [START ON 3/30/2022] atorvastatin  20 mg Oral Nightly    pantoprazole  40 mg Oral Daily    pantoprazole (PROTONIX) 40 mg injection  40 mg IntraVENous Daily    vancomycin (VANCOCIN) IV  1,000 mg IntraVENous Q12H    ipratropium-albuterol  1 ampule Inhalation Q4H WA    [START ON 3/30/2022] insulin glargine  0.15 Units/kg SubCUTAneous Nightly     Continuous Infusions:    sodium chloride      propofol 40 mcg/kg/min (03/29/22 1508)    insulin 2 Units/hr (03/29/22 1427)    dextrose      norepinephrine 0.02 mcg/kg/min (03/29/22 1427)     PRN Medicationsprotamine, 50 mg, Once PRN  sodium chloride flush, 10 mL, PRN  sodium chloride, 25 mL, PRN  ondansetron, 4 mg, Q8H PRN   Or  ondansetron, 4 mg, Q6H PRN  oxyCODONE-acetaminophen, 1 tablet, Q4H PRN   Or  oxyCODONE-acetaminophen, 2 tablet, Q4H PRN  fentanNYL, 25 mcg, Q1H PRN   Or  fentanNYL, 50 mcg, Q1H PRN  meperidine, 25 mg, Once PRN  hydrALAZINE, 5 mg, Q5 Min PRN  metoprolol, 2.5 mg, Q10 Min PRN  sodium bicarbonate, 50 mEq, Q30 Min PRN  [START ON 3/30/2022] diphenhydrAMINE, 25 mg, Nightly PRN  magnesium sulfate, 2,000 mg, PRN  calcium chloride IVPB, 1,000 mg, PRN   Or  calcium chloride IVPB, 2,000 mg, PRN  albumin human, 25 g, PRN  glucose, 15 g, PRN  dextrose, 12.5 g, PRN  glucagon (rDNA), 1 mg, PRN  dextrose, 100 mL/hr, PRN  bisacodyl, 5 mg, Daily PRN  potassium chloride, 40 mEq, PRN   Or  potassium alternative oral replacement, 40 mEq, PRN   Or  potassium chloride, 10 mEq, PRN      Diagnostic Labs:  CBC:   Recent Labs     03/27/22  0615 03/28/22  0541 03/29/22  0240   WBC 8.8 9.0 11.3   RBC 4.52 4.63 4.78   HGB 14.2 14.3 14.8   HCT 40.5 40.7 41.5   MCV 89.6 87.9 86.8   RDW 11.9 11.9 11.9    248 215     BMP:   Recent Labs     03/27/22  0615 03/27/22  0615 03/28/22  0541 03/29/22  0240 03/29/22  1450     --  133* 130*  --    K 4.1  --  3.8 4.0  --      --  97* 97*  --    CO2 20  --  22 17*  --    BUN 10  --  11 13  --    CREATININE 0.54   < > 0.60 0.56 0.69    < > = values in this interval not displayed. BNP: No results for input(s): BNP in the last 72 hours. PT/INR: No results for input(s): PROTIME, INR in the last 72 hours. APTT:   Recent Labs     03/28/22  0048 03/28/22  0541 03/29/22  0240   APTT 69.6* 65.0* 77.1*     CARDIAC ENZYMES: No results for input(s): CKMB, CKMBINDEX, TROPONINI in the last 72 hours. Invalid input(s): CKTOTAL;3  FASTING LIPID PANEL:  Lab Results   Component Value Date    CHOL 208 (H) 03/22/2018    HDL 67 03/22/2018    TRIG 59 03/22/2018     LIVER PROFILE: No results for input(s): AST, ALT, ALB, BILIDIR, BILITOT, ALKPHOS in the last 72 hours. MICROBIOLOGY:   Lab Results   Component Value Date/Time    CULTURE WRONG TEST ORDERED 03/23/2022 03:22 PM     Imaging:    CT CHEST WO CONTRAST    Result Date: 3/23/2022  1. Probable scarring/rounded atelectasis bilateral lung bases, more nodular in appearance on the right compared to the left. 2. No pulmonary infiltrate or definite suspicious lesion evident. 3. Calcific atherosclerosis aorta and coronary arteries. 4. Hepatic steatosis.  RECOMMENDATIONS: Unavailable     ASSESSMENT & PLAN     ASSESSMENT / PLAN:     Multivessel CAD  -CABG Today per CTS  -NPO for procedure     Essential hypertension - stable  -Cozaar 50 mg PO daily  -HCTZ 25 mg PO daily  -Lopressor 25 PO BID      Type 2 diabetes mellitus without complication, without long-term current use of insulin   - Medium dose sliding scale   - Starting Lantus 30 Units daily  - Glucose checks 4 times daily before meals and at bedtime  - Hemoglobin A1c 11.3  - Diabetic education       Hypokalemia  -Daily labs  -Replace electrolytes as needed     Hypomagnesemia  - Daily lab  - Replace electrolytes as needed      DVT ppx: Heparin     PT/OT/SW: Consulted. Discharge Planning: In process, pending CABG and recovery. Katia Rosas MD  Internal Medicine Resident, PGY-2  [address], [place]  3/29/2022, 3:35 PM

## 2022-03-29 NOTE — PLAN OF CARE
Problem:  Activity:  Goal: Risk for activity intolerance will decrease  Description: Risk for activity intolerance will decrease  Outcome: Ongoing  Goal: Will verbalize the importance of balancing activity with adequate rest periods  Description: Will verbalize the importance of balancing activity with adequate rest periods  Outcome: Ongoing  Goal: Ability to tolerate increased activity will improve  Description: Ability to tolerate increased activity will improve  Outcome: Ongoing     Problem: Cardiac:  Goal: Ability to maintain an adequate cardiac output will improve  Description: Ability to maintain an adequate cardiac output will improve  Outcome: Ongoing  Goal: Hemodynamic stability will improve  Description: Hemodynamic stability will improve  Outcome: Ongoing  Goal: Diagnostic test results will improve  Description: Diagnostic test results will improve  Outcome: Ongoing  Goal: Complications related to the disease process, condition or treatment will be avoided or minimized  Description: Complications related to the disease process, condition or treatment will be avoided or minimized  Outcome: Ongoing  Goal: Cerebral tissue perfusion will improve  Description: Cerebral tissue perfusion will improve  Outcome: Ongoing     Problem: Coping:  Goal: Ability to verbalize feelings about condition will improve  Description: Ability to verbalize feelings about condition will improve  Outcome: Ongoing  Goal: Ability to identify strategies to decrease anxiety will improve  Description: Ability to identify strategies to decrease anxiety will improve  Outcome: Ongoing  Goal: Ability to identify and alter barriers to satisfying sexual function will improve  Description: Ability to identify and alter barriers to satisfying sexual function will improve  Outcome: Ongoing  Goal: Ability to identify and develop effective coping behavior will improve  Description: Ability to identify and develop effective coping behavior will improve  Outcome: Ongoing     Problem: Health Behavior:  Goal: Ability to identify changes in lifestyle to reduce recurrence of condition will improve  Description: Ability to identify changes in lifestyle to reduce recurrence of condition will improve  Outcome: Ongoing  Goal: Ability to manage health-related needs will improve  Description: Ability to manage health-related needs will improve  Outcome: Ongoing  Goal: Identification of resources available to assist in meeting health care needs will improve  Description: Identification of resources available to assist in meeting health care needs will improve  Outcome: Ongoing     Problem: Nutritional:  Goal: Ability to identify appropriate dietary choices will improve  Description: Ability to identify appropriate dietary choices will improve  Outcome: Ongoing     Problem: Respiratory:  Goal: Ability to maintain adequate ventilation will improve  Description: Ability to maintain adequate ventilation will improve  Outcome: Ongoing  Goal: Levels of oxygenation will improve  Description: Levels of oxygenation will improve  Outcome: Ongoing     Problem: Sensory:  Goal: Pain level will decrease  Description: Pain level will decrease  Outcome: Ongoing     Problem: Falls - Risk of:  Goal: Will remain free from falls  Description: Will remain free from falls  Outcome: Ongoing  Goal: Absence of physical injury  Description: Absence of physical injury  Outcome: Ongoing     Problem: Infection:  Goal: Will remain free from infection  Description: Will remain free from infection  Outcome: Ongoing     Problem: Safety:  Goal: Free from accidental physical injury  Description: Free from accidental physical injury  Outcome: Ongoing  Goal: Free from intentional harm  Description: Free from intentional harm  Outcome: Ongoing     Problem: Daily Care:  Goal: Daily care needs are met  Description: Daily care needs are met  Outcome: Ongoing     Problem: Pain:  Description: Pain management should include both nonpharmacologic and pharmacologic interventions.   Goal: Pain level will decrease  Description: Pain level will decrease  Outcome: Ongoing  Goal: Patient's pain/discomfort is manageable  Description: Patient's pain/discomfort is manageable  Outcome: Ongoing  Goal: Control of acute pain  Description: Control of acute pain  Outcome: Ongoing  Goal: Control of chronic pain  Description: Control of chronic pain  Outcome: Ongoing     Problem: Skin Integrity:  Goal: Skin integrity will stabilize  Description: Skin integrity will stabilize  Outcome: Ongoing     Problem: Discharge Planning:  Goal: Patients continuum of care needs are met  Description: Patients continuum of care needs are met  Outcome: Ongoing     Problem: Non-Violent Restraints  Goal: Removal from restraints as soon as assessed to be safe  3/29/2022 1937 by Hayes Pereira RN  Outcome: Ongoing  3/29/2022 1715 by Bettie Peguero RN  Outcome: Ongoing  Goal: No harm/injury to patient while restraints in use  3/29/2022 1937 by Hayes Pereira RN  Outcome: Ongoing  3/29/2022 1715 by Bettie Peguero RN  Outcome: Ongoing  Goal: Patient's dignity will be maintained  3/29/2022 1937 by Hayes Pereira RN  Outcome: Ongoing  3/29/2022 1715 by Bettie Peguero RN  Outcome: Ongoing     Problem: OXYGENATION/RESPIRATORY FUNCTION  Goal: Patient will maintain patent airway  3/29/2022 1442 by Sherri Sinclair RCP  Outcome: Ongoing  Goal: Patient will achieve/maintain normal respiratory rate/effort  Description: Respiratory rate and effort will be within normal limits for the patient  3/29/2022 1442 by Sherri Sinclair RCP  Outcome: Ongoing     Problem: MECHANICAL VENTILATION  Goal: Patient will maintain patent airway  3/29/2022 1442 by Sherri Sinclair RCP  Outcome: Ongoing  Goal: Oral health is maintained or improved  3/29/2022 1442 by Sherri Sinclair RCP  Outcome: Ongoing  Goal: ET tube will be managed safely  3/29/2022 1442 by Tyson ANDERS Wilcox  Outcome: Ongoing  Goal: Ability to express needs and understand communication  3/29/2022 1442 by Nicolas Araiza RCP  Outcome: Ongoing  Goal: Mobility/activity is maintained at optimum level for patient  3/29/2022 1442 by Nicolas Araiza RCP  Outcome: Ongoing     Problem: SKIN INTEGRITY  Goal: Skin integrity is maintained or improved  3/29/2022 1442 by Nicolas Araiza RCP  Outcome: Ongoing     Problem: Nutrition  Goal: Optimal nutrition therapy  3/29/2022 1505 by Mya Galaviz RD, LD  Outcome: Ongoing  Note: Nutrition Problem #1: Inadequate oral intake  Intervention: Food and/or Nutrient Delivery: Continue NPO  Nutritional Goals: Meet % of estimated nutrition needs.

## 2022-03-29 NOTE — BRIEF OP NOTE
Brief Postoperative Note      Patient: Jamia Driver  YOB: 1960  MRN: [id number]    Date of Procedure: 3/29/2022    Pre-Op Diagnosis: CORONARY ARTERY DISEASE    Post-Op Diagnosis: Same       Procedure(s):  CABG CORONARY ARTERY BYPASS X3 WITH GARRETT    Surgeon(s):  Avelina Campos MD    Assistant:  First Assistant: Zachary Carreno RN  Resident: Blair Neal DO    Anesthesia: General    Estimated Blood Loss (mL): N/A    Complications: None    Specimens:   * No specimens in log *    Implants:  * No implants in log *      Drains:   Chest Tube 1 Anterior 24 Arabic (Active)       Chest Tube 2 Anterior 24 Arabic (Active)       Urethral Catheter Temperature probe 16 fr (Active)       Findings: CABG X 3 w/ SLIMA to LAD, RSVG1 to OM, RSVG2 to RPDA    Electronically signed by Avelina Campos MD on 3/29/2022 at 2:13 PM

## 2022-03-29 NOTE — ANESTHESIA PROCEDURE NOTES
Procedure Performed: GARRETT       Start Time:  3/29/2022 10:00 AM       End Time:   3/29/2022 10:15 AM    Preanesthesia Checklist:  Patient identified, IV assessed, risks and benefits discussed, monitors and equipment assessed, procedure being performed at surgeon's request and anesthesia consent obtained. General Procedure Information  Diagnostic Indications for Echo:  assessment of surgical repair and suspected pericardial effusion  Physician Requesting Echo: Alpa Quinn MD  Location performed:  OR  Intubated  Bite block not placed  Heart visualized  Probe Insertion:  Easy  Probe Type:  3D  Modalities:  2D only, color flow mapping and continuous wave Doppler    Echocardiographic and Doppler Measurements    Ventricles    Right Ventricle:  Cavity size normal.  Hypertrophy not present. Thrombus not present. Global function normal.    Left Ventricle:  Cavity size normal.  Hypertrophy present. Thrombus not present. Global Function normal.  Ejection Fraction 55%. Valves    Aortic Valve: Annulus normal.  Stenosis not present. Regurgitation none. Leaflets normal.  Leaflet motions normal.      Mitral Valve: Annulus normal.  Stenosis not present. Regurgitation mild. Leaflets normal.  Leaflet motions normal.      Tricuspid Valve: Annulus normal.  Stenosis not present. Regurgitation none. Leaflets normal.  Leaflet motions normal.    Pulmonic Valve: Annulus normal.  Stenosis not present. Regurgitation none. Aorta    Ascending Aorta:  Size normal.  Dissection not present. Aortic Arch:  Size normal.  Dissection not present. Descending Aorta:  Size normal.  Dissection not present. Atria    Right Atrium:  Size normal.  Spontaneous echo contrast not present. Thrombus not present. Tumor not present. Device not present. Left Atrium:  Size normal.  Spontaneous echo contrast not present. Thrombus not present. Tumor not present. Device not present.     Left atrial appendage normal.      Septa    Atrial Septum:  Intra-atrial septal morphology normal.      Ventricular Septum:  Intra-ventricular septum morphology normal.      Diastolic Function Measurements:  Diastolic Dysfunction Grade= I (Delayed relaxation)  E= ms  A= ms  E/A Ratio=   DT= ms  S/D=  IVRT=    Other Findings  Pericardium:  normal  Pleural Effusion:  none  Pulmonary Arteries:  normal  Pulmonary Venous Flow:  normal    Anesthesia Information  Performed Personally  Anesthesiologist:  Queenie Ruano MD

## 2022-03-30 ENCOUNTER — APPOINTMENT (OUTPATIENT)
Dept: GENERAL RADIOLOGY | Age: 62
DRG: 234 | End: 2022-03-30
Payer: COMMERCIAL

## 2022-03-30 PROBLEM — I95.9 HYPOTENSION: Status: ACTIVE | Noted: 2022-03-30

## 2022-03-30 LAB
ABO/RH: NORMAL
ANION GAP SERPL CALCULATED.3IONS-SCNC: 12 MMOL/L (ref 9–17)
ANTIBODY SCREEN: NEGATIVE
ARM BAND NUMBER: NORMAL
BLD PROD TYP BPU: NORMAL
BLD PROD TYP BPU: NORMAL
BPU ID: NORMAL
BPU ID: NORMAL
BUN BLDV-MCNC: 12 MG/DL (ref 8–23)
CALCIUM IONIZED: 1.12 MMOL/L (ref 1.13–1.33)
CALCIUM SERPL-MCNC: 8.5 MG/DL (ref 8.6–10.4)
CHLORIDE BLD-SCNC: 108 MMOL/L (ref 98–107)
CO2: 19 MMOL/L (ref 20–31)
CREAT SERPL-MCNC: 0.54 MG/DL (ref 0.5–0.9)
CROSSMATCH RESULT: NORMAL
CROSSMATCH RESULT: NORMAL
DISPENSE STATUS BLOOD BANK: NORMAL
DISPENSE STATUS BLOOD BANK: NORMAL
EKG ATRIAL RATE: 72 BPM
EKG ATRIAL RATE: 82 BPM
EKG P AXIS: 59 DEGREES
EKG P AXIS: 74 DEGREES
EKG P-R INTERVAL: 188 MS
EKG P-R INTERVAL: 230 MS
EKG Q-T INTERVAL: 428 MS
EKG Q-T INTERVAL: 510 MS
EKG QRS DURATION: 90 MS
EKG QRS DURATION: 94 MS
EKG QTC CALCULATION (BAZETT): 500 MS
EKG QTC CALCULATION (BAZETT): 558 MS
EKG R AXIS: -22 DEGREES
EKG R AXIS: -8 DEGREES
EKG T AXIS: 40 DEGREES
EKG T AXIS: 50 DEGREES
EKG VENTRICULAR RATE: 72 BPM
EKG VENTRICULAR RATE: 82 BPM
EXPIRATION DATE: NORMAL
GFR AFRICAN AMERICAN: >60 ML/MIN
GFR NON-AFRICAN AMERICAN: >60 ML/MIN
GFR SERPL CREATININE-BSD FRML MDRD: ABNORMAL ML/MIN/{1.73_M2}
GLUCOSE BLD-MCNC: 113 MG/DL (ref 65–105)
GLUCOSE BLD-MCNC: 120 MG/DL (ref 65–105)
GLUCOSE BLD-MCNC: 120 MG/DL (ref 65–105)
GLUCOSE BLD-MCNC: 142 MG/DL (ref 65–105)
GLUCOSE BLD-MCNC: 209 MG/DL (ref 65–105)
GLUCOSE BLD-MCNC: 88 MG/DL (ref 65–105)
GLUCOSE BLD-MCNC: 90 MG/DL (ref 65–105)
GLUCOSE BLD-MCNC: 98 MG/DL (ref 65–105)
GLUCOSE BLD-MCNC: 98 MG/DL (ref 65–105)
GLUCOSE BLD-MCNC: 98 MG/DL (ref 70–99)
HCT VFR BLD CALC: 34.6 % (ref 36.3–47.1)
HEMOGLOBIN: 12 G/DL (ref 11.9–15.1)
INR BLD: 1.2
MAGNESIUM: 2.1 MG/DL (ref 1.6–2.6)
MCH RBC QN AUTO: 31.3 PG (ref 25.2–33.5)
MCHC RBC AUTO-ENTMCNC: 34.7 G/DL (ref 28.4–34.8)
MCV RBC AUTO: 90.1 FL (ref 82.6–102.9)
NRBC AUTOMATED: 0 PER 100 WBC
PDW BLD-RTO: 12.4 % (ref 11.8–14.4)
PLATELET # BLD: 201 K/UL (ref 138–453)
PMV BLD AUTO: 11.7 FL (ref 8.1–13.5)
POC ANGLE TEG W HEP: 64.2 DEG (ref 59–74)
POC ANGLE TEG W HEP: 72 DEG (ref 59–74)
POC ANGLE TEG: 63.9 DEG (ref 59–74)
POC ANGLE TEG: 73.8 DEG (ref 59–74)
POC KINETICS TEG W HEP: 1.2 MIN (ref 1–3)
POC KINETICS TEG W HEP: 2 MIN (ref 1–3)
POC KINETICS TEG: 1.1 MIN (ref 1–3)
POC KINETICS TEG: 1.8 MIN (ref 1–3)
POC MA(MAX CLOT) TEG: 59.1 MM (ref 55–74)
POC MA(MAX CLOT) TEG: 69.4 MM (ref 55–74)
POC MAX CLOT TEG W HEP: 57.8 MM (ref 55–74)
POC MAX CLOT TEG W HEP: 69.7 MM (ref 55–74)
POC REACTION TIME TEG W HEP: 4.2 MIN (ref 4–9)
POC REACTION TIME TEG W HEP: 5.2 MIN (ref 4–9)
POC REACTION TIME TEG: 4.2 MIN (ref 4–9)
POC REACTION TIME TEG: 5.1 MIN (ref 4–9)
POTASSIUM SERPL-SCNC: 4.5 MMOL/L (ref 3.7–5.3)
PROTHROMBIN TIME: 12.4 SEC (ref 9.1–12.3)
RBC # BLD: 3.84 M/UL (ref 3.95–5.11)
SODIUM BLD-SCNC: 139 MMOL/L (ref 135–144)
TRANSFUSION STATUS: NORMAL
TRANSFUSION STATUS: NORMAL
UNIT DIVISION: 0
UNIT DIVISION: 0
WBC # BLD: 23.3 K/UL (ref 3.5–11.3)

## 2022-03-30 PROCEDURE — 94761 N-INVAS EAR/PLS OXIMETRY MLT: CPT

## 2022-03-30 PROCEDURE — 2140000001 HC CVICU INTERMEDIATE R&B

## 2022-03-30 PROCEDURE — 6370000000 HC RX 637 (ALT 250 FOR IP): Performed by: NURSE PRACTITIONER

## 2022-03-30 PROCEDURE — 2580000003 HC RX 258: Performed by: NURSE PRACTITIONER

## 2022-03-30 PROCEDURE — 6360000002 HC RX W HCPCS: Performed by: PHYSICIAN ASSISTANT

## 2022-03-30 PROCEDURE — 97530 THERAPEUTIC ACTIVITIES: CPT

## 2022-03-30 PROCEDURE — 6370000000 HC RX 637 (ALT 250 FOR IP): Performed by: PHYSICIAN ASSISTANT

## 2022-03-30 PROCEDURE — 94150 VITAL CAPACITY TEST: CPT

## 2022-03-30 PROCEDURE — 94669 MECHANICAL CHEST WALL OSCILL: CPT

## 2022-03-30 PROCEDURE — 2700000000 HC OXYGEN THERAPY PER DAY

## 2022-03-30 PROCEDURE — 93005 ELECTROCARDIOGRAM TRACING: CPT | Performed by: INTERNAL MEDICINE

## 2022-03-30 PROCEDURE — 6360000002 HC RX W HCPCS: Performed by: NURSE PRACTITIONER

## 2022-03-30 PROCEDURE — 94664 DEMO&/EVAL PT USE INHALER: CPT

## 2022-03-30 PROCEDURE — 97162 PT EVAL MOD COMPLEX 30 MIN: CPT

## 2022-03-30 PROCEDURE — 97535 SELF CARE MNGMENT TRAINING: CPT

## 2022-03-30 PROCEDURE — 82330 ASSAY OF CALCIUM: CPT

## 2022-03-30 PROCEDURE — 94640 AIRWAY INHALATION TREATMENT: CPT

## 2022-03-30 PROCEDURE — 71045 X-RAY EXAM CHEST 1 VIEW: CPT

## 2022-03-30 PROCEDURE — 93010 ELECTROCARDIOGRAM REPORT: CPT | Performed by: INTERNAL MEDICINE

## 2022-03-30 PROCEDURE — 80048 BASIC METABOLIC PNL TOTAL CA: CPT

## 2022-03-30 PROCEDURE — 85027 COMPLETE CBC AUTOMATED: CPT

## 2022-03-30 PROCEDURE — 97164 PT RE-EVAL EST PLAN CARE: CPT

## 2022-03-30 PROCEDURE — 99232 SBSQ HOSP IP/OBS MODERATE 35: CPT | Performed by: INTERNAL MEDICINE

## 2022-03-30 PROCEDURE — 82947 ASSAY GLUCOSE BLOOD QUANT: CPT

## 2022-03-30 PROCEDURE — 83735 ASSAY OF MAGNESIUM: CPT

## 2022-03-30 PROCEDURE — 99024 POSTOP FOLLOW-UP VISIT: CPT | Performed by: NURSE PRACTITIONER

## 2022-03-30 PROCEDURE — 97166 OT EVAL MOD COMPLEX 45 MIN: CPT

## 2022-03-30 PROCEDURE — 85610 PROTHROMBIN TIME: CPT

## 2022-03-30 PROCEDURE — 6360000002 HC RX W HCPCS: Performed by: THORACIC SURGERY (CARDIOTHORACIC VASCULAR SURGERY)

## 2022-03-30 RX ORDER — KETOROLAC TROMETHAMINE 30 MG/ML
30 INJECTION, SOLUTION INTRAMUSCULAR; INTRAVENOUS ONCE
Status: COMPLETED | OUTPATIENT
Start: 2022-03-30 | End: 2022-03-30

## 2022-03-30 RX ORDER — PROMETHAZINE HYDROCHLORIDE 25 MG/ML
6.25 INJECTION, SOLUTION INTRAMUSCULAR; INTRAVENOUS ONCE
Status: DISCONTINUED | OUTPATIENT
Start: 2022-03-30 | End: 2022-03-30

## 2022-03-30 RX ORDER — KETOROLAC TROMETHAMINE 30 MG/ML
15 INJECTION, SOLUTION INTRAMUSCULAR; INTRAVENOUS EVERY 6 HOURS PRN
Status: DISPENSED | OUTPATIENT
Start: 2022-03-30 | End: 2022-04-04

## 2022-03-30 RX ADMIN — AMIODARONE HYDROCHLORIDE 200 MG: 200 TABLET ORAL at 21:16

## 2022-03-30 RX ADMIN — POTASSIUM CHLORIDE 10 MEQ: 7.46 INJECTION, SOLUTION INTRAVENOUS at 02:27

## 2022-03-30 RX ADMIN — INSULIN LISPRO 1 UNITS: 100 INJECTION, SOLUTION INTRAVENOUS; SUBCUTANEOUS at 21:28

## 2022-03-30 RX ADMIN — SODIUM CHLORIDE 8 UNITS/HR: 9 INJECTION, SOLUTION INTRAVENOUS at 00:39

## 2022-03-30 RX ADMIN — IPRATROPIUM BROMIDE AND ALBUTEROL SULFATE 1 AMPULE: .5; 3 SOLUTION RESPIRATORY (INHALATION) at 07:55

## 2022-03-30 RX ADMIN — FENTANYL CITRATE 25 MCG: 50 INJECTION, SOLUTION INTRAMUSCULAR; INTRAVENOUS at 01:51

## 2022-03-30 RX ADMIN — OXYCODONE HYDROCHLORIDE AND ACETAMINOPHEN 2 TABLET: 5; 325 TABLET ORAL at 12:26

## 2022-03-30 RX ADMIN — METOPROLOL TARTRATE 25 MG: 25 TABLET ORAL at 08:59

## 2022-03-30 RX ADMIN — OXYCODONE HYDROCHLORIDE AND ACETAMINOPHEN 2 TABLET: 5; 325 TABLET ORAL at 07:59

## 2022-03-30 RX ADMIN — ONDANSETRON 4 MG: 2 INJECTION INTRAMUSCULAR; INTRAVENOUS at 19:33

## 2022-03-30 RX ADMIN — OXYCODONE HYDROCHLORIDE AND ACETAMINOPHEN 2 TABLET: 5; 325 TABLET ORAL at 02:57

## 2022-03-30 RX ADMIN — SODIUM CHLORIDE, PRESERVATIVE FREE 10 ML: 5 INJECTION INTRAVENOUS at 21:16

## 2022-03-30 RX ADMIN — VANCOMYCIN HYDROCHLORIDE 1000 MG: 1 INJECTION, SOLUTION INTRAVENOUS at 22:09

## 2022-03-30 RX ADMIN — PROMETHAZINE HYDROCHLORIDE 6.25 MG: 25 INJECTION INTRAMUSCULAR; INTRAVENOUS at 05:11

## 2022-03-30 RX ADMIN — POTASSIUM CHLORIDE 10 MEQ: 7.46 INJECTION, SOLUTION INTRAVENOUS at 01:24

## 2022-03-30 RX ADMIN — VANCOMYCIN HYDROCHLORIDE 1000 MG: 1 INJECTION, SOLUTION INTRAVENOUS at 11:02

## 2022-03-30 RX ADMIN — IPRATROPIUM BROMIDE AND ALBUTEROL SULFATE 1 AMPULE: .5; 3 SOLUTION RESPIRATORY (INHALATION) at 12:12

## 2022-03-30 RX ADMIN — SODIUM CHLORIDE, PRESERVATIVE FREE 10 ML: 5 INJECTION INTRAVENOUS at 09:00

## 2022-03-30 RX ADMIN — CLOPIDOGREL 75 MG: 75 TABLET, FILM COATED ORAL at 08:59

## 2022-03-30 RX ADMIN — KETOROLAC TROMETHAMINE 30 MG: 30 INJECTION, SOLUTION INTRAMUSCULAR at 04:43

## 2022-03-30 RX ADMIN — DESMOPRESSIN ACETATE 20 MG: 0.2 TABLET ORAL at 21:16

## 2022-03-30 RX ADMIN — FENTANYL CITRATE 25 MCG: 50 INJECTION, SOLUTION INTRAMUSCULAR; INTRAVENOUS at 03:18

## 2022-03-30 RX ADMIN — AMIODARONE HYDROCHLORIDE 200 MG: 200 TABLET ORAL at 14:00

## 2022-03-30 RX ADMIN — Medication 81 MG: at 08:59

## 2022-03-30 RX ADMIN — MUPIROCIN: 20 OINTMENT TOPICAL at 09:01

## 2022-03-30 RX ADMIN — OXYCODONE HYDROCHLORIDE AND ACETAMINOPHEN 2 TABLET: 5; 325 TABLET ORAL at 16:23

## 2022-03-30 RX ADMIN — KETOROLAC TROMETHAMINE 15 MG: 30 INJECTION, SOLUTION INTRAMUSCULAR; INTRAVENOUS at 16:38

## 2022-03-30 RX ADMIN — IPRATROPIUM BROMIDE AND ALBUTEROL SULFATE 1 AMPULE: .5; 3 SOLUTION RESPIRATORY (INHALATION) at 16:19

## 2022-03-30 RX ADMIN — PANTOPRAZOLE SODIUM 40 MG: 40 TABLET, DELAYED RELEASE ORAL at 08:59

## 2022-03-30 RX ADMIN — HYDRALAZINE HYDROCHLORIDE 5 MG: 20 INJECTION INTRAMUSCULAR; INTRAVENOUS at 03:11

## 2022-03-30 RX ADMIN — METOPROLOL TARTRATE 25 MG: 25 TABLET ORAL at 21:16

## 2022-03-30 RX ADMIN — VANCOMYCIN HYDROCHLORIDE 1000 MG: 1 INJECTION, SOLUTION INTRAVENOUS at 00:10

## 2022-03-30 RX ADMIN — FENTANYL CITRATE 25 MCG: 50 INJECTION, SOLUTION INTRAMUSCULAR; INTRAVENOUS at 10:58

## 2022-03-30 RX ADMIN — IPRATROPIUM BROMIDE AND ALBUTEROL SULFATE 1 AMPULE: .5; 3 SOLUTION RESPIRATORY (INHALATION) at 19:53

## 2022-03-30 RX ADMIN — MUPIROCIN: 20 OINTMENT TOPICAL at 21:16

## 2022-03-30 ASSESSMENT — PULMONARY FUNCTION TESTS
PIF_VALUE: 13
PIF_VALUE: 13

## 2022-03-30 ASSESSMENT — PAIN DESCRIPTION - PAIN TYPE
TYPE: ACUTE PAIN
TYPE: ACUTE PAIN
TYPE: ACUTE PAIN;SURGICAL PAIN
TYPE: ACUTE PAIN
TYPE: ACUTE PAIN

## 2022-03-30 ASSESSMENT — PAIN SCALES - GENERAL
PAINLEVEL_OUTOF10: 6
PAINLEVEL_OUTOF10: 7
PAINLEVEL_OUTOF10: 7
PAINLEVEL_OUTOF10: 2
PAINLEVEL_OUTOF10: 7
PAINLEVEL_OUTOF10: 7
PAINLEVEL_OUTOF10: 4
PAINLEVEL_OUTOF10: 5
PAINLEVEL_OUTOF10: 3
PAINLEVEL_OUTOF10: 7
PAINLEVEL_OUTOF10: 7
PAINLEVEL_OUTOF10: 3
PAINLEVEL_OUTOF10: 7
PAINLEVEL_OUTOF10: 0

## 2022-03-30 ASSESSMENT — PAIN DESCRIPTION - FREQUENCY
FREQUENCY: CONTINUOUS

## 2022-03-30 ASSESSMENT — PAIN DESCRIPTION - DESCRIPTORS
DESCRIPTORS: ACHING
DESCRIPTORS: ACHING
DESCRIPTORS: ACHING;DISCOMFORT
DESCRIPTORS: ACHING
DESCRIPTORS: ACHING;DISCOMFORT

## 2022-03-30 ASSESSMENT — PAIN DESCRIPTION - LOCATION
LOCATION: CHEST

## 2022-03-30 ASSESSMENT — PAIN SCALES - WONG BAKER: WONGBAKER_NUMERICALRESPONSE: 4

## 2022-03-30 ASSESSMENT — PAIN DESCRIPTION - ORIENTATION
ORIENTATION: LEFT
ORIENTATION: LEFT

## 2022-03-30 ASSESSMENT — PAIN - FUNCTIONAL ASSESSMENT: PAIN_FUNCTIONAL_ASSESSMENT: PREVENTS OR INTERFERES SOME ACTIVE ACTIVITIES AND ADLS

## 2022-03-30 ASSESSMENT — PAIN DESCRIPTION - PROGRESSION: CLINICAL_PROGRESSION: GRADUALLY IMPROVING

## 2022-03-30 ASSESSMENT — PAIN DESCRIPTION - ONSET: ONSET: ON-GOING

## 2022-03-30 NOTE — PROGRESS NOTES
Occupational Therapy   Occupational Therapy Re-Assessment  Date: 3/30/2022   Patient Name: Tess Cancino  MRN: [id number]     : 1960    Date of Service: 3/30/2022    Chief Complaint   Patient presents with    Chest Pain     Discharge Recommendations:  Patient would benefit from continued therapy after discharge     OT Equipment Recommendations  Equipment Needed: Yes  Mobility Devices: ADL Assistive Devices  ADL Assistive Devices: Transfer Tub Bench;Reacher;Sock-Aid Hard;Long-handled Shoe Horn;Grab Bars - toilet;Grab Bars - tub    Assessment   Performance deficits / Impairments: Decreased functional mobility ; Decreased endurance;Decreased ADL status; Decreased high-level IADLs;Decreased coordination;Decreased balance;Decreased safe awareness;Decreased cognition  Assessment: Pt has significant deficits at this time with her ability to independently / safely complete daily tasks, balance and mobility, endurance. At this time, the Pt has decreased ability to return to [facility] and prior living situation. She will benefit from continued participation in acute and post-acute OT services to improve independence / to improve functional activity participation. Prognosis: Good  Decision Making: Medium Complexity  OT Education: OT Role;Plan of Care;Energy Conservation;Equipment; Family Education;Precautions; ADL Adaptive Strategies;Transfer Training  Barriers to Learning: Good return by Pt. REQUIRES OT FOLLOW UP: Yes  Activity Tolerance  Activity Tolerance: Patient Tolerated treatment well;Patient limited by fatigue  Activity Tolerance: Start of tx in recliner: /65, HR 88, SpO2 (3L NC) 92-93%. Maintained HR 90-93 (during Ax); SpO2 90% (3L via NC) after Ax. End of Tx recliner: /61, HR 87-88, SpO2 (3L) 89-90% (max cues / demo diaphragmatic breathing). RN notified and will continue to monitor SPO2 on 3L. Safety Devices  Safety Devices in place: Yes  Type of devices: Call light within reach; Left in chair;Nurse notified; All fall risk precautions in place;Gait belt  Restraints  Initially in place: No         Patient Diagnosis(es): The primary encounter diagnosis was Chest pain, unspecified type. A diagnosis of NSTEMI (non-ST elevated myocardial infarction) [facility]) was also pertinent to this visit. has a past medical history of Chronic back pain, Hypertension, Osteoarthritis, and Pneumothorax.   has a past surgical history that includes back surgery; Carpal tunnel release (Right); Appendectomy; Tubal ligation; Cardiac catheterization (03/22/2022); and Coronary artery bypass graft (N/A, 3/29/2022). Restrictions  Restrictions/Precautions  Restrictions/Precautions: Fall Risk,Cardiac  Required Braces or Orthoses?: Yes  Required Braces or Orthoses  Other: Heart Hugger Brace  Position Activity Restriction  Sternal Precautions: 5# Lifting Restrictions,No Pushing,No Pulling  Sternal Precautions: No Bilat UE reaching >90  Other position/activity restrictions: Amb pt, Recent left rotator cuff repair, dec 1st 2021, CABG x3 3/29, up in chair    Subjective   General  Patient assessed for rehabilitation services?: Yes  Family / Caregiver Present: No  Diagnosis: NSTEMI. S/P CABG x3 (3/29/22). Subjective  Subjective: RN approved Pt to be seen for OT / PT evaluations, Pt was agreeable and cooperative throughout therapy session. Patient Currently in Pain: Yes  Pain Assessment  Pain Assessment: 0-10  Pain Level: 7  Butler-Baker Pain Rating: Hurts little more  Patient's Stated Pain Goal: 2  Pain Type: Acute pain;Surgical pain  Pain Location: Chest  Pain Orientation: Left  Pain Descriptors: Aching;Discomfort  Clinical Progression: Gradually improving  Functional Pain Assessment: Prevents or interferes some active activities and ADLs  Non-Pharmaceutical Pain Intervention(s): Repositioned; Ambulation/Increased Activity; Distraction  Response to Pain Intervention: Patient Satisfied  Pre Treatment Pain Screening  Intervention List: Patient able to continue with treatment  Vital Signs  Patient Currently in Pain: Yes     Social/Functional History  Social/Functional History  Lives With: Daughter (3 teenager grandsons)  Type of Home: House  Home Layout: Two level,Performs ADL's on one level,Able to Live on Main level with bedroom/bathroom  Home Access: Stairs to enter without rails  Entrance Stairs - Number of Steps: 2  Bathroom Shower/Tub: Tub/Shower unit  Bathroom Toilet: Standard  Bathroom Accessibility: Accessible  Home Equipment: Crutches,Standard walker (did not use AD prior to admission)  ADL Assistance: Independent  Homemaking Assistance: Independent  Homemaking Responsibilities: Yes  Ambulation Assistance: Independent  Transfer Assistance: Independent  Active : Yes (However not often since Rotator cuff sx a few months ago, family assists)  Mode of Transportation: Family  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 Activity: static/dynamic standing  Comment: CGA with use of RW - no LOB. Functional Mobility  Functional - Mobility Device: Rolling Walker  Assist Level: Contact guard assistance  Functional Mobility Comments: Short distance in-room functional mobility with use of RW - CGA. When returning (backwards mobility short distances), Pt required CGA x1 + Min x1 Assist and Constant Mod V/T Cues / Coaxing for accurate use of DME and integration of Sternal Precautions.   She fell asleep / closed her eyes several times d/t fatigue during tx (during backward mobility) d/t fatigue. ADL  Grooming: Contact guard assistance; Increased time to complete;Verbal cueing (standing with RW at sink)  UE Dressing: Minimal assistance; Increased time to complete;Verbal cueing (to doff / Abigail Kiser)  LE Dressing: Moderate assistance; Increased time to complete;Verbal cueing (using adapted strategies / techniques while sitting (recliner) to don and doff socks)    Toileting: Minimal assistance; Increased time to complete  Additional Comments: Prior to partiicpation in functional tasks / movement, Therapist provided max educaction and demo to Pt re:Sternal Precautions, provided initial discussion with Pt re: adapted / modified strategies for ADLs / ADL transfers d/t sternal precautions. Pt verbalized good understanding. Mod V/T Cues for safe and accurate use of DME and integration of Sternal Precautions. Tone RUE  RUE Tone: Normotonic  Tone LUE  LUE Tone: Normotonic  Coordination  Coordination and Movement description: Right UE;Left UE;Decreased speed     Bed mobility  Supine to Sit: Unable to assess  Sit to Supine: Unable to assess  Scooting: Stand by assistance     Transfers  Sit to stand: Minimal assistance  Stand to sit: Minimal assistance  Transfer Comments: Min X1 Assist with RW. Pt required Min V/T Cues for safe and accurate use of DME and integration of Sternal Precautions.      Sensation  Overall Sensation Status: WFL      LUE AROM (degrees)  LUE AROM : WFL  LUE General AROM: AROM LUE (no overhead >90) WFL  Left Hand AROM (degrees)  Left Hand AROM: WFL  RUE AROM (degrees)  RUE AROM : WFL  RUE General AROM: AROM RUE (no overhead >90) WFL  Right Hand AROM (degrees)  Right Hand AROM: WFL  LUE Strength  LUE Strength Comment: DNT this date d/t Sternal Precautions  RUE Strength  RUE Strength Comment: DNT this date d/t Sternal Precautions     Plan   Plan  Times per week: 4-6x/week (CABG)  Times per day: Daily  Current Treatment Recommendations: Endurance Training,Self-Care / ADL,Safety Education & Training,Pain Management,Cognitive/Perceptual Training,Functional Mobility Training,Balance Training,Equipment Evaluation, Education, & procurement,Patient/Caregiver Education & Training,Home Management Training (Adapted techniques d/t Sternal Precautions. Integration of EC and Ax pacing. Integration of Heart Hugger. )    AM-PAC Score  AM-PAC Inpatient Daily Activity Raw Score: 17 (03/30/22 1406)  AM-PAC Inpatient ADL T-Scale Score : 37.26 (03/30/22 1406)  ADL Inpatient CMS 0-100% Score: 50.11 (03/30/22 1406)  ADL Inpatient CMS G-Code Modifier : CK (03/30/22 1406)    Goals  Short term goals  Time Frame for Short term goals: By Discharge  Short term goal 1: Pt will verbalize / demo Mod I and Good Carryover with Sternal Precautions during all Functional ADL tasks. Short term goal 2: Pt will verbalize / demo Mod I and Good Carryover with EC and Ax Pacing during all Functional Tasks and Mobility. Short term goal 3: Pt will verbalize / demo Mod I with Correct Use of AE / DME during all ADL / Functional Transfers. Short term goal 4: Pt will maintain Fair+ Dynamic Standing Balance (10-12 minutes) while participating in Functional / Leisure tasks. Short term goal 5: Pt will participate in Item Retrieval / Transport / Functional Mobility with Mod I with Correct Use of AE / DME.        Therapy Time   Individual Concurrent Group Co-treatment   Time In 0929         Time Out 1011         Minutes 42         Timed Code Treatment Minutes: 24 Minutes (ADL + TherAct)       FELIZ Olivera, OTR/L

## 2022-03-30 NOTE — FLOWSHEET NOTE
03/30/22 1400   Mobility   Distance Ambulated (ft) 25 ft   Ambulation Response Tolerated fairly well

## 2022-03-30 NOTE — PROGRESS NOTES
Physical Therapy    Facility/Department: [facility] CAR 1  re Assessment post CABGx3    NAME: Cristina Sanchez  : 1960  MRN: [id number]    Date of Service: 3/30/2022    Discharge Recommendations: Further therapy recommended at discharge. Chief Complaint   Patient presents with    Chest Pain     The patient is a pleasant 60 y.o. female with a no PMHx formally diagnosed per the pt, EMR shows history of chronic back pain, hypertension, osteoarthritis, COPD and pneumothorax. Pt presented with a chief complaint of chest pain. Patient has midsternal chest pain that woke her up from sleep. Patient states the pain is radiating to her neck into her jaw bilaterally. Patient states it is hard to determine if the pain is traveling down either arm especially the left arm she has had rotator cuff surgery in the past.  Patient did endorse some mild associated shortness of breath and diaphoresis early this morning. Patient received aspirin and nitroglycerin by EMS prior to arrival which alleviated the chest pain. Patient denies any significant cardiac history. Patient's blood pressure on admission was 177/101. While in the emergency department patient's systolic blood pressure got up to 196. Patient received Lopressor 25 mg and was started on Cozaar 25 mg. Patient's blood pressure is improved. PT Equipment Recommendations  Equipment Needed: No (CTA with progress)    Assessment   Body structures, Functions, Activity limitations: Decreased functional mobility ; Decreased endurance  Assessment: Pt ambulates 7 ft x2 with CGA/min A and RW. Pt currently is unsafe to return to prior living situation d/t inability to safely ambulate household distances and high fall risk d/t decreased balance and endurance. Pt would benefit from continued therapy to promote endurance, balance, and strengthening.   Prognosis: Good  Decision Making: Medium Complexity  PT Education: Goals;Plan of Care;PT Role  Barriers to Learning: none  REQUIRES PT FOLLOW UP: Yes  Activity Tolerance  Activity Tolerance: Patient limited by endurance; Patient limited by fatigue (SpO2 89-90% at end of session, educated pursed lip breathing and RN notified.)       Patient Diagnosis(es): The primary encounter diagnosis was Chest pain, unspecified type. A diagnosis of NSTEMI (non-ST elevated myocardial infarction) [facility]) was also pertinent to this visit. has a past medical history of Chronic back pain, Hypertension, Osteoarthritis, and Pneumothorax.   has a past surgical history that includes back surgery; Carpal tunnel release (Right); Appendectomy; Tubal ligation; Cardiac catheterization (03/22/2022); and Coronary artery bypass graft (N/A, 3/29/2022). Restrictions  Restrictions/Precautions  Restrictions/Precautions: Fall Risk  Required Braces or Orthoses?: Yes  Required Braces or Orthoses  Other: Heart Hugger Brace  Position Activity Restriction  Sternal Precautions: 5# Lifting Restrictions  Other position/activity restrictions: Amb pt, Recent left rotator cuff repair, dec 1st 2021, CABG x3 3/29, up in chair  Vision/Hearing  Vision: Impaired  Vision Exceptions: Wears glasses at all times  Hearing: Within functional limits     Subjective  General  Patient assessed for rehabilitation services?: Yes  Response To Previous Treatment: Not applicable  Family / Caregiver Present: No  Follows Commands: Within Functional Limits  Subjective  Subjective: RN and pt agreeable to PT. pt agreeable and pleasant. Pt seated in recliner at start of session. Pain Screening  Patient Currently in Pain: Yes  Pain Assessment  Pain Assessment: 0-10  Pain Level: 7  Pain Type: Acute pain  Pain Location: Chest  Pain Orientation: Left  Pain Descriptors: Aching;Discomfort  Pain Frequency: Continuous  Pain Onset: On-going  Non-Pharmaceutical Pain Intervention(s): Repositioned; Ambulation/Increased Activity  Response to Pain Intervention: Patient Satisfied  Vital Signs  Patient Currently in Pain: Yes Orientation  Orientation  Overall Orientation Status: Within Normal Limits  Social/Functional History  Social/Functional History  Lives With: Daughter (3 teenager grandsons)  Type of Home: House  Home Layout: Two level,Performs ADL's on one level,Able to Live on Main level with bedroom/bathroom  Home Access: Stairs to enter without rails  Entrance Stairs - Number of Steps: 2  Bathroom Shower/Tub: Tub/Shower unit  Bathroom Toilet: Standard  Bathroom Accessibility: Accessible  Home Equipment: Crutches,Standard walker (did not use AD prior to admission)  ADL Assistance: Independent  Homemaking Assistance: Independent  Homemaking Responsibilities: Yes  Ambulation Assistance: Independent  Transfer Assistance: Independent  Active : Yes (However not often since Rotator cuff sx a few months ago, family assists)  Mode of Transportation: Family  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)  Leisure & Hobbies: walking, used to make crafts  Additional Comments: Recent L rotator cuff sx  Cognition        Objective          Joint Mobility  Spine: WFL  ROM RLE: WFL  ROM LLE: WFL  ROM RUE: WFL  ROM LUE: WFL  Strength RLE  Strength RLE: Exception  R Hip Flexion: 4/5  R Knee Flexion: 5/5  R Knee Extension: 5/5  R Ankle Dorsiflexion: 4+/5  R Ankle Plantar flexion: 5/5  Strength LLE  Strength LLE: Exception  L Hip Flexion: 4-/5  L Knee Flexion: 5/5  L Knee Extension: 4+/5  L Ankle Dorsiflexion: 5/5  L Ankle Plantar Flexion: 5/5  Strength RUE  Strength RUE: WFL  Comment: within sternal precautions  Strength LUE  Strength LUE: WFL  Comment: within sternal precautions  Tone RLE  RLE Tone: Normotonic  Tone LLE  LLE Tone: Normotonic  Motor Control  Gross Motor?: WFL  Sensation  Overall Sensation Status: WFL (denies n/t)  Bed mobility  Supine to Sit: Unable to assess  Sit to Supine: Unable to assess  Scooting: Stand by assistance  Comment: Pt begins and ends in recliner  Transfers  Sit to Stand: Minimal Assistance  Stand to sit: Minimal Assistance  Comment: Cues for use of heart hugger with good return  Ambulation  Ambulation?: Yes  More Ambulation?: No  Ambulation 1  Surface: level tile  Device: Rolling Walker  Other Apparatus: O2 (3 L per NC)  Assistance: Contact guard assistance;Minimal assistance  Gait Deviations: Slow Angella;Decreased step length;Decreased step height  Distance: 7ft x2  Comments: Pt limited by endurance, no LOB noted, mild unsteadiness. Min A for steps backwards. CGA for steps forward. Stairs/Curb  Stairs?: No     Balance  Posture: Fair  Sitting - Static: Good  Sitting - Dynamic: Good  Standing - Static: Fair  Standing - Dynamic: Fair  Comments: Standing balance assessed with RW        Plan   Plan  Times per week: 7x per week, 1-2x per day  Times per day: Daily  Current Treatment Recommendations: Taya [place] Re-education,Patient/Caregiver Education & Training,ADL/Self-care Training,Equipment Evaluation, Education, & procurement,Balance Training,IADL Training,Gait Training,Home Exercise Program,Functional Mobility Training,Stair training,Safety Education & Training  Safety Devices  Type of devices:  All fall risk precautions in place,Gait belt,Call light within reach,Left in chair,Nurse notified  Restraints  Initially in place: No                                   AM-PAC Score  AM-PAC Inpatient Mobility Raw Score : 17 (03/30/22 1214)  AM-PAC Inpatient T-Scale Score : 42.13 (03/30/22 1214)  Mobility Inpatient CMS 0-100% Score: 50.57 (03/30/22 1214)  Mobility Inpatient CMS G-Code Modifier : CK (03/30/22 1214)          Goals  Short term goals  Time Frame for Short term goals: 14 visits  Short term goal 1: Complete transfers with mod I and appropriate device  Short term goal 2: Complete 300 ft of gait with mod I and appropriate device  Short term goal 3: Complete 2 steps with no HR and mod I  Short term goal 4: Participate in 30 minutes of therapy to promote endurance  Short term goal 5: Be independent with cardiac HEP  Patient Goals   Patient goals :  To go home       Therapy Time   Individual Concurrent Group Co-treatment   Time In 0929         Time Out 1011         Minutes 42         Timed Code Treatment Minutes: 8 Minutes       Kiya Melvin PT [[email or web address]] : History and present illness, review of systems and discussion were carried forward from previous note, updated and modified where appropriate.\par \par I had the pleasure of seeing CHALO  in the Pediatric Cardiology Office at the [facility]. 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 \par In addition, CHALO  has been asymptomatic and thriving. Parents and CHALO deny shortness of breath, orthopnea, pallor, cyanosis, diaphoresis, or loss of consciousness. CHALO  has been feeding well and gaining weight. CHALO currently takes no cardiac medications. The remainder of review of systems is not contributory. There is no history of sudden death, syncope or pacemakers in the family. No congenital neurosensory deafness known in a close family member.\par \par 11/2/18 - CHALO is now 12 month old and continues to be asymptomatic from the cardiovascular point of view and thriving. He came for a follow up visit for VSD and PDA. Parents and ZAMAL deny shortness of breath, orthopnea, pallor, cyanosis, diaphoresis, or loss of consciousness. CHALO has been feeding well and gaining weight. CHALO currently takes no cardiac medications.\par \par 06/04/2019  -CHALO is now 19 month old and continues to be asymptomatic from the cardiovascular point of view and thriving. Parents and MAUDEMAL deny shortness of breath, orthopnea, pallor, cyanosis, diaphoresis, or loss of consciousness. CHALO has been feeding well and gaining weight. CHALO currently takes no cardiac medications.\par

## 2022-03-30 NOTE — PROGRESS NOTES
03/30/22 0756   Treatment   Treatment Type Vibratory mucous clearing therapy or intervention        03/30/22 0757   Treatment   Treatment Type IS   Incentive Spirometry Tx   Treatment Tolerance Fair  (needs encouragement)   Incentive Spirometry Achieved (mL) 800 mL     Incentive Spirometry education and demonstration given by Respiratory Therapy. Acapella education and demonstration given by Respiratory Therapy. Inhaler / Aerosol Education          [x] Good return demonstration per patient   [x] Aerosolized Medications:     Verbal education has been provided in the use, benefits and possible adverse reactions of aerosolized medications used in the treatment of this patient.     Girma Mireles RCP  8:29 AM

## 2022-03-30 NOTE — PROGRESS NOTES
[facility]  Internal Medicine Teaching Residency Program  Inpatient Daily Progress Note  ______________________________________________________________________________    Patient: Pricila Mota  YOB: 1960   [id number]    Acct: [de-identified]     Room: [address] [place] [address]  Admit date: 3/22/2022  Today's date: 03/30/22  Number of days in the hospital: 8    SUBJECTIVE   Admitting Diagnosis: 3-vessel CAD  CC: Chest pain    - Post-op day 1 from CABG x3  - Pt examined at bedside. Chart & results reviewed. - No acute events overnight  - Patient successfully extubated  - Patient was hypertensive postop and received hydralazine  - Currently patient is hypotensive  - Leukocytosis secondary to physiological stress  - Afebrile    Plan for today:  - Hold BP medications until pressures improve > please follow parameters in the order set   - Pain control  - Nausea control  - Fentanyl q1h PRN   - Patient has been responsive to Toradol and promethazine    ROS:  Constitutional:  negative for chills, fevers, sweats  Respiratory:  negative for cough, dyspnea on exertion, hemoptysis, shortness of breath, wheezing  Cardiovascular:  negative for chest pain, chest pressure/discomfort, lower extremity edema, palpitations  Gastrointestinal:  negative for abdominal pain, constipation, diarrhea, nausea, vomiting  Neurological:  negative for dizziness, headache    BRIEF HISTORY     The patient is a pleasant 58 y. o. female with a no PMHx formally diagnosed per the pt, EMR shows history of chronic back pain, hypertension, osteoarthritis, COPD and pneumothorax.  Pt presented with a chief complaint of chest pain.  Patient has midsternal chest pain that woke her up from sleep around 4 in the morning.  Patient states the pain is radiating to her neck into her jaw bilaterally.  Patient states it is hard to determine if the pain is traveling down either arm especially the left arm she has had rotator cuff surgery in the past.  Patient did endorse some mild associated shortness of breath and diaphoresis early this morning.  Patient received aspirin and nitroglycerin by EMS prior to arrival which alleviated the chest pain.  Patient denies any significant cardiac history.  Patient's blood pressure on admission was 177/101.  While in the emergency department patient's systolic blood pressure got up to 196.  Patient received Lopressor 25 mg and was started on Cozaar 25 mg.  Patient's blood pressure is improved. OBJECTIVE     Vital Signs:  BP (!) 109/54   Pulse 86   Temp 98.8 °F (37.1 °C) (Axillary)   Resp 19   Ht 5' 5\" (1.651 m)   Wt 188 lb 11.4 oz (85.6 kg)   SpO2 93%   BMI 31.40 kg/m²     Temp (24hrs), Av.9 °F (36.1 °C), Min:92 °F (33.3 °C), Max:101.7 °F (38.7 °C)    In: 3330.4   Out: 9500 [Urine:2670]    Physical Exam:  Physical Exam  Vitals and nursing note reviewed. Constitutional:       Appearance: Normal appearance. HENT:      Head: Normocephalic and atraumatic. Nose: Nose normal.      Mouth/Throat:      Mouth: Mucous membranes are moist.      Pharynx: Oropharynx is clear. Eyes:      Extraocular Movements: Extraocular movements intact. Conjunctiva/sclera: Conjunctivae normal.      Pupils: Pupils are equal, round, and reactive to light. Cardiovascular:      Rate and Rhythm: Normal rate and regular rhythm. Pulses: Normal pulses. Heart sounds: Normal heart sounds. No murmur heard. No friction rub. No gallop. Pulmonary:      Effort: Pulmonary effort is normal. No respiratory distress. Breath sounds: Normal breath sounds. No stridor. No wheezing, rhonchi or rales. Chest:      Chest wall: Tenderness present. Abdominal:      General: Abdomen is flat. Bowel sounds are normal. There is no distension. Palpations: Abdomen is soft. There is no mass. Tenderness: There is no abdominal tenderness. There is no guarding or rebound.       Hernia: No hernia is present. Musculoskeletal:         General: No swelling, tenderness, deformity or signs of injury. Normal range of motion. Cervical back: Normal range of motion. Right lower leg: No edema. Left lower leg: No edema. Skin:     General: Skin is warm. Neurological:      General: No focal deficit present. Mental Status: She is alert and oriented to person, place, and time. Mental status is at baseline. Psychiatric:         Mood and Affect: Mood normal.         Behavior: Behavior normal.         Thought Content:  Thought content normal.         Judgment: Judgment normal.           Medications:  Scheduled Medications:    sodium chloride flush  10 mL IntraVENous 2 times per day    aspirin  81 mg Oral Daily    clopidogrel  75 mg Oral Daily    amiodarone  200 mg Oral TID    mupirocin   Nasal BID    metoprolol tartrate  25 mg Oral BID    atorvastatin  20 mg Oral Nightly    pantoprazole  40 mg Oral Daily    pantoprazole (PROTONIX) 40 mg injection  40 mg IntraVENous Daily    vancomycin (VANCOCIN) IV  1,000 mg IntraVENous Q12H    ipratropium-albuterol  1 ampule Inhalation Q4H WA    insulin glargine  0.15 Units/kg SubCUTAneous Nightly     Continuous Infusions:    sodium chloride      propofol Stopped (03/29/22 1711)    insulin 2 Units/hr (03/30/22 0622)    dextrose      norepinephrine Stopped (03/29/22 1533)    nitroGLYCERIN 20 mcg/min (03/29/22 1818)     PRN Medicationssodium chloride flush, 10 mL, PRN  sodium chloride, 25 mL, PRN  ondansetron, 4 mg, Q8H PRN   Or  ondansetron, 4 mg, Q6H PRN  oxyCODONE-acetaminophen, 1 tablet, Q4H PRN   Or  oxyCODONE-acetaminophen, 2 tablet, Q4H PRN  fentanNYL, 25 mcg, Q1H PRN   Or  fentanNYL, 50 mcg, Q1H PRN  hydrALAZINE, 5 mg, Q5 Min PRN  metoprolol, 2.5 mg, Q10 Min PRN  sodium bicarbonate, 50 mEq, Q30 Min PRN  diphenhydrAMINE, 25 mg, Nightly PRN  magnesium sulfate, 2,000 mg, PRN  calcium chloride IVPB, 1,000 mg, PRN   Or  calcium chloride IVPB, 2,000 mg, PRN  albumin human, 25 g, PRN  glucose, 15 g, PRN  dextrose, 12.5 g, PRN  glucagon (rDNA), 1 mg, PRN  dextrose, 100 mL/hr, PRN  bisacodyl, 5 mg, Daily PRN  potassium chloride, 40 mEq, PRN   Or  potassium alternative oral replacement, 40 mEq, PRN   Or  potassium chloride, 10 mEq, PRN        Diagnostic Labs:  CBC:   Recent Labs     03/29/22  1508 03/29/22  2240 03/30/22  0509   WBC 11.0 18.3* 23.3*   RBC 3.43* 3.67* 3.84*   HGB 10.8* 11.5* 12.0   HCT 30.4* 32.7* 34.6*   MCV 88.6 89.1 90.1   RDW 12.0 12.3 12.4   PLT See Reflexed IPF Result 172 201     BMP:   Recent Labs     03/29/22  1508 03/29/22  2240 03/30/22  0509   * 137 139   K 3.9 4.2 4.5    104 108*   CO2 20 22 19*   BUN 10 10 12   CREATININE 0.51 0.58 0.54     BNP: No results for input(s): BNP in the last 72 hours. PT/INR:   Recent Labs     03/29/22  1508 03/29/22  2240 03/30/22  0509   PROTIME 13.5* 12.0 12.4*   INR 1.3 1.1 1.2     APTT:   Recent Labs     03/28/22  0541 03/29/22  0240 03/29/22  1508   APTT 65.0* 77.1* 27.1     CARDIAC ENZYMES: No results for input(s): CKMB, CKMBINDEX, TROPONINI in the last 72 hours. Invalid input(s): CKTOTAL;3  FASTING LIPID PANEL:  Lab Results   Component Value Date    CHOL 208 (H) 03/22/2018    HDL 67 03/22/2018    TRIG 59 03/22/2018     LIVER PROFILE: No results for input(s): AST, ALT, ALB, BILIDIR, BILITOT, ALKPHOS in the last 72 hours. MICROBIOLOGY:   Lab Results   Component Value Date/Time    CULTURE WRONG TEST ORDERED 03/23/2022 03:22 PM       Imaging:    CT CHEST WO CONTRAST    Result Date: 3/23/2022  1. Probable scarring/rounded atelectasis bilateral lung bases, more nodular in appearance on the right compared to the left. 2. No pulmonary infiltrate or definite suspicious lesion evident. 3. Calcific atherosclerosis aorta and coronary arteries. 4. Hepatic steatosis. RECOMMENDATIONS: Unavailable     XR CHEST PORTABLE    Result Date: 3/29/2022  1.  Lines and tubes in expected position as above. 2. Interval sternotomy. 3. Linear opacities throughout both lungs, which could be due to atelectasis       ASSESSMENT & PLAN     Assessment and Plan:    Principal Problem:    3-vessel CAD  Active Problems:    Essential hypertension    NSTEMI (non-ST elevated myocardial infarction) ([address]    Type 2 diabetes mellitus without complication, without long-term current use of insulin (HCC)    Hypokalemia    Hypomagnesemia  Resolved Problems:    * No resolved hospital problems. *        Multivessel CAD  - POD 1 for CABG    - Advance diet per CT surgery      Essential hypertension - stable  - Patient is currently hypotensive will hold all medication for the time being  - Cozaar 50 mg PO daily  - HCTZ 25 mg PO daily  - Lopressor 25 PO BID      Hypotension   - We will continue to monitor  - Ejection fraction 52% > fluids as needed  - Hold blood pressure medication for the time being    Type 2 diabetes mellitus without complication, without long-term current use of insulin   - Medium dose sliding scale   - Lantus 30 Units daily > patient on insulin infusion per CT surgery  - Glucose checks 4 times daily before meals and at bedtime  - Hemoglobin A1c 11.3  - Diabetic education       Hypokalemia  - Daily labs  - Replace electrolytes as needed  - Keep above 4     Hypomagnesemia  - Daily lab  - Replace electrolytes as needed   - Keep above 2     DVT ppx: Heparin     PT/OT/SW: Consulted. Discharge Planning: In process, pending recovery. Tc Quiñones MD  Internal Medicine Resident, PGY-1  [facility]; [place], [place]   7:03 AM 3/30/2022     Please note that part of this chart was generated using voice recognition dictation software. Although every effort was made to ensure the accuracy of this automated transcription, some errors in transcription may have occurred. I have discussed the care of Megan Kimo , including pertinent history and exam findings,    today with the resident.   I have seen and examined the patient and the key elements of all parts of the encounter have been performed by me . I agree with the assessment, plan and orders as documented by the resident. Principal Problem:    3-vessel CAD  Active Problems:    Essential hypertension    NSTEMI (non-ST elevated myocardial infarction) ([facility] [address]    Type 2 diabetes mellitus without complication, without long-term current use of insulin (HCC)    Hypokalemia    Hypomagnesemia    Hypotension  Resolved Problems:    * No resolved hospital problems. *        Overall  course ;                                   are improving over time.         S/p CABG  On insulin drip  Blood sugars controlled          Electronically signed by Chio Alba MD

## 2022-03-30 NOTE — PROGRESS NOTES
Follow-up with pt. Surgery went well yesterday but lots of pain in the middle of the night. This am very sleepy and no appetite. Will suggest glucerna liquid supplement. However, pt currently on  insulin drip. Will follow-up closer to d/c for further diabetes instructions.

## 2022-03-30 NOTE — PLAN OF CARE
Problem:  Activity:  Goal: Risk for activity intolerance will decrease  Description: Risk for activity intolerance will decrease  Outcome: Ongoing  Goal: Will verbalize the importance of balancing activity with adequate rest periods  Description: Will verbalize the importance of balancing activity with adequate rest periods  Outcome: Ongoing  Goal: Ability to tolerate increased activity will improve  Description: Ability to tolerate increased activity will improve  Outcome: Ongoing     Problem: Cardiac:  Goal: Ability to maintain an adequate cardiac output will improve  Description: Ability to maintain an adequate cardiac output will improve  Outcome: Ongoing  Goal: Hemodynamic stability will improve  Description: Hemodynamic stability will improve  Outcome: Ongoing  Goal: Diagnostic test results will improve  Description: Diagnostic test results will improve  Outcome: Ongoing  Goal: Complications related to the disease process, condition or treatment will be avoided or minimized  Description: Complications related to the disease process, condition or treatment will be avoided or minimized  Outcome: Ongoing  Goal: Cerebral tissue perfusion will improve  Description: Cerebral tissue perfusion will improve  Outcome: Ongoing     Problem: Coping:  Goal: Ability to verbalize feelings about condition will improve  Description: Ability to verbalize feelings about condition will improve  Outcome: Ongoing  Goal: Ability to identify strategies to decrease anxiety will improve  Description: Ability to identify strategies to decrease anxiety will improve  Outcome: Ongoing  Goal: Ability to identify and alter barriers to satisfying sexual function will improve  Description: Ability to identify and alter barriers to satisfying sexual function will improve  Outcome: Ongoing  Goal: Ability to identify and develop effective coping behavior will improve  Description: Ability to identify and develop effective coping behavior will improve  Outcome: Ongoing     Problem: Health Behavior:  Goal: Ability to identify changes in lifestyle to reduce recurrence of condition will improve  Description: Ability to identify changes in lifestyle to reduce recurrence of condition will improve  Outcome: Ongoing  Goal: Ability to manage health-related needs will improve  Description: Ability to manage health-related needs will improve  Outcome: Ongoing  Goal: Identification of resources available to assist in meeting health care needs will improve  Description: Identification of resources available to assist in meeting health care needs will improve  Outcome: Ongoing     Problem: Nutritional:  Goal: Ability to identify appropriate dietary choices will improve  Description: Ability to identify appropriate dietary choices will improve  Outcome: Ongoing     Problem: Respiratory:  Goal: Ability to maintain adequate ventilation will improve  Description: Ability to maintain adequate ventilation will improve  Outcome: Ongoing  Goal: Levels of oxygenation will improve  Description: Levels of oxygenation will improve  Outcome: Ongoing     Problem: Sensory:  Goal: Pain level will decrease  Description: Pain level will decrease  Outcome: Ongoing     Problem: Falls - Risk of:  Goal: Will remain free from falls  Description: Will remain free from falls  Outcome: Ongoing  Goal: Absence of physical injury  Description: Absence of physical injury  Outcome: Ongoing     Problem: Infection:  Goal: Will remain free from infection  Description: Will remain free from infection  Outcome: Ongoing     Problem: Safety:  Goal: Free from accidental physical injury  Description: Free from accidental physical injury  Outcome: Ongoing  Goal: Free from intentional harm  Description: Free from intentional harm  Outcome: Ongoing     Problem: Daily Care:  Goal: Daily care needs are met  Description: Daily care needs are met  Outcome: Ongoing     Problem: Pain:  Goal: Pain level will decrease  Description: Pain level will decrease  Outcome: Ongoing  Goal: Patient's pain/discomfort is manageable  Description: Patient's pain/discomfort is manageable  Outcome: Ongoing  Goal: Control of acute pain  Description: Control of acute pain  Outcome: Ongoing  Goal: Control of chronic pain  Description: Control of chronic pain  Outcome: Ongoing     Problem: Skin Integrity:  Goal: Skin integrity will stabilize  Description: Skin integrity will stabilize  Outcome: Ongoing     Problem: Discharge Planning:  Goal: Patients continuum of care needs are met  Description: Patients continuum of care needs are met  Outcome: Ongoing     Problem: OXYGENATION/RESPIRATORY FUNCTION  Goal: Patient will maintain patent airway  Outcome: Ongoing  Goal: Patient will achieve/maintain normal respiratory rate/effort  Description: Respiratory rate and effort will be within normal limits for the patient  Outcome: Ongoing     Problem: MECHANICAL VENTILATION  Goal: Patient will maintain patent airway  Outcome: Ongoing  Goal: Oral health is maintained or improved  Outcome: Ongoing  Goal: ET tube will be managed safely  Outcome: Ongoing  Goal: Ability to express needs and understand communication  Outcome: Ongoing  Goal: Mobility/activity is maintained at optimum level for patient  Outcome: Ongoing     Problem: SKIN INTEGRITY  Goal: Skin integrity is maintained or improved  Outcome: Ongoing

## 2022-03-30 NOTE — CARE COORDINATION
Met with pt to discuss transition plans, pt declines SNF. [facility] given, pt will review and provide choices. 1640 - Met with pt, pt states she is still reviewing [facility], will check back tomorrow.

## 2022-03-30 NOTE — PROGRESS NOTES
[facility]  Progress Note    3/30/2022 8:27 AM  Surgeon: Surgeon CTS: Dr. Mara Gastelum MD   POD # 1  S/P: CABGx3     Subjective:  Ms. Hu Marshall   Resting in chair. NV well controlled now. No chest pains or SOB. Objective:  BP (!) 103/59   Pulse 84   Temp 98.1 °F (36.7 °C) (Oral)   Resp 18   Ht 5' 5\" (1.651 m)   Wt 188 lb 11.4 oz (85.6 kg)   SpO2 94%   BMI 31.40 kg/m²   Chest: pacing wires: yes, chest tubes:yes, air leak no, 2 +  CV: no murmur noted, Normal S1, S2,  Lungs: clear to auscultation, no wheezes, rales, or rhonchi  Abd: normal bowel sounds   Lower Extremities: Trace edema  Saph Incison: no sign of drainage or infection  Sternal Incison: dressing applied-no excessive drainage or signs of infection noted  CXR-no large effusions. No pneumothorax noted  Labs: Labs reviewed today  CBC: Recent Labs     03/29/22  1508 03/29/22  2240 03/30/22  0509   WBC 11.0 18.3* 23.3*   HGB 10.8* 11.5* 12.0   HCT 30.4* 32.7* 34.6*   MCV 88.6 89.1 90.1   PLT See Reflexed IPF Result 172 201     BMP:   Recent Labs     03/29/22  1508 03/29/22  2240 03/30/22  0509   * 137 139   K 3.9 4.2 4.5    104 108*   CO2 20 22 19*   BUN 10 10 12   CREATININE 0.51 0.58 0.54       I/O: I/O last 3 completed shifts: In: 4604.5 [P.O.:400;  I.V.:3187; Blood:500; IV Piggyback:517.5]  Out: 1108 [Urine:2670; Blood:450; Chest Tube:525]  Scheduled Meds:   sodium chloride flush  10 mL IntraVENous 2 times per day    aspirin  81 mg Oral Daily    clopidogrel  75 mg Oral Daily    amiodarone  200 mg Oral TID    mupirocin   Nasal BID    metoprolol tartrate  25 mg Oral BID    atorvastatin  20 mg Oral Nightly    pantoprazole  40 mg Oral Daily    pantoprazole (PROTONIX) 40 mg injection  40 mg IntraVENous Daily    vancomycin (VANCOCIN) IV  1,000 mg IntraVENous Q12H    ipratropium-albuterol  1 ampule Inhalation Q4H WA    insulin glargine  0.15 Units/kg SubCUTAneous Nightly     Continuous Infusions:   sodium chloride  propofol Stopped (03/29/22 1711)    insulin 2.1 Units/hr (03/30/22 0800)    dextrose      norepinephrine Stopped (03/29/22 1533)    nitroGLYCERIN 20 mcg/min (03/29/22 1818)     PRN Meds:sodium chloride flush, sodium chloride, ondansetron **OR** ondansetron, oxyCODONE-acetaminophen **OR** oxyCODONE-acetaminophen, fentanNYL **OR** fentanNYL, hydrALAZINE, metoprolol, sodium bicarbonate, diphenhydrAMINE, magnesium sulfate, calcium chloride IVPB **OR** calcium chloride IVPB, albumin human, glucose, dextrose, glucagon (rDNA), dextrose, bisacodyl, potassium chloride **OR** potassium alternative oral replacement **OR** potassium chloride    Beta- Blocker: Yes  Aspirin: Yes  Lovenox: No  GI: Yes  Plavix: Yes  Coumadin: No  Statin: Yes  ACE: No    Daily Nursing Care:  Please keep SCDS in place as DVT prophylaxis  If not intubated, Please have patient use IS and acapella 10X/hr or during commercial breaks  Please continue BM management  Daily labs and CXR  Daily PT/OT and ambulation  Continue with Case Management for DC planning      Assessment/ Plan:    Doing well POD1  Keep chest tubes to suction. Remove sosua and art line  Ambulate and PT eval.   Begin DC planning at this time.        LAKSHMI Cleary - NP

## 2022-03-30 NOTE — PLAN OF CARE
CT Surgery is primary team for this patient from today. We will continue to follow for medical management.      Hossein Velazquez MD  INTERNAL MEDICINE RESIDENT,  [address] [facility],[address], [address] [facility] [address]   3/30/2022

## 2022-03-30 NOTE — PROGRESS NOTES
[facility]   Progress Note                   Date:   3/30/2022  Patient name: Douglas Moody  Date of admission:  3/22/2022  5:31 AM  MRN:   [id number]  YOB: 1960  PCP: LAKSHMI Marlow CNP    Reason for Admission: NSTEMI (non-ST elevated myocardial infarction) [facility]) [I21.4]  Chest pain, unspecified type [R07.9]    Subjective:       POD1  Extubated. Off pressors. No acute events overnight. Medications:   Scheduled Meds:   sodium chloride flush  10 mL IntraVENous 2 times per day    aspirin  81 mg Oral Daily    clopidogrel  75 mg Oral Daily    amiodarone  200 mg Oral TID    mupirocin   Nasal BID    metoprolol tartrate  25 mg Oral BID    atorvastatin  20 mg Oral Nightly    pantoprazole  40 mg Oral Daily    pantoprazole (PROTONIX) 40 mg injection  40 mg IntraVENous Daily    vancomycin (VANCOCIN) IV  1,000 mg IntraVENous Q12H    ipratropium-albuterol  1 ampule Inhalation Q4H WA    insulin glargine  0.15 Units/kg SubCUTAneous Nightly     Continuous Infusions:   sodium chloride      propofol Stopped (03/29/22 1711)    insulin 2 Units/hr (03/30/22 0622)    dextrose      norepinephrine Stopped (03/29/22 1533)    nitroGLYCERIN 20 mcg/min (03/29/22 1818)     CBC:   Recent Labs     03/29/22  1508 03/29/22  2240 03/30/22  0509   WBC 11.0 18.3* 23.3*   HGB 10.8* 11.5* 12.0   PLT See Reflexed IPF Result 172 201     BMP:    Recent Labs     03/29/22  1508 03/29/22  2240 03/30/22  0509   * 137 139   K 3.9 4.2 4.5    104 108*   CO2 20 22 19*   BUN 10 10 12   CREATININE 0.51 0.58 0.54   GLUCOSE 126* 133* 98     Hepatic: No results for input(s): AST, ALT, ALB, BILITOT, ALKPHOS in the last 72 hours. Troponin:   No results for input(s): TROPHS in the last 72 hours. BNP: No results for input(s): BNP in the last 72 hours. Lipids: No results for input(s): CHOL, HDL in the last 72 hours.     Invalid input(s): LDLCALCU  INR:   Recent Labs     03/29/22  1502 03/29/22  2240 03/30/22  0509   INR 1.3 1.1 1.2     DIAGNOSTIC DATA:    EKG 3/23/2022  Normal sinus rhythm  Minimal voltage criteria for LVH, may be normal variant    CARDIAC CATH 3/23/22  LMCA: Ostial 25% stenosis     LAD: Mid 100% stenosis, collateral from RCA     D1: Proximal 70% stenosis, and mid 90% stenosis     LCx: Mid 75% stenosis     OM1 and OM2 ostial 80% stenosis     RCA: Ostial 30% stenosis     PDA: Proximal 80% stenosis     PL branch 75% stenosis        Procedure Summary  Multivessel CAD  Preserved LV function  Recommendations  CV surgery consult for CABG    ECHO 3/23/2022  Summary  Left ventricle is normal in size, global left ventricular systolic function  is low normal, calculated ejection fraction is 52%. Tricuspid valve was not well visualized. Trivial tricuspid regurgitation. Insignificant tricuspid regurgitation, unable to estimate RVSP. Objective:   Vitals: BP (!) 108/59   Pulse 85   Temp 98.8 °F (37.1 °C) (Axillary)   Resp 17   Ht 5' 5\" (1.651 m)   Wt 188 lb 11.4 oz (85.6 kg)   SpO2 93%   BMI 31.40 kg/m²   General appearance: alert and cooperative with exam  HEENT: Head: Normocephalic, no lesions, without obvious abnormality. Neck: no JVD, trachea midline, no adenopathy  Lungs: Clear to auscultation  Heart: Regular rate and rhythm, s1/s2 auscultated, no murmurs. SR   Abdomen: soft, non-tender, bowel sounds active  Extremities: no edema  Neurologic: not done        Assessment / Acute Cardiac Problems:   1. NSTEMI   2. MV-CAD s/p CABG X 3, 3/29/22 w/ SLIMA to LAD, RSVG1 to OM, RSVG2 to RPDA   3. Preserved EF  4. HTN   5. DM      Plan of Treatment:   1. Off pressors. Extubated overnight. 2. Continue amiodarone 200 mg  3. Continue asa, plavix, statin  4. Lopressor 25 mg bid  5.  Post op care per CTS    Electronically signed by Sury Whitten MD on 3/30/2022 at 7:37 AM  [address]  [phone]      Attending Physician Statement  I have discussed the care of Destiny Buneo, including pertinent history and exam findings,  with the cardiology fellow/resident. I have seen and examined the patient and the key elements of all parts of the encounter have been performed by me.       Mariola Lanier MD, [facility]

## 2022-03-31 ENCOUNTER — TELEPHONE (OUTPATIENT)
Dept: FAMILY MEDICINE CLINIC | Age: 62
End: 2022-03-31

## 2022-03-31 ENCOUNTER — APPOINTMENT (OUTPATIENT)
Dept: GENERAL RADIOLOGY | Age: 62
DRG: 234 | End: 2022-03-31
Payer: COMMERCIAL

## 2022-03-31 LAB
ANION GAP SERPL CALCULATED.3IONS-SCNC: 10 MMOL/L (ref 9–17)
ANION GAP SERPL CALCULATED.3IONS-SCNC: 10 MMOL/L (ref 9–17)
ANION GAP SERPL CALCULATED.3IONS-SCNC: 11 MMOL/L (ref 9–17)
ANION GAP SERPL CALCULATED.3IONS-SCNC: 9 MMOL/L (ref 9–17)
BUN BLDV-MCNC: 14 MG/DL (ref 8–23)
BUN BLDV-MCNC: 14 MG/DL (ref 8–23)
BUN BLDV-MCNC: 15 MG/DL (ref 8–23)
BUN BLDV-MCNC: 16 MG/DL (ref 8–23)
CALCIUM SERPL-MCNC: 8.4 MG/DL (ref 8.6–10.4)
CALCIUM SERPL-MCNC: 8.4 MG/DL (ref 8.6–10.4)
CALCIUM SERPL-MCNC: 8.6 MG/DL (ref 8.6–10.4)
CALCIUM SERPL-MCNC: 8.7 MG/DL (ref 8.6–10.4)
CHLORIDE BLD-SCNC: 92 MMOL/L (ref 98–107)
CHLORIDE BLD-SCNC: 92 MMOL/L (ref 98–107)
CHLORIDE BLD-SCNC: 93 MMOL/L (ref 98–107)
CHLORIDE BLD-SCNC: 94 MMOL/L (ref 98–107)
CO2: 19 MMOL/L (ref 20–31)
CO2: 19 MMOL/L (ref 20–31)
CO2: 21 MMOL/L (ref 20–31)
CO2: 23 MMOL/L (ref 20–31)
CREAT SERPL-MCNC: 0.67 MG/DL (ref 0.5–0.9)
CREAT SERPL-MCNC: 0.68 MG/DL (ref 0.5–0.9)
CREAT SERPL-MCNC: 0.75 MG/DL (ref 0.5–0.9)
CREAT SERPL-MCNC: 0.76 MG/DL (ref 0.5–0.9)
GFR AFRICAN AMERICAN: >60 ML/MIN
GFR NON-AFRICAN AMERICAN: >60 ML/MIN
GFR SERPL CREATININE-BSD FRML MDRD: ABNORMAL ML/MIN/{1.73_M2}
GLUCOSE BLD-MCNC: 209 MG/DL (ref 65–105)
GLUCOSE BLD-MCNC: 209 MG/DL (ref 65–105)
GLUCOSE BLD-MCNC: 227 MG/DL (ref 70–99)
GLUCOSE BLD-MCNC: 232 MG/DL (ref 70–99)
GLUCOSE BLD-MCNC: 256 MG/DL (ref 65–105)
GLUCOSE BLD-MCNC: 256 MG/DL (ref 70–99)
GLUCOSE BLD-MCNC: 265 MG/DL (ref 70–99)
GLUCOSE BLD-MCNC: 305 MG/DL (ref 65–105)
HCT VFR BLD CALC: 32.8 % (ref 36.3–47.1)
HEMOGLOBIN: 11 G/DL (ref 11.9–15.1)
INR BLD: 1.2
MAGNESIUM: 1.8 MG/DL (ref 1.6–2.6)
MAGNESIUM: 2 MG/DL (ref 1.6–2.6)
MAGNESIUM: 2.1 MG/DL (ref 1.6–2.6)
MCH RBC QN AUTO: 31 PG (ref 25.2–33.5)
MCHC RBC AUTO-ENTMCNC: 33.5 G/DL (ref 28.4–34.8)
MCV RBC AUTO: 92.4 FL (ref 82.6–102.9)
NRBC AUTOMATED: 0 PER 100 WBC
PDW BLD-RTO: 12.8 % (ref 11.8–14.4)
PLATELET # BLD: 142 K/UL (ref 138–453)
PMV BLD AUTO: 12 FL (ref 8.1–13.5)
POTASSIUM SERPL-SCNC: 4.4 MMOL/L (ref 3.7–5.3)
POTASSIUM SERPL-SCNC: 5.1 MMOL/L (ref 3.7–5.3)
POTASSIUM SERPL-SCNC: 5.2 MMOL/L (ref 3.7–5.3)
POTASSIUM SERPL-SCNC: 5.4 MMOL/L (ref 3.7–5.3)
PROTHROMBIN TIME: 12.2 SEC (ref 9.1–12.3)
RBC # BLD: 3.55 M/UL (ref 3.95–5.11)
SODIUM BLD-SCNC: 122 MMOL/L (ref 135–144)
SODIUM BLD-SCNC: 123 MMOL/L (ref 135–144)
SODIUM BLD-SCNC: 124 MMOL/L (ref 135–144)
SODIUM BLD-SCNC: 124 MMOL/L (ref 135–144)
WBC # BLD: 17.8 K/UL (ref 3.5–11.3)

## 2022-03-31 PROCEDURE — 97110 THERAPEUTIC EXERCISES: CPT

## 2022-03-31 PROCEDURE — 71045 X-RAY EXAM CHEST 1 VIEW: CPT

## 2022-03-31 PROCEDURE — 6370000000 HC RX 637 (ALT 250 FOR IP): Performed by: PHYSICIAN ASSISTANT

## 2022-03-31 PROCEDURE — 97535 SELF CARE MNGMENT TRAINING: CPT

## 2022-03-31 PROCEDURE — 6370000000 HC RX 637 (ALT 250 FOR IP): Performed by: NURSE PRACTITIONER

## 2022-03-31 PROCEDURE — 83735 ASSAY OF MAGNESIUM: CPT

## 2022-03-31 PROCEDURE — 97530 THERAPEUTIC ACTIVITIES: CPT

## 2022-03-31 PROCEDURE — 99232 SBSQ HOSP IP/OBS MODERATE 35: CPT | Performed by: INTERNAL MEDICINE

## 2022-03-31 PROCEDURE — 97116 GAIT TRAINING THERAPY: CPT

## 2022-03-31 PROCEDURE — 2700000000 HC OXYGEN THERAPY PER DAY

## 2022-03-31 PROCEDURE — 82947 ASSAY GLUCOSE BLOOD QUANT: CPT

## 2022-03-31 PROCEDURE — 2580000003 HC RX 258: Performed by: NURSE PRACTITIONER

## 2022-03-31 PROCEDURE — 94761 N-INVAS EAR/PLS OXIMETRY MLT: CPT

## 2022-03-31 PROCEDURE — 6360000002 HC RX W HCPCS: Performed by: PHYSICIAN ASSISTANT

## 2022-03-31 PROCEDURE — 85610 PROTHROMBIN TIME: CPT

## 2022-03-31 PROCEDURE — 85027 COMPLETE CBC AUTOMATED: CPT

## 2022-03-31 PROCEDURE — 94640 AIRWAY INHALATION TREATMENT: CPT

## 2022-03-31 PROCEDURE — G0108 DIAB MANAGE TRN  PER INDIV: HCPCS

## 2022-03-31 PROCEDURE — 80048 BASIC METABOLIC PNL TOTAL CA: CPT

## 2022-03-31 PROCEDURE — 2140000001 HC CVICU INTERMEDIATE R&B

## 2022-03-31 PROCEDURE — 6360000002 HC RX W HCPCS: Performed by: NURSE PRACTITIONER

## 2022-03-31 PROCEDURE — 94669 MECHANICAL CHEST WALL OSCILL: CPT

## 2022-03-31 PROCEDURE — 36415 COLL VENOUS BLD VENIPUNCTURE: CPT

## 2022-03-31 PROCEDURE — 6370000000 HC RX 637 (ALT 250 FOR IP)

## 2022-03-31 PROCEDURE — 99024 POSTOP FOLLOW-UP VISIT: CPT | Performed by: NURSE PRACTITIONER

## 2022-03-31 RX ORDER — SODIUM CHLORIDE 9 MG/ML
INJECTION, SOLUTION INTRAVENOUS CONTINUOUS
Status: DISCONTINUED | OUTPATIENT
Start: 2022-03-31 | End: 2022-04-01

## 2022-03-31 RX ORDER — INSULIN GLARGINE 100 [IU]/ML
30 INJECTION, SOLUTION SUBCUTANEOUS DAILY
Status: DISCONTINUED | OUTPATIENT
Start: 2022-03-31 | End: 2022-04-02

## 2022-03-31 RX ORDER — SODIUM CHLORIDE 1000 MG
1 TABLET, SOLUBLE MISCELLANEOUS
Status: DISCONTINUED | OUTPATIENT
Start: 2022-03-31 | End: 2022-04-01

## 2022-03-31 RX ORDER — FUROSEMIDE 10 MG/ML
20 INJECTION INTRAMUSCULAR; INTRAVENOUS 2 TIMES DAILY
Status: DISCONTINUED | OUTPATIENT
Start: 2022-03-31 | End: 2022-04-03

## 2022-03-31 RX ADMIN — METOPROLOL TARTRATE 25 MG: 25 TABLET ORAL at 08:53

## 2022-03-31 RX ADMIN — IPRATROPIUM BROMIDE AND ALBUTEROL SULFATE 1 AMPULE: .5; 3 SOLUTION RESPIRATORY (INHALATION) at 07:59

## 2022-03-31 RX ADMIN — AMIODARONE HYDROCHLORIDE 200 MG: 200 TABLET ORAL at 14:43

## 2022-03-31 RX ADMIN — OXYCODONE HYDROCHLORIDE AND ACETAMINOPHEN 2 TABLET: 5; 325 TABLET ORAL at 22:50

## 2022-03-31 RX ADMIN — OXYCODONE HYDROCHLORIDE AND ACETAMINOPHEN 2 TABLET: 5; 325 TABLET ORAL at 05:40

## 2022-03-31 RX ADMIN — DESMOPRESSIN ACETATE 20 MG: 0.2 TABLET ORAL at 20:35

## 2022-03-31 RX ADMIN — SODIUM CHLORIDE TAB 1 GM 1 G: 1 TAB at 11:31

## 2022-03-31 RX ADMIN — INSULIN LISPRO 4 UNITS: 100 INJECTION, SOLUTION INTRAVENOUS; SUBCUTANEOUS at 20:38

## 2022-03-31 RX ADMIN — KETOROLAC TROMETHAMINE 15 MG: 30 INJECTION, SOLUTION INTRAMUSCULAR; INTRAVENOUS at 18:31

## 2022-03-31 RX ADMIN — INSULIN LISPRO 2 UNITS: 100 INJECTION, SOLUTION INTRAVENOUS; SUBCUTANEOUS at 11:37

## 2022-03-31 RX ADMIN — AMIODARONE HYDROCHLORIDE 200 MG: 200 TABLET ORAL at 20:35

## 2022-03-31 RX ADMIN — OXYCODONE HYDROCHLORIDE AND ACETAMINOPHEN 2 TABLET: 5; 325 TABLET ORAL at 11:31

## 2022-03-31 RX ADMIN — SODIUM CHLORIDE, PRESERVATIVE FREE 10 ML: 5 INJECTION INTRAVENOUS at 20:35

## 2022-03-31 RX ADMIN — CLOPIDOGREL 75 MG: 75 TABLET, FILM COATED ORAL at 08:53

## 2022-03-31 RX ADMIN — BISACODYL 5 MG: 5 TABLET, COATED ORAL at 07:52

## 2022-03-31 RX ADMIN — Medication 81 MG: at 08:53

## 2022-03-31 RX ADMIN — MUPIROCIN: 20 OINTMENT TOPICAL at 20:35

## 2022-03-31 RX ADMIN — AMIODARONE HYDROCHLORIDE 200 MG: 200 TABLET ORAL at 08:53

## 2022-03-31 RX ADMIN — SODIUM CHLORIDE TAB 1 GM 1 G: 1 TAB at 17:08

## 2022-03-31 RX ADMIN — IPRATROPIUM BROMIDE AND ALBUTEROL SULFATE 1 AMPULE: .5; 3 SOLUTION RESPIRATORY (INHALATION) at 11:26

## 2022-03-31 RX ADMIN — SODIUM CHLORIDE TAB 1 GM 1 G: 1 TAB at 08:52

## 2022-03-31 RX ADMIN — INSULIN GLARGINE 30 UNITS: 100 INJECTION, SOLUTION SUBCUTANEOUS at 08:48

## 2022-03-31 RX ADMIN — FUROSEMIDE 20 MG: 10 INJECTION, SOLUTION INTRAMUSCULAR; INTRAVENOUS at 17:08

## 2022-03-31 RX ADMIN — SODIUM CHLORIDE, PRESERVATIVE FREE 10 ML: 5 INJECTION INTRAVENOUS at 09:20

## 2022-03-31 RX ADMIN — OXYCODONE HYDROCHLORIDE AND ACETAMINOPHEN 2 TABLET: 5; 325 TABLET ORAL at 17:01

## 2022-03-31 RX ADMIN — INSULIN LISPRO 2 UNITS: 100 INJECTION, SOLUTION INTRAVENOUS; SUBCUTANEOUS at 08:48

## 2022-03-31 RX ADMIN — FUROSEMIDE 20 MG: 10 INJECTION, SOLUTION INTRAMUSCULAR; INTRAVENOUS at 08:53

## 2022-03-31 RX ADMIN — PANTOPRAZOLE SODIUM 40 MG: 40 TABLET, DELAYED RELEASE ORAL at 08:53

## 2022-03-31 RX ADMIN — SODIUM CHLORIDE: 9 INJECTION, SOLUTION INTRAVENOUS at 21:27

## 2022-03-31 RX ADMIN — INSULIN LISPRO 4 UNITS: 100 INJECTION, SOLUTION INTRAVENOUS; SUBCUTANEOUS at 17:19

## 2022-03-31 RX ADMIN — IPRATROPIUM BROMIDE AND ALBUTEROL SULFATE 1 AMPULE: .5; 3 SOLUTION RESPIRATORY (INHALATION) at 19:53

## 2022-03-31 RX ADMIN — INSULIN HUMAN 10 UNITS: 100 INJECTION, SOLUTION PARENTERAL at 11:53

## 2022-03-31 RX ADMIN — OXYCODONE HYDROCHLORIDE AND ACETAMINOPHEN 2 TABLET: 5; 325 TABLET ORAL at 00:01

## 2022-03-31 RX ADMIN — METOPROLOL TARTRATE 25 MG: 25 TABLET ORAL at 20:34

## 2022-03-31 RX ADMIN — MUPIROCIN: 20 OINTMENT TOPICAL at 09:20

## 2022-03-31 ASSESSMENT — PAIN SCALES - GENERAL
PAINLEVEL_OUTOF10: 5
PAINLEVEL_OUTOF10: 0
PAINLEVEL_OUTOF10: 8
PAINLEVEL_OUTOF10: 0
PAINLEVEL_OUTOF10: 5
PAINLEVEL_OUTOF10: 7
PAINLEVEL_OUTOF10: 7
PAINLEVEL_OUTOF10: 10
PAINLEVEL_OUTOF10: 7
PAINLEVEL_OUTOF10: 0
PAINLEVEL_OUTOF10: 8
PAINLEVEL_OUTOF10: 0
PAINLEVEL_OUTOF10: 7

## 2022-03-31 ASSESSMENT — PAIN SCALES - WONG BAKER
WONGBAKER_NUMERICALRESPONSE: 0

## 2022-03-31 ASSESSMENT — PAIN DESCRIPTION - DESCRIPTORS
DESCRIPTORS: ACHING;DISCOMFORT
DESCRIPTORS: ACHING
DESCRIPTORS: ACHING;DISCOMFORT

## 2022-03-31 ASSESSMENT — PAIN DESCRIPTION - FREQUENCY
FREQUENCY: CONTINUOUS

## 2022-03-31 ASSESSMENT — PAIN DESCRIPTION - PROGRESSION
CLINICAL_PROGRESSION: GRADUALLY IMPROVING
CLINICAL_PROGRESSION: GRADUALLY IMPROVING

## 2022-03-31 ASSESSMENT — PAIN DESCRIPTION - LOCATION
LOCATION: CHEST
LOCATION: CHEST;INCISION
LOCATION: CHEST;INCISION

## 2022-03-31 ASSESSMENT — PAIN DESCRIPTION - PAIN TYPE
TYPE: SURGICAL PAIN
TYPE: ACUTE PAIN;SURGICAL PAIN
TYPE: ACUTE PAIN;SURGICAL PAIN

## 2022-03-31 ASSESSMENT — PAIN - FUNCTIONAL ASSESSMENT: PAIN_FUNCTIONAL_ASSESSMENT: PREVENTS OR INTERFERES SOME ACTIVE ACTIVITIES AND ADLS

## 2022-03-31 ASSESSMENT — PAIN DESCRIPTION - ONSET: ONSET: ON-GOING

## 2022-03-31 ASSESSMENT — PAIN DESCRIPTION - ORIENTATION
ORIENTATION: LEFT
ORIENTATION: MID
ORIENTATION: LEFT

## 2022-03-31 NOTE — CARE COORDINATION
Met with pt to discuss transition plans. Received [facility] choices of 1) Elara and 2) Interim. [facility] to [facility]. Awaiting additional [address] choices. Brave list provided to pt as prior choices are not in-network with pt's plan. Received [facility] choices of 1) [id number] and 2) Omid. Referral sent to [address] [place] [address]. [address] [place] [address] with Michael Pineda at [address] [place] [address], referral not received, obtained alternate fax number of [phone]. Referral land faxed. [phone] - Received message from Cee Weller at [address] [place] [address], returned call, spoke with Ryan Jose is able to accept pt.    [phone] - Received message from Michael Pineda, she has a call out to pt's PCP, Chance Byrne; however, pt hasn't been in to see her PCP in over a year, unsure if PCP will follow for [address]. Michael Pineda asked if primary team will follow pt. Will discuss with them and f/u if Chance Byrne will not follow pt for [address].

## 2022-03-31 NOTE — PROGRESS NOTES
[facility]   Progress Note                   Date:   3/31/2022  Patient name: Viviane Tucker  Date of admission:  3/22/2022  5:31 AM  MRN:   [id number]  YOB: 1960  PCP: LAKSHMI Lynch CNP    Reason for Admission: NSTEMI (non-ST elevated myocardial infarction) [facility]) [I21.4]  Chest pain, unspecified type [R07.9]    Subjective:       POD2  Extubated. Off pressors. No acute events overnight. Hemodynamically stable. Mild incision site discomfort. Medications:   Scheduled Meds:   furosemide  20 mg IntraVENous BID    insulin glargine  30 Units SubCUTAneous Daily    sodium chloride  1 g Oral TID WC    insulin lispro  0-6 Units SubCUTAneous TID WC    insulin lispro  0-3 Units SubCUTAneous Nightly    sodium chloride flush  10 mL IntraVENous 2 times per day    aspirin  81 mg Oral Daily    clopidogrel  75 mg Oral Daily    amiodarone  200 mg Oral TID    mupirocin   Nasal BID    metoprolol tartrate  25 mg Oral BID    atorvastatin  20 mg Oral Nightly    pantoprazole  40 mg Oral Daily    pantoprazole (PROTONIX) 40 mg injection  40 mg IntraVENous Daily    ipratropium-albuterol  1 ampule Inhalation Q4H WA     Continuous Infusions:   sodium chloride      propofol Stopped (03/29/22 1711)    dextrose      norepinephrine Stopped (03/29/22 1533)    nitroGLYCERIN Stopped (03/29/22 1853)     CBC:   Recent Labs     03/29/22  2240 03/30/22  0509 03/31/22  0413   WBC 18.3* 23.3* 17.8*   HGB 11.5* 12.0 11.0*    201 142     BMP:    Recent Labs     03/29/22  2240 03/30/22  0509 03/31/22  0413    139 124*   K 4.2 4.5 5.4*    108* 94*   CO2 22 19* 19*   BUN 10 12 15   CREATININE 0.58 0.54 0.76   GLUCOSE 133* 98 232*     Hepatic: No results for input(s): AST, ALT, ALB, BILITOT, ALKPHOS in the last 72 hours. Troponin:   No results for input(s): TROPHS in the last 72 hours. BNP: No results for input(s): BNP in the last 72 hours.   Lipids: No results for input(s): CHOL, HDL in the last 72 hours. Invalid input(s): LDLCALCU  INR:   Recent Labs     03/29/22  2240 03/30/22  0509 03/31/22  0413   INR 1.1 1.2 1.2     DIAGNOSTIC DATA:    EKG 3/23/2022  Normal sinus rhythm  Minimal voltage criteria for LVH, may be normal variant    CARDIAC CATH 3/23/22  LMCA: Ostial 25% stenosis     LAD: Mid 100% stenosis, collateral from RCA     D1: Proximal 70% stenosis, and mid 90% stenosis     LCx: Mid 75% stenosis     OM1 and OM2 ostial 80% stenosis     RCA: Ostial 30% stenosis     PDA: Proximal 80% stenosis     PL branch 75% stenosis        Procedure Summary  Multivessel CAD  Preserved LV function  Recommendations  CV surgery consult for CABG    ECHO 3/23/2022  Summary  Left ventricle is normal in size, global left ventricular systolic function  is low normal, calculated ejection fraction is 52%. Tricuspid valve was not well visualized. Trivial tricuspid regurgitation. Insignificant tricuspid regurgitation, unable to estimate RVSP. Objective:   Vitals: BP (!) 104/52   Pulse 63   Temp 98.2 °F (36.8 °C)   Resp 19   Ht 5' 5\" (1.651 m)   Wt 190 lb 14.7 oz (86.6 kg)   SpO2 95%   BMI 31.77 kg/m²   General appearance: alert and cooperative with exam  HEENT: Head: Normocephalic, no lesions, without obvious abnormality. Neck: no JVD, trachea midline, no adenopathy  Lungs: Clear to auscultation  Heart: Regular rate and rhythm, s1/s2 auscultated, no murmurs. SR   Abdomen: soft, non-tender, bowel sounds active  Extremities: no edema  Neurologic: not done        Assessment / Acute Cardiac Problems:   1. NSTEMI   2. MV-CAD s/p CABG X 3, 3/29/22 w/ SLIMA to LAD, RSVG1 to OM, RSVG2 to RPDA   3. Preserved EF  4. HTN   5. DM  6. Post op anemia  7. Hyperkalemia      Plan of Treatment:   1. POD2  2. Hemodynamically stable. 3. Continue amiodarone 200 mg  4. Continue asa, plavix, statin  5. Continue IV lasix 20 mg bid. Mild left sided effusion noted. Monitor I/Os.   6. Continue Lopressor 25 mg bid  7. Monitor H/H. Stable currently. 8. Post op care per CTS    Electronically signed by Hollis Brian MD on 3/31/2022 at 7:29 AM  [address]  [phone]    I performed a history and physical examination of the patient and discussed management with the resident. I reviewed the residents note and agree with the documented findings and plan of care. Any areas of disagreement are noted on the chart. I was personally present for the key portions of any procedures. I have documented in the chart those procedures where I was not present during the key portions. I have personally evaluated this patient and have completed at least one if not all key elements of the E/M (history, physical exam, and MDM). Additional findings are as noted. . Fluid restriction 1.0L. IV lasix 20mg BID.     Stephanie Rosas MD

## 2022-03-31 NOTE — OP NOTE
[address]                  [place] [address]                                OPERATIVE REPORT    PATIENT NAME: Linda Cagle                     :        1960  MED REC NO:   [id number]                             ROOM:       1005  ACCOUNT NO:   [de-identified]                           ADMIT DATE: 2022  PROVIDER:     Leonardo Land MD    DATE OF PROCEDURE:  2022    PRIMARY ATTENDING SURGEON:  Leonardo Land MD    OTHER ASSISTANTS:  Included Ora Anderson DO and Kayla Dubois RN,  [facility]. PREOPERATIVE DIAGNOSES:  Multivessel coronary artery disease status post  non-STEMI, unstable angina, volume overload, and congestive heart  failure. POSTOPERATIVE DIAGNOSES:  Multivessel coronary artery disease status  post non-STEMI, unstable angina, volume overload, and congestive heart  failure. PROCEDURES:  Median sternotomy, aorto-right atrial cardiopulmonary  bypass, GARRETT, endoscopic vein harvest, CABG x3 with skeletonized LIMA to  LAD, reversed saphenous vein graft 1 to OM, and reversed saphenous vein  graft 2 to RPDA. COMPLICATIONS:  None. CONDITION:  Stable. DISPOSITION:  To CVICU. ESTIMATED BLOOD LOSS:  Not applicable. ANESTHESIA:  General endotracheal with Dr. Tatiana Dimas. CARDIOPULMONARY BYPASS TIME:  77 minutes. CROSS-CLAMP TIME:  61 minutes. LOWEST TEMPERATURE:  33.4 degrees. INDICATIONS FOR SURGERY:  The patient is a 28-year-old patient who was  diagnosed with the above-mentioned diagnoses, who was felt to be an  appropriate surgical revascularization candidate and was therefore taken  to the operating room with the consent of she and her family on  2022. FINDINGS AT SURGERY:  There was good quality reversed saphenous vein  graft and there was good quality skeletonized left internal mammary  artery. This allowed CABG x3 as described above.   There were palpable  pulses in all the cardiopulmonary bypass with good hemodynamics on  minimal inotropic support. Secondary to this, I thus decannulated and  administered protamine. Hemostasis was achieved and verified, and chest  tubes were placed. Ultimately, her sternum was closed by double  stainless steel wire technique. She tolerated the procedure well and  was transported to CVICU in stable condition. Please include there were no adequately trained or available residents  for assistance in this operation, and the presence of Rey Joiner  was critical for the independent performance of the endoscopic vein  harvest necessary to complete this operation. KATIE Suh MD    D: 03/30/2022 15:05:46       T: 03/30/2022 70:16:80     [id number]  Job#: [id number]     Doc#: [id number]    CC:

## 2022-03-31 NOTE — PROGRESS NOTES
Comprehensive Nutrition Assessment    Type and Reason for Visit:  Reassess    Nutrition Recommendations/Plan: Continue current diet. Encourage/monitor PO intakes as tolerated. Will monitor need for oral supplements. Monitor labs, weights, and plan of care. Nutrition Assessment:  Pt ate more than 75% of her breakfast this morning. Noted yesterday pt was lethargic and poor appetite was reported in the morning but PO intakes of 75% of for lunch and dinner were recorded. Will monitor need for oral supplements. S/p CABG on 3/29. Last BM 3/28. Weight fluctuations noted. Labs reviewed: Na 122-124 mmol/L, Glucose 209-265 mg/dL. Meds include: Lasix, NaCl Tablets. Malnutrition Assessment:  Malnutrition Status: At risk for malnutrition    Context:  Acute Illness     Findings of the 6 clinical characteristics of malnutrition:  Energy Intake:  Mild decrease in energy intake  Weight Loss:  Unable to assess - Weight fluctuations noted. Body Fat Loss:  1 - Mild body fat loss Orbital   Muscle Mass Loss:  No significant muscle mass loss  Fluid Accumulation:  1 - Mild Extremities   Strength:  Not Performed    Estimated Daily Nutrient Needs:  Energy (kcal):  MSJ x 1.1-1.2 = 1844-5789 kcals/day; Weight Used for Energy Requirements:  Current     Protein (g):  1.2-1.5 gm/kg = 70-85 gm pro/day; Weight Used for Protein Requirements:  Ideal        Fluid (ml/day):  25 mL/kg = 2000 mL/day or per MD; Method Used for Fluid Requirements:  ml/Kg      Nutrition Related Findings:  Labs/Meds reviewed. Last BM 3/28. Wounds:  Multiple,Surgical Incision       Current Nutrition Therapies:    ADULT DIET; Regular; No Added Salt (3-4 gm);  Low Potassium (Less than 3000 mg/day); 1000 ml    Anthropometric Measures:  · Height: 5' 5\" (165.1 cm)  · Current Body Weight: 190 lb 14.7 oz (86.6 kg)   · Admission Body Weight: 178 lb (80.7 kg)    · Ideal Body Weight: 125 lbs; % Ideal Body Weight 152.7 %   · BMI: 31.8  · BMI Categories: Obese Class 1 (BMI 30.0-34. 9)       Nutrition Diagnosis:   · Inadequate oral intake related to  (current condition; recent CABG) as evidenced by  (improving PO intakes)    Nutrition Interventions:   Food and/or Nutrient Delivery:  Continue Current Diet (Monitor need for oral supplements with meals.)  Nutrition Education/Counseling:  No recommendation at this time   Coordination of Nutrition Care:  Continue to monitor while inpatient    Goals:  Meet % of estimated nutrition needs. Nutrition Monitoring and Evaluation:   Behavioral-Environmental Outcomes:  None Identified   Food/Nutrient Intake Outcomes:  Food and Nutrient Intake  Physical Signs/Symptoms Outcomes:  Biochemical Data,GI Status,Fluid Status or Edema,Hemodynamic Status,Nutrition Focused Physical Findings,Skin,Weight     Discharge Planning:     Too soon to determine     Electronically signed by Christopher Del Real RD, LD on 3/31/22 at 12:18 PM EDT    Contact: [phone]

## 2022-03-31 NOTE — PROGRESS NOTES
Daily    ipratropium-albuterol  1 ampule Inhalation Q4H WA     Continuous Infusions:   sodium chloride      propofol Stopped (03/29/22 1711)    dextrose      norepinephrine Stopped (03/29/22 1533)    nitroGLYCERIN Stopped (03/29/22 1853)     PRN Meds:ketorolac, sodium chloride flush, sodium chloride, ondansetron **OR** ondansetron, oxyCODONE-acetaminophen **OR** oxyCODONE-acetaminophen, fentanNYL **OR** fentanNYL, hydrALAZINE, metoprolol, sodium bicarbonate, diphenhydrAMINE, magnesium sulfate, albumin human, glucose, dextrose, glucagon (rDNA), dextrose, bisacodyl, potassium chloride **OR** potassium alternative oral replacement **OR** potassium chloride    Beta- Blocker: Yes  Aspirin: Yes  Lovenox: No  GI: Yes  Plavix: Yes  Coumadin: No  Statin: Yes  ACE: No    Daily Nursing Care:  Please keep SCDS in place as DVT prophylaxis  If not intubated, Please have patient use IS and acapella 10X/hr or during commercial breaks  Please continue BM management  Daily labs and CXR  Daily PT/OT and ambulation  Continue with Case Management for DC planning      Assessment/ Plan:    Noted hyponatremia and rise of potassium. Repeat BMP for error and recheck BMP around 11am-noon. 10units insulin R and recheck potassium this AM-check BS 30 min after admin. No amp D5 due to hyperglycemia currently  Daily scheduled diuretics 20mg BID to improve edema   please continue aggressive PT-today determine placement  After hallway ambulation-eval for removal of chest tubes.  If dump >100ml keep chest tubes one more day  Continue with BM management  DC planning-home vs HHC vs rehab    [address] [facility], APRN - NP

## 2022-03-31 NOTE — PROGRESS NOTES
in the past. Dangelo Corley did endorse some mild associated shortness of breath and diaphoresis early this morning.  Patient received aspirin and nitroglycerin by EMS prior to arrival which alleviated the chest pain.  Patient denies any significant cardiac history.  Patient's blood pressure on admission was 177/101.  While in the emergency department patient's systolic blood pressure got up to 196.  Patient received Lopressor 25 mg and was started on Cozaar 25 mg.  Patient's blood pressure is improved. OBJECTIVE     Vital Signs:  BP (!) 104/52   Pulse 63   Temp 98.2 °F (36.8 °C)   Resp 19   Ht 5' 5\" (1.651 m)   Wt 188 lb 11.4 oz (85.6 kg)   SpO2 95%   BMI 31.40 kg/m²     Temp (24hrs), Av.1 °F (36.7 °C), Min:97.9 °F (36.6 °C), Max:98.4 °F (36.9 °C)    In: 2296.8   Out:  [Urine:1165]    Physical Exam:  Physical Exam  Vitals and nursing note reviewed. Constitutional:       Appearance: Normal appearance. HENT:      Head: Normocephalic and atraumatic. Nose: Nose normal.      Mouth/Throat:      Mouth: Mucous membranes are moist.      Pharynx: Oropharynx is clear. Eyes:      Extraocular Movements: Extraocular movements intact. Conjunctiva/sclera: Conjunctivae normal.      Pupils: Pupils are equal, round, and reactive to light. Cardiovascular:      Rate and Rhythm: Normal rate and regular rhythm. Pulses: Normal pulses. Heart sounds: Normal heart sounds. No murmur heard. No friction rub. No gallop. Pulmonary:      Effort: Pulmonary effort is normal. No respiratory distress. Breath sounds: Normal breath sounds. No stridor. No wheezing, rhonchi or rales. Chest:      Chest wall: Tenderness present. Abdominal:      General: Abdomen is flat. Bowel sounds are normal. There is no distension. Palpations: Abdomen is soft. There is no mass. Tenderness: There is no abdominal tenderness. There is no guarding or rebound. Hernia: No hernia is present.    Musculoskeletal: General: No swelling, tenderness, deformity or signs of injury. Normal range of motion. Cervical back: Normal range of motion. Right lower leg: No edema. Left lower leg: No edema. Skin:     General: Skin is warm. Neurological:      General: No focal deficit present. Mental Status: She is alert and oriented to person, place, and time. Mental status is at baseline. Psychiatric:         Mood and Affect: Mood normal.         Behavior: Behavior normal.         Thought Content:  Thought content normal.         Judgment: Judgment normal.           Medications:  Scheduled Medications:    insulin lispro  0-6 Units SubCUTAneous TID WC    insulin lispro  0-3 Units SubCUTAneous Nightly    sodium chloride flush  10 mL IntraVENous 2 times per day    aspirin  81 mg Oral Daily    clopidogrel  75 mg Oral Daily    amiodarone  200 mg Oral TID    mupirocin   Nasal BID    metoprolol tartrate  25 mg Oral BID    atorvastatin  20 mg Oral Nightly    pantoprazole  40 mg Oral Daily    pantoprazole (PROTONIX) 40 mg injection  40 mg IntraVENous Daily    ipratropium-albuterol  1 ampule Inhalation Q4H WA     Continuous Infusions:    sodium chloride      propofol Stopped (03/29/22 1711)    dextrose      norepinephrine Stopped (03/29/22 1533)    nitroGLYCERIN Stopped (03/29/22 1853)     PRN Medicationsketorolac, 15 mg, Q6H PRN  sodium chloride flush, 10 mL, PRN  sodium chloride, 25 mL, PRN  ondansetron, 4 mg, Q8H PRN   Or  ondansetron, 4 mg, Q6H PRN  oxyCODONE-acetaminophen, 1 tablet, Q4H PRN   Or  oxyCODONE-acetaminophen, 2 tablet, Q4H PRN  fentanNYL, 25 mcg, Q1H PRN   Or  fentanNYL, 50 mcg, Q1H PRN  hydrALAZINE, 5 mg, Q5 Min PRN  metoprolol, 2.5 mg, Q10 Min PRN  sodium bicarbonate, 50 mEq, Q30 Min PRN  diphenhydrAMINE, 25 mg, Nightly PRN  magnesium sulfate, 2,000 mg, PRN  albumin human, 25 g, PRN  glucose, 15 g, PRN  dextrose, 12.5 g, PRN  glucagon (rDNA), 1 mg, PRN  dextrose, 100 mL/hr, PRN  bisacodyl, 5 mg, Daily PRN  potassium chloride, 40 mEq, PRN   Or  potassium alternative oral replacement, 40 mEq, PRN   Or  potassium chloride, 10 mEq, PRN        Diagnostic Labs:  CBC:   Recent Labs     03/29/22  2240 03/30/22  0509 03/31/22  0413   WBC 18.3* 23.3* 17.8*   RBC 3.67* 3.84* 3.55*   HGB 11.5* 12.0 11.0*   HCT 32.7* 34.6* 32.8*   MCV 89.1 90.1 92.4   RDW 12.3 12.4 12.8    201 142     BMP:   Recent Labs     03/29/22  1508 03/29/22  2240 03/30/22  0509   * 137 139   K 3.9 4.2 4.5    104 108*   CO2 20 22 19*   BUN 10 10 12   CREATININE 0.51 0.58 0.54     BNP: No results for input(s): BNP in the last 72 hours. PT/INR:   Recent Labs     03/29/22 2240 03/30/22  0509 03/31/22  0413   PROTIME 12.0 12.4* 12.2   INR 1.1 1.2 1.2     APTT:   Recent Labs     03/28/22  0541 03/29/22  0240 03/29/22  1508   APTT 65.0* 77.1* 27.1     CARDIAC ENZYMES: No results for input(s): CKMB, CKMBINDEX, TROPONINI in the last 72 hours. Invalid input(s): CKTOTAL;3  FASTING LIPID PANEL:  Lab Results   Component Value Date    CHOL 208 (H) 03/22/2018    HDL 67 03/22/2018    TRIG 59 03/22/2018     LIVER PROFILE: No results for input(s): AST, ALT, ALB, BILIDIR, BILITOT, ALKPHOS in the last 72 hours. MICROBIOLOGY:   Lab Results   Component Value Date/Time    CULTURE WRONG TEST ORDERED 03/23/2022 03:22 PM       Imaging:    XR CHEST PORTABLE    Result Date: 3/30/2022  Interval extubation. Unchanged mild bibasilar atelectasis. XR CHEST PORTABLE    Result Date: 3/29/2022  1. Lines and tubes in expected position as above. 2. Interval sternotomy.  3. Linear opacities throughout both lungs, which could be due to atelectasis       ASSESSMENT & PLAN     Assessment and Plan:    Principal Problem:    3-vessel CAD  Active Problems:    Essential hypertension    [facility]STEM[facility] (non-ST elevated [facility]) [facility]    Type 2 diabetes mellitus without complication, without long-term current use of insulin ([facility] Hypokalemia    Hypomagnesemia    Hypotension  Resolved Problems:    * No resolved hospital problems. *    Multivessel CAD  - POD 1 for CABG    - Advance diet per CT surgery      Essential hypertension - stable  - Patient is currently hypotensive will hold all medication for the time being  - Cozaar 50 mg PO daily  - HCTZ 25 mg PO daily  - Lopressor 25 PO BID      Hypotension   - We will continue to monitor  - Ejection fraction 52% > fluids as needed  - Hold blood pressure medication for the time being     Type 2 diabetes mellitus without complication, without long-term current use of insulin   - Insulin infusion off  - Medium dose sliding scale   - Lantus 30 Units daily   - Glucose checks 4 times daily before meals and at bedtime  - Hemoglobin A1c 11.3  - Diabetic education       Hypokalemia  - Daily labs  - Replace electrolytes as needed  - Keep above 4     Hypomagnesemia  - Daily lab  - Replace electrolytes as needed   - Keep above 2      DVT ppx: Heparin     PT/OT/SW: Consulted. Discharge Planning: In process, pending recovery. Bonnie Lyle MD  Internal Medicine Resident, PGY-1  [address], [place]   5:18 AM 3/31/2022     Please note that part of this chart was generated using voice recognition dictation software. Although every effort was made to ensure the accuracy of this automated transcription, some errors in transcription may have occurred. I have discussed the care of Cristina Sanchez , including pertinent history and exam findings,    today with the resident. I have seen and examined the patient and the key elements of all parts of the encounter have been performed by me . I agree with the assessment, plan and orders as documented by the resident.      Principal Problem:    3-vessel CAD  Active Problems:    Essential hypertension    NSTEMI (non-ST elevated myocardial infarction) ([facility]    Type 2 diabetes mellitus without complication, without long-term current use of insulin (HCC)    Hypokalemia    Hypomagnesemia    Hypotension  Resolved Problems:    * No resolved hospital problems. *        Overall  course ;                                   are improving over time.         Doing Better  Has hyponatremia  Started on IV Lasix  Monitoring sodium          Electronically signed by Alfonso Jones MD

## 2022-03-31 NOTE — PLAN OF CARE
Problem:  Activity:  Goal: Risk for activity intolerance will decrease  3/31/2022 0813 by Wayne Kolb RN  Outcome: Ongoing  3/30/2022 1956 by Cheng Mclain RN  Outcome: Ongoing  Goal: Will verbalize the importance of balancing activity with adequate rest periods  3/31/2022 0813 by Wayne Kolb RN  Outcome: Ongoing  3/30/2022 1956 by Cheng Mclain RN  Outcome: Ongoing  Goal: Ability to tolerate increased activity will improve  3/31/2022 0813 by Wayne Kolb RN  Outcome: Ongoing  3/30/2022 1956 by Cheng Mclain RN  Outcome: Ongoing     Problem: Cardiac:  Goal: Ability to maintain an adequate cardiac output will improve  3/31/2022 0813 by Wayne Kolb RN  Outcome: Ongoing  3/30/2022 1956 by Cheng Mclain RN  Outcome: Ongoing  Goal: Hemodynamic stability will improve  3/31/2022 0813 by Wayne Kolb RN  Outcome: Ongoing  3/30/2022 1956 by Cheng Mclain RN  Outcome: Ongoing  Goal: Diagnostic test results will improve  3/31/2022 0813 by Wayne Kolb RN  Outcome: Ongoing  3/30/2022 1956 by Cheng Mclain RN  Outcome: Ongoing  Goal: Complications related to the disease process, condition or treatment will be avoided or minimized  3/31/2022 0813 by Wayne Kolb RN  Outcome: Ongoing  3/30/2022 1956 by Cheng Mclain RN  Outcome: Ongoing  Goal: Cerebral tissue perfusion will improve  3/31/2022 0813 by Wayne Kolb RN  Outcome: Ongoing  3/30/2022 1956 by Cheng Mclain RN  Outcome: Ongoing     Problem: Coping:  Goal: Ability to verbalize feelings about condition will improve  3/31/2022 0813 by Wayne Kolb RN  Outcome: Ongoing  3/30/2022 1956 by Cheng Mclain RN  Outcome: Ongoing  Goal: Ability to identify strategies to decrease anxiety will improve  3/31/2022 0813 by Wayne Kolb RN  Outcome: Ongoing  3/30/2022 1956 by Cheng Mclain RN  Outcome: Ongoing  Goal: Ability to identify and alter barriers to satisfying sexual function will improve  3/31/2022 0813 by Wayne Kolb RN  Outcome: Ongoing  3/30/2022 1956 by Fanny Davis RN  Outcome: Ongoing  Goal: Ability to identify and develop effective coping behavior will improve  3/31/2022 0813 by Yunier Collado RN  Outcome: Ongoing  3/30/2022 1956 by Fanny Davis RN  Outcome: Ongoing     Problem: Health Behavior:  Goal: Ability to identify changes in lifestyle to reduce recurrence of condition will improve  3/31/2022 0813 by Yunier Collado RN  Outcome: Ongoing  3/30/2022 1956 by Fanny Davis RN  Outcome: Ongoing  Goal: Ability to manage health-related needs will improve  3/31/2022 0813 by Yunier Collado RN  Outcome: Ongoing  3/30/2022 1956 by Fanny Davis RN  Outcome: Ongoing  Goal: Identification of resources available to assist in meeting health care needs will improve  3/31/2022 0813 by Yunier Collado RN  Outcome: Ongoing  3/30/2022 1956 by Fanny Davis RN  Outcome: Ongoing     Problem: Nutritional:  Goal: Ability to identify appropriate dietary choices will improve  3/31/2022 0813 by Yunier Collado RN  Outcome: Ongoing  3/30/2022 1956 by Fanny Davis RN  Outcome: Ongoing     Problem: Respiratory:  Goal: Ability to maintain adequate ventilation will improve  3/31/2022 0813 by Yunier Collado RN  Outcome: Ongoing  3/30/2022 1956 by Fanny Davis RN  Outcome: Ongoing  Goal: Levels of oxygenation will improve  3/31/2022 0813 by Yunier Collado RN  Outcome: Ongoing  3/30/2022 1956 by Fanny Davis RN  Outcome: Ongoing     Problem: Sensory:  Goal: Pain level will decrease  3/31/2022 0813 by Yunier Collado RN  Outcome: Ongoing  3/30/2022 1956 by Fanny Davis RN  Outcome: Ongoing     Problem: Falls - Risk of:  Goal: Will remain free from falls  3/31/2022 0813 by Yunier Collado RN  Outcome: Ongoing  3/30/2022 1956 by Fanny Davis RN  Outcome: Ongoing  Goal: Absence of physical injury  3/31/2022 0813 by Yunier Collado RN  Outcome: Ongoing  3/30/2022 1956 by Fanny Davis, RN  Outcome: Ongoing     Problem: Infection:  Goal: Will remain free from infection  3/31/2022 0813 by Ziggy White RN  Outcome: Ongoing  3/30/2022 1956 by Ester Gonzalez RN  Outcome: Ongoing     Problem: Safety:  Goal: Free from accidental physical injury  3/31/2022 0813 by Ziggy White RN  Outcome: Ongoing  3/30/2022 1956 by Ester Gonzalez RN  Outcome: Ongoing  Goal: Free from intentional harm  3/31/2022 0813 by Ziggy White RN  Outcome: Ongoing  3/30/2022 1956 by Ester Gonzalez RN  Outcome: Ongoing     Problem: Daily Care:  Goal: Daily care needs are met  3/31/2022 0813 by Ziggy White RN  Outcome: Ongoing  3/30/2022 1956 by Ester Gonzalez RN  Outcome: Ongoing     Problem: Pain:  Description: Pain management should include both nonpharmacologic and pharmacologic interventions.   Goal: Pain level will decrease  3/31/2022 0813 by Ziggy White RN  Outcome: Ongoing  3/30/2022 1956 by Ester Gonzalez RN  Outcome: Ongoing  Goal: Patient's pain/discomfort is manageable  3/31/2022 0813 by Ziggy White RN  Outcome: Ongoing  3/30/2022 1956 by Ester Gonzalez RN  Outcome: Ongoing  Goal: Control of acute pain  3/31/2022 0813 by Ziggy White RN  Outcome: Ongoing  3/30/2022 1956 by Ester Gonzalez RN  Outcome: Ongoing  Goal: Control of chronic pain  3/31/2022 0813 by Ziggy White RN  Outcome: Ongoing  3/30/2022 1956 by Ester Gonzalez RN  Outcome: Ongoing     Problem: Skin Integrity:  Goal: Skin integrity will stabilize  3/31/2022 0813 by Ziggy White RN  Outcome: Ongoing  3/30/2022 1956 by Ester Gonzalez RN  Outcome: Ongoing     Problem: Discharge Planning:  Goal: Patients continuum of care needs are met  3/31/2022 0813 by Ziggy White RN  Outcome: Ongoing  3/30/2022 1956 by Ester Gonzalez RN  Outcome: Ongoing     Problem: OXYGENATION/RESPIRATORY FUNCTION  Goal: Patient will maintain patent airway  3/31/2022 0813 by Ziggy White RN  Outcome: Ongoing  3/31/2022 0805 by Enedina Cordova RCP  Outcome: Ongoing  3/30/2022 1956 by Ester Gonzalez RN  Outcome: Ongoing  Goal: Patient will achieve/maintain normal respiratory rate/effort  3/31/2022 0813 by Jaquan An RN  Outcome: Ongoing  3/31/2022 0805 by Freddie Lawson RCP  Outcome: Ongoing  3/30/2022 1956 by Varun Davenport RN  Outcome: Ongoing     Problem: SKIN INTEGRITY  Goal: Skin integrity is maintained or improved  3/31/2022 0813 by Jaquan An RN  Outcome: Ongoing  3/30/2022 1956 by Varun Davenport RN  Outcome: Ongoing

## 2022-03-31 NOTE — PROGRESS NOTES
Occupational Therapy  Facility/Department: [facility] 1  Daily Treatment Note  NAME: Isela Mcdaniel  : 1960  MRN: [id number]    Date of Service: 3/31/2022    Discharge Recommendations:  Patient would benefit from continued therapy after discharge       Assessment   Performance deficits / Impairments: Decreased functional mobility ; Decreased endurance;Decreased ADL status; Decreased high-level IADLs;Decreased coordination;Decreased balance;Decreased safe awareness;Decreased cognition  Assessment: She will benefit from continued participation in acute and post-acute OT services to improve independence / to improve functional activity participation. Treatment Diagnosis: CABG X3 3/29/22  Prognosis: Good  REQUIRES OT FOLLOW UP: Yes  Activity Tolerance  Activity Tolerance: Patient Tolerated treatment well;Patient limited by fatigue;Patient limited by pain  Activity Tolerance: Increased time d/t pain and fatigue  Safety Devices  Safety Devices in place: Yes  Type of devices: All fall risk precautions in place;Call light within reach; Patient at risk for falls; Left in chair;Nurse notified         Patient Diagnosis(es): The primary encounter diagnosis was Chest pain, unspecified type. A diagnosis of NSTEMI (non-ST elevated myocardial infarction) [facility]) was also pertinent to this visit. has a past medical history of Chronic back pain, Hypertension, Osteoarthritis, and Pneumothorax.   has a past surgical history that includes back surgery; Carpal tunnel release (Right); Appendectomy; Tubal ligation; Cardiac catheterization (2022); and Coronary artery bypass graft (N/A, 3/29/2022).     Restrictions  Restrictions/Precautions  Restrictions/Precautions: Cardiac,Surgical Protocols,Fall Risk,Up as Tolerated  Required Braces or Orthoses?: Yes (Heart Hugger)  Required Braces or Orthoses  Other: Heart Hugger Brace  Position Activity Restriction  Sternal Precautions: 5# Lifting Restrictions  Sternal Precautions: CABG X3 3/29/22  Other position/activity restrictions: Recent left rotator cuff repair, 12/1/21, CT to wall suction  Subjective   General  Patient assessed for rehabilitation services?: Yes  Family / Caregiver Present: No  Diagnosis: NSTEMI. S/P CABG x3 (3/29/22). Subjective  Subjective: RN approved Pt to be seen for OT tx. Pt was agreeable and cooperative throughout therapy session. Pain Assessment  Pain Assessment: 0-10  Pain Level: 5  Patient's Stated Pain Goal: No pain  Pain Type: Surgical pain  Pain Location: Chest  Pain Orientation: Mid  Pain Descriptors: Aching  Pain Frequency: Continuous  Clinical Progression: Gradually improving  Response to Pain Intervention: Patient Satisfied (pain meds on board per pt)  Vital Signs  Patient Currently in Pain: Yes   Orientation  Orientation  Overall Orientation Status: Within Normal Limits  Objective    ADL  Feeding: Independent (able to open containers and feed self)  Grooming: Stand by assistance;Maximum assistance;Setup;Verbal cueing; Increased time to complete (SBA-wash face, brush teeth/hair, Max-wash/dry hair d/t pain)  UE Bathing: Minimal assistance;Setup;Verbal cueing; Increased time to complete (assist to wash back)  LE Bathing: Moderate assistance;Setup;Verbal cueing; Increased time to complete (assist to wash lower legs and feet seated in chair)  UE Dressing: Minimal assistance;Maximum assistance;Setup;Verbal cueing; Increased time to complete (Min assist to snap, thread arms and tie gown, Max-doff/don sx bra & heart hugger)  LE Dressing: Maximum assistance;Setup;Verbal cueing; Increased time to complete (Max-doff/don SHAGUFTA hose and footies)  Toileting: Contact guard assistance;Setup; Increased time to complete (able to complete nallely care in standing)      Pt up in chair upon arrival. Setup at tray table for self care using sx soap (see above for LOF). Pt has CT to wall suction and O2 @5L NC on throughout tx.  Pt needed vc's for deep breathing tech w/self care d/t SpO2 dropping to 88-87%. SpO2 would return to 91-92% within 1-2 min of deep breathing. Balance  Sitting Balance: Stand by assistance (supported and unsupported seated in recliner for self care)  Standing Balance: Contact guard assistance (w/no LOB)  Standing Balance  Time: Pt stood for approx 3-4 min total for nallely care and adjusting linens in chair  Activity: static/dynamic standing  Comment: CGA w/SW w/no LOB    Transfers  Sit to stand: Contact guard assistance  Stand to sit: Contact guard assistance  Transfer Comments: w/no LOB     Cognition  Overall Cognitive Status: [facility]     Plan   Plan  Times per week: 4-6x/week (CABG)  Times per day: Daily  Current Treatment Recommendations: Endurance Training,Self-Care / ADL,Safety Education & Training,Pain Management,Cognitive/Perceptual Training,Functional Mobility Training,Balance Training,Equipment Evaluation, Education, & procurement,Patient/Caregiver Education & Training,Home Management Training (Adapted techniques d/t Sternal Precautions. Integration of EC and Ax pacing. Integration of Heart Hugger. )    AM-PAC Score  [facility] Inpatient Daily Activity Raw Score: 16 (03/31/22 1404)  AM-PAC Inpatient ADL T-Scale Score : 35.96 (03/31/22 1404)  ADL Inpatient CMS 0-100% Score: 53.32 (03/31/22 1404)  ADL Inpatient CMS G-Code Modifier : CK (03/31/22 1404)    Goals  Short term goals  Time Frame for Short term goals: By Discharge  Short term goal 1: Pt will verbalize / demo Mod I and Good Carryover with Sternal Precautions during all Functional ADL tasks. Short term goal 2: Pt will verbalize / demo Mod I and Good Carryover with EC and Ax Pacing during all Functional Tasks and Mobility. Short term goal 3: Pt will verbalize / demo Mod I with Correct Use of AE / DME during all ADL / Functional Transfers. Short term goal 4: Pt will maintain Fair+ Dynamic Standing Balance (10-12 minutes) while participating in Functional / Leisure tasks.   Short term goal 5: Pt will participate in Item Retrieval / Transport / Functional Mobility with Mod I with Correct Use of AE / DME.      Therapy Time   Individual Concurrent Group Co-treatment   Time In  1225         Time Out  1340         Minutes  75 total tx time                 [address]

## 2022-03-31 NOTE — PROGRESS NOTES
Inpatient Diabetes Education    Destiny Fernández was seen for evaluation and education on Type 2 uncontrolled    Lab Results   Component Value Date    LABA1C 11.3 (H) 03/23/2022       Please see Diabetes Education note from 3-28-22 for more details. Education from that day was reinforced. Demonstrated use of glucometer. 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 I also demonstrated use of Insulin pen. (Her father took insulin with vial/syringe method) Destiny states that all the step looked familiar from the demonstration from last diabetes education session. Following Support materials were provided for patient to take home:  __X_ Educational Booklets as available \"Diabetes and you\"  __X_ Be safe with [place] teaching sheet /  [employer] Generated [employer]  __X_\"Type 2 Diabetes and Adding Insulin\" with survival skills   __X_ [facility] Diabetes Education brochure or contact card    Patient verbalized  understanding  of survival skills education. RECOMMENDATIONS FOR OUTPATIENT PLAN:  Diabetes Self-Monitoring Supplies:  __X_ Preferred / formulary blood glucose meter for BGSM at home use    __X_ Strips and lancets for bid - qid  frequency of home BGSM    Diabetes Medications: Discussed with patient the difference between basal and bolus insulin (long and rapid acting)  __X_ Insulin Pen or Vial  Basal / long acting - dose per MD   ___ Insulin Pen or Vial  Bolus pre meal set dose  or correction scale - dose per MD  __X_ Insulin Delivery method - Pens -   order Insulin Pen Needle 31G X 4 mm MISC  ___ Insulin Delivery method - Syringe - order  Insulin syringe Needle U 100 31G X 15/64\" 0.5ml    Diabetes Education / HCP follow -up:   _x_ Would recommend follow -up education at outpatient diabetes education at [place] [id number]. An ordered is needed for this service and can be placed via EHR.  Discharge Navigator --- Med Reconciliation -- New orders for discharge tab -- search diabetic ed - REF20 -  [facility]  - review and sign     _X__ Follow -up with HCP / PCP within one week.     OLIVER MONTOYA RN

## 2022-03-31 NOTE — PLAN OF CARE
Problem:  Activity:  Goal: Risk for activity intolerance will decrease  Description: Risk for activity intolerance will decrease  3/31/2022 1922 by Amie Caceres RN  Outcome: Ongoing  3/31/2022 0813 by Bishnu Lopez RN  Outcome: Ongoing  Goal: Will verbalize the importance of balancing activity with adequate rest periods  Description: Will verbalize the importance of balancing activity with adequate rest periods  3/31/2022 1922 by Amie Caceres RN  Outcome: Ongoing  3/31/2022 0813 by Bishnu Lopez RN  Outcome: Ongoing  Goal: Ability to tolerate increased activity will improve  Description: Ability to tolerate increased activity will improve  3/31/2022 1922 by Amie Caceres RN  Outcome: Ongoing  3/31/2022 0813 by Bishnu Lopez RN  Outcome: Ongoing     Problem: Cardiac:  Goal: Ability to maintain an adequate cardiac output will improve  Description: Ability to maintain an adequate cardiac output will improve  3/31/2022 1922 by Amie Caceres RN  Outcome: Ongoing  3/31/2022 0813 by Bishnu Lopez RN  Outcome: Ongoing  Goal: Hemodynamic stability will improve  Description: Hemodynamic stability will improve  3/31/2022 1922 by Amie Caceres RN  Outcome: Ongoing  3/31/2022 0813 by Bishnu Lopez RN  Outcome: Ongoing  Goal: Diagnostic test results will improve  Description: Diagnostic test results will improve  3/31/2022 1922 by Amie Caceres RN  Outcome: Ongoing  3/31/2022 0813 by Bishnu Lopez RN  Outcome: Ongoing  Goal: Complications related to the disease process, condition or treatment will be avoided or minimized  Description: Complications related to the disease process, condition or treatment will be avoided or minimized  3/31/2022 1922 by Amie Caceres RN  Outcome: Ongoing  3/31/2022 0813 by Bishnu Lopez RN  Outcome: Ongoing  Goal: Cerebral tissue perfusion will improve  Description: Cerebral tissue perfusion will improve  3/31/2022 1922 by Amie Caceres RN  Outcome: Ongoing  3/31/2022 0813 by Bishnu Lopez RN  Outcome: Ongoing     Problem: Coping:  Goal: Ability to verbalize feelings about condition will improve  Description: Ability to verbalize feelings about condition will improve  3/31/2022 1922 by Otf Sepulveda RN  Outcome: Ongoing  3/31/2022 0813 by Mari Dupont RN  Outcome: Ongoing  Goal: Ability to identify strategies to decrease anxiety will improve  Description: Ability to identify strategies to decrease anxiety will improve  3/31/2022 1922 by Otf Sepulveda RN  Outcome: Ongoing  3/31/2022 0813 by Mari Dupont RN  Outcome: Ongoing  Goal: Ability to identify and alter barriers to satisfying sexual function will improve  Description: Ability to identify and alter barriers to satisfying sexual function will improve  3/31/2022 1922 by Otf Sepulveda RN  Outcome: Ongoing  3/31/2022 0813 by Mari Dupont RN  Outcome: Ongoing  Goal: Ability to identify and develop effective coping behavior will improve  Description: Ability to identify and develop effective coping behavior will improve  3/31/2022 1922 by Otf Sepulveda RN  Outcome: Ongoing  3/31/2022 0813 by Mari Dupont RN  Outcome: Ongoing     Problem: Health Behavior:  Goal: Ability to identify changes in lifestyle to reduce recurrence of condition will improve  Description: Ability to identify changes in lifestyle to reduce recurrence of condition will improve  3/31/2022 1922 by Otf Sepulveda RN  Outcome: Ongoing  3/31/2022 0813 by Mari Dupont RN  Outcome: Ongoing  Goal: Ability to manage health-related needs will improve  Description: Ability to manage health-related needs will improve  3/31/2022 1922 by Otf Sepulveda RN  Outcome: Ongoing  3/31/2022 0813 by Mari Dupont RN  Outcome: Ongoing  Goal: Identification of resources available to assist in meeting health care needs will improve  Description: Identification of resources available to assist in meeting health care needs will improve  3/31/2022 1922 by Otf Sepulveda RN  Outcome: Ongoing  3/31/2022 7612 by Ant Andre RN  Outcome: Ongoing     Problem: Nutritional:  Goal: Ability to identify appropriate dietary choices will improve  Description: Ability to identify appropriate dietary choices will improve  3/31/2022 1922 by Roderick Devi RN  Outcome: Ongoing  3/31/2022 0813 by Ant Andre RN  Outcome: Ongoing     Problem: Respiratory:  Goal: Ability to maintain adequate ventilation will improve  Description: Ability to maintain adequate ventilation will improve  3/31/2022 1922 by Roderick Devi RN  Outcome: Ongoing  3/31/2022 0813 by Ant Andre RN  Outcome: Ongoing  Goal: Levels of oxygenation will improve  Description: Levels of oxygenation will improve  3/31/2022 1922 by Roderick Devi RN  Outcome: Ongoing  3/31/2022 0813 by Ant Andre RN  Outcome: Ongoing     Problem: Sensory:  Goal: Pain level will decrease  Description: Pain level will decrease  3/31/2022 1922 by Roderick Devi RN  Outcome: Ongoing  3/31/2022 0813 by Ant Andre RN  Outcome: Ongoing     Problem: Falls - Risk of:  Goal: Will remain free from falls  Description: Will remain free from falls  3/31/2022 1922 by Roderick Devi RN  Outcome: Ongoing  3/31/2022 0813 by Ant Andre RN  Outcome: Ongoing  Goal: Absence of physical injury  Description: Absence of physical injury  3/31/2022 1922 by Roderick Devi RN  Outcome: Ongoing  3/31/2022 0813 by Ant Andre RN  Outcome: Ongoing     Problem: Infection:  Goal: Will remain free from infection  Description: Will remain free from infection  3/31/2022 1922 by Roderick Devi RN  Outcome: Ongoing  3/31/2022 0813 by Ant Andre RN  Outcome: Ongoing     Problem: Safety:  Goal: Free from accidental physical injury  Description: Free from accidental physical injury  3/31/2022 1922 by Roderick Devi RN  Outcome: Ongoing  3/31/2022 0813 by Ant Andre RN  Outcome: Ongoing  Goal: Free from intentional harm  Description: Free from intentional harm  3/31/2022 1922 by Roderick Devi RN  Outcome: Ongoing  3/31/2022 0813 by Jaquan An RN  Outcome: Ongoing     Problem: Daily Care:  Goal: Daily care needs are met  Description: Daily care needs are met  3/31/2022 1922 by Varun Davenport RN  Outcome: Ongoing  3/31/2022 0813 by Jaquan An RN  Outcome: Ongoing     Problem: Pain:  Goal: Pain level will decrease  Description: Pain level will decrease  3/31/2022 1922 by Varun Davenport RN  Outcome: Ongoing  3/31/2022 0813 by Jaquan An RN  Outcome: Ongoing  Goal: Patient's pain/discomfort is manageable  Description: Patient's pain/discomfort is manageable  3/31/2022 1922 by Varun Davenport RN  Outcome: Ongoing  3/31/2022 0813 by Jaquan An RN  Outcome: Ongoing  Goal: Control of acute pain  Description: Control of acute pain  3/31/2022 1922 by Varun Davenport RN  Outcome: Ongoing  3/31/2022 0813 by Jaquan An RN  Outcome: Ongoing  Goal: Control of chronic pain  Description: Control of chronic pain  3/31/2022 1922 by Varun Davenport RN  Outcome: Ongoing  3/31/2022 0813 by Jaquan An RN  Outcome: Ongoing     Problem: Skin Integrity:  Goal: Skin integrity will stabilize  Description: Skin integrity will stabilize  3/31/2022 1922 by Varun Davenport RN  Outcome: Ongoing  3/31/2022 0813 by Jaquan An RN  Outcome: Ongoing     Problem: Discharge Planning:  Goal: Patients continuum of care needs are met  Description: Patients continuum of care needs are met  3/31/2022 1922 by Varun Davenport RN  Outcome: Ongoing  3/31/2022 0813 by Jaquan An RN  Outcome: Ongoing     Problem: OXYGENATION/RESPIRATORY FUNCTION  Goal: Patient will maintain patent airway  3/31/2022 1922 by Varun Davenport RN  Outcome: Ongoing  3/31/2022 0813 by Jaquan An RN  Outcome: Ongoing  3/31/2022 0805 by Freddie Lawson RCP  Outcome: Ongoing  Goal: Patient will achieve/maintain normal respiratory rate/effort  Description: Respiratory rate and effort will be within normal limits for the patient  3/31/2022 1922 by Varun Davenport, RN  Outcome: Ongoing  3/31/2022 0813 by Gerald Meeks RN  Outcome: Ongoing  3/31/2022 0805 by Demian Ny RCP  Outcome: Ongoing     Problem: SKIN INTEGRITY  Goal: Skin integrity is maintained or improved  3/31/2022 1922 by Jose Jimenez RN  Outcome: Ongoing  3/31/2022 0813 by Gerald Meeks RN  Outcome: Ongoing

## 2022-03-31 NOTE — PROGRESS NOTES
Physical Therapy  Facility/Department: [facility]  Daily Treatment Note  NAME: Edith Peabody  : 1960  MRN: [id number]    Date of Service: 3/31/2022    Discharge Recommendations:  Patient would benefit from continued therapy after discharge   PT Equipment Recommendations  Equipment Needed: No (CTA with progress)    Assessment   Body structures, Functions, Activity limitations: Decreased functional mobility ; Decreased endurance  Assessment: Pt [id number] ft x4 with CGA/min A and RW., Min assist grossly, verbal cues for heart hugger use, decreased safety awareness with toilet transfer. Pt currently is unsafe to return to prior living situation d/t inability to safely ambulate household distances and high fall risk d/t decreased balance and endurance. Pt would benefit from continued therapy to promote endurance, balance, and strengthening. Prognosis: Good  PT Education: Goals;Plan of Care;PT Role;Home Exercise Program;Transfer Training;General Safety;Precautions  Patient Education: ther ex, sternal precautions  REQUIRES PT FOLLOW UP: Yes  Activity Tolerance  Activity Tolerance: Patient limited by endurance; Patient limited by fatigue  Activity Tolerance: Spo2 decreased to 88% during ther ex, PLB increased to 93%     Patient Diagnosis(es): The primary encounter diagnosis was Chest pain, unspecified type. A diagnosis of NSTEMI (non-ST elevated myocardial infarction) [facility]) was also pertinent to this visit. has a past medical history of Chronic back pain, Hypertension, Osteoarthritis, and Pneumothorax.   has a past surgical history that includes back surgery; Carpal tunnel release (Right); Appendectomy; Tubal ligation; Cardiac catheterization (2022); and Coronary artery bypass graft (N/A, 3/29/2022).     Restrictions  Restrictions/Precautions  Restrictions/Precautions: Fall Risk,Cardiac  Required Braces or Orthoses?: Yes  Required Braces or Orthoses  Other: Heart Hugger Brace  Position Activity Restriction  Sternal Precautions: 5# Lifting Restrictions,No Pushing,No Pulling  Sternal Precautions: No Bilat UE reaching >90  Other position/activity restrictions: Amb pt, Recent left rotator cuff repair, dec 1st 2021, CABG x3 3/29, up in chair  Subjective   General  Chart Reviewed: Yes  Response To Previous Treatment: Patient with no complaints from previous session. Family / Caregiver Present: No  Subjective  Subjective: RN and pt agreeable to PT. pt agreeable and pleasant. Pt seated in recliner at start of session.   General Comment  Comments: Pt retired to recliner chair, given callight, chair alarm set  Pain Screening  Patient Currently in Pain: Yes  Pain Assessment  Pain Assessment: 0-10  Pain Level: 7  Patient's Stated Pain Goal: No pain  Pain Type: Acute pain;Surgical pain  Pain Location: Chest;Incision  Pain Orientation: Left  Pain Descriptors: Aching;Discomfort  Pain Frequency: Continuous  Vital Signs  Patient Currently in Pain: Yes       Orientation  Orientation  Overall Orientation Status: Within Normal Limits  Cognition   Cognition  Overall Cognitive Status: WFL  Objective   Bed mobility  Supine to Sit: Unable to assess  Sit to Supine: Unable to assess  Scooting: Stand by assistance  Comment: Pt begins and ends in recliner  Transfers  Sit to Stand: Minimal Assistance  Stand to sit: Minimal Assistance  Toilet tx: MIN/MOD, pt impulsive with toilet tx  Stand Pivot Transfers: Minimal Assistance  Comment: Cues for use of heart hugger with good return, Min assist, increased performance asnd understandung with encouragement, increased time and effort  Ambulation  More Ambulation?: No  Ambulation 1  Surface: level tile  Device: Rolling Walker  Other Apparatus: O2 (6L)  Assistance: Contact guard assistance;Minimal assistance  Quality of Gait: steady  Gait Deviations: Slow Angella;Decreased step length;Decreased step height  Distance: 12 ft x4  Comments: Pt limited by endurance, no LOB noted, Min assist for stability, increased cuing. Stairs/Curb  Stairs?: No     Balance  Sitting - Static: Good  Standing - Static: Fair  Standing - Dynamic: Fair  Comments: Standing balance assessed with RW  Other exercises  Other exercises?: Yes  Other exercises 1: .sit  Other exercises 2: STS x5  Other exercises 3: incentive spirometer and acapella x10      AM-PAC Score  AM-PAC Inpatient Mobility Raw Score : 17 (03/31/22 1221)  AM-PAC Inpatient T-Scale Score : 42.13 (03/31/22 1221)  Mobility Inpatient CMS 0-100% Score: 50.57 (03/31/22 1221)  Mobility Inpatient CMS G-Code Modifier : CK (03/31/22 1221)          Goals  Short term goals  Time Frame for Short term goals: 14 visits  Short term goal 1: Complete transfers with mod I and appropriate device  Short term goal 2: Complete 300 ft of gait with mod I and appropriate device  Short term goal 3: Complete 2 steps with no HR and mod I  Short term goal 4: Participate in 30 minutes of therapy to promote endurance  Short term goal 5: Be independent with cardiac HEP  Patient Goals   Patient goals : To go home    Plan    Plan  Times per week: 7x per week, 1-2x per day  Times per day: Daily  Current Treatment Recommendations: Tyson Sanchez Re-education,Patient/Caregiver Education & Training,ADL/Self-care Training,Equipment Evaluation, Education, & procurement,Balance Training,IADL Training,Gait Training,Home Exercise Program,Functional Mobility Training,Stair training,Safety Education & Training  Safety Devices  Type of devices:  All fall risk precautions in place,Gait belt,Call light within reach,Left in chair,Nurse notified  Restraints  Initially in place: No     Therapy Time   Individual Concurrent Group Co-treatment   Time In 0905         Time Out 1000         Minutes 55         Timed Code Treatment Minutes: [address], PTA

## 2022-04-01 ENCOUNTER — APPOINTMENT (OUTPATIENT)
Dept: GENERAL RADIOLOGY | Age: 62
DRG: 234 | End: 2022-04-01
Payer: COMMERCIAL

## 2022-04-01 PROBLEM — E87.6 HYPOKALEMIA: Status: RESOLVED | Noted: 2022-03-26 | Resolved: 2022-04-01

## 2022-04-01 LAB
ANION GAP SERPL CALCULATED.3IONS-SCNC: 10 MMOL/L (ref 9–17)
ANION GAP SERPL CALCULATED.3IONS-SCNC: 13 MMOL/L (ref 9–17)
ANION GAP SERPL CALCULATED.3IONS-SCNC: 17 MMOL/L (ref 9–17)
ANION GAP SERPL CALCULATED.3IONS-SCNC: 9 MMOL/L (ref 9–17)
BUN BLDV-MCNC: 13 MG/DL (ref 8–23)
BUN BLDV-MCNC: 13 MG/DL (ref 8–23)
BUN BLDV-MCNC: 15 MG/DL (ref 8–23)
BUN BLDV-MCNC: 16 MG/DL (ref 8–23)
CALCIUM SERPL-MCNC: 8.3 MG/DL (ref 8.6–10.4)
CALCIUM SERPL-MCNC: 8.3 MG/DL (ref 8.6–10.4)
CALCIUM SERPL-MCNC: 8.4 MG/DL (ref 8.6–10.4)
CALCIUM SERPL-MCNC: 8.5 MG/DL (ref 8.6–10.4)
CHLORIDE BLD-SCNC: 92 MMOL/L (ref 98–107)
CHLORIDE BLD-SCNC: 93 MMOL/L (ref 98–107)
CHLORIDE BLD-SCNC: 94 MMOL/L (ref 98–107)
CHLORIDE BLD-SCNC: 96 MMOL/L (ref 98–107)
CO2: 21 MMOL/L (ref 20–31)
CO2: 21 MMOL/L (ref 20–31)
CO2: 22 MMOL/L (ref 20–31)
CO2: 22 MMOL/L (ref 20–31)
CREAT SERPL-MCNC: 0.61 MG/DL (ref 0.5–0.9)
CREAT SERPL-MCNC: 0.7 MG/DL (ref 0.5–0.9)
CREAT SERPL-MCNC: 0.7 MG/DL (ref 0.5–0.9)
CREAT SERPL-MCNC: 0.73 MG/DL (ref 0.5–0.9)
GFR AFRICAN AMERICAN: >60 ML/MIN
GFR NON-AFRICAN AMERICAN: >60 ML/MIN
GFR SERPL CREATININE-BSD FRML MDRD: ABNORMAL ML/MIN/{1.73_M2}
GLUCOSE BLD-MCNC: 162 MG/DL (ref 70–99)
GLUCOSE BLD-MCNC: 182 MG/DL (ref 65–105)
GLUCOSE BLD-MCNC: 211 MG/DL (ref 65–105)
GLUCOSE BLD-MCNC: 264 MG/DL (ref 65–105)
GLUCOSE BLD-MCNC: 306 MG/DL (ref 65–105)
GLUCOSE BLD-MCNC: 311 MG/DL (ref 70–99)
GLUCOSE BLD-MCNC: 317 MG/DL (ref 70–99)
GLUCOSE BLD-MCNC: 320 MG/DL (ref 70–99)
HCT VFR BLD CALC: 30.2 % (ref 36.3–47.1)
HEMOGLOBIN: 10.6 G/DL (ref 11.9–15.1)
INR BLD: 1.2
MAGNESIUM: 1.9 MG/DL (ref 1.6–2.6)
MCH RBC QN AUTO: 31.4 PG (ref 25.2–33.5)
MCHC RBC AUTO-ENTMCNC: 35.1 G/DL (ref 28.4–34.8)
MCV RBC AUTO: 89.3 FL (ref 82.6–102.9)
NRBC AUTOMATED: 0 PER 100 WBC
PDW BLD-RTO: 12.4 % (ref 11.8–14.4)
PLATELET # BLD: ABNORMAL K/UL (ref 138–453)
PLATELET, FLUORESCENCE: 152 K/UL (ref 138–453)
PLATELET, IMMATURE FRACTION: 7.5 % (ref 1.1–10.3)
POTASSIUM SERPL-SCNC: 4.1 MMOL/L (ref 3.7–5.3)
POTASSIUM SERPL-SCNC: 4.2 MMOL/L (ref 3.7–5.3)
POTASSIUM SERPL-SCNC: 4.3 MMOL/L (ref 3.7–5.3)
POTASSIUM SERPL-SCNC: 4.5 MMOL/L (ref 3.7–5.3)
PROTHROMBIN TIME: 12.3 SEC (ref 9.1–12.3)
RBC # BLD: 3.38 M/UL (ref 3.95–5.11)
SODIUM BLD-SCNC: 125 MMOL/L (ref 135–144)
SODIUM BLD-SCNC: 127 MMOL/L (ref 135–144)
SODIUM BLD-SCNC: 128 MMOL/L (ref 135–144)
SODIUM BLD-SCNC: 130 MMOL/L (ref 135–144)
WBC # BLD: 15.7 K/UL (ref 3.5–11.3)

## 2022-04-01 PROCEDURE — 6370000000 HC RX 637 (ALT 250 FOR IP): Performed by: NURSE PRACTITIONER

## 2022-04-01 PROCEDURE — 85610 PROTHROMBIN TIME: CPT

## 2022-04-01 PROCEDURE — 99024 POSTOP FOLLOW-UP VISIT: CPT | Performed by: PHYSICIAN ASSISTANT

## 2022-04-01 PROCEDURE — 97116 GAIT TRAINING THERAPY: CPT

## 2022-04-01 PROCEDURE — 2580000003 HC RX 258: Performed by: NURSE PRACTITIONER

## 2022-04-01 PROCEDURE — 6370000000 HC RX 637 (ALT 250 FOR IP): Performed by: THORACIC SURGERY (CARDIOTHORACIC VASCULAR SURGERY)

## 2022-04-01 PROCEDURE — 85055 RETICULATED PLATELET ASSAY: CPT

## 2022-04-01 PROCEDURE — 99232 SBSQ HOSP IP/OBS MODERATE 35: CPT | Performed by: INTERNAL MEDICINE

## 2022-04-01 PROCEDURE — 83735 ASSAY OF MAGNESIUM: CPT

## 2022-04-01 PROCEDURE — 94761 N-INVAS EAR/PLS OXIMETRY MLT: CPT

## 2022-04-01 PROCEDURE — 82947 ASSAY GLUCOSE BLOOD QUANT: CPT

## 2022-04-01 PROCEDURE — 80048 BASIC METABOLIC PNL TOTAL CA: CPT

## 2022-04-01 PROCEDURE — 97530 THERAPEUTIC ACTIVITIES: CPT

## 2022-04-01 PROCEDURE — 71045 X-RAY EXAM CHEST 1 VIEW: CPT

## 2022-04-01 PROCEDURE — 6360000002 HC RX W HCPCS: Performed by: NURSE PRACTITIONER

## 2022-04-01 PROCEDURE — 85027 COMPLETE CBC AUTOMATED: CPT

## 2022-04-01 PROCEDURE — 2700000000 HC OXYGEN THERAPY PER DAY

## 2022-04-01 PROCEDURE — 6360000002 HC RX W HCPCS: Performed by: PHYSICIAN ASSISTANT

## 2022-04-01 PROCEDURE — 2140000001 HC CVICU INTERMEDIATE R&B

## 2022-04-01 PROCEDURE — 94640 AIRWAY INHALATION TREATMENT: CPT

## 2022-04-01 PROCEDURE — 6370000000 HC RX 637 (ALT 250 FOR IP)

## 2022-04-01 PROCEDURE — 6370000000 HC RX 637 (ALT 250 FOR IP): Performed by: INTERNAL MEDICINE

## 2022-04-01 PROCEDURE — 97535 SELF CARE MNGMENT TRAINING: CPT

## 2022-04-01 PROCEDURE — 36415 COLL VENOUS BLD VENIPUNCTURE: CPT

## 2022-04-01 RX ORDER — SODIUM CHLORIDE 1000 MG
1 TABLET, SOLUBLE MISCELLANEOUS 2 TIMES DAILY WITH MEALS
Status: DISCONTINUED | OUTPATIENT
Start: 2022-04-01 | End: 2022-04-04

## 2022-04-01 RX ADMIN — METOPROLOL TARTRATE 25 MG: 25 TABLET ORAL at 21:10

## 2022-04-01 RX ADMIN — SODIUM CHLORIDE TAB 1 GM 1 G: 1 TAB at 08:08

## 2022-04-01 RX ADMIN — INSULIN LISPRO 4 UNITS: 100 INJECTION, SOLUTION INTRAVENOUS; SUBCUTANEOUS at 17:05

## 2022-04-01 RX ADMIN — FUROSEMIDE 20 MG: 10 INJECTION, SOLUTION INTRAMUSCULAR; INTRAVENOUS at 07:53

## 2022-04-01 RX ADMIN — AMIODARONE HYDROCHLORIDE 200 MG: 200 TABLET ORAL at 21:10

## 2022-04-01 RX ADMIN — KETOROLAC TROMETHAMINE 15 MG: 30 INJECTION, SOLUTION INTRAMUSCULAR; INTRAVENOUS at 10:41

## 2022-04-01 RX ADMIN — CLOPIDOGREL 75 MG: 75 TABLET, FILM COATED ORAL at 07:53

## 2022-04-01 RX ADMIN — INSULIN LISPRO 2 UNITS: 100 INJECTION, SOLUTION INTRAVENOUS; SUBCUTANEOUS at 08:01

## 2022-04-01 RX ADMIN — SODIUM CHLORIDE, PRESERVATIVE FREE 10 ML: 5 INJECTION INTRAVENOUS at 21:09

## 2022-04-01 RX ADMIN — MUPIROCIN: 20 OINTMENT TOPICAL at 07:53

## 2022-04-01 RX ADMIN — AMIODARONE HYDROCHLORIDE 200 MG: 200 TABLET ORAL at 07:52

## 2022-04-01 RX ADMIN — Medication 81 MG: at 07:53

## 2022-04-01 RX ADMIN — SODIUM CHLORIDE: 9 INJECTION, SOLUTION INTRAVENOUS at 01:20

## 2022-04-01 RX ADMIN — INSULIN LISPRO 4 UNITS: 100 INJECTION, SOLUTION INTRAVENOUS; SUBCUTANEOUS at 13:02

## 2022-04-01 RX ADMIN — SODIUM CHLORIDE, PRESERVATIVE FREE 10 ML: 5 INJECTION INTRAVENOUS at 07:54

## 2022-04-01 RX ADMIN — KETOROLAC TROMETHAMINE 15 MG: 30 INJECTION, SOLUTION INTRAMUSCULAR; INTRAVENOUS at 18:09

## 2022-04-01 RX ADMIN — MUPIROCIN: 20 OINTMENT TOPICAL at 21:12

## 2022-04-01 RX ADMIN — OXYCODONE HYDROCHLORIDE AND ACETAMINOPHEN 2 TABLET: 5; 325 TABLET ORAL at 03:50

## 2022-04-01 RX ADMIN — POTASSIUM CHLORIDE 20 MEQ: 20 TABLET, EXTENDED RELEASE ORAL at 07:52

## 2022-04-01 RX ADMIN — PANTOPRAZOLE SODIUM 40 MG: 40 TABLET, DELAYED RELEASE ORAL at 07:53

## 2022-04-01 RX ADMIN — SODIUM CHLORIDE TAB 1 GM 1 G: 1 TAB at 17:04

## 2022-04-01 RX ADMIN — FUROSEMIDE 20 MG: 10 INJECTION, SOLUTION INTRAMUSCULAR; INTRAVENOUS at 17:05

## 2022-04-01 RX ADMIN — IPRATROPIUM BROMIDE AND ALBUTEROL SULFATE 1 AMPULE: .5; 3 SOLUTION RESPIRATORY (INHALATION) at 21:06

## 2022-04-01 RX ADMIN — OXYCODONE HYDROCHLORIDE AND ACETAMINOPHEN 2 TABLET: 5; 325 TABLET ORAL at 07:51

## 2022-04-01 RX ADMIN — INSULIN GLARGINE 30 UNITS: 100 INJECTION, SOLUTION SUBCUTANEOUS at 07:54

## 2022-04-01 RX ADMIN — OXYCODONE HYDROCHLORIDE AND ACETAMINOPHEN 2 TABLET: 5; 325 TABLET ORAL at 21:06

## 2022-04-01 RX ADMIN — IPRATROPIUM BROMIDE AND ALBUTEROL SULFATE 1 AMPULE: .5; 3 SOLUTION RESPIRATORY (INHALATION) at 11:23

## 2022-04-01 RX ADMIN — IPRATROPIUM BROMIDE AND ALBUTEROL SULFATE 1 AMPULE: .5; 3 SOLUTION RESPIRATORY (INHALATION) at 16:37

## 2022-04-01 RX ADMIN — METOPROLOL TARTRATE 25 MG: 25 TABLET ORAL at 07:53

## 2022-04-01 RX ADMIN — AMIODARONE HYDROCHLORIDE 200 MG: 200 TABLET ORAL at 14:07

## 2022-04-01 RX ADMIN — IPRATROPIUM BROMIDE AND ALBUTEROL SULFATE 1 AMPULE: .5; 3 SOLUTION RESPIRATORY (INHALATION) at 08:04

## 2022-04-01 RX ADMIN — DESMOPRESSIN ACETATE 20 MG: 0.2 TABLET ORAL at 21:10

## 2022-04-01 RX ADMIN — INSULIN LISPRO 4 UNITS: 100 INJECTION, SOLUTION INTRAVENOUS; SUBCUTANEOUS at 21:25

## 2022-04-01 RX ADMIN — OXYCODONE HYDROCHLORIDE AND ACETAMINOPHEN 2 TABLET: 5; 325 TABLET ORAL at 14:07

## 2022-04-01 ASSESSMENT — PAIN DESCRIPTION - PAIN TYPE
TYPE: ACUTE PAIN;SURGICAL PAIN
TYPE: ACUTE PAIN;SURGICAL PAIN
TYPE: SURGICAL PAIN

## 2022-04-01 ASSESSMENT — PAIN SCALES - GENERAL
PAINLEVEL_OUTOF10: 9
PAINLEVEL_OUTOF10: 8
PAINLEVEL_OUTOF10: 0
PAINLEVEL_OUTOF10: 9
PAINLEVEL_OUTOF10: 4
PAINLEVEL_OUTOF10: 8
PAINLEVEL_OUTOF10: 8
PAINLEVEL_OUTOF10: 4
PAINLEVEL_OUTOF10: 8
PAINLEVEL_OUTOF10: 9
PAINLEVEL_OUTOF10: 3
PAINLEVEL_OUTOF10: 8

## 2022-04-01 ASSESSMENT — PAIN DESCRIPTION - FREQUENCY
FREQUENCY: CONTINUOUS
FREQUENCY: CONTINUOUS

## 2022-04-01 ASSESSMENT — PAIN DESCRIPTION - DESCRIPTORS
DESCRIPTORS: ACHING;SORE
DESCRIPTORS: DISCOMFORT;SORE

## 2022-04-01 ASSESSMENT — PAIN DESCRIPTION - PROGRESSION: CLINICAL_PROGRESSION: NOT CHANGED

## 2022-04-01 ASSESSMENT — PAIN DESCRIPTION - LOCATION
LOCATION: CHEST

## 2022-04-01 ASSESSMENT — PAIN DESCRIPTION - ORIENTATION
ORIENTATION: MID
ORIENTATION: MID

## 2022-04-01 ASSESSMENT — PAIN - FUNCTIONAL ASSESSMENT: PAIN_FUNCTIONAL_ASSESSMENT: ACTIVITIES ARE NOT PREVENTED

## 2022-04-01 ASSESSMENT — PAIN DESCRIPTION - ONSET: ONSET: ON-GOING

## 2022-04-01 NOTE — PROGRESS NOTES
Chest tubes removed per verbal order of Hung HERNANDEZ with CT surgery. Pacing wires clipped as well. Pt.  Tolerated well, no apparent distress, chest xray four hours post chest tube removal.

## 2022-04-01 NOTE — CARE COORDINATION
Placed call to [address] to f/u on following physician, and per Chiquita Boo, pt's PCP, Aster Cabrera will follow for [facility], pt needs to schedule an appointment as soon as possible. Awais Herman discussed need for PCP appointment ASAP with pt and per pt, she will schedule appointment.

## 2022-04-01 NOTE — PLAN OF CARE
Problem:  Activity:  Goal: Risk for activity intolerance will decrease  Description: Risk for activity intolerance will decrease  Outcome: Ongoing  Goal: Will verbalize the importance of balancing activity with adequate rest periods  Description: Will verbalize the importance of balancing activity with adequate rest periods  Outcome: Ongoing  Goal: Ability to tolerate increased activity will improve  Description: Ability to tolerate increased activity will improve  Outcome: Ongoing     Problem: Cardiac:  Goal: Ability to maintain an adequate cardiac output will improve  Description: Ability to maintain an adequate cardiac output will improve  Outcome: Ongoing  Goal: Hemodynamic stability will improve  Description: Hemodynamic stability will improve  Outcome: Ongoing  Goal: Diagnostic test results will improve  Description: Diagnostic test results will improve  Outcome: Ongoing  Goal: Complications related to the disease process, condition or treatment will be avoided or minimized  Description: Complications related to the disease process, condition or treatment will be avoided or minimized  Outcome: Ongoing  Goal: Cerebral tissue perfusion will improve  Description: Cerebral tissue perfusion will improve  Outcome: Ongoing     Problem: Coping:  Goal: Ability to verbalize feelings about condition will improve  Description: Ability to verbalize feelings about condition will improve  Outcome: Ongoing  Goal: Ability to identify strategies to decrease anxiety will improve  Description: Ability to identify strategies to decrease anxiety will improve  Outcome: Ongoing  Goal: Ability to identify and alter barriers to satisfying sexual function will improve  Description: Ability to identify and alter barriers to satisfying sexual function will improve  Outcome: Ongoing  Goal: Ability to identify and develop effective coping behavior will improve  Description: Ability to identify and develop effective coping behavior will improve  Outcome: Ongoing     Problem: Health Behavior:  Goal: Ability to identify changes in lifestyle to reduce recurrence of condition will improve  Description: Ability to identify changes in lifestyle to reduce recurrence of condition will improve  Outcome: Ongoing  Goal: Ability to manage health-related needs will improve  Description: Ability to manage health-related needs will improve  Outcome: Ongoing  Goal: Identification of resources available to assist in meeting health care needs will improve  Description: Identification of resources available to assist in meeting health care needs will improve  Outcome: Ongoing     Problem: Nutritional:  Goal: Ability to identify appropriate dietary choices will improve  Description: Ability to identify appropriate dietary choices will improve  Outcome: Ongoing     Problem: Respiratory:  Goal: Ability to maintain adequate ventilation will improve  Description: Ability to maintain adequate ventilation will improve  Outcome: Ongoing  Goal: Levels of oxygenation will improve  Description: Levels of oxygenation will improve  Outcome: Ongoing     Problem: Sensory:  Goal: Pain level will decrease  Description: Pain level will decrease  Outcome: Ongoing     Problem: Falls - Risk of:  Goal: Will remain free from falls  Description: Will remain free from falls  Outcome: Ongoing  Goal: Absence of physical injury  Description: Absence of physical injury  Outcome: Ongoing     Problem: Daily Care:  Goal: Daily care needs are met  Description: Daily care needs are met  Outcome: Ongoing     Problem: Pain:  Goal: Pain level will decrease  Description: Pain level will decrease  Outcome: Ongoing  Goal: Patient's pain/discomfort is manageable  Description: Patient's pain/discomfort is manageable  Outcome: Ongoing  Goal: Control of acute pain  Description: Control of acute pain  Outcome: Ongoing  Goal: Control of chronic pain  Description: Control of chronic pain  Outcome: Ongoing Problem: Discharge Planning:  Goal: Patients continuum of care needs are met  Description: Patients continuum of care needs are met  Outcome: Ongoing     Problem: OXYGENATION/RESPIRATORY FUNCTION  Goal: Patient will maintain patent airway  Outcome: Ongoing  Goal: Patient will achieve/maintain normal respiratory rate/effort  Description: Respiratory rate and effort will be within normal limits for the patient  Outcome: Ongoing     Problem: SKIN INTEGRITY  Goal: Skin integrity is maintained or improved  Outcome: Ongoing

## 2022-04-01 NOTE — PROGRESS NOTES
[facility]   Progress Note                    Date:   4/1/2022  Patient name:  Brandon Brumfield  Date of admission:  3/22/2022  5:31 AM  MRN:   [id number]  YOB: 1960  PCP:    LAKSHMI Fitch CNP    Reason for Admission:  NSTEMI (non-ST elevated myocardial infarction) [facility]) [I21.4]  Chest pain, unspecified type [R07.9]    Subjective:      Clinical Changes / Abnormalities:    Patient seen and examined at bedside. No acute events overnight. No chest pain or shortness of breath. Chest tube in place, management per CV surgery. Laying comfortably in bed this morning.     Urine output in the last 24 hours:     Intake/Output Summary (Last 24 hours) at 4/1/2022 0652  Last data filed at 4/1/2022 0600  Gross per 24 hour   Intake 513.63 ml   Output 3600 ml   Net -3086.37 ml     I/O since admission: +5.09 liters      Medications:   Scheduled Meds:   furosemide  20 mg IntraVENous BID    insulin glargine  30 Units SubCUTAneous Daily    sodium chloride  1 g Oral TID WC    insulin lispro  0-12 Units SubCUTAneous TID WC    insulin lispro  0-6 Units SubCUTAneous Nightly    sodium chloride flush  10 mL IntraVENous 2 times per day    aspirin  81 mg Oral Daily    clopidogrel  75 mg Oral Daily    amiodarone  200 mg Oral TID    mupirocin   Nasal BID    metoprolol tartrate  25 mg Oral BID    atorvastatin  20 mg Oral Nightly    pantoprazole  40 mg Oral Daily    pantoprazole (PROTONIX) 40 mg injection  40 mg IntraVENous Daily    ipratropium-albuterol  1 ampule Inhalation Q4H WA     Continuous Infusions:   sodium chloride 50 mL/hr at 04/01/22 0400    sodium chloride      propofol Stopped (03/29/22 1711)    dextrose      norepinephrine Stopped (03/29/22 1533)    nitroGLYCERIN Stopped (03/29/22 1853)     CBC:   Recent Labs     03/30/22  0509 03/31/22  0413 04/01/22  0325   WBC 23.3* 17.8* 15.7*   HGB 12.0 11.0* 10.6*    142 See Reflexed IPF Result     BMP:    Recent Labs 03/31/22  1112 03/31/22 2010 04/01/22  0325   * 123* 127*   K 5.2 4.4 4.1   CL 92* 92* 96*   CO2 23 21 22   BUN 14 16 16   CREATININE 0.68 0.75 0.70   GLUCOSE 265* 256* 162*     Hepatic: No results for input(s): AST, ALT, ALB, BILITOT, ALKPHOS in the last 72 hours. Troponin: No results for input(s): TROPONINI in the last 72 hours. No results for input(s): TROPONINT in the last 72 hours. BNP: No results for input(s): PROBNP in the last 72 hours. No results for input(s): BNP in the last 72 hours. Lipids: No results for input(s): CHOL, HDL in the last 72 hours. Invalid input(s): LDLCALCU  INR:   Recent Labs     03/30/22  0509 03/31/22  0413 04/01/22  0325   INR 1.2 1.2 1.2       Objective:   Vitals: BP (!) 113/53   Pulse 78   Temp 98.2 °F (36.8 °C) (Oral)   Resp 18   Ht 5' 5\" (1.651 m)   Wt 191 lb (86.6 kg)   SpO2 92%   BMI 31.78 kg/m²    Last Recorded Weight:  [unfilled]    Constitutional and General Appearance:    alert, cooperative, no distress and appears stated age  Respiratory:  · No for increased work of breathing. · On auscultation: diminished breath sounds bilaterally  · Chest tube in place  Cardiovascular:  · The apical impulse is not displaced  · Heart tones are crisp and normal. Regular S1 and S2. No murmurs.    Abdomen:   · No masses or tenderness  · Bowel sounds present  Extremities:  ·  No Cyanosis or Clubbing  ·  Lower extremity edema: No  ·  Skin: Warm and dry      Diagnostic Studies:     EKG 3/23/2022  Normal sinus rhythm  Minimal voltage criteria for LVH, may be normal variant     CARDIAC CATH 3/23/22  LMCA: Ostial 25% stenosis     LAD: Mid 100% stenosis, collateral from RCA     D1: Proximal 70% stenosis, and mid 90% stenosis     LCx: Mid 75% stenosis     OM1 and OM2 ostial 80% stenosis     RCA: Ostial 30% stenosis     PDA: Proximal 80% stenosis     PL branch 75% stenosis        Procedure Summary  Multivessel CAD  Preserved LV function  Recommendations  CV surgery consult for CABG     ECHO 3/23/2022  Summary  Left ventricle is normal in size, global left ventricular systolic function  is low normal, calculated ejection fraction is 52%. Tricuspid valve was not well visualized. Trivial tricuspid regurgitation. Insignificant tricuspid regurgitation, unable to estimate RVSP. Assessment / Acute Cardiac Problems:   1. NSTEMI found to have MV-CAD s/p CABG X 3, 3/29/22 w/ SLIMA to LAD, RSVG1 to OM, RSVG2 to RPDA   2. Preserved EF  3. HTN   4. DM  5. Post op anemia  6. Hyperkalemia  7. Hyponatremia. Na 127 this AM.    RECOMMENDATIONS:  1. Continue ASA & plavix  2. Continue statin  3. Continue amiodarone   4. Continue lasix IV 20 mg BID. Continue fluid restriction. 5. Continue Lopressor 25 mg BID  6. K>4, Mg>2  7. CT Surgery on board, post op management per CT Surgery. Tara Ward MD  Fellow, [id number] [facility]      I performed a history and physical examination of the patient and discussed management with the resident. I reviewed the residents note and agree with the documented findings and plan of care. Any areas of disagreement are noted on the chart. I was personally present for the key portions of any procedures. I have documented in the chart those procedures where I was not present during the key portions. I have personally evaluated this patient and have completed at least one if not all key elements of the E/M (history, physical exam, and MDM). Additional findings are as noted.     Ralph Jaffe MD MD

## 2022-04-01 NOTE — PROGRESS NOTES
[facility]  Internal Medicine Teaching Residency Program  Inpatient Daily Progress Note  ______________________________________________________________________________    Patient: Brandon Brumfield  YOB: 1960   RGQ:[id number]    Acct: [de-identified]     Room: [address] [place] [address]  Admit date: 3/22/2022  Today's date: 04/01/22  Number of days in the hospital: 10    SUBJECTIVE   Admitting Diagnosis: 3-vessel CAD  CC: Chest pain  - Post-op day 3 from three-vessel CABG  - Pt examined at bedside. Chart & results reviewed. - Cardiothoracic surgery now primary  - No acute events overnight.   - Patient is afebrile and hemodynamically stable. - Patient complains of left-sided chest pain  - Patient has been up and moving working with PT/OT  - Electrolyte abnormalities on morning labs. Hyponatremia improving 123 to 128. - Glucose well controlled    ROS:  Constitutional:  negative for chills, fevers, sweats  Respiratory:  negative for cough, dyspnea on exertion, hemoptysis, shortness of breath, wheezing  Cardiovascular:  negative for chest pain, chest pressure/discomfort, lower extremity edema, palpitations  Gastrointestinal:  negative for abdominal pain, constipation, diarrhea, nausea, vomiting  Neurological:  negative for dizziness, headache    BRIEF HISTORY     The patient is a pleasant 58 y. o. female with a no PMHx formally diagnosed per the pt, EMR shows history of chronic back pain, hypertension, osteoarthritis, COPD and pneumothorax.  Pt presented with a chief complaint of chest pain.  Patient has midsternal chest pain that woke her up from sleep around 4 in the morning.  Patient states the pain is radiating to her neck into her jaw bilaterally.  Patient states it is hard to determine if the pain is traveling down either arm especially the left arm she has had rotator cuff surgery in the past.  Patient did endorse some mild associated shortness of breath and diaphoresis early this morning.  Patient received aspirin and nitroglycerin by EMS prior to arrival which alleviated the chest pain.  Patient denies any significant cardiac history.  Patient's blood pressure on admission was 177/101.  While in the emergency department patient's systolic blood pressure got up to 196.  Patient received Lopressor 25 mg and was started on Cozaar 25 mg.  Patient's blood pressure  improved. OBJECTIVE     Vital Signs:  /64   Pulse 80   Temp 98.6 °F (37 °C) (Axillary)   Resp 18   Ht 5' 5\" (1.651 m)   Wt 191 lb (86.6 kg)   SpO2 94%   BMI 31.78 kg/m²     Temp (24hrs), Av.3 °F (36.8 °C), Min:98.1 °F (36.7 °C), Max:98.7 °F (37.1 °C)    In: 862.7   Out: 5400 [Urine:3800]    Physical Exam:  Physical Exam  Vitals and nursing note reviewed. Constitutional:       Appearance: Normal appearance. HENT:      Head: Normocephalic and atraumatic. Nose: Nose normal.      Mouth/Throat:      Mouth: Mucous membranes are moist.      Pharynx: Oropharynx is clear. Eyes:      Extraocular Movements: Extraocular movements intact. Conjunctiva/sclera: Conjunctivae normal.      Pupils: Pupils are equal, round, and reactive to light. Cardiovascular:      Rate and Rhythm: Normal rate and regular rhythm. Pulses: Normal pulses. Heart sounds: Normal heart sounds. No murmur heard. No friction rub. No gallop. Pulmonary:      Effort: Pulmonary effort is normal. No respiratory distress. Breath sounds: Normal breath sounds. No stridor. No wheezing, rhonchi or rales. Chest:      Chest wall: Tenderness present. Abdominal:      General: Abdomen is flat. Bowel sounds are normal. There is no distension. Palpations: Abdomen is soft. There is no mass. Tenderness: There is no abdominal tenderness. There is no guarding or rebound. Hernia: No hernia is present. Musculoskeletal:         General: No swelling, tenderness, deformity or signs of injury. Normal range of motion. Cervical back: Normal range of motion. Right lower leg: No edema. Left lower leg: No edema. Skin:     General: Skin is warm. Neurological:      General: No focal deficit present. Mental Status: She is alert and oriented to person, place, and time. Mental status is at baseline. Psychiatric:         Mood and Affect: Mood normal.         Behavior: Behavior normal.         Thought Content:  Thought content normal.         Judgment: Judgment normal.           Medications:  Scheduled Medications:    sodium chloride  1 g Oral BID WC    furosemide  20 mg IntraVENous BID    insulin glargine  30 Units SubCUTAneous Daily    insulin lispro  0-12 Units SubCUTAneous TID WC    insulin lispro  0-6 Units SubCUTAneous Nightly    sodium chloride flush  10 mL IntraVENous 2 times per day    aspirin  81 mg Oral Daily    clopidogrel  75 mg Oral Daily    amiodarone  200 mg Oral TID    mupirocin   Nasal BID    metoprolol tartrate  25 mg Oral BID    atorvastatin  20 mg Oral Nightly    pantoprazole  40 mg Oral Daily    pantoprazole (PROTONIX) 40 mg injection  40 mg IntraVENous Daily    ipratropium-albuterol  1 ampule Inhalation Q4H WA     Continuous Infusions:    sodium chloride      propofol Stopped (03/29/22 1711)    dextrose      norepinephrine Stopped (03/29/22 1533)    nitroGLYCERIN Stopped (03/29/22 1853)     PRN Medicationsketorolac, 15 mg, Q6H PRN  sodium chloride flush, 10 mL, PRN  sodium chloride, 25 mL, PRN  ondansetron, 4 mg, Q8H PRN   Or  ondansetron, 4 mg, Q6H PRN  oxyCODONE-acetaminophen, 1 tablet, Q4H PRN   Or  oxyCODONE-acetaminophen, 2 tablet, Q4H PRN  fentanNYL, 25 mcg, Q1H PRN   Or  fentanNYL, 50 mcg, Q1H PRN  hydrALAZINE, 5 mg, Q5 Min PRN  metoprolol, 2.5 mg, Q10 Min PRN  sodium bicarbonate, 50 mEq, Q30 Min PRN  diphenhydrAMINE, 25 mg, Nightly PRN  magnesium sulfate, 2,000 mg, PRN  albumin human, 25 g, PRN  glucose, 15 g, PRN  dextrose, 12.5 g, PRN  glucagon (rDNA), 1 mg, PRN  dextrose, 100 mL/hr, PRN  bisacodyl, 5 mg, Daily PRN  potassium chloride, 40 mEq, PRN   Or  potassium alternative oral replacement, 40 mEq, PRN   Or  potassium chloride, 10 mEq, PRN        Diagnostic Labs:  CBC:   Recent Labs     03/30/22  0509 03/31/22 0413 04/01/22  0325   WBC 23.3* 17.8* 15.7*   RBC 3.84* 3.55* 3.38*   HGB 12.0 11.0* 10.6*   HCT 34.6* 32.8* 30.2*   MCV 90.1 92.4 89.3   RDW 12.4 12.8 12.4    142 See Reflexed IPF Result     BMP:   Recent Labs     03/31/22 2010 04/01/22 0325 04/01/22  0928   * 127* 128*   K 4.4 4.1 4.5   CL 92* 96* 94*   CO2 21 22 21   BUN 16 16 13   CREATININE 0.75 0.70 0.61     BNP: No results for input(s): BNP in the last 72 hours. PT/INR:   Recent Labs     03/30/22  0509 03/31/22 0413 04/01/22  0325   PROTIME 12.4* 12.2 12.3   INR 1.2 1.2 1.2     APTT:   Recent Labs     03/29/22  1508   APTT 27.1     CARDIAC ENZYMES: No results for input(s): CKMB, CKMBINDEX, TROPONINI in the last 72 hours. Invalid input(s): CKTOTAL;3  FASTING LIPID PANEL:  Lab Results   Component Value Date    CHOL 208 (H) 03/22/2018    HDL 67 03/22/2018    TRIG 59 03/22/2018     LIVER PROFILE: No results for input(s): AST, ALT, ALB, BILIDIR, BILITOT, ALKPHOS in the last 72 hours. MICROBIOLOGY:   Lab Results   Component Value Date/Time    CULTURE WRONG TEST ORDERED 03/23/2022 03:22 PM       Imaging:    XR CHEST PORTABLE    Result Date: 3/30/2022  Interval extubation. Unchanged mild bibasilar atelectasis. XR CHEST PORTABLE    Result Date: 3/29/2022  1. Lines and tubes in expected position as above. 2. Interval sternotomy.  3. Linear opacities throughout both lungs, which could be due to atelectasis       ASSESSMENT & PLAN     Assessment and Plan:    Principal Problem:    3-vessel CAD  Active Problems:    Essential hypertension    NSTEMI (non-ST elevated myocardial infarction) ([facility]    Type 2 diabetes mellitus without complication, without long-term current use of insulin ([facility] Hypokalemia    Hypomagnesemia    Hypotension  Resolved Problems:    * No resolved hospital problems. *    Multivessel CAD  - POD 3 for CABG    - Advance diet per CT surgery      Essential hypertension - stable  - Cozaar 50 mg PO daily home med  - Lopressor 25 PO BID      Hypotension   - We will continue to monitor  - Ejection fraction 52% > fluids as needed  - Hold blood pressure medication for the time being     Type 2 diabetes mellitus without complication, without long-term current use of insulin   - Insulin infusion off  - Medium dose sliding scale   - Lantus 30 Units daily   - Glucose checks 4 times daily before meals and at bedtime  - Hemoglobin A1c 11.3  - Diabetic education       Hypokalemia  - Daily labs  - Replace electrolytes as needed  - Keep above 4     Hypomagnesemia  - Daily lab  - Replace electrolytes as needed   - Keep above 2     Hyponatremia  Resolved       DVT ppx: Heparin     PT/OT/SW: Consulted. Discharge Planning: In process, pending recovery. I have discussed the care of Pricila Mota , including pertinent history and exam findings,    today with the resident. I have seen and examined the patient and the key elements of all parts of the encounter have been performed by me . I agree with the assessment, plan and orders as documented by the resident. Principal Problem:    3-vessel CAD  Active Problems:    Essential hypertension    NSTEMI (non-ST elevated myocardial infarction) ([facility]    Type 2 diabetes mellitus without complication, without long-term current use of insulin ([facility])    Hypomagnesemia    Hypotension  Resolved Problems:    Hypokalemia        Overall  course ;                                   are improving over time.         Patient status post CABG  Hyponatremia improving            Electronically signed by Marguerite Muller MD

## 2022-04-01 NOTE — PROGRESS NOTES
[facility] Cardiothoracic Surgical   Progress Note    4/1/2022 8:22 AM  Surgeon:  Inocencia Luevano          POD# 3    S/P :  Coronary artery bypass    Subjective:  Ms. Delmis Machado well, and has had no acute complaints or problems    Vital Signs: BP (!) 128/90   Pulse 81   Temp 98.1 °F (36.7 °C) (Oral)   Resp 18   Ht 5' 5\" (1.651 m)   Wt 191 lb (86.6 kg)   SpO2 94%   BMI 31.78 kg/m²  O2 Flow Rate (L/min): 5 L/min   Admit Weight: Weight: 178 lb (80.7 kg)       Labs and Studies:  CBC: Recent Labs     03/30/22  0509 03/31/22 0413 04/01/22  0325   WBC 23.3* 17.8* 15.7*   HGB 12.0 11.0* 10.6*   HCT 34.6* 32.8* 30.2*   MCV 90.1 92.4 89.3    142 See Reflexed IPF Result     BMP:   Recent Labs     03/31/22  1112 03/31/22 2010 04/01/22  0325   * 123* 127*   K 5.2 4.4 4.1   CL 92* 92* 96*   CO2 23 21 22   BUN 14 16 16   CREATININE 0.68 0.75 0.70     PT/INR:   Recent Labs     03/30/22  0509 03/31/22 0413 04/01/22  0325   PROTIME 12.4* 12.2 12.3   INR 1.2 1.2 1.2     APTT:   Recent Labs     03/29/22  1508   APTT 27.1       I/O:  I/O last 3 completed shifts: In: 513.6 [P.O.:200; I.V.:313.6]  Out: 7357 [Urine:2000;  Chest Tube:1720]      Scheduled Meds:    furosemide  20 mg IntraVENous BID    insulin glargine  30 Units SubCUTAneous Daily    sodium chloride  1 g Oral TID WC    insulin lispro  0-12 Units SubCUTAneous TID WC    insulin lispro  0-6 Units SubCUTAneous Nightly    sodium chloride flush  10 mL IntraVENous 2 times per day    aspirin  81 mg Oral Daily    clopidogrel  75 mg Oral Daily    amiodarone  200 mg Oral TID    mupirocin   Nasal BID    metoprolol tartrate  25 mg Oral BID    atorvastatin  20 mg Oral Nightly    pantoprazole  40 mg Oral Daily    pantoprazole (PROTONIX) 40 mg injection  40 mg IntraVENous Daily    ipratropium-albuterol  1 ampule Inhalation Q4H WA     Continuous Infusions:    sodium chloride 50 mL/hr at 04/01/22 0400    sodium chloride      propofol Stopped (03/29/22 1711)    dextrose      norepinephrine Stopped (03/29/22 3893)    nitroGLYCERIN Stopped (03/29/22 2324)       Assessment/Plan:   POD # 3 S/P CABG  NA+ improving  ABLA  Tubes are out  D/C soon       DAVID Stubbs

## 2022-04-01 NOTE — PROGRESS NOTES
Physical Therapy  Facility/Department: [facility]  Daily Treatment Note  NAME: Candida Huang  : 1960  MRN: [id number]    Date of Service: 2022    Discharge Recommendations:  Patient would benefit from continued therapy after discharge   PT Equipment Recommendations  Equipment Needed: No    Assessment   Body structures, Functions, Activity limitations: Decreased functional mobility ; Decreased endurance  Assessment: Pt ambulates 12x2 and 25ft x2 with CGA/min A and RW.2 long seated rest break d/t decreased balance and endurance. Pt would benefit from continued therapy to promote endurance, balance, and strengthening. Prognosis: Good  PT Education: Goals;Plan of Care;PT Role;Transfer Training;General Safety;Precautions; Functional Mobility Training;Energy Conservation  REQUIRES PT FOLLOW UP: Yes  Activity Tolerance  Activity Tolerance: Patient limited by endurance; Patient limited by fatigue;Patient limited by pain     Patient Diagnosis(es): The primary encounter diagnosis was Chest pain, unspecified type. A diagnosis of NSTEMI (non-ST elevated myocardial infarction) [facility]) was also pertinent to this visit. has a past medical history of Chronic back pain, Hypertension, Osteoarthritis, and Pneumothorax.   has a past surgical history that includes back surgery; Carpal tunnel release (Right); Appendectomy; Tubal ligation; Cardiac catheterization (2022); and Coronary artery bypass graft (N/A, 3/29/2022).     Restrictions  Restrictions/Precautions  Restrictions/Precautions: Cardiac,Surgical Protocols,Fall Risk,Up as Tolerated  Required Braces or Orthoses?: Yes  Required Braces or Orthoses  Other: Heart Hugger Brace  Position Activity Restriction  Sternal Precautions: 5# Lifting Restrictions,No Pulling,No Pushing  Sternal Precautions: CABG X3 3/29/22  Other position/activity restrictions: Recent left rotator cuff repair, 21, CT to wall suction  Subjective   General  Response To Previous Treatment: Patient with no complaints from previous session. ;Patient reporting fatigue but able to participate. Family / Caregiver Present: No  Subjective  Subjective: RN and pt agreeable to PT. pt agreeable and pleasant. Pain Screening  Patient Currently in Pain: Yes  Pain Assessment  Pain Level: 8  Pain Type: Acute pain;Surgical pain  Pain Location: Chest  Pain Orientation: Mid  Pain Descriptors: Aching; Sore  Pain Frequency: Continuous  Non-Pharmaceutical Pain Intervention(s): Relaxation techniques; Ambulation/Increased Activity  Response to Pain Intervention: Patient Satisfied  Vital Signs  Patient Currently in Pain: Yes       Orientation  Orientation  Overall Orientation Status: Within Normal Limits  Cognition      Objective   Bed mobility  Comment: Pt up in chair upon arrival and exit. Transfers  Sit to Stand: Contact guard assistance;Stand by assistance  Stand to sit: Stand by assistance  Bed to Chair: Contact guard assistance (Toilet seat.)  Comment: STS x4 performd form Recliner , EOB and Toilet seat. Cues for use of heart hugger with good return,  Ambulation  Ambulation?: Yes  More Ambulation?: No  Ambulation 1  Surface: level tile  Device: Rolling Walker  Other Apparatus: O2 (6L)  Assistance: Contact guard assistance  Quality of Gait: Extremely slow jennifer. Gait Deviations: Slow Jennifer;Decreased step length;Decreased step height  Distance: 12ft x2, and 25ft x2  Comments: 2 seated rest break. Increase pain with Mobility .   Stairs/Curb  Stairs?: No     Balance  Posture: Fair  Sitting - Static: Good  Sitting - Dynamic: Good  Standing - Static: Fair  Standing - Dynamic: Fair  Comments: Standing balance assessed with RW  Exercises  Comments: Pt defere d/t pain         AM-PAC Score  AM-PAC Inpatient Mobility Raw Score : 16 (04/01/22 1347)  AM-PAC Inpatient T-Scale Score : 40.78 (04/01/22 1347)  Mobility Inpatient CMS 0-100% Score: 54.16 (04/01/22 1347)  Mobility Inpatient CMS G-Code Modifier : CK (04/01/22 1347) Goals  Short term goals  Time Frame for Short term goals: 14 visits  Short term goal 1: Complete transfers with mod I and appropriate device  Short term goal 2: Complete 300 ft of gait with mod I and appropriate device  Short term goal 3: Complete 2 steps with no HR and mod I  Short term goal 4: Participate in 30 minutes of therapy to promote endurance  Short term goal 5: Be independent with cardiac HEP  Patient Goals   Patient goals : To go home    Plan    Plan  Times per week: 7x per week, 1-2x per day  Times per day: Daily  Current Treatment Recommendations: Jayla Cifuentes Re-education,Patient/Caregiver Education & Training,ADL/Self-care Training,Equipment Evaluation, Education, & procurement,Balance Training,IADL Training,Gait Training,Home Exercise Program,Functional Mobility Training,Stair training,Safety Education & Training  Safety Devices  Type of devices:  All fall risk precautions in place,Gait belt,Call light within reach,Left in chair,Nurse notified  Restraints  Initially in place: No     Therapy Time   Individual Concurrent Group Co-treatment   Time In 1330         Time Out 1408         Minutes 38         Timed Code Treatment Minutes: Murphy Mcghee PTA

## 2022-04-01 NOTE — PROGRESS NOTES
Occupational Therapy  Facility/Department: [facility] CAR 1  Daily Treatment Note  NAME: Naya Haro  : 1960  MRN: [id number]    Date of Service: 2022    Discharge Recommendations:Pt. Would benefit from further skilled OT services to enhance functional outcomes. Patient would benefit from continued therapy after discharge  OT Equipment Recommendations  Equipment Needed: Yes  ADL Assistive Devices: Transfer Tub Bench;Reacher;Sock-Aid Hard;Long-handled Shoe Horn;Grab Bars - toilet;Grab Bars - tub    Assessment   Performance deficits / Impairments: Decreased functional mobility ; Decreased endurance;Decreased ADL status; Decreased high-level IADLs;Decreased coordination;Decreased balance;Decreased safe awareness;Decreased cognition  Treatment Diagnosis: CABG X3 3/29/22  Prognosis: Good  Decision Making: Medium Complexity  OT Education: OT Role;Plan of Care;Energy Conservation;Equipment; Family Education;Precautions; ADL Adaptive Strategies;Transfer Training  Barriers to Learning: Good return by Pt. REQUIRES OT FOLLOW UP: Yes  Activity Tolerance  Activity Tolerance: Patient Tolerated treatment well;Patient limited by fatigue;Patient limited by pain  Activity Tolerance: Increased time d/t pain and fatigue  Safety Devices  Type of devices: All fall risk precautions in place;Call light within reach; Patient at risk for falls; Left in chair;Nurse notified; Chair alarm in place  Restraints  Initially in place: No         Patient Diagnosis(es): The primary encounter diagnosis was Chest pain, unspecified type. A diagnosis of NSTEMI (non-ST elevated myocardial infarction) [facility]) was also pertinent to this visit. has a past medical history of Chronic back pain, Hypertension, Osteoarthritis, and Pneumothorax.   has a past surgical history that includes back surgery; Carpal tunnel release (Right); Appendectomy;  Tubal ligation; Cardiac catheterization (2022); and Coronary artery bypass graft (N/A, 3/29/2022). Restrictions  Restrictions/Precautions  Restrictions/Precautions: Cardiac,Surgical Protocols,Fall Risk,Up as Tolerated  Required Braces or Orthoses?: Yes  Required Braces or Orthoses  Other: Heart Hugger Brace  Position Activity Restriction  Sternal Precautions: 5# Lifting Restrictions,No Pulling,No Pushing  Sternal Precautions: CABG X3 3/29/22  Other position/activity restrictions: Recent left rotator cuff repair, 12/1/21, CT to wall suction  Subjective   General  Patient assessed for rehabilitation services?: Yes  Family / Caregiver Present: No  Diagnosis: NSTEMI. S/P CABG x3 (3/29/22). Subjective  Subjective: RN approved Pt to be seen for OT tx, Pt was agreeable and cooperative throughout therapy session. Pain Assessment  Pain Level: 9  Pain Type: Acute pain;Surgical pain  Pain Location: Chest  Non-Pharmaceutical Pain Intervention(s): Emotional support;Distraction; Ambulation/Increased Activity;Repositioned  Vital Signs  Patient Currently in Pain: Yes   Orientation  Orientation  Overall Orientation Status: Within Functional Limits  Objective    ADL  Grooming: Stand by assistance;Setup;Verbal cueing; Increased time to complete;Modified independent  (Standing at sink, SBA to ensure stability, MI with action, Use of RW or counter top as needed, VC to pace self.)  Toileting: Contact guard assistance;Setup; Increased time to complete (Toilet transfer- CGA, frontal hyg SBA (sitting),)  Additional Comments: PtGretta KHAN carryover of sternal precautions. Balance  Sitting Balance: Stand by assistance (SBA-S unsupported, static/dynamic)  Standing Balance: Contact guard assistance (CGA-SBA)  Standing Balance  Time: ~15 minutes with 1 sitting rest break  Activity: static/dynamic standing  Comment: CGA-SBA w/RW w/no LOB  Functional Mobility  Functional - Mobility Device: Rolling Walker  Activity: To/from bathroom; Other  Assist Level: Contact guard assistance  Toilet Transfers  Toilet - Technique: Ambulating  Equipment Used: Standard toilet  Toilet Transfer: Contact guard assistance;Stand by assistance     Transfers  Sit to stand: Contact guard assistance  Stand to sit: Contact guard assistance  Transfer Comments: w/no LOB                       Cognition  Overall Cognitive Status: [facility]                                         Plan   Plan  Times per week: 4-6x/week (CABG)  Times per day: Daily  Current Treatment Recommendations: Endurance Training,Self-Care / ADL,Safety Education & Training,Pain Management,Cognitive/Perceptual Training,Functional Mobility Training,Balance Training,Equipment Evaluation, Education, & procurement,Patient/Caregiver Education & Training,Home Management Training                                               AM-PAC Score        AM-PAC Inpatient Daily Activity Raw Score: 19 (04/01/22 1533)  AM-PAC Inpatient ADL T-Scale Score : 40.22 (04/01/22 1533)  ADL Inpatient CMS 0-100% Score: 42.8 (04/01/22 1533)  ADL Inpatient CMS G-Code Modifier : CK (04/01/22 1533)    Goals  Short term goals  Time Frame for Short term goals: By Discharge  Short term goal 1: Pt will verbalize / demo Mod I and Good Carryover with Sternal Precautions during all Functional ADL tasks. Short term goal 2: Pt will verbalize / demo Mod I and Good Carryover with EC and Ax Pacing during all Functional Tasks and Mobility. Short term goal 3: Pt will verbalize / demo Mod I with Correct Use of AE / DME during all ADL / Functional Transfers. Short term goal 4: Pt will maintain Fair+ Dynamic Standing Balance (10-12 minutes) while participating in Functional / Leisure tasks. Short term goal 5: Pt will participate in Item Retrieval / Transport / Functional Mobility with Mod I with Correct Use of AE / DME.        Therapy Time   Individual Concurrent Group Co-treatment   Time In 4318         Time Out 1453         Minutes 31         Timed Code Treatment Minutes: [address] [facility], [place]/L

## 2022-04-02 ENCOUNTER — APPOINTMENT (OUTPATIENT)
Dept: GENERAL RADIOLOGY | Age: 62
DRG: 234 | End: 2022-04-02
Payer: COMMERCIAL

## 2022-04-02 LAB
ANION GAP SERPL CALCULATED.3IONS-SCNC: 11 MMOL/L (ref 9–17)
ANION GAP SERPL CALCULATED.3IONS-SCNC: 14 MMOL/L (ref 9–17)
BUN BLDV-MCNC: 11 MG/DL (ref 8–23)
BUN BLDV-MCNC: 14 MG/DL (ref 8–23)
CALCIUM SERPL-MCNC: 8.6 MG/DL (ref 8.6–10.4)
CALCIUM SERPL-MCNC: 8.7 MG/DL (ref 8.6–10.4)
CHLORIDE BLD-SCNC: 90 MMOL/L (ref 98–107)
CHLORIDE BLD-SCNC: 93 MMOL/L (ref 98–107)
CO2: 23 MMOL/L (ref 20–31)
CO2: 23 MMOL/L (ref 20–31)
CREAT SERPL-MCNC: 0.7 MG/DL (ref 0.5–0.9)
CREAT SERPL-MCNC: 0.7 MG/DL (ref 0.5–0.9)
GFR AFRICAN AMERICAN: >60 ML/MIN
GFR AFRICAN AMERICAN: >60 ML/MIN
GFR NON-AFRICAN AMERICAN: >60 ML/MIN
GFR NON-AFRICAN AMERICAN: >60 ML/MIN
GFR SERPL CREATININE-BSD FRML MDRD: ABNORMAL ML/MIN/{1.73_M2}
GFR SERPL CREATININE-BSD FRML MDRD: ABNORMAL ML/MIN/{1.73_M2}
GLUCOSE BLD-MCNC: 176 MG/DL (ref 70–99)
GLUCOSE BLD-MCNC: 186 MG/DL (ref 65–105)
GLUCOSE BLD-MCNC: 194 MG/DL (ref 65–105)
GLUCOSE BLD-MCNC: 242 MG/DL (ref 65–105)
GLUCOSE BLD-MCNC: 291 MG/DL (ref 70–99)
HCT VFR BLD CALC: 31.1 % (ref 36.3–47.1)
HEMOGLOBIN: 10.5 G/DL (ref 11.9–15.1)
INR BLD: 1.1
MAGNESIUM: 1.7 MG/DL (ref 1.6–2.6)
MCH RBC QN AUTO: 30.8 PG (ref 25.2–33.5)
MCHC RBC AUTO-ENTMCNC: 33.8 G/DL (ref 28.4–34.8)
MCV RBC AUTO: 91.2 FL (ref 82.6–102.9)
NRBC AUTOMATED: 0 PER 100 WBC
PDW BLD-RTO: 12.6 % (ref 11.8–14.4)
PLATELET # BLD: 149 K/UL (ref 138–453)
PMV BLD AUTO: 11.3 FL (ref 8.1–13.5)
POTASSIUM SERPL-SCNC: 4.2 MMOL/L (ref 3.7–5.3)
POTASSIUM SERPL-SCNC: 4.3 MMOL/L (ref 3.7–5.3)
PROTHROMBIN TIME: 12.1 SEC (ref 9.1–12.3)
RBC # BLD: 3.41 M/UL (ref 3.95–5.11)
SODIUM BLD-SCNC: 127 MMOL/L (ref 135–144)
SODIUM BLD-SCNC: 127 MMOL/L (ref 135–144)
WBC # BLD: 13.8 K/UL (ref 3.5–11.3)

## 2022-04-02 PROCEDURE — 2580000003 HC RX 258: Performed by: NURSE PRACTITIONER

## 2022-04-02 PROCEDURE — 94640 AIRWAY INHALATION TREATMENT: CPT

## 2022-04-02 PROCEDURE — 6370000000 HC RX 637 (ALT 250 FOR IP)

## 2022-04-02 PROCEDURE — 6370000000 HC RX 637 (ALT 250 FOR IP): Performed by: THORACIC SURGERY (CARDIOTHORACIC VASCULAR SURGERY)

## 2022-04-02 PROCEDURE — 6370000000 HC RX 637 (ALT 250 FOR IP): Performed by: NURSE PRACTITIONER

## 2022-04-02 PROCEDURE — 6360000002 HC RX W HCPCS: Performed by: PHYSICIAN ASSISTANT

## 2022-04-02 PROCEDURE — 97110 THERAPEUTIC EXERCISES: CPT

## 2022-04-02 PROCEDURE — 2700000000 HC OXYGEN THERAPY PER DAY

## 2022-04-02 PROCEDURE — 82947 ASSAY GLUCOSE BLOOD QUANT: CPT

## 2022-04-02 PROCEDURE — 99232 SBSQ HOSP IP/OBS MODERATE 35: CPT | Performed by: INTERNAL MEDICINE

## 2022-04-02 PROCEDURE — 83036 HEMOGLOBIN GLYCOSYLATED A1C: CPT

## 2022-04-02 PROCEDURE — 6370000000 HC RX 637 (ALT 250 FOR IP): Performed by: INTERNAL MEDICINE

## 2022-04-02 PROCEDURE — 6360000002 HC RX W HCPCS: Performed by: NURSE PRACTITIONER

## 2022-04-02 PROCEDURE — 97530 THERAPEUTIC ACTIVITIES: CPT

## 2022-04-02 PROCEDURE — 85027 COMPLETE CBC AUTOMATED: CPT

## 2022-04-02 PROCEDURE — 36415 COLL VENOUS BLD VENIPUNCTURE: CPT

## 2022-04-02 PROCEDURE — 2140000001 HC CVICU INTERMEDIATE R&B

## 2022-04-02 PROCEDURE — 71045 X-RAY EXAM CHEST 1 VIEW: CPT

## 2022-04-02 PROCEDURE — 80048 BASIC METABOLIC PNL TOTAL CA: CPT

## 2022-04-02 PROCEDURE — 97535 SELF CARE MNGMENT TRAINING: CPT

## 2022-04-02 PROCEDURE — 83735 ASSAY OF MAGNESIUM: CPT

## 2022-04-02 PROCEDURE — 94761 N-INVAS EAR/PLS OXIMETRY MLT: CPT

## 2022-04-02 PROCEDURE — 85610 PROTHROMBIN TIME: CPT

## 2022-04-02 PROCEDURE — 97116 GAIT TRAINING THERAPY: CPT

## 2022-04-02 PROCEDURE — 99024 POSTOP FOLLOW-UP VISIT: CPT | Performed by: PHYSICIAN ASSISTANT

## 2022-04-02 RX ORDER — INSULIN GLARGINE 100 [IU]/ML
35 INJECTION, SOLUTION SUBCUTANEOUS DAILY
Status: DISCONTINUED | OUTPATIENT
Start: 2022-04-03 | End: 2022-04-06 | Stop reason: HOSPADM

## 2022-04-02 RX ORDER — DOCUSATE SODIUM 100 MG/1
100 CAPSULE, LIQUID FILLED ORAL 2 TIMES DAILY
Status: DISCONTINUED | OUTPATIENT
Start: 2022-04-02 | End: 2022-04-06 | Stop reason: HOSPADM

## 2022-04-02 RX ADMIN — PANTOPRAZOLE SODIUM 40 MG: 40 TABLET, DELAYED RELEASE ORAL at 08:50

## 2022-04-02 RX ADMIN — DOCUSATE SODIUM 100 MG: 100 CAPSULE ORAL at 20:08

## 2022-04-02 RX ADMIN — KETOROLAC TROMETHAMINE 15 MG: 30 INJECTION, SOLUTION INTRAMUSCULAR; INTRAVENOUS at 21:24

## 2022-04-02 RX ADMIN — CLOPIDOGREL 75 MG: 75 TABLET, FILM COATED ORAL at 08:48

## 2022-04-02 RX ADMIN — IPRATROPIUM BROMIDE AND ALBUTEROL SULFATE 1 AMPULE: .5; 3 SOLUTION RESPIRATORY (INHALATION) at 11:52

## 2022-04-02 RX ADMIN — OXYCODONE HYDROCHLORIDE AND ACETAMINOPHEN 2 TABLET: 5; 325 TABLET ORAL at 09:00

## 2022-04-02 RX ADMIN — OXYCODONE HYDROCHLORIDE AND ACETAMINOPHEN 2 TABLET: 5; 325 TABLET ORAL at 01:06

## 2022-04-02 RX ADMIN — DOCUSATE SODIUM 100 MG: 100 CAPSULE ORAL at 11:41

## 2022-04-02 RX ADMIN — OXYCODONE HYDROCHLORIDE AND ACETAMINOPHEN 2 TABLET: 5; 325 TABLET ORAL at 05:10

## 2022-04-02 RX ADMIN — FUROSEMIDE 20 MG: 10 INJECTION, SOLUTION INTRAMUSCULAR; INTRAVENOUS at 17:55

## 2022-04-02 RX ADMIN — AMIODARONE HYDROCHLORIDE 200 MG: 200 TABLET ORAL at 20:08

## 2022-04-02 RX ADMIN — OXYCODONE HYDROCHLORIDE AND ACETAMINOPHEN 2 TABLET: 5; 325 TABLET ORAL at 15:54

## 2022-04-02 RX ADMIN — MAGNESIUM SULFATE 2000 MG: 2 INJECTION INTRAVENOUS at 05:48

## 2022-04-02 RX ADMIN — INSULIN LISPRO 4 UNITS: 100 INJECTION, SOLUTION INTRAVENOUS; SUBCUTANEOUS at 21:21

## 2022-04-02 RX ADMIN — AMIODARONE HYDROCHLORIDE 200 MG: 200 TABLET ORAL at 08:48

## 2022-04-02 RX ADMIN — SODIUM CHLORIDE, PRESERVATIVE FREE 10 ML: 5 INJECTION INTRAVENOUS at 20:08

## 2022-04-02 RX ADMIN — KETOROLAC TROMETHAMINE 15 MG: 30 INJECTION, SOLUTION INTRAMUSCULAR; INTRAVENOUS at 11:55

## 2022-04-02 RX ADMIN — AMIODARONE HYDROCHLORIDE 200 MG: 200 TABLET ORAL at 14:27

## 2022-04-02 RX ADMIN — FUROSEMIDE 20 MG: 10 INJECTION, SOLUTION INTRAMUSCULAR; INTRAVENOUS at 08:49

## 2022-04-02 RX ADMIN — BISACODYL 5 MG: 5 TABLET, COATED ORAL at 09:00

## 2022-04-02 RX ADMIN — SODIUM CHLORIDE TAB 1 GM 1 G: 1 TAB at 08:49

## 2022-04-02 RX ADMIN — MUPIROCIN: 20 OINTMENT TOPICAL at 20:08

## 2022-04-02 RX ADMIN — SODIUM CHLORIDE TAB 1 GM 1 G: 1 TAB at 15:49

## 2022-04-02 RX ADMIN — DESMOPRESSIN ACETATE 20 MG: 0.2 TABLET ORAL at 20:08

## 2022-04-02 RX ADMIN — INSULIN LISPRO 4 UNITS: 100 INJECTION, SOLUTION INTRAVENOUS; SUBCUTANEOUS at 11:46

## 2022-04-02 RX ADMIN — METOPROLOL TARTRATE 25 MG: 25 TABLET ORAL at 20:08

## 2022-04-02 RX ADMIN — POTASSIUM CHLORIDE 20 MEQ: 20 TABLET, EXTENDED RELEASE ORAL at 05:20

## 2022-04-02 RX ADMIN — OXYCODONE HYDROCHLORIDE AND ACETAMINOPHEN 2 TABLET: 5; 325 TABLET ORAL at 20:07

## 2022-04-02 RX ADMIN — Medication 81 MG: at 08:48

## 2022-04-02 RX ADMIN — METOPROLOL TARTRATE 25 MG: 25 TABLET ORAL at 08:49

## 2022-04-02 RX ADMIN — IPRATROPIUM BROMIDE AND ALBUTEROL SULFATE 1 AMPULE: .5; 3 SOLUTION RESPIRATORY (INHALATION) at 07:53

## 2022-04-02 RX ADMIN — MUPIROCIN: 20 OINTMENT TOPICAL at 11:54

## 2022-04-02 RX ADMIN — INSULIN LISPRO 2 UNITS: 100 INJECTION, SOLUTION INTRAVENOUS; SUBCUTANEOUS at 08:35

## 2022-04-02 RX ADMIN — INSULIN GLARGINE 30 UNITS: 100 INJECTION, SOLUTION SUBCUTANEOUS at 08:40

## 2022-04-02 RX ADMIN — KETOROLAC TROMETHAMINE 15 MG: 30 INJECTION, SOLUTION INTRAMUSCULAR; INTRAVENOUS at 00:13

## 2022-04-02 RX ADMIN — IPRATROPIUM BROMIDE AND ALBUTEROL SULFATE 1 AMPULE: .5; 3 SOLUTION RESPIRATORY (INHALATION) at 21:00

## 2022-04-02 RX ADMIN — INSULIN LISPRO 2 UNITS: 100 INJECTION, SOLUTION INTRAVENOUS; SUBCUTANEOUS at 17:56

## 2022-04-02 RX ADMIN — IPRATROPIUM BROMIDE AND ALBUTEROL SULFATE 1 AMPULE: .5; 3 SOLUTION RESPIRATORY (INHALATION) at 16:16

## 2022-04-02 ASSESSMENT — PAIN SCALES - GENERAL
PAINLEVEL_OUTOF10: 6
PAINLEVEL_OUTOF10: 8
PAINLEVEL_OUTOF10: 7
PAINLEVEL_OUTOF10: 5
PAINLEVEL_OUTOF10: 8
PAINLEVEL_OUTOF10: 5
PAINLEVEL_OUTOF10: 7
PAINLEVEL_OUTOF10: 8
PAINLEVEL_OUTOF10: 8
PAINLEVEL_OUTOF10: 5
PAINLEVEL_OUTOF10: 8

## 2022-04-02 ASSESSMENT — PAIN SCALES - WONG BAKER
WONGBAKER_NUMERICALRESPONSE: 0
WONGBAKER_NUMERICALRESPONSE: 0

## 2022-04-02 ASSESSMENT — PAIN DESCRIPTION - PAIN TYPE
TYPE: SURGICAL PAIN
TYPE: SURGICAL PAIN

## 2022-04-02 ASSESSMENT — PAIN DESCRIPTION - LOCATION: LOCATION: CHEST

## 2022-04-02 ASSESSMENT — PAIN DESCRIPTION - FREQUENCY: FREQUENCY: CONTINUOUS

## 2022-04-02 ASSESSMENT — PAIN DESCRIPTION - ONSET: ONSET: ON-GOING

## 2022-04-02 ASSESSMENT — PAIN DESCRIPTION - ORIENTATION: ORIENTATION: LEFT

## 2022-04-02 ASSESSMENT — PAIN DESCRIPTION - DESCRIPTORS: DESCRIPTORS: DISCOMFORT;SORE

## 2022-04-02 NOTE — PROGRESS NOTES
Occupational Therapy  Facility/Department: [facility] CAR 1  Daily Treatment Note  NAME: Efren Hunter  : 1960  MRN: [id number]    Date of Service: 2022    Discharge Recommendations: Pt. Would benefit from further skilled OT services to enhance functional mobility. Patient would benefit from continued therapy after discharge  OT Equipment Recommendations  Equipment Needed: Yes  ADL Assistive Devices: Transfer Tub Bench;Reacher;Sock-Aid Hard;Long-handled Shoe Horn;Grab Bars - toilet;Grab Bars - tub    Assessment   Performance deficits / Impairments: Decreased functional mobility ; Decreased endurance;Decreased ADL status; Decreased high-level IADLs;Decreased coordination;Decreased balance;Decreased safe awareness;Decreased cognition  Treatment Diagnosis: CABG X3 3/29/22  Prognosis: Good  Decision Making: Medium Complexity  OT Education: OT Role;Plan of Care;Energy Conservation;Equipment; Family Education;Precautions; ADL Adaptive Strategies;Transfer Training  Patient Education: G carryover  REQUIRES OT FOLLOW UP: Yes  Activity Tolerance  Activity Tolerance: Patient Tolerated treatment well;Patient limited by pain; Patient limited by fatigue  Activity Tolerance: Increased time d/t pain and fatigue  Safety Devices  Safety Devices in place: Yes  Type of devices: All fall risk precautions in place;Call light within reach; Patient at risk for falls; Left in chair;Nurse notified; Chair alarm in place  Restraints  Initially in place: No         Patient Diagnosis(es): The primary encounter diagnosis was Chest pain, unspecified type. A diagnosis of NSTEMI (non-ST elevated myocardial infarction) [facility]) was also pertinent to this visit. has a past medical history of Chronic back pain, Hypertension, Osteoarthritis, and Pneumothorax.   has a past surgical history that includes back surgery; Carpal tunnel release (Right); Appendectomy;  Tubal ligation; Cardiac catheterization (2022); and Coronary artery bypass graft (N/A, 3/29/2022). Restrictions  Restrictions/Precautions  Restrictions/Precautions: Cardiac,Surgical Protocols,Fall Risk,Up as Tolerated  Required Braces or Orthoses?: Yes  Required Braces or Orthoses  Other: Heart Hugger Brace  Position Activity Restriction  Sternal Precautions: 5# Lifting Restrictions,No Pulling,No Pushing  Sternal Precautions: CABG X3 3/29/22  Other position/activity restrictions: Recent left rotator cuff repair, 12/1/21,  Subjective   General  Patient assessed for rehabilitation services?: Yes  Family / Caregiver Present: No  Diagnosis: NSTEMI. S/P CABG x3 (3/29/22). Subjective  Subjective: RN approved Pt to be seen for OT tx, Pt was agreeable and cooperative throughout therapy session. Pain Assessment  Pain Level: 6  Pain Type: Surgical pain  Non-Pharmaceutical Pain Intervention(s): Ambulation/Increased Activity; Distraction; Emotional support;Repositioned  Vital Signs  Patient Currently in Pain: Yes   Orientation  Orientation  Overall Orientation Status: Within Functional Limits  Objective    ADL  Grooming: Stand by assistance;Setup;Verbal cueing; Increased time to complete;Modified independent  (Standing at sink- SBA to ensure zero LOB, MI to complete action (brush teeth/wash face/comb hair).)  UE Bathing: Minimal assistance;Setup;Verbal cueing; Increased time to complete (Min A with back, SBA with front, sx soap used, set up, standing at sink, min SOB.)  LE Bathing: Setup;Verbal cueing; Increased time to complete;Stand by assistance (Bottom/nallely area/tops of legs, SBA (standing at sink), pt. educated on 4 figure tech for below B knees, ptGretta KHAN carryover.)  UE Dressing: Setup;Verbal cueing; Increased time to complete; Moderate assistance;Minimal assistance (min-mod A to doff/don heart hugger/support bra/gown, d/t pain and decreased B UE ROM.)  Toileting: Contact guard assistance;Setup; Increased time to complete (CGA-toilet transfer, (frontal hyg, sitting) S.)  Additional Comments: James KHAN carryover of sternal precautions. Slow moving, increased time and effort. Balance  Sitting Balance: Supervision (S sitting on toilet/recliner chair to ensure no LOB, static/dynamic.)  Standing Balance: Contact guard assistance (CGA-SBA)  Standing Balance  Time: ~40 minutes, 3 sitting rest breaks. Activity: static/dynamic standing  Comment: CGA-SBA w/RW w/no LOB  Functional Mobility  Functional - Mobility Device: Rolling Walker  Activity: To/from bathroom  Toilet Transfers  Toilet - Technique: Ambulating  Toilet Transfer: Contact guard assistance;Stand by assistance     Transfers  Sit to stand: Contact guard assistance  Stand to sit: Contact guard assistance  Transfer Comments: w/no LOB, G sternal precautions. Cognition  Overall Cognitive Status: [facility]                                         Plan   Plan  Times per week: 4-6x/week (CABG)  Times per day: Daily  Current Treatment Recommendations: Endurance Training,Self-Care / ADL,Safety Education & Training,Pain Management,Cognitive/Perceptual Training,Functional Mobility Training,Balance Training,Equipment Evaluation, Education, & procurement,Patient/Caregiver Education & Training,Home Management Training                                                    AM-PAC Score        AM-[facility] Inpatient Daily Activity Raw Score: 19 (04/02/22 1144)  AM-PAC Inpatient ADL T-Scale Score : 40.22 (04/02/22 1144)  ADL Inpatient CMS 0-100% Score: 42.8 (04/02/22 1144)  ADL Inpatient CMS G-Code Modifier : CK (04/02/22 1144)    Goals  Short term goals  Time Frame for Short term goals: By Discharge  Short term goal 1: Pt will verbalize / demo Mod I and Good Carryover with Sternal Precautions during all Functional ADL tasks. Short term goal 2: Pt will verbalize / demo Mod I and Good Carryover with EC and Ax Pacing during all Functional Tasks and Mobility.   Short term goal 3: Pt will verbalize / demo Mod I with Correct Use of AE / DME during all ADL / Functional Transfers. Short term goal 4: Pt will maintain Fair+ Dynamic Standing Balance (10-12 minutes) while participating in Functional / Leisure tasks. Short term goal 5: Pt will participate in Item Retrieval / Transport / Functional Mobility with Mod I with Correct Use of AE / DME.        Therapy Time   Individual Concurrent Group Co-treatment   Time In 1050         Time Out 1144         Minutes 54         Timed Code Treatment Minutes: [address] [facility], [place]/

## 2022-04-02 NOTE — PLAN OF CARE
Problem:  Activity:  Goal: Risk for activity intolerance will decrease  Description: Risk for activity intolerance will decrease  4/2/2022 1326 by Celestino Quiñones RN  Outcome: Ongoing  4/2/2022 0738 by Celestino Quiñones RN  Outcome: Ongoing  4/2/2022 0405 by Star Nagel RN  Outcome: Ongoing  Goal: Will verbalize the importance of balancing activity with adequate rest periods  Description: Will verbalize the importance of balancing activity with adequate rest periods  4/2/2022 1326 by Celestino Quiñones RN  Outcome: Ongoing  4/2/2022 0738 by Celestino Quiñones RN  Outcome: Ongoing  4/2/2022 0405 by Star Nagel RN  Outcome: Ongoing  Goal: Ability to tolerate increased activity will improve  Description: Ability to tolerate increased activity will improve  4/2/2022 1326 by Celestino Quiñones RN  Outcome: Ongoing  4/2/2022 0738 by Celestino Quiñones RN  Outcome: Ongoing  4/2/2022 0405 by Star Nagel RN  Outcome: Ongoing

## 2022-04-02 NOTE — PROGRESS NOTES
[facility] Cardiothoracic Surgical   Progress Note    4/2/2022 11:08 AM  Surgeon:  Marilou Haley          POD# 4  S/P :  Coronary artery bypass    Subjective:  Ms. Vidales Cocking well, and has had no acute complaints or problems    Vital Signs: /62   Pulse 73   Temp 98.6 °F (37 °C) (Oral)   Resp 16   Ht 5' 5\" (1.651 m)   Wt 187 lb 6.3 oz (85 kg)   SpO2 92%   BMI 31.18 kg/m²  O2 Flow Rate (L/min): 2 L/min   Admit Weight: Weight: 178 lb (80.7 kg)       Labs and Studies:  CBC:   Recent Labs     03/31/22 0413 04/01/22 0325 04/02/22 0258   WBC 17.8* 15.7* 13.8*   HGB 11.0* 10.6* 10.5*   HCT 32.8* 30.2* 31.1*   MCV 92.4 89.3 91.2    See Reflexed IPF Result 149     BMP:   Recent Labs     04/01/22 2022 04/02/22 0258 04/02/22  0939   * 127* 127*   K 4.2 4.2 4.3   CL 93* 93* 90*   CO2 22 23 23   BUN 15 14 11   CREATININE 0.70 0.70 0.70     PT/INR:   Recent Labs     03/31/22 0413 04/01/22 0325 04/02/22 0258   PROTIME 12.2 12.3 12.1   INR 1.2 1.2 1.1     APTT:   No results for input(s): APTT in the last 72 hours. I/O:  I/O last 3 completed shifts: In: 962.7 [P.O.:300; I.V.:662.7]  Out: 9474 [Urine:3900; Chest Tube:160]    Scheduled Meds:    docusate sodium  100 mg Oral BID    sodium chloride  1 g Oral BID WC    furosemide  20 mg IntraVENous BID    insulin glargine  30 Units SubCUTAneous Daily    insulin lispro  0-12 Units SubCUTAneous TID WC    insulin lispro  0-6 Units SubCUTAneous Nightly    sodium chloride flush  10 mL IntraVENous 2 times per day    aspirin  81 mg Oral Daily    clopidogrel  75 mg Oral Daily    amiodarone  200 mg Oral TID    mupirocin   Nasal BID    metoprolol tartrate  25 mg Oral BID    atorvastatin  20 mg Oral Nightly    pantoprazole  40 mg Oral Daily    pantoprazole (PROTONIX) 40 mg injection  40 mg IntraVENous Daily    ipratropium-albuterol  1 ampule Inhalation Q4H WA       Assessment/Plan:   POD # 4 S/P CABG  Continue current management  CXR wet and atelectatic. Continue diuresis ambulate and IS  D/C tomorrow with [facility] AT [facility] ???    DAVID Gregg

## 2022-04-02 NOTE — PROGRESS NOTES
[facility]  Internal Medicine Teaching Residency Program  Inpatient Daily Progress Note  ______________________________________________________________________________    Patient: Lynne Beckham  YOB: 1960   VQQ:[id number]    Acct: [de-identified]     Room: [address] [place] [address]  Admit date: 3/22/2022  Today's date: 04/02/22  Number of days in the hospital: 11    SUBJECTIVE   Admitting Diagnosis: 3-vessel CAD  CC: Chest pain  -Post-op day 4 from three-vessel CABG  -Patient seen and examined at bedside. Chart results reviewed.  -No acute event overnight.  -Labs reviewed and evaluated. Blood sugar on the higher side. Lantus dose readjusted with medium dose insulin sliding scale.  -Sodium improving 127. ROS:  Constitutional:  negative for chills, fevers, sweats  Respiratory:  negative for cough, dyspnea on exertion, hemoptysis, shortness of breath, wheezing  Cardiovascular:  negative for chest pain, chest pressure/discomfort, lower extremity edema, palpitations  Gastrointestinal:  negative for abdominal pain, constipation, diarrhea, nausea, vomiting  Neurological:  negative for dizziness, headache    BRIEF HISTORY     The patient is a pleasant 58 y. o. female with a no PMHx formally diagnosed per the pt, EMR shows history of chronic back pain, hypertension, osteoarthritis, COPD and pneumothorax.  Pt presented with a chief complaint of chest pain.  Patient has midsternal chest pain that woke her up from sleep around 4 in the morning.  Patient states the pain is radiating to her neck into her jaw bilaterally.  Patient states it is hard to determine if the pain is traveling down either arm especially the left arm she has had rotator cuff surgery in the past.  Patient did endorse some mild associated shortness of breath and diaphoresis early this morning.  Patient received aspirin and nitroglycerin by EMS prior to arrival which alleviated the chest pain.  Patient denies any significant cardiac history.  Patient's blood pressure on admission was 177/101.  While in the emergency department patient's systolic blood pressure got up to 196.  Patient received Lopressor 25 mg and was started on Cozaar 25 mg.  Patient's blood pressure  improved. OBJECTIVE     Vital Signs:  /62   Pulse 73   Temp 98.6 °F (37 °C) (Oral)   Resp 16   Ht 5' 5\" (1.651 m)   Wt 187 lb 6.3 oz (85 kg)   SpO2 92%   BMI 31.18 kg/m²     Temp (24hrs), Av.8 °F (37.1 °C), Min:98.6 °F (37 °C), Max:99.1 °F (37.3 °C)    In: 649.1   Out: 3650 [Urine:3550]    Physical Exam:  Physical Exam  Vitals and nursing note reviewed. Constitutional:       Appearance: Normal appearance. HENT:      Head: Normocephalic and atraumatic. Nose: Nose normal.      Mouth/Throat:      Mouth: Mucous membranes are moist.      Pharynx: Oropharynx is clear. Eyes:      Extraocular Movements: Extraocular movements intact. Conjunctiva/sclera: Conjunctivae normal.      Pupils: Pupils are equal, round, and reactive to light. Cardiovascular:      Rate and Rhythm: Normal rate and regular rhythm. Pulses: Normal pulses. Heart sounds: Normal heart sounds. No murmur heard. No friction rub. No gallop. Pulmonary:      Effort: Pulmonary effort is normal. No respiratory distress. Breath sounds: Normal breath sounds. No stridor. No wheezing, rhonchi or rales. Chest:      Chest wall: Tenderness present. Abdominal:      General: Abdomen is flat. Bowel sounds are normal. There is no distension. Palpations: Abdomen is soft. There is no mass. Tenderness: There is no abdominal tenderness. There is no guarding or rebound. Hernia: No hernia is present. Musculoskeletal:         General: No swelling, tenderness, deformity or signs of injury. Normal range of motion. Cervical back: Normal range of motion. Right lower leg: No edema. Left lower leg: No edema. Skin:     General: Skin is warm. Neurological:      General: No focal deficit present. Mental Status: She is alert and oriented to person, place, and time. Mental status is at baseline. Psychiatric:         Mood and Affect: Mood normal.         Behavior: Behavior normal.         Thought Content:  Thought content normal.         Judgment: Judgment normal.           Medications:  Scheduled Medications:    docusate sodium  100 mg Oral BID    sodium chloride  1 g Oral BID WC    furosemide  20 mg IntraVENous BID    insulin glargine  30 Units SubCUTAneous Daily    insulin lispro  0-12 Units SubCUTAneous TID WC    insulin lispro  0-6 Units SubCUTAneous Nightly    sodium chloride flush  10 mL IntraVENous 2 times per day    aspirin  81 mg Oral Daily    clopidogrel  75 mg Oral Daily    amiodarone  200 mg Oral TID    mupirocin   Nasal BID    metoprolol tartrate  25 mg Oral BID    atorvastatin  20 mg Oral Nightly    pantoprazole  40 mg Oral Daily    pantoprazole (PROTONIX) 40 mg injection  40 mg IntraVENous Daily    ipratropium-albuterol  1 ampule Inhalation Q4H WA     Continuous Infusions:    sodium chloride      propofol Stopped (03/29/22 1711)    dextrose      norepinephrine Stopped (03/29/22 1533)    nitroGLYCERIN Stopped (03/29/22 1853)     PRN Medicationsketorolac, 15 mg, Q6H PRN  sodium chloride flush, 10 mL, PRN  sodium chloride, 25 mL, PRN  ondansetron, 4 mg, Q8H PRN   Or  ondansetron, 4 mg, Q6H PRN  oxyCODONE-acetaminophen, 1 tablet, Q4H PRN   Or  oxyCODONE-acetaminophen, 2 tablet, Q4H PRN  fentanNYL, 25 mcg, Q1H PRN   Or  fentanNYL, 50 mcg, Q1H PRN  hydrALAZINE, 5 mg, Q5 Min PRN  metoprolol, 2.5 mg, Q10 Min PRN  diphenhydrAMINE, 25 mg, Nightly PRN  magnesium sulfate, 2,000 mg, PRN  albumin human, 25 g, PRN  glucose, 15 g, PRN  dextrose, 12.5 g, PRN  glucagon (rDNA), 1 mg, PRN  dextrose, 100 mL/hr, PRN  bisacodyl, 5 mg, Daily PRN  potassium chloride, 40 mEq, PRN   Or  potassium alternative oral replacement, 40 mEq, PRN Or  potassium chloride, 10 mEq, PRN        Diagnostic Labs:  CBC:   Recent Labs     03/31/22 0413 04/01/22 0325 04/02/22 0258   WBC 17.8* 15.7* 13.8*   RBC 3.55* 3.38* 3.41*   HGB 11.0* 10.6* 10.5*   HCT 32.8* 30.2* 31.1*   MCV 92.4 89.3 91.2   RDW 12.8 12.4 12.6    See Reflexed IPF Result 149     BMP:   Recent Labs     04/01/22 2022 04/02/22 0258 04/02/22  0939   * 127* 127*   K 4.2 4.2 4.3   CL 93* 93* 90*   CO2 22 23 23   BUN 15 14 11   CREATININE 0.70 0.70 0.70     BNP: No results for input(s): BNP in the last 72 hours. PT/INR:   Recent Labs     03/31/22 0413 04/01/22 0325 04/02/22 0258   PROTIME 12.2 12.3 12.1   INR 1.2 1.2 1.1     APTT:   No results for input(s): APTT in the last 72 hours. CARDIAC ENZYMES: No results for input(s): CKMB, CKMBINDEX, TROPONINI in the last 72 hours. Invalid input(s): CKTOTAL;3  FASTING LIPID PANEL:  Lab Results   Component Value Date    CHOL 208 (H) 03/22/2018    HDL 67 03/22/2018    TRIG 59 03/22/2018     LIVER PROFILE: No results for input(s): AST, ALT, ALB, BILIDIR, BILITOT, ALKPHOS in the last 72 hours. MICROBIOLOGY:   Lab Results   Component Value Date/Time    CULTURE WRONG TEST ORDERED 03/23/2022 03:22 PM       Imaging:    XR CHEST PORTABLE    Result Date: 3/30/2022  Interval extubation. Unchanged mild bibasilar atelectasis. XR CHEST PORTABLE    Result Date: 3/29/2022  1. Lines and tubes in expected position as above. 2. Interval sternotomy.  3. Linear opacities throughout both lungs, which could be due to atelectasis       ASSESSMENT & PLAN     Assessment and Plan:    Principal Problem:    3-vessel CAD  Active Problems:    Essential hypertension    NSTEMI (non-ST elevated myocardial infarction) ([facility] [address]    Type 2 diabetes mellitus without complication, without long-term current use of insulin (HCC)    Hypomagnesemia    Hypotension  Resolved Problems:    Hypokalemia    Multivessel CAD  - POD 4 for CABG 3/29/22 w/ SLIMA to LAD, RSVG1 to OM, RSVG2 to RPDA   - Advance diet per CT surgery      Essential hypertension - stable  - Cozaar 50 mg PO daily home med  - Lopressor 25 PO BID      Hypotension: Resolved  - We will continue to monitor  - Ejection fraction 52% > fluids as needed  - Hold blood pressure medication for the time being     Type 2 diabetes mellitus without complication, without long-term current use of insulin   - Insulin infusion off  - Medium dose sliding scale   -Increase the dose of Lantus 35 Units daily   - Glucose checks 4 times daily before meals and at bedtime  - Hemoglobin A1c 11.3  - Diabetic education       Hypokalemia  - Daily labs  - Replace electrolytes as needed  - Keep above 4     Hypomagnesemia  - Daily lab  - Replace electrolytes as needed   - Keep above 2     Hyponatremia: Improving  Sodium 127. DVT ppx: Heparin     PT/OT/SW: Consulted. Discharge Planning: As per CT surgery as a primary team.     Laurel Miller MD  Internal Medicine Resident, PGY-1  [address]  3/9/9463,05:73 AM      I have discussed the care of Paola Carver , including pertinent history and exam findings,    today with the resident. I have seen and examined the patient and the key elements of all parts of the encounter have been performed by me . I agree with the assessment, plan and orders as documented by the resident. Principal Problem:    3-vessel CAD  Active Problems:    Essential hypertension    NSTEMI (non-ST elevated myocardial infarction) ([facility] [address]    Type 2 diabetes mellitus without complication, without long-term current use of insulin (HCC)    Hypomagnesemia    Hypotension  Resolved Problems:    Hypokalemia        Overall  course ;                                   are improving over time.         Patient s/p CABG  Hyponatremia, improving  Reduce salt tablets  We will change diet to low-carb diet            Electronically signed by Lisa Pierce MD

## 2022-04-02 NOTE — PLAN OF CARE
Problem:  Activity:  Goal: Risk for activity intolerance will decrease  Description: Risk for activity intolerance will decrease  4/2/2022 0738 by Mayelin Szymanski RN  Outcome: Ongoing  4/2/2022 0405 by Herbie Dickens RN  Outcome: Ongoing  Goal: Will verbalize the importance of balancing activity with adequate rest periods  Description: Will verbalize the importance of balancing activity with adequate rest periods  4/2/2022 0738 by Mayelin Szymanski RN  Outcome: Ongoing  4/2/2022 0405 by Herbie Dickens RN  Outcome: Ongoing  Goal: Ability to tolerate increased activity will improve  Description: Ability to tolerate increased activity will improve  4/2/2022 0738 by Mayelin Szymanski RN  Outcome: Ongoing  4/2/2022 0405 by Herbie Dickens RN  Outcome: Ongoing

## 2022-04-02 NOTE — PROGRESS NOTES
Physical Therapy  Facility/Department: [facility]  Daily Treatment Note  NAME: Warner Harrison  : 1960  MRN: [id number]    Date of Service: 2022    Discharge Recommendations:  Patient would benefit from continued therapy after discharge   PT Equipment Recommendations  Equipment Needed: No    Assessment   Body structures, Functions, Activity limitations: Decreased functional mobility ; Decreased endurance  Assessment: Pt ambulated 50 ft. with CGA using a RW along with 6L of O2. Pt becomes SOB during gait training and was provided with verbal cues for pursed lip breathing with good return. Pt is limited by decreased endurance and would benefit from continued PT following discharge to address functional deficits. Prognosis: Good  Decision Making: Medium Complexity  PT Education: Goals;Plan of Care;PT Role;Transfer Training;General Safety;Precautions; Functional Mobility Training;Energy Conservation  Patient Education: ther ex, sternal precautions  Barriers to Learning: none  REQUIRES PT FOLLOW UP: Yes  Activity Tolerance  Activity Tolerance: Patient limited by endurance; Patient limited by fatigue;Patient limited by pain     Patient Diagnosis(es): The primary encounter diagnosis was Chest pain, unspecified type. A diagnosis of NSTEMI (non-ST elevated myocardial infarction) [facility]) was also pertinent to this visit. has a past medical history of Chronic back pain, Hypertension, Osteoarthritis, and Pneumothorax.   has a past surgical history that includes back surgery; Carpal tunnel release (Right); Appendectomy; Tubal ligation; Cardiac catheterization (2022); and Coronary artery bypass graft (N/A, 3/29/2022).     Restrictions  Restrictions/Precautions  Restrictions/Precautions: Cardiac,Surgical Protocols,Fall Risk,Up as Tolerated  Required Braces or Orthoses?: Yes  Required Braces or Orthoses  Other: Heart Hugger Brace  Position Activity Restriction  Sternal Precautions: 5# Lifting Restrictions,No Pulling,No Pushing  Sternal Precautions: CABG X3 3/29/22  Other position/activity restrictions: Recent left rotator cuff repair, 12/1/21, CT to wall suction  Subjective   General  Chart Reviewed: Yes  Response To Previous Treatment: Patient with no complaints from previous session. ;Patient reporting fatigue but able to participate. Family / Caregiver Present: No  Subjective  Subjective: RN and pt agreeable to PT. pt agreeable and pleasant. General Comment  Comments: Pt retired to recliner chair, given callight, chair alarm set  Pain Screening  Patient Currently in Pain: Yes  Pain Assessment  Pain Assessment: 0-10  Pain Level: 5  Vital Signs  Patient Currently in Pain: Yes       Orientation  Orientation  Overall Orientation Status: Within Normal Limits  Cognition      Objective   Bed mobility  Supine to Sit: Unable to assess  Sit to Supine: Unable to assess  Scooting: Stand by assistance  Transfers  Stand to sit: Stand by assistance  Bed to Chair: Contact guard assistance  Ambulation  Ambulation?: Yes  More Ambulation?: No  Ambulation 1  Surface: level tile  Device: Rolling Walker  Other Apparatus: O2 (6L)  Assistance: Contact guard assistance  Quality of Gait: Extremely slow jennifer. Gait Deviations: Slow Jennifer;Decreased step length;Decreased step height  Distance: 50 ft. Comments: 2 seated rest break. Increase pain with Mobility .   Stairs/Curb  Stairs?: No     Balance  Posture: Fair  Sitting - Static: Good  Sitting - Dynamic: Good  Standing - Static: Fair  Standing - Dynamic: Fair  Comments: Standing balance assessed with RW  Exercises  Hip Flexion: x10 BLE  Hip Abduction: x10 BLE  Knee Long Arc Quad: x10 BLE  Ankle Pumps: x20 BLE                                  AM-PAC Score  AM-PAC Inpatient Mobility Raw Score : 16 (04/02/22 1126)  AM-PAC Inpatient T-Scale Score : 40.78 (04/02/22 1126)  Mobility Inpatient CMS 0-100% Score: 54.16 (04/02/22 1126)  Mobility Inpatient CMS G-Code Modifier : CK (04/02/22 1126) Goals  Short term goals  Time Frame for Short term goals: 14 visits  Short term goal 1: Complete transfers with mod I and appropriate device  Short term goal 2: Complete 300 ft of gait with mod I and appropriate device  Short term goal 3: Complete 2 steps with no HR and mod I  Short term goal 4: Participate in 30 minutes of therapy to promote endurance  Short term goal 5: Be independent with cardiac HEP  Patient Goals   Patient goals : To go home    Plan    Plan  Times per week: 7x per week, 1-2x per day  Times per day: Daily  Current Treatment Recommendations: [place] Re-education,Patient/Caregiver Education & Training,ADL/Self-care Training,Equipment Evaluation, Education, & procurement,Balance Training,IADL Training,Gait Training,Home Exercise Program,Functional Mobility Training,Stair training,Safety Education & Training  Safety Devices  Type of devices:  All fall risk precautions in place,Gait belt,Call light within reach,Left in chair,Nurse notified  Restraints  Initially in place: No     Therapy Time   Individual Concurrent Group Co-treatment   Time In 0855         Time Out 0935         Minutes 40         Timed Code Treatment Minutes: [address], [place]

## 2022-04-02 NOTE — PLAN OF CARE
Problem: Activity:  Goal: Risk for activity intolerance will decrease  Description: Risk for activity intolerance will decrease  4/2/2022 0405 by Valentino Mckenzie RN  Outcome: Ongoing     Problem: Activity:  Goal: Will verbalize the importance of balancing activity with adequate rest periods  Description: Will verbalize the importance of balancing activity with adequate rest periods  4/2/2022 0405 by Valentino Mckenzie RN  Outcome: Ongoing     Problem:  Activity:  Goal: Ability to tolerate increased activity will improve  Description: Ability to tolerate increased activity will improve  4/2/2022 0405 by Valentino Mckenzie RN  Outcome: Ongoing     Problem: Cardiac:  Goal: Ability to maintain an adequate cardiac output will improve  Description: Ability to maintain an adequate cardiac output will improve  4/2/2022 0405 by Valentino Mckenzie RN  Outcome: Ongoing     Problem: Cardiac:  Goal: Hemodynamic stability will improve  Description: Hemodynamic stability will improve  4/2/2022 0405 by Valentino Mckenzie RN  Outcome: Ongoing     Problem: Cardiac:  Goal: Diagnostic test results will improve  Description: Diagnostic test results will improve  4/2/2022 0405 by Valentino Mckenzie RN  Outcome: Ongoing     Problem: Cardiac:  Goal: Complications related to the disease process, condition or treatment will be avoided or minimized  Description: Complications related to the disease process, condition or treatment will be avoided or minimized  4/2/2022 0405 by Valentino Mckenzie RN  Outcome: Ongoing     Problem: Cardiac:  Goal: Cerebral tissue perfusion will improve  Description: Cerebral tissue perfusion will improve  4/2/2022 0405 by Valentino Mckenzie RN  Outcome: Ongoing     Problem: Coping:  Goal: Ability to verbalize feelings about condition will improve  Description: Ability to verbalize feelings about condition will improve  4/2/2022 0405 by Valentino Mckenzie RN  Outcome: Ongoing     Problem: Coping:  Goal: Ability to Levels of oxygenation will improve  Description: Levels of oxygenation will improve  4/2/2022 0405 by Cherylene Patten, RN  Outcome: Ongoing     Problem: Sensory:  Goal: Pain level will decrease  Description: Pain level will decrease  4/2/2022 0405 by Cherylene Patten, RN  Outcome: Ongoing     Problem: Falls - Risk of:  Goal: Will remain free from falls  Description: Will remain free from falls  4/2/2022 0405 by Cherylene Patten, RN  Outcome: Ongoing     Problem: Falls - Risk of:  Goal: Absence of physical injury  Description: Absence of physical injury  4/2/2022 0405 by Cherylene Patten, RN  Outcome: Ongoing     Problem: Infection:  Goal: Will remain free from infection  Description: Will remain free from infection  4/2/2022 0405 by Cherylene Patten, RN  Outcome: Ongoing     Problem: Safety:  Goal: Free from accidental physical injury  Description: Free from accidental physical injury  4/2/2022 0405 by Cherylene Patten, RN  Outcome: Ongoing     Problem: Safety:  Goal: Free from intentional harm  Description: Free from intentional harm  4/2/2022 0405 by Cherylene Patten, RN  Outcome: Ongoing     Problem: Daily Care:  Goal: Daily care needs are met  Description: Daily care needs are met  4/2/2022 0405 by Cherylene Patten, RN  Outcome: Ongoing     Problem: Pain:  Goal: Pain level will decrease  Description: Pain level will decrease  4/2/2022 0405 by Cherylene Patten, RN  Outcome: Ongoing     Problem: Pain:  Goal: Patient's pain/discomfort is manageable  Description: Patient's pain/discomfort is manageable  4/2/2022 0405 by Cherylene Patten, RN  Outcome: Ongoing     Problem: Pain:  Goal: Control of acute pain  Description: Control of acute pain  4/2/2022 0405 by Cherylene Patten, RN  Outcome: Ongoing     Problem: Pain:  Goal: Control of chronic pain  Description: Control of chronic pain  4/2/2022 0405 by Cherylene Patten, RN  Outcome: Ongoing     Problem: Skin Integrity:  Goal: Skin integrity will stabilize  Description: Skin integrity will stabilize  4/2/2022 0405 by Ovidio Kimball RN  Outcome: Ongoing     Problem: Discharge Planning:  Goal: Patients continuum of care needs are met  Description: Patients continuum of care needs are met  4/2/2022 0405 by Ovidio Kimball RN  Outcome: Ongoing     Problem: OXYGENATION/RESPIRATORY FUNCTION  Goal: Patient will maintain patent airway  4/2/2022 0405 by Ovidio Kimball RN  Outcome: Ongoing     Problem: OXYGENATION/RESPIRATORY FUNCTION  Goal: Patient will achieve/maintain normal respiratory rate/effort  Description: Respiratory rate and effort will be within normal limits for the patient  4/2/2022 0405 by Ovidio Kimball RN  Outcome: Ongoing     Problem: SKIN INTEGRITY  Goal: Skin integrity is maintained or improved  4/2/2022 0405 by Ovidio Kimball RN  Outcome: Ongoing

## 2022-04-03 ENCOUNTER — APPOINTMENT (OUTPATIENT)
Dept: GENERAL RADIOLOGY | Age: 62
DRG: 234 | End: 2022-04-03
Payer: COMMERCIAL

## 2022-04-03 LAB
ANION GAP SERPL CALCULATED.3IONS-SCNC: 12 MMOL/L (ref 9–17)
BUN BLDV-MCNC: 12 MG/DL (ref 8–23)
CALCIUM SERPL-MCNC: 8.6 MG/DL (ref 8.6–10.4)
CHLORIDE BLD-SCNC: 88 MMOL/L (ref 98–107)
CO2: 23 MMOL/L (ref 20–31)
CREAT SERPL-MCNC: 0.66 MG/DL (ref 0.5–0.9)
ESTIMATED AVERAGE GLUCOSE: 260 MG/DL
GFR AFRICAN AMERICAN: >60 ML/MIN
GFR NON-AFRICAN AMERICAN: >60 ML/MIN
GFR SERPL CREATININE-BSD FRML MDRD: ABNORMAL ML/MIN/{1.73_M2}
GLUCOSE BLD-MCNC: 181 MG/DL (ref 65–105)
GLUCOSE BLD-MCNC: 185 MG/DL (ref 65–105)
GLUCOSE BLD-MCNC: 192 MG/DL (ref 65–105)
GLUCOSE BLD-MCNC: 193 MG/DL (ref 70–99)
GLUCOSE BLD-MCNC: 206 MG/DL (ref 65–105)
HBA1C MFR BLD: 10.7 % (ref 4–6)
HCT VFR BLD CALC: 31.2 % (ref 36.3–47.1)
HEMOGLOBIN: 11 G/DL (ref 11.9–15.1)
INR BLD: 1
MAGNESIUM: 2 MG/DL (ref 1.6–2.6)
MCH RBC QN AUTO: 31.8 PG (ref 25.2–33.5)
MCHC RBC AUTO-ENTMCNC: 35.3 G/DL (ref 28.4–34.8)
MCV RBC AUTO: 90.2 FL (ref 82.6–102.9)
NRBC AUTOMATED: 0 PER 100 WBC
PDW BLD-RTO: 12.5 % (ref 11.8–14.4)
PLATELET # BLD: 148 K/UL (ref 138–453)
PMV BLD AUTO: 11.1 FL (ref 8.1–13.5)
POTASSIUM SERPL-SCNC: 3.8 MMOL/L (ref 3.7–5.3)
PROTHROMBIN TIME: 11 SEC (ref 9.1–12.3)
RBC # BLD: 3.46 M/UL (ref 3.95–5.11)
SODIUM BLD-SCNC: 123 MMOL/L (ref 135–144)
WBC # BLD: 11.3 K/UL (ref 3.5–11.3)

## 2022-04-03 PROCEDURE — 6370000000 HC RX 637 (ALT 250 FOR IP): Performed by: NURSE PRACTITIONER

## 2022-04-03 PROCEDURE — 6360000002 HC RX W HCPCS: Performed by: NURSE PRACTITIONER

## 2022-04-03 PROCEDURE — 2700000000 HC OXYGEN THERAPY PER DAY

## 2022-04-03 PROCEDURE — 82947 ASSAY GLUCOSE BLOOD QUANT: CPT

## 2022-04-03 PROCEDURE — 94640 AIRWAY INHALATION TREATMENT: CPT

## 2022-04-03 PROCEDURE — 85610 PROTHROMBIN TIME: CPT

## 2022-04-03 PROCEDURE — 97110 THERAPEUTIC EXERCISES: CPT

## 2022-04-03 PROCEDURE — 6370000000 HC RX 637 (ALT 250 FOR IP): Performed by: STUDENT IN AN ORGANIZED HEALTH CARE EDUCATION/TRAINING PROGRAM

## 2022-04-03 PROCEDURE — 2140000001 HC CVICU INTERMEDIATE R&B

## 2022-04-03 PROCEDURE — 2580000003 HC RX 258: Performed by: NURSE PRACTITIONER

## 2022-04-03 PROCEDURE — 6360000002 HC RX W HCPCS: Performed by: PHYSICIAN ASSISTANT

## 2022-04-03 PROCEDURE — 83735 ASSAY OF MAGNESIUM: CPT

## 2022-04-03 PROCEDURE — 71045 X-RAY EXAM CHEST 1 VIEW: CPT

## 2022-04-03 PROCEDURE — 85027 COMPLETE CBC AUTOMATED: CPT

## 2022-04-03 PROCEDURE — 6370000000 HC RX 637 (ALT 250 FOR IP)

## 2022-04-03 PROCEDURE — 99024 POSTOP FOLLOW-UP VISIT: CPT | Performed by: PHYSICIAN ASSISTANT

## 2022-04-03 PROCEDURE — 99232 SBSQ HOSP IP/OBS MODERATE 35: CPT | Performed by: INTERNAL MEDICINE

## 2022-04-03 PROCEDURE — 97116 GAIT TRAINING THERAPY: CPT

## 2022-04-03 PROCEDURE — 94761 N-INVAS EAR/PLS OXIMETRY MLT: CPT

## 2022-04-03 PROCEDURE — 36415 COLL VENOUS BLD VENIPUNCTURE: CPT

## 2022-04-03 PROCEDURE — 6370000000 HC RX 637 (ALT 250 FOR IP): Performed by: PHYSICIAN ASSISTANT

## 2022-04-03 PROCEDURE — 6370000000 HC RX 637 (ALT 250 FOR IP): Performed by: INTERNAL MEDICINE

## 2022-04-03 PROCEDURE — 6370000000 HC RX 637 (ALT 250 FOR IP): Performed by: THORACIC SURGERY (CARDIOTHORACIC VASCULAR SURGERY)

## 2022-04-03 PROCEDURE — 80048 BASIC METABOLIC PNL TOTAL CA: CPT

## 2022-04-03 RX ORDER — SENNA AND DOCUSATE SODIUM 50; 8.6 MG/1; MG/1
2 TABLET, FILM COATED ORAL DAILY PRN
Status: DISCONTINUED | OUTPATIENT
Start: 2022-04-03 | End: 2022-04-06 | Stop reason: HOSPADM

## 2022-04-03 RX ORDER — AMIODARONE HYDROCHLORIDE 200 MG/1
200 TABLET ORAL 2 TIMES DAILY
Status: DISCONTINUED | OUTPATIENT
Start: 2022-04-03 | End: 2022-04-06 | Stop reason: HOSPADM

## 2022-04-03 RX ORDER — FUROSEMIDE 10 MG/ML
20 INJECTION INTRAMUSCULAR; INTRAVENOUS DAILY
Status: DISCONTINUED | OUTPATIENT
Start: 2022-04-04 | End: 2022-04-06 | Stop reason: HOSPADM

## 2022-04-03 RX ORDER — PROMETHAZINE HYDROCHLORIDE 25 MG/ML
12.5 INJECTION, SOLUTION INTRAMUSCULAR; INTRAVENOUS ONCE
Status: COMPLETED | OUTPATIENT
Start: 2022-04-03 | End: 2022-04-03

## 2022-04-03 RX ORDER — POLYETHYLENE GLYCOL 3350 17 G/17G
17 POWDER, FOR SOLUTION ORAL DAILY
Status: DISCONTINUED | OUTPATIENT
Start: 2022-04-03 | End: 2022-04-06 | Stop reason: HOSPADM

## 2022-04-03 RX ORDER — POTASSIUM CHLORIDE 7.45 MG/ML
10 INJECTION INTRAVENOUS
Status: ACTIVE | OUTPATIENT
Start: 2022-04-03 | End: 2022-04-03

## 2022-04-03 RX ADMIN — INSULIN LISPRO 1 UNITS: 100 INJECTION, SOLUTION INTRAVENOUS; SUBCUTANEOUS at 22:02

## 2022-04-03 RX ADMIN — IPRATROPIUM BROMIDE AND ALBUTEROL SULFATE 1 AMPULE: .5; 3 SOLUTION RESPIRATORY (INHALATION) at 15:45

## 2022-04-03 RX ADMIN — INSULIN GLARGINE 35 UNITS: 100 INJECTION, SOLUTION SUBCUTANEOUS at 08:36

## 2022-04-03 RX ADMIN — IPRATROPIUM BROMIDE AND ALBUTEROL SULFATE 1 AMPULE: .5; 3 SOLUTION RESPIRATORY (INHALATION) at 11:56

## 2022-04-03 RX ADMIN — CLOPIDOGREL 75 MG: 75 TABLET, FILM COATED ORAL at 08:21

## 2022-04-03 RX ADMIN — CLOPIDOGREL 75 MG: 75 TABLET, FILM COATED ORAL at 08:25

## 2022-04-03 RX ADMIN — KETOROLAC TROMETHAMINE 15 MG: 30 INJECTION, SOLUTION INTRAMUSCULAR; INTRAVENOUS at 04:13

## 2022-04-03 RX ADMIN — PANTOPRAZOLE SODIUM 40 MG: 40 TABLET, DELAYED RELEASE ORAL at 08:25

## 2022-04-03 RX ADMIN — OXYCODONE HYDROCHLORIDE AND ACETAMINOPHEN 2 TABLET: 5; 325 TABLET ORAL at 02:08

## 2022-04-03 RX ADMIN — METOPROLOL TARTRATE 25 MG: 25 TABLET ORAL at 08:40

## 2022-04-03 RX ADMIN — PANTOPRAZOLE SODIUM 40 MG: 40 TABLET, DELAYED RELEASE ORAL at 08:26

## 2022-04-03 RX ADMIN — AMIODARONE HYDROCHLORIDE 200 MG: 200 TABLET ORAL at 22:00

## 2022-04-03 RX ADMIN — AMIODARONE HYDROCHLORIDE 200 MG: 200 TABLET ORAL at 08:25

## 2022-04-03 RX ADMIN — IPRATROPIUM BROMIDE AND ALBUTEROL SULFATE 1 AMPULE: .5; 3 SOLUTION RESPIRATORY (INHALATION) at 07:36

## 2022-04-03 RX ADMIN — POLYETHYLENE GLYCOL 3350 17 G: 17 POWDER, FOR SOLUTION ORAL at 08:40

## 2022-04-03 RX ADMIN — INSULIN LISPRO 2 UNITS: 100 INJECTION, SOLUTION INTRAVENOUS; SUBCUTANEOUS at 08:36

## 2022-04-03 RX ADMIN — PROMETHAZINE HYDROCHLORIDE 12.5 MG: 25 INJECTION INTRAMUSCULAR; INTRAVENOUS at 17:28

## 2022-04-03 RX ADMIN — DOCUSATE SODIUM 100 MG: 100 CAPSULE ORAL at 08:21

## 2022-04-03 RX ADMIN — DESMOPRESSIN ACETATE 20 MG: 0.2 TABLET ORAL at 22:02

## 2022-04-03 RX ADMIN — ONDANSETRON 4 MG: 2 INJECTION INTRAMUSCULAR; INTRAVENOUS at 08:20

## 2022-04-03 RX ADMIN — SODIUM CHLORIDE, PRESERVATIVE FREE 10 ML: 5 INJECTION INTRAVENOUS at 22:02

## 2022-04-03 RX ADMIN — Medication 81 MG: at 08:21

## 2022-04-03 RX ADMIN — OXYCODONE HYDROCHLORIDE AND ACETAMINOPHEN 1 TABLET: 5; 325 TABLET ORAL at 08:53

## 2022-04-03 RX ADMIN — ONDANSETRON 4 MG: 2 INJECTION INTRAMUSCULAR; INTRAVENOUS at 14:36

## 2022-04-03 RX ADMIN — KETOROLAC TROMETHAMINE 15 MG: 30 INJECTION, SOLUTION INTRAMUSCULAR; INTRAVENOUS at 14:42

## 2022-04-03 RX ADMIN — SODIUM CHLORIDE TAB 1 GM 1 G: 1 TAB at 08:42

## 2022-04-03 RX ADMIN — METOPROLOL TARTRATE 25 MG: 25 TABLET ORAL at 22:01

## 2022-04-03 RX ADMIN — INSULIN LISPRO 4 UNITS: 100 INJECTION, SOLUTION INTRAVENOUS; SUBCUTANEOUS at 16:57

## 2022-04-03 RX ADMIN — FUROSEMIDE 20 MG: 10 INJECTION, SOLUTION INTRAMUSCULAR; INTRAVENOUS at 08:32

## 2022-04-03 RX ADMIN — INSULIN LISPRO 2 UNITS: 100 INJECTION, SOLUTION INTRAVENOUS; SUBCUTANEOUS at 12:50

## 2022-04-03 RX ADMIN — MAGNESIUM HYDROXIDE 30 ML: 400 SUSPENSION ORAL at 16:29

## 2022-04-03 ASSESSMENT — PAIN DESCRIPTION - LOCATION
LOCATION: CHEST
LOCATION: CHEST

## 2022-04-03 ASSESSMENT — PAIN DESCRIPTION - FREQUENCY: FREQUENCY: CONTINUOUS

## 2022-04-03 ASSESSMENT — PAIN SCALES - GENERAL
PAINLEVEL_OUTOF10: 8
PAINLEVEL_OUTOF10: 8
PAINLEVEL_OUTOF10: 7
PAINLEVEL_OUTOF10: 7
PAINLEVEL_OUTOF10: 2
PAINLEVEL_OUTOF10: 7
PAINLEVEL_OUTOF10: 8

## 2022-04-03 ASSESSMENT — PAIN DESCRIPTION - DESCRIPTORS: DESCRIPTORS: DISCOMFORT;ACHING

## 2022-04-03 ASSESSMENT — PAIN SCALES - WONG BAKER
WONGBAKER_NUMERICALRESPONSE: 0

## 2022-04-03 ASSESSMENT — PAIN DESCRIPTION - PAIN TYPE: TYPE: SURGICAL PAIN

## 2022-04-03 NOTE — PLAN OF CARE
Problem: Cardiac:  Goal: Ability to maintain an adequate cardiac output will improve  Description: Ability to maintain an adequate cardiac output will improve  Outcome: Ongoing     Problem: Sensory:  Goal: Pain level will decrease  Description: Pain level will decrease  Outcome: Ongoing     Problem: Pain:  Goal: Pain level will decrease  Description: Pain level will decrease  Outcome: Ongoing

## 2022-04-03 NOTE — PROGRESS NOTES
Physical Therapy  Facility/Department: [facility] CAR 1  Daily Treatment Note  NAME: Herlinda Aleman  : 1960  MRN: [id number]    Date of Service: 4/3/2022    Discharge Recommendations:  Patient would benefit from continued therapy after discharge        Assessment   Body structures, Functions, Activity limitations: Decreased functional mobility ; Decreased endurance;Decreased strength  Assessment: Improved gait to [de-identified]' with 3 liters/min oxygen. Minimal shortness of breath with excessively slow jennifer. Reviewed sternal precautions. Patient will need further PT to regain functional independence. PT Education: Goals;Plan of Care;PT Role;Transfer Training;General Safety;Precautions; Functional Mobility Training;Energy Conservation  Patient Education: ther ex, sternal precautions  REQUIRES PT FOLLOW UP: Yes  Activity Tolerance  Activity Tolerance: Patient limited by endurance; Patient limited by fatigue;Patient limited by pain     Patient Diagnosis(es): The primary encounter diagnosis was Chest pain, unspecified type. A diagnosis of NSTEMI (non-ST elevated myocardial infarction) [facility]) was also pertinent to this visit. has a past medical history of Chronic back pain, Hypertension, Osteoarthritis, and Pneumothorax.   has a past surgical history that includes back surgery; Carpal tunnel release (Right); Appendectomy; Tubal ligation; Cardiac catheterization (2022); and Coronary artery bypass graft (N/A, 3/29/2022).     Restrictions  Restrictions/Precautions  Restrictions/Precautions: Cardiac,Surgical Protocols,Fall Risk,Up as Tolerated  Required Braces or Orthoses?: Yes  Required Braces or Orthoses  Other: Heart Hugger Brace  Position Activity Restriction  Sternal Precautions: 5# Lifting Restrictions,No Pulling,No Pushing  Sternal Precautions: CABG X3 3/29/22  Other position/activity restrictions: Recent left rotator cuff repair, 21,  Subjective   General  Chart Reviewed: Yes  Family / Caregiver Present: No  Subjective  Subjective: RN and pt agreeable to PT. pt agreeable and pleasant. Pain Screening  Patient Currently in Pain: Yes  Pain Assessment  Pain Assessment: 0-10  Pain Level: 7  Pain Location: Chest  Vital Signs  Patient Currently in Pain: Yes        Cognition   Cognition  Overall Cognitive Status: WFL  Objective      Transfers  Sit to Stand: Stand by assistance  Stand to sit: Stand by assistance  Bed to Chair: Stand by assistance  Ambulation  Ambulation?: Yes  Ambulation 1  Surface: level tile  Device: Rolling Walker  Other Apparatus: O2  Assistance: Contact guard assistance  Quality of Gait: Extremely slow jennifer. Appears apprehensive. Patient reports she feels shaky. Gait Deviations: Slow Jennifer;Decreased step length;Decreased step height  Distance: 80'     Balance  Sitting - Static: Good  Sitting - Dynamic: Good  Standing - Static: Fair  Standing - Dynamic: Fair  Comments: Able to stand and let go of walker to don her mask and wash her hands. Exercises  Ankle Pumps: x20 BLE  Comments: Reviewed restrictions. Patient was able to verbalize 5# restriction. Needed a review of ROM restriction. Incentive spirometer use approx 750 ml after several attempts and cues. AM-PAC Score  AM-PAC Inpatient Mobility Raw Score : 19 (04/03/22 0946)  AM-PAC Inpatient T-Scale Score : 45.44 (04/03/22 0946)  Mobility Inpatient CMS 0-100% Score: 41.77 (04/03/22 0946)  Mobility Inpatient CMS G-Code Modifier : CK (04/03/22 0946)          Goals  Short term goals  Time Frame for Short term goals: 14 visits  Short term goal 1: Complete transfers with mod I and appropriate device  Short term goal 2: Complete 300 ft of gait with mod I and appropriate device  Short term goal 3: Complete 2 steps with no HR and mod I  Short term goal 4: Participate in 30 minutes of therapy to promote endurance  Short term goal 5: Be independent with cardiac HEP  Patient Goals   Patient goals :  To go home    Plan    Plan  Times per week: 7x per week, 1-2x per day  Times per day: Daily  Current Treatment Recommendations: Gerri Kline Re-education,Patient/Caregiver Education & Training,Equipment Evaluation, Education, & procurement,Balance Training,Gait Training,Home Exercise Program,Functional Mobility Training,Stair training,Safety Education & Training  Safety Devices  Type of devices:  All fall risk precautions in place,Gait belt,Call light within reach,Left in chair,Nurse notified,Chair alarm in place  Restraints  Initially in place: No     Therapy Time   Individual Concurrent Group Co-treatment   Time In 0910         Time Out 0945         Minutes 35         Timed Code Treatment Minutes: Papa, PT

## 2022-04-03 NOTE — PLAN OF CARE
PROVIDE ADEQUATE OXYGENATION WITH ACCEPTABLE SP02/ABG'S    [x]  IDENTIFY APPROPRIATE OXYGEN THERAPY  [x]   MONITOR SP02/ABG'S AS NEEDED   [x]   PATIENT EDUCATION AS NEEDED   BRONCHOSPASM/BRONCHOCONSTRICTION     [x]         IMPROVE AERATION/BREATH SOUNDS  [x]   ADMINISTER BRONCHODILATOR THERAPY AS APPROPRIATE  [x]   ASSESS BREATH SOUNDS  []   IMPLEMENT AEROSOL/MDI PROTOCOL  [x]   PATIENT EDUCATION AS NEEDED Detail Level: Detailed

## 2022-04-03 NOTE — PROGRESS NOTES
[facility] Cardiothoracic Surgical   Progress Note    4/3/2022 10:44 AM  Surgeon:  Laura Daugherty          POD# 5  S/P :  Coronary artery bypass    Subjective:  Ms. Gutierrez Deena well, and has had no acute complaints or problems    Vital Signs: BP (!) 148/67   Pulse 80   Temp 98 °F (36.7 °C) (Oral)   Resp 18   Ht 5' 5\" (1.651 m)   Wt 188 lb 7.9 oz (85.5 kg)   SpO2 91%   BMI 31.37 kg/m²  O2 Flow Rate (L/min): 3 L/min   Admit Weight: Weight: 178 lb (80.7 kg)       Labs and Studies:  CBC:   Recent Labs     04/01/22 0325 04/02/22  0258 04/03/22  0351   WBC 15.7* 13.8* 11.3   HGB 10.6* 10.5* 11.0*   HCT 30.2* 31.1* 31.2*   MCV 89.3 91.2 90.2   PLT See Reflexed IPF Result 149 148     BMP:   Recent Labs     04/02/22  0258 04/02/22  0939 04/03/22  0351   * 127* 123*   K 4.2 4.3 3.8   CL 93* 90* 88*   CO2 23 23 23   BUN 14 11 12   CREATININE 0.70 0.70 0.66     PT/INR:   Recent Labs     04/01/22 0325 04/02/22  0258 04/03/22  0351   PROTIME 12.3 12.1 11.0   INR 1.2 1.1 1.0     APTT:   No results for input(s): APTT in the last 72 hours. I/O:  I/O last 3 completed shifts: In: 1180 [P.O.:1070;  I.V.:110]  Out: 2480 [Urine:2480]    Scheduled Meds:    potassium chloride  10 mEq IntraVENous Q1H    magnesium hydroxide  30 mL Oral NOW    polyethylene glycol  17 g Oral Daily    docusate sodium  100 mg Oral BID    insulin glargine  35 Units SubCUTAneous Daily    sodium chloride  1 g Oral BID WC    furosemide  20 mg IntraVENous BID    insulin lispro  0-12 Units SubCUTAneous TID WC    insulin lispro  0-6 Units SubCUTAneous Nightly    sodium chloride flush  10 mL IntraVENous 2 times per day    aspirin  81 mg Oral Daily    clopidogrel  75 mg Oral Daily    amiodarone  200 mg Oral TID    metoprolol tartrate  25 mg Oral BID    atorvastatin  20 mg Oral Nightly    pantoprazole  40 mg Oral Daily    pantoprazole (PROTONIX) 40 mg injection  40 mg IntraVENous Daily    ipratropium-albuterol  1 ampule Inhalation Q4H WA Assessment/Plan:   POD # 5 S/P CABG  CXR looks slightly better  Supplement Potassium and NA  Ambulate  Continue prevena, may need a stitch at chest tube site  D/C tomorrow possibly    DAVID Kimble

## 2022-04-03 NOTE — PROGRESS NOTES
[facility]  Internal Medicine Teaching Residency Program  Inpatient Daily Progress Note  ______________________________________________________________________________    Patient: Godfrey Quintanilla  YOB: 1960   OJM:[id number]    Acct: [de-identified]     Room: [address] [place] [address]  Admit date: 3/22/2022  Today's date: 04/03/22  Number of days in the hospital: 12    SUBJECTIVE   Admitting Diagnosis: 3-vessel CAD  CC: Chest pain  -Status postop day 5 CABG x3.  -Patient seen and examined at bedside. No acute event overnight.  -Blood pressure is 140/60, pulse 77. Saturating well on 3 L nasal cannula. -Patient is feeling little nauseous, did not have bowel movement since surgery despite being on GlycoLax and Colace.  -Labs reviewed and evaluated. Hyponatremia and hypokalemia 123 and 3.8 respectively. -POC glucose [phone].  35 units Lantus and medium dose insulin sliding scale, received lispro 10 units. ROS:  Constitutional:  negative for chills, fevers, sweats  Respiratory:  negative for cough, dyspnea on exertion, hemoptysis, shortness of breath, wheezing  Cardiovascular:  negative for chest pain, chest pressure/discomfort, lower extremity edema, palpitations  Gastrointestinal:  negative for abdominal pain, constipation, diarrhea, nausea, vomiting  Neurological:  negative for dizziness, headache    BRIEF HISTORY     The patient is a pleasant 58 y. o. female with a no PMHx formally diagnosed per the pt, EMR shows history of chronic back pain, hypertension, osteoarthritis, COPD and pneumothorax.  Pt presented with a chief complaint of chest pain.  Patient has midsternal chest pain that woke her up from sleep around 4 in the morning.  Patient states the pain is radiating to her neck into her jaw bilaterally.  Patient states it is hard to determine if the pain is traveling down either arm especially the left arm she has had rotator cuff surgery in the past.  Patient did Nose normal.      Mouth/Throat:      Mouth: Mucous membranes are moist.      Pharynx: Oropharynx is clear. Eyes:      Extraocular Movements: Extraocular movements intact. Conjunctiva/sclera: Conjunctivae normal.      Pupils: Pupils are equal, round, and reactive to light. Cardiovascular:      Rate and Rhythm: Normal rate and regular rhythm. Pulses: Normal pulses. Heart sounds: Normal heart sounds. No murmur heard. No friction rub. No gallop. Pulmonary:      Effort: Pulmonary effort is normal. No respiratory distress. Breath sounds: Normal breath sounds. No stridor. No wheezing, rhonchi or rales. Chest:      Chest wall: Tenderness present. Abdominal:      General: Abdomen is flat. Bowel sounds are normal. There is no distension. Palpations: Abdomen is soft. There is no mass. Tenderness: There is no abdominal tenderness. There is no guarding or rebound. Hernia: No hernia is present. Musculoskeletal:         General: No swelling, tenderness, deformity or signs of injury. Normal range of motion. Cervical back: Normal range of motion. Right lower leg: No edema. Left lower leg: No edema. Skin:     General: Skin is warm. Neurological:      General: No focal deficit present. Mental Status: She is alert and oriented to person, place, and time. Mental status is at baseline. Psychiatric:         Mood and Affect: Mood normal.         Behavior: Behavior normal.         Thought Content:  Thought content normal.         Judgment: Judgment normal.           Medications:  Scheduled Medications:    potassium chloride  10 mEq IntraVENous Q1H    magnesium hydroxide  30 mL Oral NOW    polyethylene glycol  17 g Oral Daily    docusate sodium  100 mg Oral BID    insulin glargine  35 Units SubCUTAneous Daily    sodium chloride  1 g Oral BID WC    furosemide  20 mg IntraVENous BID    insulin lispro  0-12 Units SubCUTAneous TID WC    insulin lispro  0-6 Units SubCUTAneous Nightly    sodium chloride flush  10 mL IntraVENous 2 times per day    aspirin  81 mg Oral Daily    clopidogrel  75 mg Oral Daily    amiodarone  200 mg Oral TID    mupirocin   Nasal BID    metoprolol tartrate  25 mg Oral BID    atorvastatin  20 mg Oral Nightly    pantoprazole  40 mg Oral Daily    pantoprazole (PROTONIX) 40 mg injection  40 mg IntraVENous Daily    ipratropium-albuterol  1 ampule Inhalation Q4H WA     Continuous Infusions:    sodium chloride      propofol Stopped (03/29/22 1711)    dextrose      norepinephrine Stopped (03/29/22 1533)    nitroGLYCERIN Stopped (03/29/22 1853)     PRN Medicationssennosides-docusate sodium, 2 tablet, Daily PRN  ketorolac, 15 mg, Q6H PRN  sodium chloride flush, 10 mL, PRN  sodium chloride, 25 mL, PRN  ondansetron, 4 mg, Q8H PRN   Or  ondansetron, 4 mg, Q6H PRN  oxyCODONE-acetaminophen, 1 tablet, Q4H PRN   Or  oxyCODONE-acetaminophen, 2 tablet, Q4H PRN  fentanNYL, 25 mcg, Q1H PRN   Or  fentanNYL, 50 mcg, Q1H PRN  hydrALAZINE, 5 mg, Q5 Min PRN  metoprolol, 2.5 mg, Q10 Min PRN  diphenhydrAMINE, 25 mg, Nightly PRN  magnesium sulfate, 2,000 mg, PRN  albumin human, 25 g, PRN  glucose, 15 g, PRN  dextrose, 12.5 g, PRN  glucagon (rDNA), 1 mg, PRN  dextrose, 100 mL/hr, PRN  bisacodyl, 5 mg, Daily PRN  potassium chloride, 40 mEq, PRN   Or  potassium alternative oral replacement, 40 mEq, PRN   Or  potassium chloride, 10 mEq, PRN        Diagnostic Labs:  CBC:   Recent Labs     04/01/22  0325 04/02/22  0258 04/03/22  0351   WBC 15.7* 13.8* 11.3   RBC 3.38* 3.41* 3.46*   HGB 10.6* 10.5* 11.0*   HCT 30.2* 31.1* 31.2*   MCV 89.3 91.2 90.2   RDW 12.4 12.6 12.5   PLT See Reflexed IPF Result 149 148     BMP:   Recent Labs     04/02/22  0258 04/02/22  0939 04/03/22  0351   * 127* 123*   K 4.2 4.3 3.8   CL 93* 90* 88*   CO2 23 23 23   BUN 14 11 12   CREATININE 0.70 0.70 0.66     BNP: No results for input(s): BNP in the last 72 hours.   PT/INR:   Recent Labs 04/01/22  0325 04/02/22  0258 04/03/22  0351   PROTIME 12.3 12.1 11.0   INR 1.2 1.1 1.0     APTT:   No results for input(s): APTT in the last 72 hours. CARDIAC ENZYMES: No results for input(s): CKMB, CKMBINDEX, TROPONINI in the last 72 hours. Invalid input(s): CKTOTAL;3  FASTING LIPID PANEL:  Lab Results   Component Value Date    CHOL 208 (H) 03/22/2018    HDL 67 03/22/2018    TRIG 59 03/22/2018     LIVER PROFILE: No results for input(s): AST, ALT, ALB, BILIDIR, BILITOT, ALKPHOS in the last 72 hours. MICROBIOLOGY:   Lab Results   Component Value Date/Time    CULTURE WRONG TEST ORDERED 03/23/2022 03:22 PM       Imaging:    XR CHEST PORTABLE    Result Date: 3/30/2022  Interval extubation. Unchanged mild bibasilar atelectasis. XR CHEST PORTABLE    Result Date: 3/29/2022  1. Lines and tubes in expected position as above. 2. Interval sternotomy. 3. Linear opacities throughout both lungs, which could be due to atelectasis       ASSESSMENT & PLAN     Assessment and Plan:    Principal Problem:    3-vessel CAD  Active Problems:    Essential hypertension    NSTEMI (non-ST elevated myocardial infarction) ([facility] [address]    Type 2 diabetes mellitus without complication, without long-term current use of insulin ([facility])    Hypomagnesemia    Hypotension  Resolved Problems:    Hypokalemia    Multivessel CAD  - POD 5 for CABG 3/29/22 w/ SLIMA to LAD, RSVG1 to OM, RSVG2 to RPDA   - On dual antiplatelet therapy ASA and Plavix. Lipitor 20 mg nightly. - Advance diet per CT surgery      Essential hypertension - stable  - Cozaar 50 mg PO daily home med  - Lopressor 25 PO BID     Constipation:  Patient does not have bowel movement since surgery but passing gas. Concern for postop ileus. Belly is soft and nontender, bowel sounds are present. Received Colace and GlycoLax. Will try milk of magnesia. Potassium was replaced 20 mEq intravenously. Hyponatremia:   Sodium level is fluctuating. This a.m. it is 123.   Continue with salt tablet. Repeat BMP in the evening. Douglas Paul Hypotension: Resolved    Newly diagnosed type 2 diabetes mellitus  -HbA1c 10.7.  -Medium dose sliding scale and Lantus 35 units daily.  -Glucose checks 4 times daily before meals and at bedtime POC glucose [phone].  -Diabetic education       Hypokalemia  - Daily labs  - Replace electrolytes as needed  - Keep above 4     Hypomagnesemia  - Daily lab  - Replace electrolytes as needed   - Keep above 2     DVT ppx: Heparin     PT/OT/SW: Consulted. Discharge Planning: As per CT surgery as a primary team.     Enedina Castano MD  Internal Medicine Resident, PGY-1  [address]  2/6/4968,1:03 AM      I have discussed the care of Brandon Brumfield , including pertinent history and exam findings,    today with the resident. I have seen and examined the patient and the key elements of all parts of the encounter have been performed by me . I agree with the assessment, plan and orders as documented by the resident. Principal Problem:    3-vessel CAD  Active Problems:    Essential hypertension    NSTEMI (non-ST elevated myocardial infarction) ([facility] [address]    Type 2 diabetes mellitus without complication, without long-term current use of insulin (HCC)    Hypomagnesemia    Hypotension  Resolved Problems:    Hypokalemia        Overall  course ;                                   are improving over time.         Clinically doing better  Working with PT  Chest tubes are out  Blood sugar slightly better          Electronically signed by Cecilia Mcdowell MD

## 2022-04-03 NOTE — PLAN OF CARE
Problem:  Activity:  Goal: Risk for activity intolerance will decrease  Description: Risk for activity intolerance will decrease  Outcome: Ongoing  Goal: Will verbalize the importance of balancing activity with adequate rest periods  Description: Will verbalize the importance of balancing activity with adequate rest periods  Outcome: Ongoing  Goal: Ability to tolerate increased activity will improve  Description: Ability to tolerate increased activity will improve  Outcome: Ongoing

## 2022-04-04 ENCOUNTER — APPOINTMENT (OUTPATIENT)
Dept: GENERAL RADIOLOGY | Age: 62
DRG: 234 | End: 2022-04-04
Payer: COMMERCIAL

## 2022-04-04 LAB
ANION GAP SERPL CALCULATED.3IONS-SCNC: 11 MMOL/L (ref 9–17)
ANION GAP SERPL CALCULATED.3IONS-SCNC: 12 MMOL/L (ref 9–17)
ANION GAP SERPL CALCULATED.3IONS-SCNC: 12 MMOL/L (ref 9–17)
ANION GAP SERPL CALCULATED.3IONS-SCNC: 14 MMOL/L (ref 9–17)
BUN BLDV-MCNC: 11 MG/DL (ref 8–23)
BUN BLDV-MCNC: 13 MG/DL (ref 8–23)
BUN BLDV-MCNC: 15 MG/DL (ref 8–23)
CALCIUM SERPL-MCNC: 8.3 MG/DL (ref 8.6–10.4)
CALCIUM SERPL-MCNC: 8.4 MG/DL (ref 8.6–10.4)
CALCIUM SERPL-MCNC: 9 MG/DL (ref 8.6–10.4)
CHLORIDE BLD-SCNC: 88 MMOL/L (ref 98–107)
CHLORIDE BLD-SCNC: 95 MMOL/L (ref 98–107)
CHLORIDE BLD-SCNC: 95 MMOL/L (ref 98–107)
CHLORIDE BLD-SCNC: 96 MMOL/L (ref 98–107)
CO2: 21 MMOL/L (ref 20–31)
CO2: 23 MMOL/L (ref 20–31)
CO2: 24 MMOL/L (ref 20–31)
CO2: 24 MMOL/L (ref 20–31)
CREAT SERPL-MCNC: 0.62 MG/DL (ref 0.5–0.9)
CREAT SERPL-MCNC: 0.63 MG/DL (ref 0.5–0.9)
CREAT SERPL-MCNC: 0.66 MG/DL (ref 0.5–0.9)
GFR AFRICAN AMERICAN: >60 ML/MIN
GFR NON-AFRICAN AMERICAN: >60 ML/MIN
GFR SERPL CREATININE-BSD FRML MDRD: ABNORMAL ML/MIN/{1.73_M2}
GLUCOSE BLD-MCNC: 123 MG/DL (ref 65–105)
GLUCOSE BLD-MCNC: 135 MG/DL (ref 70–99)
GLUCOSE BLD-MCNC: 136 MG/DL (ref 65–105)
GLUCOSE BLD-MCNC: 140 MG/DL (ref 65–105)
GLUCOSE BLD-MCNC: 150 MG/DL (ref 70–99)
GLUCOSE BLD-MCNC: 159 MG/DL (ref 65–105)
GLUCOSE BLD-MCNC: 193 MG/DL (ref 70–99)
GLUCOSE BLD-MCNC: 312 MG/DL (ref 65–105)
HCT VFR BLD CALC: 35.4 % (ref 36.3–47.1)
HEMOGLOBIN: 12.2 G/DL (ref 11.9–15.1)
MAGNESIUM: 2.3 MG/DL (ref 1.6–2.6)
MCH RBC QN AUTO: 30.7 PG (ref 25.2–33.5)
MCHC RBC AUTO-ENTMCNC: 34.5 G/DL (ref 28.4–34.8)
MCV RBC AUTO: 88.9 FL (ref 82.6–102.9)
NRBC AUTOMATED: 0 PER 100 WBC
OSMOLALITY URINE: 229 MOSM/KG (ref 80–1300)
PDW BLD-RTO: 12.7 % (ref 11.8–14.4)
PLATELET # BLD: ABNORMAL K/UL (ref 138–453)
PLATELET, FLUORESCENCE: 131 K/UL (ref 138–453)
PLATELET, IMMATURE FRACTION: 7.6 % (ref 1.1–10.3)
POTASSIUM SERPL-SCNC: 3.8 MMOL/L (ref 3.7–5.3)
POTASSIUM SERPL-SCNC: 3.9 MMOL/L (ref 3.7–5.3)
POTASSIUM SERPL-SCNC: 4.2 MMOL/L (ref 3.7–5.3)
POTASSIUM SERPL-SCNC: 5.1 MMOL/L (ref 3.7–5.3)
RBC # BLD: 3.98 M/UL (ref 3.95–5.11)
SERUM OSMOLALITY: 271 MOSM/KG (ref 275–295)
SERUM OSMOLALITY: 274 MOSM/KG (ref 275–295)
SODIUM BLD-SCNC: 120 MMOL/L (ref 135–144)
SODIUM BLD-SCNC: 131 MMOL/L (ref 135–144)
SODIUM BLD-SCNC: 131 MMOL/L (ref 135–144)
SODIUM BLD-SCNC: 133 MMOL/L (ref 135–144)
SODIUM,UR: 51 MMOL/L
WBC # BLD: 22.6 K/UL (ref 3.5–11.3)

## 2022-04-04 PROCEDURE — 83935 ASSAY OF URINE OSMOLALITY: CPT

## 2022-04-04 PROCEDURE — 6370000000 HC RX 637 (ALT 250 FOR IP): Performed by: NURSE PRACTITIONER

## 2022-04-04 PROCEDURE — 82947 ASSAY GLUCOSE BLOOD QUANT: CPT

## 2022-04-04 PROCEDURE — 97110 THERAPEUTIC EXERCISES: CPT

## 2022-04-04 PROCEDURE — 85055 RETICULATED PLATELET ASSAY: CPT

## 2022-04-04 PROCEDURE — 99232 SBSQ HOSP IP/OBS MODERATE 35: CPT | Performed by: INTERNAL MEDICINE

## 2022-04-04 PROCEDURE — 6370000000 HC RX 637 (ALT 250 FOR IP)

## 2022-04-04 PROCEDURE — 99024 POSTOP FOLLOW-UP VISIT: CPT | Performed by: NURSE PRACTITIONER

## 2022-04-04 PROCEDURE — 2580000003 HC RX 258: Performed by: NURSE PRACTITIONER

## 2022-04-04 PROCEDURE — 97530 THERAPEUTIC ACTIVITIES: CPT

## 2022-04-04 PROCEDURE — 84300 ASSAY OF URINE SODIUM: CPT

## 2022-04-04 PROCEDURE — 99222 1ST HOSP IP/OBS MODERATE 55: CPT | Performed by: INTERNAL MEDICINE

## 2022-04-04 PROCEDURE — 6370000000 HC RX 637 (ALT 250 FOR IP): Performed by: PHYSICIAN ASSISTANT

## 2022-04-04 PROCEDURE — 2700000000 HC OXYGEN THERAPY PER DAY

## 2022-04-04 PROCEDURE — 94761 N-INVAS EAR/PLS OXIMETRY MLT: CPT

## 2022-04-04 PROCEDURE — 6360000002 HC RX W HCPCS: Performed by: PHYSICIAN ASSISTANT

## 2022-04-04 PROCEDURE — 80048 BASIC METABOLIC PNL TOTAL CA: CPT

## 2022-04-04 PROCEDURE — 2580000003 HC RX 258: Performed by: INTERNAL MEDICINE

## 2022-04-04 PROCEDURE — 36415 COLL VENOUS BLD VENIPUNCTURE: CPT

## 2022-04-04 PROCEDURE — 83735 ASSAY OF MAGNESIUM: CPT

## 2022-04-04 PROCEDURE — 80051 ELECTROLYTE PANEL: CPT

## 2022-04-04 PROCEDURE — 97116 GAIT TRAINING THERAPY: CPT

## 2022-04-04 PROCEDURE — 76937 US GUIDE VASCULAR ACCESS: CPT

## 2022-04-04 PROCEDURE — 2140000001 HC CVICU INTERMEDIATE R&B

## 2022-04-04 PROCEDURE — 6370000000 HC RX 637 (ALT 250 FOR IP): Performed by: INTERNAL MEDICINE

## 2022-04-04 PROCEDURE — 85027 COMPLETE CBC AUTOMATED: CPT

## 2022-04-04 PROCEDURE — 83930 ASSAY OF BLOOD OSMOLALITY: CPT

## 2022-04-04 PROCEDURE — 71045 X-RAY EXAM CHEST 1 VIEW: CPT

## 2022-04-04 PROCEDURE — 97535 SELF CARE MNGMENT TRAINING: CPT

## 2022-04-04 PROCEDURE — 6370000000 HC RX 637 (ALT 250 FOR IP): Performed by: STUDENT IN AN ORGANIZED HEALTH CARE EDUCATION/TRAINING PROGRAM

## 2022-04-04 PROCEDURE — G0108 DIAB MANAGE TRN  PER INDIV: HCPCS

## 2022-04-04 RX ORDER — DEXTROSE MONOHYDRATE 50 MG/ML
INJECTION, SOLUTION INTRAVENOUS ONCE
Status: COMPLETED | OUTPATIENT
Start: 2022-04-04 | End: 2022-04-04

## 2022-04-04 RX ORDER — SODIUM CHLORIDE 450 MG/100ML
INJECTION, SOLUTION INTRAVENOUS CONTINUOUS
Status: DISCONTINUED | OUTPATIENT
Start: 2022-04-04 | End: 2022-04-05

## 2022-04-04 RX ORDER — MIDODRINE HYDROCHLORIDE 5 MG/1
10 TABLET ORAL
Status: DISCONTINUED | OUTPATIENT
Start: 2022-04-04 | End: 2022-04-06 | Stop reason: HOSPADM

## 2022-04-04 RX ADMIN — SODIUM CHLORIDE, PRESERVATIVE FREE 10 ML: 5 INJECTION INTRAVENOUS at 08:23

## 2022-04-04 RX ADMIN — DESMOPRESSIN ACETATE 20 MG: 0.2 TABLET ORAL at 21:32

## 2022-04-04 RX ADMIN — INSULIN LISPRO 2 UNITS: 100 INJECTION, SOLUTION INTRAVENOUS; SUBCUTANEOUS at 17:12

## 2022-04-04 RX ADMIN — SODIUM CHLORIDE, PRESERVATIVE FREE 10 ML: 5 INJECTION INTRAVENOUS at 21:32

## 2022-04-04 RX ADMIN — PANTOPRAZOLE SODIUM 40 MG: 40 TABLET, DELAYED RELEASE ORAL at 08:14

## 2022-04-04 RX ADMIN — SODIUM CHLORIDE: 4.5 INJECTION, SOLUTION INTRAVENOUS at 17:50

## 2022-04-04 RX ADMIN — AMIODARONE HYDROCHLORIDE 200 MG: 200 TABLET ORAL at 08:15

## 2022-04-04 RX ADMIN — CLOPIDOGREL 75 MG: 75 TABLET, FILM COATED ORAL at 08:14

## 2022-04-04 RX ADMIN — FUROSEMIDE 20 MG: 10 INJECTION, SOLUTION INTRAMUSCULAR; INTRAVENOUS at 08:17

## 2022-04-04 RX ADMIN — METOPROLOL TARTRATE 25 MG: 25 TABLET ORAL at 21:32

## 2022-04-04 RX ADMIN — DEXTROSE MONOHYDRATE: 50 INJECTION, SOLUTION INTRAVENOUS at 16:55

## 2022-04-04 RX ADMIN — AMIODARONE HYDROCHLORIDE 200 MG: 200 TABLET ORAL at 21:32

## 2022-04-04 RX ADMIN — INSULIN LISPRO 2 UNITS: 100 INJECTION, SOLUTION INTRAVENOUS; SUBCUTANEOUS at 08:21

## 2022-04-04 RX ADMIN — SODIUM CHLORIDE TAB 1 GM 1 G: 1 TAB at 08:15

## 2022-04-04 RX ADMIN — INSULIN GLARGINE 35 UNITS: 100 INJECTION, SOLUTION SUBCUTANEOUS at 08:22

## 2022-04-04 RX ADMIN — Medication 7.5 MG: at 11:40

## 2022-04-04 RX ADMIN — Medication 81 MG: at 08:15

## 2022-04-04 RX ADMIN — MIDODRINE HYDROCHLORIDE 10 MG: 5 TABLET ORAL at 15:04

## 2022-04-04 ASSESSMENT — ENCOUNTER SYMPTOMS
EYES NEGATIVE: 1
RESPIRATORY NEGATIVE: 1
ABDOMINAL PAIN: 1

## 2022-04-04 ASSESSMENT — PAIN DESCRIPTION - LOCATION: LOCATION: SHOULDER

## 2022-04-04 ASSESSMENT — PAIN DESCRIPTION - ORIENTATION: ORIENTATION: LEFT

## 2022-04-04 ASSESSMENT — PAIN DESCRIPTION - PAIN TYPE
TYPE: SURGICAL PAIN
TYPE: ACUTE PAIN

## 2022-04-04 ASSESSMENT — PAIN DESCRIPTION - DESCRIPTORS
DESCRIPTORS: ACHING;SORE
DESCRIPTORS: CRAMPING;ACHING

## 2022-04-04 ASSESSMENT — PAIN SCALES - WONG BAKER
WONGBAKER_NUMERICALRESPONSE: 0

## 2022-04-04 ASSESSMENT — PAIN SCALES - GENERAL
PAINLEVEL_OUTOF10: 0
PAINLEVEL_OUTOF10: 5
PAINLEVEL_OUTOF10: 5

## 2022-04-04 ASSESSMENT — PAIN DESCRIPTION - FREQUENCY: FREQUENCY: INTERMITTENT

## 2022-04-04 ASSESSMENT — PAIN DESCRIPTION - ONSET: ONSET: ON-GOING

## 2022-04-04 ASSESSMENT — PAIN DESCRIPTION - PROGRESSION: CLINICAL_PROGRESSION: NOT CHANGED

## 2022-04-04 ASSESSMENT — PAIN - FUNCTIONAL ASSESSMENT: PAIN_FUNCTIONAL_ASSESSMENT: ACTIVITIES ARE NOT PREVENTED

## 2022-04-04 NOTE — PROGRESS NOTES
[facility] Cardiothoracic Surgery  Progress Note    4/4/2022 10:07 AM  Surgeon: Surgeon CTS: Dr. Angeles London MD   S/P: CABGx3     Subjective:  Ms. Catalina Hamman   Resting in chair. NV well controlled now. No chest pains or SOB. Objective:  /60   Pulse 86   Temp 98.6 °F (37 °C) (Oral)   Resp 16   Ht 5' 5\" (1.651 m)   Wt 182 lb 15.7 oz (83 kg)   SpO2 92%   BMI 30.45 kg/m²     CV: no murmur noted, Normal S1, S2,  Lungs: clear to auscultation, no wheezes, rales, or rhonchi  Abd: normal bowel sounds   Lower Extremities: Trace edema  Saph Incison: no sign of drainage or infection  Sternal Incison: dressing applied-no excessive drainage or signs of infection noted  CXR-no concerns noted with CXR- needs cough and deep breath altectasis noted on left lower thorax  Labs: Labs reviewed today  CBC:   Recent Labs     04/02/22  0258 04/03/22  0351 04/04/22  0322   WBC 13.8* 11.3 22.6*   HGB 10.5* 11.0* 12.2   HCT 31.1* 31.2* 35.4*   MCV 91.2 90.2 88.9    148 See Reflexed IPF Result     BMP:   Recent Labs     04/02/22  0939 04/03/22  0351 04/04/22  0322   * 123* 120*   K 4.3 3.8 5.1   CL 90* 88* 88*   CO2 23 23 21   BUN 11 12 15   CREATININE 0.70 0.66 0.62       I/O: I/O last 3 completed shifts:   In: 1180 [P.O.:1180]  Out: 1315 [Urine:1570; Drains:185]  Scheduled Meds:   tolvaptan  7.5 mg Oral Once    polyethylene glycol  17 g Oral Daily    furosemide  20 mg IntraVENous Daily    amiodarone  200 mg Oral BID    docusate sodium  100 mg Oral BID    insulin glargine  35 Units SubCUTAneous Daily    sodium chloride  1 g Oral BID WC    insulin lispro  0-12 Units SubCUTAneous TID WC    insulin lispro  0-6 Units SubCUTAneous Nightly    sodium chloride flush  10 mL IntraVENous 2 times per day    aspirin  81 mg Oral Daily    clopidogrel  75 mg Oral Daily    metoprolol tartrate  25 mg Oral BID    atorvastatin  20 mg Oral Nightly    pantoprazole  40 mg Oral Daily    pantoprazole (PROTONIX) 40 mg injection  40 mg IntraVENous Daily    ipratropium-albuterol  1 ampule Inhalation Q4H WA     Continuous Infusions:   sodium chloride      propofol Stopped (03/29/22 1711)    dextrose      norepinephrine Stopped (03/29/22 1533)    nitroGLYCERIN Stopped (03/29/22 1853)     PRN Meds:sennosides-docusate sodium, ketorolac, sodium chloride flush, sodium chloride, ondansetron **OR** ondansetron, oxyCODONE-acetaminophen **OR** oxyCODONE-acetaminophen, fentanNYL **OR** fentanNYL, hydrALAZINE, metoprolol, diphenhydrAMINE, magnesium sulfate, albumin human, glucose, dextrose, glucagon (rDNA), dextrose, bisacodyl, potassium chloride **OR** potassium alternative oral replacement **OR** potassium chloride    Beta- Blocker: Yes  Aspirin: Yes  Lovenox: No  GI: Yes  Plavix: Yes  Coumadin: No  Statin: Yes  ACE: No    Daily Nursing Care:  Please keep SCDS in place as DVT prophylaxis  If not intubated, Please have patient use IS and acapella 10X/hr or during commercial breaks  Please continue BM management  Daily labs and CXR  Daily PT/OT and ambulation  Continue with Case Management for DC planning      Assessment/ Plan:    Worsening hyponatremia after NA interventions. Nephrology contacted  Pt doing well. Anticipate DC once Na improves.   Anticipate DC-home with [address], [place] - NP

## 2022-04-04 NOTE — CARE COORDINATION
[facility] Quality Flow/Interdisciplinary Rounds Progress Note    Quality Flow Rounds held on April 4, 2022 at [address] Attending:  Bedside nurse, Case manage, Unit leadership. Barriers to Discharge: Electrolyte imbalance    Anticipated Discharge Disposition: Home with [id number]    Readmission Risk              Risk of Unplanned Readmission:  11           Discussed patient goal for the day, patient clinical progression, and barriers to discharge. The following Goal(s) of the Day/Commitment(s) have been identified: Nephrology consult ordered today. Ty Simon RN  April 4, 2022      1151 - Received call from Chiquita Boo at [facility], discussed nephrology consult today, pt not discharging yet.

## 2022-04-04 NOTE — PROGRESS NOTES
Occupational Therapy  Facility/Department: [facility] CAR 1  Daily Treatment Note  NAME: Paola Carver  : 1960  MRN: [id number]    Date of Service: 2022    Discharge Recommendations:  Patient would benefit from continued therapy after discharge       Assessment   Performance deficits / Impairments: Decreased functional mobility ; Decreased endurance;Decreased ADL status; Decreased high-level IADLs;Decreased coordination;Decreased balance;Decreased safe awareness;Decreased cognition  Assessment: Pt continues with slow pace. Encouragement to all ADL tasks with steady verbal instruction throughout. Pt presents with flat affect throughout session. Pt will require continued OT services following discharge from acute care hospital  Treatment Diagnosis: CABG X3 3/29/22  Prognosis: Good  OT Education: ADL Adaptive Strategies;Transfer Training;Energy Conservation  Patient Education: Body mechanics and sternal precautions  Barriers to Learning: Fair+ return demonstration  REQUIRES OT FOLLOW UP: Yes  Activity Tolerance  Activity Tolerance: Patient Tolerated treatment well;Patient limited by fatigue  Activity Tolerance: Increased time d/t  fatigue  Safety Devices  Safety Devices in place: Yes  Type of devices: All fall risk precautions in place;Call light within reach; Patient at risk for falls; Left in chair;Nurse notified; Chair alarm in place  Restraints  Initially in place: No         Patient Diagnosis(es): The primary encounter diagnosis was Chest pain, unspecified type. A diagnosis of NSTEMI (non-ST elevated myocardial infarction) [facility]) was also pertinent to this visit. has a past medical history of Chronic back pain, Hypertension, Osteoarthritis, and Pneumothorax.   has a past surgical history that includes back surgery; Carpal tunnel release (Right); Appendectomy;  Tubal ligation; Cardiac catheterization (2022); and Coronary artery bypass graft (N/A, 3/29/2022). Restrictions  Restrictions/Precautions  Restrictions/Precautions: Cardiac,Surgical Protocols,Fall Risk,Up as Tolerated,General Precautions  Required Braces or Orthoses?: Yes  Required Braces or Orthoses  Other: Heart Hugger Brace  Position Activity Restriction  Sternal Precautions: 5# Lifting Restrictions,No Pulling,No Pushing  Sternal Precautions: CABG X3 3/29/22, wound vac  Other position/activity restrictions: Recent left rotator cuff repair, 12/1/21,  Subjective   General  Patient assessed for rehabilitation services?: Yes  Response to previous treatment: Patient with no complaints from previous session  Family / Caregiver Present: No  Diagnosis: NSTEMI. S/P CABG x3 (3/29/22). Subjective  Subjective: RN approved Pt to be seen for OT tx, Pt was agreeable and cooperative throughout therapy session. Flat affect throughout  Vital Signs  Patient Currently in Pain: Denies   Orientation  Orientation  Overall Orientation Status: Within Functional Limits  Objective    ADL  Grooming: Setup; Increased time to complete;Modified independent   UE Bathing: Setup; Increased time to complete;Stand by assistance  LE Bathing:  (Pt declined LB bath)  UE Dressing: Setup; Increased time to complete;Minimal assistance (hospital gown)  LE Dressing:  (Pt declined)  Toileting: Increased time to complete;Contact guard assistance (clothing mgt. Pt able to complete hygiene with hip hike technique)  Additional Comments: Pt demonstrates slow, steady progress and fair tolerance to activity. Min verbal cues for sternal precautions this session   Balance  Sitting Balance: Supervision  Standing Balance: Contact guard assistance  Standing Balance  Time: Stood ~2 minutes x2  Activity: static/dynamic standing, ADL tasks  Comment: SBA>CGA with RW no LOB. Pt c/o fatigue and not feeling well today.  Rn confirmed this  Functional Mobility  Functional - Mobility Device: Rolling Walker  Assist Level: Contact guard assistance  Functional Mobility Comments: Short distance in room with RW for toileting tasks. Toilet Transfers  Toilet - Technique: Ambulating  Equipment Used: Standard bedside commode  Toilet Transfer: Contact guard assistance;Stand by assistance  Toilet Transfers Comments: with fair+ safety demonstrated  Bed mobility  Comment: Pt up in bedside recliner and remains in bedside recliner following OT session  Transfers  Stand Pivot Transfers: Contact guard assistance  Sit to stand: Contact guard assistance  Stand to sit: Contact guard assistance  Transfer Comments: w/no LOB, G sternal precautions. Cognition  Overall Cognitive Status: [facility]  Cognition Comment: Pt moments of flat affect      Plan   Plan  Times per week: 4-6x/week (CABG)  Times per day: Daily  Current Treatment Recommendations: Endurance Training,Self-Care / ADL,Safety Education & Training,Pain Management,Cognitive/Perceptual Training,Functional Mobility Training,Balance Training,Equipment Evaluation, Education, & procurement,Patient/Caregiver Education & Training,Home Management Training    AM-PAC Score   AM-PAC Inpatient Daily Activity Raw Score: 19 (04/04/22 1609)  AM-PAC Inpatient ADL T-Scale Score : 40.22 (04/04/22 1609)  ADL Inpatient CMS 0-100% Score: 42.8 (04/04/22 1609)  ADL Inpatient CMS G-Code Modifier : CK (04/04/22 1609)    Goals  Short term goals  Time Frame for Short term goals: By Discharge  Short term goal 1: Pt will verbalize / demo Mod I and Good Carryover with Sternal Precautions during all Functional ADL tasks. Short term goal 2: Pt will verbalize / demo Mod I and Good Carryover with EC and Ax Pacing during all Functional Tasks and Mobility. Short term goal 3: Pt will verbalize / demo Mod I with Correct Use of AE / DME during all ADL / Functional Transfers. Short term goal 4: Pt will maintain Fair+ Dynamic Standing Balance (10-12 minutes) while participating in Functional / Leisure tasks.   Short term goal 5: Pt will participate in Item Retrieval / Transport / Functional Mobility with Mod I with Correct Use of AE / DME.        Therapy Time   Individual Concurrent Group Co-treatment   Time In 1526         Time Out 1555         Minutes 29         Timed Code Treatment Minutes: 632 [facility], [facility]/L

## 2022-04-04 NOTE — PLAN OF CARE
Problem:  Activity:  Goal: Risk for activity intolerance will decrease  Description: Risk for activity intolerance will decrease  Outcome: Ongoing  Goal: Will verbalize the importance of balancing activity with adequate rest periods  Description: Will verbalize the importance of balancing activity with adequate rest periods  Outcome: Ongoing  Goal: Ability to tolerate increased activity will improve  Description: Ability to tolerate increased activity will improve  Outcome: Ongoing     Problem: Cardiac:  Goal: Ability to maintain an adequate cardiac output will improve  Description: Ability to maintain an adequate cardiac output will improve  Outcome: Ongoing  Goal: Hemodynamic stability will improve  Description: Hemodynamic stability will improve  Outcome: Ongoing  Goal: Diagnostic test results will improve  Description: Diagnostic test results will improve  Outcome: Ongoing  Goal: Complications related to the disease process, condition or treatment will be avoided or minimized  Description: Complications related to the disease process, condition or treatment will be avoided or minimized  Outcome: Ongoing  Goal: Cerebral tissue perfusion will improve  Description: Cerebral tissue perfusion will improve  Outcome: Ongoing     Problem: Coping:  Goal: Ability to verbalize feelings about condition will improve  Description: Ability to verbalize feelings about condition will improve  Outcome: Ongoing  Goal: Ability to identify strategies to decrease anxiety will improve  Description: Ability to identify strategies to decrease anxiety will improve  Outcome: Ongoing  Goal: Ability to identify and alter barriers to satisfying sexual function will improve  Description: Ability to identify and alter barriers to satisfying sexual function will improve  Outcome: Ongoing  Goal: Ability to identify and develop effective coping behavior will improve  Description: Ability to identify and develop effective coping behavior will improve  Outcome: Ongoing     Problem: Health Behavior:  Goal: Ability to identify changes in lifestyle to reduce recurrence of condition will improve  Description: Ability to identify changes in lifestyle to reduce recurrence of condition will improve  Outcome: Ongoing  Goal: Ability to manage health-related needs will improve  Description: Ability to manage health-related needs will improve  Outcome: Ongoing  Goal: Identification of resources available to assist in meeting health care needs will improve  Description: Identification of resources available to assist in meeting health care needs will improve  Outcome: Ongoing     Problem: Nutritional:  Goal: Ability to identify appropriate dietary choices will improve  Description: Ability to identify appropriate dietary choices will improve  Outcome: Ongoing     Problem: Respiratory:  Goal: Ability to maintain adequate ventilation will improve  Description: Ability to maintain adequate ventilation will improve  Outcome: Ongoing  Goal: Levels of oxygenation will improve  Description: Levels of oxygenation will improve  Outcome: Ongoing     Problem: Sensory:  Goal: Pain level will decrease  Description: Pain level will decrease  Outcome: Ongoing     Problem: Falls - Risk of:  Goal: Will remain free from falls  Description: Will remain free from falls  Outcome: Ongoing  Goal: Absence of physical injury  Description: Absence of physical injury  Outcome: Ongoing     Problem: Infection:  Goal: Will remain free from infection  Description: Will remain free from infection  Outcome: Ongoing     Problem: Safety:  Goal: Free from accidental physical injury  Description: Free from accidental physical injury  Outcome: Ongoing  Goal: Free from intentional harm  Description: Free from intentional harm  Outcome: Ongoing     Problem: Daily Care:  Goal: Daily care needs are met  Description: Daily care needs are met  Outcome: Ongoing     Problem: Pain:  Description: Pain management should include both nonpharmacologic and pharmacologic interventions.   Goal: Pain level will decrease  Description: Pain level will decrease  Outcome: Ongoing  Goal: Patient's pain/discomfort is manageable  Description: Patient's pain/discomfort is manageable  Outcome: Ongoing  Goal: Control of acute pain  Description: Control of acute pain  Outcome: Ongoing  Goal: Control of chronic pain  Description: Control of chronic pain  Outcome: Ongoing     Problem: Skin Integrity:  Goal: Skin integrity will stabilize  Description: Skin integrity will stabilize  Outcome: Ongoing     Problem: Discharge Planning:  Goal: Patients continuum of care needs are met  Description: Patients continuum of care needs are met  Outcome: Ongoing     Problem: OXYGENATION/RESPIRATORY FUNCTION  Goal: Patient will maintain patent airway  Outcome: Ongoing  Goal: Patient will achieve/maintain normal respiratory rate/effort  Description: Respiratory rate and effort will be within normal limits for the patient  Outcome: Ongoing     Problem: SKIN INTEGRITY  Goal: Skin integrity is maintained or improved  Outcome: Ongoing     Problem: Nutrition  Goal: Optimal nutrition therapy  Outcome: Ongoing     Problem: Skin Integrity:  Goal: Will show no infection signs and symptoms  Description: Will show no infection signs and symptoms  Outcome: Ongoing  Goal: Absence of new skin breakdown  Description: Absence of new skin breakdown  Outcome: Ongoing
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
## 2022-04-04 NOTE — PROGRESS NOTES
[facility]  Internal Medicine Teaching Residency Program  Inpatient Daily Progress Note  ______________________________________________________________________________    Patient: Allie Díaz  YOB: 1960   YLL:[id number]    Acct: [de-identified]     Room: [address] [place] [address]  Admit date: 3/22/2022  Today's date: 04/04/22  Number of days in the hospital: 13    SUBJECTIVE   Admitting Diagnosis: 3-vessel CAD    CC: Chest pain  Status postop day 7 CABG x3. Patient seen and examined at bedside. No acute event overnight. Blood pressure is 110/50. Pulse 80. Saturating well on 2 L nasal cannula. Patient has a normal bowel movement yesterday. Labs reviewed and evaluated. Worsening hyponatremia sodium 120 this a.m. Leukocytosis WBC 23.6. Hemoglobin 12.2. Chest x-ray showed left-sided pleural effusion. Nephrology taken on board for worsening hyponatremia. ROS:  Constitutional:  negative for chills, fevers, sweats  Respiratory:  negative for cough, dyspnea on exertion, hemoptysis, shortness of breath, wheezing  Cardiovascular:  negative for chest pain, chest pressure/discomfort, lower extremity edema, palpitations  Gastrointestinal:  negative for abdominal pain, constipation, diarrhea, nausea, vomiting  Neurological:  negative for dizziness, headache    BRIEF HISTORY     The patient is a pleasant 58 y. o. female with a no PMHx formally diagnosed per the pt, EMR shows history of chronic back pain, hypertension, osteoarthritis, COPD and pneumothorax.  Pt presented with a chief complaint of chest pain.  Patient has midsternal chest pain that woke her up from sleep around 4 in the morning.  Patient states the pain is radiating to her neck into her jaw bilaterally.  Patient states it is hard to determine if the pain is traveling down either arm especially the left arm she has had rotator cuff surgery in the past.  Patient did endorse some mild associated shortness of breath and diaphoresis early this morning.  Patient received aspirin and nitroglycerin by EMS prior to arrival which alleviated the chest pain.  Patient denies any significant cardiac history.  Patient's blood pressure on admission was 177/101.  While in the emergency department patient's systolic blood pressure got up to 196.  Patient received Lopressor 25 mg and was started on Cozaar 25 mg.  Patient's blood pressure  Improved. Cardiology was taken on board for elevated troponin and underwent cardiac cath on . Found to have multivessel coronary artery disease with preserved LV function. CT surgery was consulted for CABG and underwent successful surgery CABG x3 without any complication. CARDIAC CATH 3/23/22  LMCA: Ostial 25% stenosis     LAD: Mid 100% stenosis, collateral from RCA     D1: Proximal 70% stenosis, and mid 90% stenosis     LCx: Mid 75% stenosis     OM1 and OM2 ostial 80% stenosis     RCA: Ostial 30% stenosis     PDA: Proximal 80% stenosis     PL branch 75% stenosis     Procedure Summary  Multivessel CAD  Preserved LV function  Recommendations  CV surgery consult for CABG     ECHO 3/23/2022  Summary  Left ventricle is normal in size, global left ventricular systolic function  is low normal, calculated ejection fraction is 52%. Tricuspid valve was not well visualized. Trivial tricuspid regurgitation. Insignificant tricuspid regurgitation, unable to estimate RVSP    OBJECTIVE     Vital Signs:  BP (!) 105/50   Pulse 80   Temp 98.1 °F (36.7 °C) (Oral)   Resp 18   Ht 5' 5\" (1.651 m)   Wt 182 lb 15.7 oz (83 kg)   SpO2 95%   BMI 30.45 kg/m²     Temp (24hrs), Av.3 °F (36.8 °C), Min:98 °F (36.7 °C), Max:99 °F (37.2 °C)    In: 1180   Out: 1155 [Urine:970; Drains:185]    Physical Exam:  Physical Exam  Vitals and nursing note reviewed. Constitutional:       Appearance: Normal appearance. HENT:      Head: Normocephalic and atraumatic.       Nose: Nose normal.      Mouth/Throat: Mouth: Mucous membranes are moist.      Pharynx: Oropharynx is clear. Eyes:      Extraocular Movements: Extraocular movements intact. Conjunctiva/sclera: Conjunctivae normal.      Pupils: Pupils are equal, round, and reactive to light. Cardiovascular:      Rate and Rhythm: Normal rate and regular rhythm. Pulses: Normal pulses. Heart sounds: Normal heart sounds. No murmur heard. No friction rub. No gallop. Pulmonary:      Effort: Pulmonary effort is normal. No respiratory distress. Breath sounds: Normal breath sounds. No stridor. No wheezing, rhonchi or rales. Chest:      Chest wall: Tenderness present. Abdominal:      General: Abdomen is flat. Bowel sounds are normal. There is no distension. Palpations: Abdomen is soft. There is no mass. Tenderness: There is no abdominal tenderness. There is no guarding or rebound. Hernia: No hernia is present. Musculoskeletal:         General: No swelling, tenderness, deformity or signs of injury. Normal range of motion. Cervical back: Normal range of motion. Right lower leg: No edema. Left lower leg: No edema. Skin:     General: Skin is warm. Neurological:      General: No focal deficit present. Mental Status: She is alert and oriented to person, place, and time. Mental status is at baseline. Psychiatric:         Mood and Affect: Mood normal.         Behavior: Behavior normal.         Thought Content:  Thought content normal.         Judgment: Judgment normal.           Medications:  Scheduled Medications:    polyethylene glycol  17 g Oral Daily    furosemide  20 mg IntraVENous Daily    amiodarone  200 mg Oral BID    docusate sodium  100 mg Oral BID    insulin glargine  35 Units SubCUTAneous Daily    sodium chloride  1 g Oral BID     insulin lispro  0-12 Units SubCUTAneous TID WC    insulin lispro  0-6 Units SubCUTAneous Nightly    sodium chloride flush  10 mL IntraVENous 2 times per day    aspirin  81 mg Oral Daily    clopidogrel  75 mg Oral Daily    metoprolol tartrate  25 mg Oral BID    atorvastatin  20 mg Oral Nightly    pantoprazole  40 mg Oral Daily    pantoprazole (PROTONIX) 40 mg injection  40 mg IntraVENous Daily    ipratropium-albuterol  1 ampule Inhalation Q4H WA     Continuous Infusions:    sodium chloride      propofol Stopped (03/29/22 1711)    dextrose      norepinephrine Stopped (03/29/22 1533)    nitroGLYCERIN Stopped (03/29/22 1853)     PRN Medicationssennosides-docusate sodium, 2 tablet, Daily PRN  ketorolac, 15 mg, Q6H PRN  sodium chloride flush, 10 mL, PRN  sodium chloride, 25 mL, PRN  ondansetron, 4 mg, Q8H PRN   Or  ondansetron, 4 mg, Q6H PRN  oxyCODONE-acetaminophen, 1 tablet, Q4H PRN   Or  oxyCODONE-acetaminophen, 2 tablet, Q4H PRN  fentanNYL, 25 mcg, Q1H PRN   Or  fentanNYL, 50 mcg, Q1H PRN  hydrALAZINE, 5 mg, Q5 Min PRN  metoprolol, 2.5 mg, Q10 Min PRN  diphenhydrAMINE, 25 mg, Nightly PRN  magnesium sulfate, 2,000 mg, PRN  albumin human, 25 g, PRN  glucose, 15 g, PRN  dextrose, 12.5 g, PRN  glucagon (rDNA), 1 mg, PRN  dextrose, 100 mL/hr, PRN  bisacodyl, 5 mg, Daily PRN  potassium chloride, 40 mEq, PRN   Or  potassium alternative oral replacement, 40 mEq, PRN   Or  potassium chloride, 10 mEq, PRN        Diagnostic Labs:  CBC:   Recent Labs     04/02/22  0258 04/03/22 0351 04/04/22 0322   WBC 13.8* 11.3 22.6*   RBC 3.41* 3.46* 3.98   HGB 10.5* 11.0* 12.2   HCT 31.1* 31.2* 35.4*   MCV 91.2 90.2 88.9   RDW 12.6 12.5 12.7    148 See Reflexed IPF Result     BMP:   Recent Labs     04/02/22  0939 04/03/22 0351 04/04/22 0322   * 123* 120*   K 4.3 3.8 5.1   CL 90* 88* 88*   CO2 23 23 21   BUN 11 12 15   CREATININE 0.70 0.66 0.62     BNP: No results for input(s): BNP in the last 72 hours. PT/INR:   Recent Labs     04/02/22  0258 04/03/22  0351   PROTIME 12.1 11.0   INR 1.1 1.0     APTT:   No results for input(s): APTT in the last 72 hours.   CARDIAC ENZYMES: No results for input(s): CKMB, CKMBINDEX, TROPONINI in the last 72 hours. Invalid input(s): CKTOTAL;3  FASTING LIPID PANEL:  Lab Results   Component Value Date    CHOL 208 (H) 03/22/2018    HDL 67 03/22/2018    TRIG 59 03/22/2018     LIVER PROFILE: No results for input(s): AST, ALT, ALB, BILIDIR, BILITOT, ALKPHOS in the last 72 hours. MICROBIOLOGY:   Lab Results   Component Value Date/Time    CULTURE WRONG TEST ORDERED 03/23/2022 03:22 PM       Imaging:    XR CHEST PORTABLE    Result Date: 3/30/2022  Interval extubation. Unchanged mild bibasilar atelectasis. XR CHEST PORTABLE    Result Date: 3/29/2022  1. Lines and tubes in expected position as above. 2. Interval sternotomy. 3. Linear opacities throughout both lungs, which could be due to atelectasis       ASSESSMENT & PLAN     Assessment and Plan:    Principal Problem:    3-vessel CAD  Active Problems:    Essential hypertension    NSTEMI (non-ST elevated myocardial infarction) ([facility] [address] [address]    Type 2 diabetes mellitus without complication, without long-term current use of insulin ([facility])    Hypomagnesemia    Hypotension  Resolved Problems:    Hypokalemia    Multivessel CAD  - POD 7 for CABG 3/29/22 w/ SLIMA to LAD, RSVG1 to OM, RSVG2 to RPDA   - On dual antiplatelet therapy ASA and Plavix. Lipitor 20 mg nightly. - Advance diet per CT surgery      Essential hypertension - stable  - Cozaar 50 mg PO daily home med  - Lopressor 25 PO BID     Constipation: Resolved  Received Colace and GlycoLax. Will try milk of magnesia. Potassium was replaced 20 mEq intravenously. Hyponatremia:   Sodium level is fluctuating. This a.m. it is 120. Continue with salt tablet. Ordered serum and urine osmolality. Urine sodium.   Nephrology taken on board and recommend 1 dose of Samsca 7.5 MG with a goal to increase sodium by 4-6 mmol/L in the next 24 hours    Hypotension: Resolved    Newly diagnosed type 2 diabetes mellitus  -HbA1c 10.7.  -Medium dose sliding scale and Lantus 35 units daily.  -Glucose checks 4 times daily before meals and at bedtime POC glucose   -Diabetic education       Hypokalemia  - Daily labs  - Replace electrolytes as needed  - Keep above 4     Hypomagnesemia  - Daily lab  - Replace electrolytes as needed   - Keep above 2     DVT ppx: SCD as per CTS team.     PT/OT/SW: Consulted. Discharge Planning: Will be discharged home with home care once sodium improved. Magdalena Hoover MD  Internal Medicine Resident, PGY-1  [address]  5/8/3307,9:92 AM     I have discussed the care of Herlinda Aleman , including pertinent history and exam findings,    today with the resident. I have seen and examined the patient and the key elements of all parts of the encounter have been performed by me . I agree with the assessment, plan and orders as documented by the resident. Principal Problem:    3-vessel CAD  Active Problems:    Essential hypertension    NSTEMI (non-ST elevated myocardial infarction) ([facility] [address]    Type 2 diabetes mellitus without complication, without long-term current use of insulin ([facility])    Hypomagnesemia    Hypotension  Resolved Problems:    Hypokalemia        Overall  course ;                                   are improving over time.         Hyponatremia, patient given 1 dose of Samsca  Chest pain is controlled  Diabetes, blood sugar better controlled          Electronically signed by Vivek Diaz MD

## 2022-04-04 NOTE — PROGRESS NOTES
Inpatient Diabetes Education    Destiny Leon was seen for evaluation and education on Type 2 uncontrolled    Lab Results   Component Value Date    LABA1C 10.7 (H) 04/02/2022    LABA1C 11.3 (H) 03/23/2022     Please see previous notes from 3-28-22 and 3-31-22. Previously taught information reinforced. Did a deep dive on the concept of basal/bolus insulin. Additional materials added to her folder of information that was previously given. Demonstrated use of insulin pen again. Patient states that she remembers previous teaching and demonstrations.      · Verbally reviewed following information with patient  Survival skills Diagnosis, A1C, Blood glucose targets, hypo and hyperglycemia, importance of home blood glucose monitoring, heathy eating  plate method for CHO control portions, be active as recommended by health care providers, take medications oral and or insulin as directed      OLIVER MONTOYA RN

## 2022-04-04 NOTE — PROGRESS NOTES
nephro contacted regarding renal US noted in their note. Resident states they are not going to do that at this time.

## 2022-04-04 NOTE — PROGRESS NOTES
Physical Therapy  Facility/Department: [facility]  Daily Treatment Note  NAME: Radha Marino  : 1960  MRN: [id number]    Date of Service: 2022  Further therapy recommended at discharge. The patient should be able to tolerate at least 3 hours of therapy per day over 5 days or 15 hours over 7 days. This patient may benefit from a Physical Medicine and Rehab consult. Discharge Recommendations:  Patient would benefit from continued therapy after discharge   PT Equipment Recommendations  Equipment Needed: No    Assessment   Body structures, Functions, Activity limitations: Decreased functional mobility ; Decreased endurance;Decreased strength  Assessment: Pt stated she was up all night with morales amb 3 ft x4 bsc<>chair with SBA/CGA, pt mdeclined any additional amb, asked for a check bsck, pt performed breathing exercises and seated BLE ther ex 10-15, pt reeducated on sternal precautions. Patient will need further PT to regain functional independence. Prognosis: Good  Decision Making: Medium Complexity  PT Education: Goals;Plan of Care;PT Role;Transfer Training;General Safety;Precautions; Functional Mobility Training;Energy Conservation  Patient Education: ther ex, sternal precautions  Barriers to Learning: none  REQUIRES PT FOLLOW UP: Yes  Activity Tolerance  Activity Tolerance: Patient limited by endurance; Patient limited by fatigue;Patient limited by pain  Activity Tolerance: spo2 >90 t/o treatment session     Patient Diagnosis(es): The primary encounter diagnosis was Chest pain, unspecified type. A diagnosis of NSTEMI (non-ST elevated myocardial infarction) [facility]) was also pertinent to this visit. has a past medical history of Chronic back pain, Hypertension, Osteoarthritis, and Pneumothorax.   has a past surgical history that includes back surgery; Carpal tunnel release (Right); Appendectomy;  Tubal ligation; Cardiac catheterization (2022); and Coronary artery bypass graft (N/A, 3/29/2022). Restrictions  Restrictions/Precautions  Restrictions/Precautions: Cardiac,Surgical Protocols,Fall Risk,Up as Tolerated  Required Braces or Orthoses?: Yes  Required Braces or Orthoses  Other: Heart Hugger Brace  Position Activity Restriction  Sternal Precautions: 5# Lifting Restrictions,No Pulling,No Pushing  Sternal Precautions: CABG X3 3/29/22, wound vac  Other position/activity restrictions: Recent left rotator cuff repair, 12/1/21,  Subjective   General  Chart Reviewed: Yes  Response To Previous Treatment: Patient with no complaints from previous session. ;Patient reporting fatigue but able to participate. Family / Caregiver Present: No  Subjective  Subjective: RN and pt agreeable to PT.  Pt on BSC on arrival, pt agreeable and pleasant, stated she is very fatigued dt juan alberto, stated did not sleep very much last night  General Comment  Comments: pt reatired to recliner chair , given callight , chair alarm set  Pain Screening  Patient Currently in Pain: Yes  Pain Assessment  Pain Assessment: 0-10  Pain Level: 5  Patient's Stated Pain Goal: No pain  Pain Type: Surgical pain  Pain Location:  (stomach, cramps and nausea)  Pain Descriptors: Cramping;Aching  Vital Signs  Patient Currently in Pain: Yes       Orientation  Orientation  Overall Orientation Status: Within Normal Limits  Cognition   Cognition  Overall Cognitive Status: WFL  Cognition Comment: Pt moments of flat affect  Objective   Bed mobility  Supine to Sit: Unable to assess  Sit to Supine: Unable to assess  Scooting: Stand by assistance  Comment: Pt upin chair upon arrival and exit  Transfers  Sit to Stand: Stand by assistance  Stand to sit: Stand by assistance  Bed to Chair: Stand by assistance  Stand Pivot Transfers: Contact guard assistance (CGA for bsc to recliner chair)  Comment: STS x4 performed with RW, cues for sternal precautions, 2 BSC transfers, Cues for use of heart hugger with good return,  Ambulation  Ambulation?: Yes  More Ambulation?: No  Ambulation 1  Surface: level tile  Device: Rolling Walker  Other Apparatus: O2  Assistance: Contact guard assistance  Quality of Gait: guarded  Gait Deviations: Slow Angella;Decreased step length;Decreased step height  Distance: 3 ft x4  Comments: Pt ambulated from bed side commode to chair x4, states tired from not sleeping lastbnight, deferred  additional mobility at this time  Stairs/Curb  Stairs?: No     Balance  Posture: Fair  Sitting - Static: Good  Sitting - Dynamic: Good  Standing - Static: Fair  Standing - Dynamic: Fair  Comments: stood with RW  Other exercises  Other exercises?: Yes  Other exercises 1:Seated LE exercise program: Long Arc Quads, hip abduction/adduction, heel/toe raises, and marches. Reps: 15  Other exercises 3: incentive spirometer and acapella x10        AM-PAC Score  AM-PAC Inpatient Mobility Raw Score : 18 (04/04/22 1443)  AM-PAC Inpatient T-Scale Score : 43.63 (04/04/22 1443)  Mobility Inpatient CMS 0-100% Score: 46.58 (04/04/22 1443)  Mobility Inpatient CMS G-Code Modifier : CK (04/04/22 1443)          Goals  Short term goals  Time Frame for Short term goals: 14 visits  Short term goal 1: Complete transfers with mod I and appropriate device  Short term goal 2: Complete 300 ft of gait with mod I and appropriate device  Short term goal 3: Complete 2 steps with no HR and mod I  Short term goal 4: Participate in 30 minutes of therapy to promote endurance  Short term goal 5: Be independent with cardiac HEP  Patient Goals   Patient goals :  To go home    Plan    Plan  Times per week: 7x per week, 1-2x per day  Times per day: Daily  Current Treatment Recommendations: Alecia Sanchez Re-education,Patient/Caregiver Education & Training,Equipment Evaluation, Education, & procurement,Balance Training,Gait Training,Home Exercise Program,Functional Mobility Training,Stair training,Safety Education & Training  Safety Devices  Type of devices:  All fall risk precautions in place,Gait belt,Call light within reach,Left in chair,Nurse notified,Chair alarm in place  Restraints  Initially in place: No     Therapy Time   Individual Concurrent Group Co-treatment   Time In 1234         Time Out 1313         Minutes 39         Timed Code Treatment Minutes: [facility], hospitals After evaluating the patient it has been determined they are at risk for postpartum hemorrhage.

## 2022-04-05 ENCOUNTER — APPOINTMENT (OUTPATIENT)
Dept: GENERAL RADIOLOGY | Age: 62
DRG: 234 | End: 2022-04-05
Payer: COMMERCIAL

## 2022-04-05 LAB
ANION GAP SERPL CALCULATED.3IONS-SCNC: 11 MMOL/L (ref 9–17)
ANION GAP SERPL CALCULATED.3IONS-SCNC: 12 MMOL/L (ref 9–17)
BUN BLDV-MCNC: 8 MG/DL (ref 8–23)
BUN BLDV-MCNC: 9 MG/DL (ref 8–23)
BUN BLDV-MCNC: 9 MG/DL (ref 8–23)
CALCIUM SERPL-MCNC: 8.1 MG/DL (ref 8.6–10.4)
CALCIUM SERPL-MCNC: 8.2 MG/DL (ref 8.6–10.4)
CALCIUM SERPL-MCNC: 8.6 MG/DL (ref 8.6–10.4)
CHLORIDE BLD-SCNC: 92 MMOL/L (ref 98–107)
CHLORIDE BLD-SCNC: 96 MMOL/L (ref 98–107)
CHLORIDE BLD-SCNC: 97 MMOL/L (ref 98–107)
CHLORIDE BLD-SCNC: 98 MMOL/L (ref 98–107)
CHLORIDE BLD-SCNC: 99 MMOL/L (ref 98–107)
CO2: 23 MMOL/L (ref 20–31)
CO2: 24 MMOL/L (ref 20–31)
CO2: 25 MMOL/L (ref 20–31)
CO2: 26 MMOL/L (ref 20–31)
CO2: 26 MMOL/L (ref 20–31)
CREAT SERPL-MCNC: 0.59 MG/DL (ref 0.5–0.9)
CREAT SERPL-MCNC: 0.6 MG/DL (ref 0.5–0.9)
CREAT SERPL-MCNC: 0.64 MG/DL (ref 0.5–0.9)
CREAT SERPL-MCNC: 0.71 MG/DL (ref 0.5–0.9)
CREAT SERPL-MCNC: 0.72 MG/DL (ref 0.5–0.9)
GFR AFRICAN AMERICAN: >60 ML/MIN
GFR NON-AFRICAN AMERICAN: >60 ML/MIN
GFR SERPL CREATININE-BSD FRML MDRD: ABNORMAL ML/MIN/{1.73_M2}
GLUCOSE BLD-MCNC: 118 MG/DL (ref 65–105)
GLUCOSE BLD-MCNC: 128 MG/DL (ref 70–99)
GLUCOSE BLD-MCNC: 165 MG/DL (ref 65–105)
GLUCOSE BLD-MCNC: 189 MG/DL (ref 65–105)
GLUCOSE BLD-MCNC: 191 MG/DL (ref 65–105)
GLUCOSE BLD-MCNC: 191 MG/DL (ref 70–99)
GLUCOSE BLD-MCNC: 243 MG/DL (ref 70–99)
GLUCOSE BLD-MCNC: 274 MG/DL (ref 70–99)
GLUCOSE BLD-MCNC: 98 MG/DL (ref 70–99)
HCT VFR BLD CALC: 31.5 % (ref 36.3–47.1)
HEMOGLOBIN: 10.8 G/DL (ref 11.9–15.1)
MAGNESIUM: 2.1 MG/DL (ref 1.6–2.6)
MCH RBC QN AUTO: 30.9 PG (ref 25.2–33.5)
MCHC RBC AUTO-ENTMCNC: 34.3 G/DL (ref 28.4–34.8)
MCV RBC AUTO: 90 FL (ref 82.6–102.9)
NRBC AUTOMATED: 0 PER 100 WBC
PDW BLD-RTO: 12.8 % (ref 11.8–14.4)
PLATELET # BLD: 90 K/UL (ref 138–453)
PMV BLD AUTO: 11.4 FL (ref 8.1–13.5)
POTASSIUM SERPL-SCNC: 3.6 MMOL/L (ref 3.7–5.3)
POTASSIUM SERPL-SCNC: 3.7 MMOL/L (ref 3.7–5.3)
POTASSIUM SERPL-SCNC: 3.8 MMOL/L (ref 3.7–5.3)
POTASSIUM SERPL-SCNC: 4 MMOL/L (ref 3.7–5.3)
POTASSIUM SERPL-SCNC: 4.1 MMOL/L (ref 3.7–5.3)
RBC # BLD: 3.5 M/UL (ref 3.95–5.11)
SODIUM BLD-SCNC: 129 MMOL/L (ref 135–144)
SODIUM BLD-SCNC: 132 MMOL/L (ref 135–144)
SODIUM BLD-SCNC: 132 MMOL/L (ref 135–144)
SODIUM BLD-SCNC: 134 MMOL/L (ref 135–144)
SODIUM BLD-SCNC: 135 MMOL/L (ref 135–144)
WBC # BLD: 14.1 K/UL (ref 3.5–11.3)

## 2022-04-05 PROCEDURE — 94640 AIRWAY INHALATION TREATMENT: CPT

## 2022-04-05 PROCEDURE — 36415 COLL VENOUS BLD VENIPUNCTURE: CPT

## 2022-04-05 PROCEDURE — 82947 ASSAY GLUCOSE BLOOD QUANT: CPT

## 2022-04-05 PROCEDURE — 6370000000 HC RX 637 (ALT 250 FOR IP): Performed by: NURSE PRACTITIONER

## 2022-04-05 PROCEDURE — 6370000000 HC RX 637 (ALT 250 FOR IP): Performed by: PHYSICIAN ASSISTANT

## 2022-04-05 PROCEDURE — 2140000001 HC CVICU INTERMEDIATE R&B

## 2022-04-05 PROCEDURE — 97110 THERAPEUTIC EXERCISES: CPT

## 2022-04-05 PROCEDURE — 99024 POSTOP FOLLOW-UP VISIT: CPT | Performed by: NURSE PRACTITIONER

## 2022-04-05 PROCEDURE — 83735 ASSAY OF MAGNESIUM: CPT

## 2022-04-05 PROCEDURE — 80048 BASIC METABOLIC PNL TOTAL CA: CPT

## 2022-04-05 PROCEDURE — 99232 SBSQ HOSP IP/OBS MODERATE 35: CPT | Performed by: INTERNAL MEDICINE

## 2022-04-05 PROCEDURE — 2580000003 HC RX 258: Performed by: NURSE PRACTITIONER

## 2022-04-05 PROCEDURE — 6370000000 HC RX 637 (ALT 250 FOR IP)

## 2022-04-05 PROCEDURE — 6360000002 HC RX W HCPCS: Performed by: INTERNAL MEDICINE

## 2022-04-05 PROCEDURE — 6370000000 HC RX 637 (ALT 250 FOR IP): Performed by: THORACIC SURGERY (CARDIOTHORACIC VASCULAR SURGERY)

## 2022-04-05 PROCEDURE — 85027 COMPLETE CBC AUTOMATED: CPT

## 2022-04-05 PROCEDURE — 2700000000 HC OXYGEN THERAPY PER DAY

## 2022-04-05 PROCEDURE — 2580000003 HC RX 258: Performed by: INTERNAL MEDICINE

## 2022-04-05 PROCEDURE — 97116 GAIT TRAINING THERAPY: CPT

## 2022-04-05 PROCEDURE — 94761 N-INVAS EAR/PLS OXIMETRY MLT: CPT

## 2022-04-05 PROCEDURE — 71045 X-RAY EXAM CHEST 1 VIEW: CPT

## 2022-04-05 RX ORDER — INSULIN GLARGINE 100 [IU]/ML
35 INJECTION, SOLUTION SUBCUTANEOUS NIGHTLY
Qty: 5 PEN | Refills: 2 | Status: SHIPPED | OUTPATIENT
Start: 2022-04-05 | End: 2022-05-27 | Stop reason: SDUPTHER

## 2022-04-05 RX ORDER — AMIODARONE HYDROCHLORIDE 200 MG/1
200 TABLET ORAL 2 TIMES DAILY
Qty: 30 TABLET | Refills: 0 | Status: SHIPPED | OUTPATIENT
Start: 2022-04-05 | End: 2022-04-06

## 2022-04-05 RX ORDER — PANTOPRAZOLE SODIUM 40 MG/1
40 TABLET, DELAYED RELEASE ORAL DAILY
Qty: 30 TABLET | Refills: 3 | Status: SHIPPED | OUTPATIENT
Start: 2022-04-06 | End: 2022-04-06

## 2022-04-05 RX ORDER — BLOOD-GLUCOSE METER
1 KIT MISCELLANEOUS DAILY PRN
Qty: 1 KIT | Refills: 0 | Status: SHIPPED | OUTPATIENT
Start: 2022-04-05

## 2022-04-05 RX ORDER — OXYCODONE HYDROCHLORIDE AND ACETAMINOPHEN 5; 325 MG/1; MG/1
1 TABLET ORAL EVERY 8 HOURS PRN
Qty: 15 TABLET | Refills: 0 | Status: SHIPPED | OUTPATIENT
Start: 2022-04-05 | End: 2022-04-06

## 2022-04-05 RX ORDER — SENNA AND DOCUSATE SODIUM 50; 8.6 MG/1; MG/1
2 TABLET, FILM COATED ORAL DAILY
Qty: 15 TABLET | Refills: 0 | Status: SHIPPED | OUTPATIENT
Start: 2022-04-05 | End: 2022-04-06

## 2022-04-05 RX ORDER — DEXTROSE MONOHYDRATE 50 MG/ML
INJECTION, SOLUTION INTRAVENOUS ONCE
Status: COMPLETED | OUTPATIENT
Start: 2022-04-05 | End: 2022-04-05

## 2022-04-05 RX ORDER — CHLOROTHIAZIDE SODIUM 500 MG/1
250 INJECTION INTRAVENOUS ONCE
Status: DISCONTINUED | OUTPATIENT
Start: 2022-04-05 | End: 2022-04-06 | Stop reason: HOSPADM

## 2022-04-05 RX ORDER — DESMOPRESSIN ACETATE 4 UG/ML
1 INJECTION, SOLUTION INTRAVENOUS; SUBCUTANEOUS ONCE
Status: COMPLETED | OUTPATIENT
Start: 2022-04-05 | End: 2022-04-05

## 2022-04-05 RX ORDER — CLOPIDOGREL BISULFATE 75 MG/1
75 TABLET ORAL DAILY
Qty: 30 TABLET | Refills: 3 | Status: SHIPPED | OUTPATIENT
Start: 2022-04-06 | End: 2022-04-06

## 2022-04-05 RX ORDER — LANCETS 30 GAUGE
1 EACH MISCELLANEOUS 3 TIMES DAILY
Qty: 100 EACH | Refills: 0 | Status: SHIPPED | OUTPATIENT
Start: 2022-04-05 | End: 2022-06-01 | Stop reason: SDUPTHER

## 2022-04-05 RX ORDER — INSULIN LISPRO 100 [IU]/ML
1-3 INJECTION, SOLUTION INTRAVENOUS; SUBCUTANEOUS
Qty: 1 PEN | Refills: 0 | Status: SHIPPED | OUTPATIENT
Start: 2022-04-05 | End: 2022-04-29 | Stop reason: SDUPTHER

## 2022-04-05 RX ORDER — GLUCOSAMINE HCL/CHONDROITIN SU 500-400 MG
1 CAPSULE ORAL DAILY
Qty: 100 EACH | Refills: 1 | Status: SHIPPED | OUTPATIENT
Start: 2022-04-05

## 2022-04-05 RX ORDER — ONDANSETRON 4 MG/1
4 TABLET, ORALLY DISINTEGRATING ORAL EVERY 8 HOURS PRN
Qty: 30 TABLET | Refills: 0 | Status: SHIPPED | OUTPATIENT
Start: 2022-04-05 | End: 2022-04-06

## 2022-04-05 RX ORDER — DOCUSATE SODIUM 100 MG/1
100 CAPSULE, LIQUID FILLED ORAL 2 TIMES DAILY
Qty: 30 CAPSULE | Refills: 0 | Status: SHIPPED | OUTPATIENT
Start: 2022-04-05 | End: 2022-04-06

## 2022-04-05 RX ORDER — ASPIRIN 81 MG/1
81 TABLET ORAL DAILY
Qty: 30 TABLET | Refills: 3 | Status: SHIPPED | OUTPATIENT
Start: 2022-04-06 | End: 2022-04-06

## 2022-04-05 RX ORDER — ATORVASTATIN CALCIUM 20 MG/1
20 TABLET, FILM COATED ORAL NIGHTLY
Qty: 30 TABLET | Refills: 3 | Status: SHIPPED | OUTPATIENT
Start: 2022-04-05 | End: 2022-04-06

## 2022-04-05 RX ADMIN — METOPROLOL TARTRATE 25 MG: 25 TABLET ORAL at 20:03

## 2022-04-05 RX ADMIN — DOCUSATE SODIUM 100 MG: 100 CAPSULE ORAL at 20:03

## 2022-04-05 RX ADMIN — IPRATROPIUM BROMIDE AND ALBUTEROL SULFATE 1 AMPULE: .5; 3 SOLUTION RESPIRATORY (INHALATION) at 07:42

## 2022-04-05 RX ADMIN — PANTOPRAZOLE SODIUM 40 MG: 40 TABLET, DELAYED RELEASE ORAL at 08:35

## 2022-04-05 RX ADMIN — OXYCODONE HYDROCHLORIDE AND ACETAMINOPHEN 2 TABLET: 5; 325 TABLET ORAL at 21:31

## 2022-04-05 RX ADMIN — METOPROLOL TARTRATE 25 MG: 25 TABLET ORAL at 08:35

## 2022-04-05 RX ADMIN — DEXTROSE MONOHYDRATE: 50 INJECTION, SOLUTION INTRAVENOUS at 11:57

## 2022-04-05 RX ADMIN — IPRATROPIUM BROMIDE AND ALBUTEROL SULFATE 1 AMPULE: .5; 3 SOLUTION RESPIRATORY (INHALATION) at 11:22

## 2022-04-05 RX ADMIN — INSULIN GLARGINE 35 UNITS: 100 INJECTION, SOLUTION SUBCUTANEOUS at 08:36

## 2022-04-05 RX ADMIN — IPRATROPIUM BROMIDE AND ALBUTEROL SULFATE 1 AMPULE: .5; 3 SOLUTION RESPIRATORY (INHALATION) at 19:41

## 2022-04-05 RX ADMIN — OXYCODONE HYDROCHLORIDE AND ACETAMINOPHEN 2 TABLET: 5; 325 TABLET ORAL at 08:35

## 2022-04-05 RX ADMIN — INSULIN LISPRO 1 UNITS: 100 INJECTION, SOLUTION INTRAVENOUS; SUBCUTANEOUS at 12:00

## 2022-04-05 RX ADMIN — Medication 81 MG: at 08:35

## 2022-04-05 RX ADMIN — AMIODARONE HYDROCHLORIDE 200 MG: 200 TABLET ORAL at 08:35

## 2022-04-05 RX ADMIN — CLOPIDOGREL 75 MG: 75 TABLET, FILM COATED ORAL at 08:35

## 2022-04-05 RX ADMIN — AMIODARONE HYDROCHLORIDE 200 MG: 200 TABLET ORAL at 20:03

## 2022-04-05 RX ADMIN — MIDODRINE HYDROCHLORIDE 10 MG: 5 TABLET ORAL at 08:35

## 2022-04-05 RX ADMIN — IPRATROPIUM BROMIDE AND ALBUTEROL SULFATE 1 AMPULE: .5; 3 SOLUTION RESPIRATORY (INHALATION) at 15:17

## 2022-04-05 RX ADMIN — OXYCODONE HYDROCHLORIDE AND ACETAMINOPHEN 2 TABLET: 5; 325 TABLET ORAL at 13:12

## 2022-04-05 RX ADMIN — SODIUM CHLORIDE: 4.5 INJECTION, SOLUTION INTRAVENOUS at 13:15

## 2022-04-05 RX ADMIN — DESMOPRESSIN ACETATE 1 MCG: 4 SOLUTION INTRAVENOUS at 13:57

## 2022-04-05 RX ADMIN — SODIUM CHLORIDE, PRESERVATIVE FREE 10 ML: 5 INJECTION INTRAVENOUS at 20:03

## 2022-04-05 RX ADMIN — POTASSIUM CHLORIDE 40 MEQ: 20 TABLET, EXTENDED RELEASE ORAL at 21:21

## 2022-04-05 RX ADMIN — DESMOPRESSIN ACETATE 20 MG: 0.2 TABLET ORAL at 20:03

## 2022-04-05 RX ADMIN — INSULIN LISPRO 1 UNITS: 100 INJECTION, SOLUTION INTRAVENOUS; SUBCUTANEOUS at 20:07

## 2022-04-05 RX ADMIN — SODIUM CHLORIDE, PRESERVATIVE FREE 10 ML: 5 INJECTION INTRAVENOUS at 08:35

## 2022-04-05 ASSESSMENT — PAIN DESCRIPTION - DESCRIPTORS
DESCRIPTORS: ACHING;SORE
DESCRIPTORS: SORE

## 2022-04-05 ASSESSMENT — PAIN SCALES - GENERAL
PAINLEVEL_OUTOF10: 2
PAINLEVEL_OUTOF10: 7
PAINLEVEL_OUTOF10: 5
PAINLEVEL_OUTOF10: 7
PAINLEVEL_OUTOF10: 8
PAINLEVEL_OUTOF10: 7
PAINLEVEL_OUTOF10: 5

## 2022-04-05 ASSESSMENT — PAIN DESCRIPTION - ORIENTATION: ORIENTATION: LEFT

## 2022-04-05 ASSESSMENT — PAIN DESCRIPTION - PROGRESSION
CLINICAL_PROGRESSION: GRADUALLY IMPROVING

## 2022-04-05 ASSESSMENT — PAIN - FUNCTIONAL ASSESSMENT
PAIN_FUNCTIONAL_ASSESSMENT: ACTIVITIES ARE NOT PREVENTED
PAIN_FUNCTIONAL_ASSESSMENT: PREVENTS OR INTERFERES SOME ACTIVE ACTIVITIES AND ADLS

## 2022-04-05 ASSESSMENT — PAIN DESCRIPTION - PAIN TYPE
TYPE: ACUTE PAIN
TYPE: ACUTE PAIN;SURGICAL PAIN

## 2022-04-05 ASSESSMENT — PAIN DESCRIPTION - FREQUENCY
FREQUENCY: INTERMITTENT
FREQUENCY: INTERMITTENT

## 2022-04-05 ASSESSMENT — PAIN DESCRIPTION - LOCATION
LOCATION: CHEST;INCISION
LOCATION: SHOULDER

## 2022-04-05 ASSESSMENT — PAIN DESCRIPTION - ONSET
ONSET: ON-GOING
ONSET: ON-GOING

## 2022-04-05 NOTE — FLOWSHEET NOTE
SPIRITUAL CARE DEPARTMENT - [facility]  PROGRESS NOTE    Shift date: 4/5/2022  Shift day: Tuesday   Shift # 1    Room # 1005/1005-01   Name: [place] Peralta            Age: 60 y.o. Gender: female          Rastafari: [address] of Religion: None    Referral: Routine Visit    Admit Date & Time: 3/22/2022  5:31 AM    PATIENT/EVENT DESCRIPTION:  Florida Peralta is a 60 y.o. female   (Description of condition/event). SPIRITUAL ASSESSMENT/INTERVENTION:  Assessment: Patient was sitting in chair finishing her lunch. She welcomed  and engaged well in conversation. Patient spoke of how her health issues have affected her work life. She expressed deep appreciation for her daughter and grandsons who will help care for her as she recovers. Patient said her don has helped her during this time. Intervention:  provided a supportive presence through active listening, words of affirmation and prayer. Outcome: Patient was able to express her feelings around the impact that her illness has had on her. Patient became tearful during prayer. SPIRITUAL CARE FOLLOW-UP PLAN:  Chaplains will remain available to offer spiritual and emotional support as needed. Electronically signed by Chi Barroso on 4/5/2022 at 1:09 PM.  [address] [employer]  [phone]         04/05/22 9758   Encounter Summary   Services provided to: Patient   Referral/Consult From: [facility]   (4/5/22)   Complexity of Encounter Moderate   Length of Encounter 15 minutes   Spiritual/Pentecostalism   Type Spiritual support   Assessment Calm; Approachable   Intervention Active listening;Explored feelings, thoughts, concerns;Prayer;Discussed illness/injury and it's impact   Outcome Comfort;Expressed gratitude;Engaged in conversation;Expressed feelings/needs/concerns; Tearful;Receptive

## 2022-04-05 NOTE — DISCHARGE INSTR - COC
Continuity of Care Form    Patient Name: Naya Haro   :  1960  MRN:  [id number]    [facility] date:  3/22/2022  Discharge date:  22    Code Status Order: Prior   Advance Directives:   [facility] Documentation       Date/Time Healthcare Directive Type of Healthcare Directive Copy in [address] Agent's Name Healthcare Agent's Phone Number    22 0930 No, patient does not have an advance directive for healthcare treatment -- -- -- -- --            Admitting Physician:  Benito Juarez MD  PCP: LAKSHMI Brito CNP    Discharging Nurse: [facility] Unit/Room#: 1005/1005-01  Discharging Unit Phone Number: [phone]    Emergency Contact:   Extended Emergency Contact Information  Primary Emergency Contact: [address] Phone: [phone]  Relation: Child    Past Surgical History:  Past Surgical History:   Procedure Laterality Date    APPENDECTOMY      BACK SURGERY      lumbar surgery  pt does not recall    CARDIAC CATHETERIZATION  2022    CARPAL TUNNEL RELEASE Right     twice     CORONARY ARTERY BYPASS GRAFT N/A 3/29/2022    CABG CORONARY ARTERY BYPASS X3 WITH GARRETT performed by Terry Jean MD at [facility]         Immunization History: There is no immunization history on file for this patient.     Active Problems:  Patient Active Problem List   Diagnosis Code    Essential hypertension I10    Tobacco dependence F17.200    History of lumbar laminectomy Z98.890    DDD (degenerative disc disease), lumbar M51.36    Chronic hand pain M79.643, G89.29    Ganglion of joint M67.40    Arthritis of carpometacarpal (CMC) joint of both thumbs M18.0    NSTEMI (non-ST elevated myocardial infarction) ([facility] [address] I21.4    Type 2 diabetes mellitus without complication, without long-term current use of insulin (HCC) E11.9    3-vessel CAD I25.10    Hypomagnesemia E83.42    Hypotension I95.9 Isolation/Infection:   Isolation            No Isolation          Patient Infection Status       Infection Onset Added Last Indicated Last Indicated By Review Planned Expiration Resolved Resolved By    None active    Resolved    COVID-19 (Rule Out) 03/22/22 03/22/22 03/22/22 COVID-19, Rapid (Ordered)   03/22/22 Rule-Out Test Resulted            Nurse Assessment:  Last Vital Signs: BP (!) 104/55   Pulse 84   Temp 98.6 °F (37 °C) (Oral)   Resp 16   Ht 5' 5\" (1.651 m)   Wt 180 lb 1.9 oz (81.7 kg)   SpO2 93%   BMI 29.97 kg/m²     Last documented pain score (0-10 scale): Pain Level: 7  Last Weight:   Wt Readings from Last 1 Encounters:   04/05/22 180 lb 1.9 oz (81.7 kg)     Mental Status:  oriented    IV Access:  - None    Nursing Mobility/ADLs:  Walking   Assisted  Transfer  Assisted  Bathing  Assisted  Dressing  Independent  Toileting  Independent  Feeding  Independent  Med Admin  Independent  Med Delivery   whole    Wound Care Documentation and Therapy:  Negative Pressure Wound Therapy Abdomen Medial;Upper (Active)   Wound Type Other (Comment) 04/04/22 2015   Unit Type Prevena 04/04/22 2015   Dressing Type Other (Comment) 04/02/22 1100   Dressing Change Due 04/02/22 04/02/22 1030   Output (ml) 0 ml 04/04/22 0600   Shape Chest tube sites 04/02/22 1100   Number of days: 2        Elimination:  Continence: Bowel: Yes  Bladder: Yes  Urinary Catheter: None   Colostomy/Ileostomy/Ileal Conduit: No       Date of Last BM: 4/5/22    Intake/Output Summary (Last 24 hours) at 4/5/2022 0953  Last data filed at 4/5/2022 0000  Gross per 24 hour   Intake 1600 ml   Output 1650 ml   Net -50 ml     I/O last 3 completed shifts: In: 2080 [P.O.:1580; I.V.:500]  Out: [address] [Urine:1650]    Safety Concerns:      At Risk for Falls    Impairments/Disabilities:      None    Nutrition Therapy:  Current Nutrition Therapy:   - Oral Diet:  Carb Control 4 carbs/meal (1800kcals/day) and no added salt, low potassium    Routes of Feeding: Oral  Liquids: No Restrictions  Daily Fluid Restriction: yes - amount 1000  Last Modified Barium Swallow with Video (Video Swallowing Test): not done    Treatments at the Time of Hospital Discharge:   Respiratory Treatments: see MAR  Oxygen Therapy:  is not on home oxygen therapy. Ventilator:    - No ventilator support    Rehab Therapies: Physical Therapy and Occupational Therapy  Weight Bearing Status/Restrictions: No weight bearing restrictions  Other Medical Equipment (for information only, NOT a DME order):  walker  Other Treatments: patient is to continue checking blood sugars before each meal, use incentive spirometer along with acapella 10x per hour, follow all open heart discharge instructions    Patient's personal belongings (please select all that are sent with patient):  Patient belonging bag    RN SIGNATURE:  Electronically signed by Dalton Orozco RN on 4/6/22 at 9:24 AM EDT    CASE MANAGEMENT/SOCIAL WORK SECTION    Inpatient Status Date: 3/22/22    Readmission Risk Assessment Score:  Readmission Risk              Risk of Unplanned Readmission:  14           Discharging to Facility/ . [address] [address]         Phone: [phone]       Fax: [phone]            Dialysis Facility (if applicable)   Name:  Address:  Dialysis Schedule:  Phone:  Fax:    / signature: Electronically signed by Jackson Lopez RN on 4/6/22 at 2:58 PM EDT    PHYSICIAN SECTION    Prognosis: Good    Condition at Discharge: Stable    Rehab Potential (if transferring to Rehab): Good    Recommended Labs or Other Treatments After Discharge:   Skilled nursing cardio/pulmonary assessment EVERY shift/visit with vital signs and SaO2 call abnormal results to Surgeon's office  Notify Surgeon's office for temp >101.5 F  Daily weight and record.  Call for weight gain of 3 lbs in 3 days or 5-7lbs in 7 days    Monitor surgical incisions for signs of infection (redness, warmth, pain, purulent drainage, odor) and call with abnormal findings. Leave incisions open to air unless drainage is prohibitive. Call with any signs of separation or dehiscence. Shower daily with warm water and mild soap. Pat dry with clean towel, do not rub. Heart Hugger and Surgical bra on during daytime  Large breasted patients need to wear surgical or soft sports bra at all times to reduce tension on sternal incision  Dank hose on during day time, rinse and dry overnight to prevent infection of leg incisions    Strict Sternal Precautions: No lifting/pushing/pulling over 5lbs x 4 weeks, may increase 5-10 lbs per week after that as tolerated. Physical therapy BID, plus ambulation 3x per day in addition. Up in chair for all meals  Reinforce Cardiac and Diabetic Diet, medication compliance and activity instructions    Arrange for transportation to all appointments  Patient needs to see Surgeon and Cardiologist within 2 weeks of discharge. Please make sure she gets BMP drawn in 1 week  Make sure follow ups are set up          Physician Certification: I certify the above information and transfer of Nolan Cushing  is necessary for the continuing treatment of the diagnosis listed and that she requires [address] for less 30 days.      Update Admission H&P: No change in H&P    PHYSICIAN SIGNATURE:  Electronically signed by Dr. Cristal Lawson MD on 4/5/22 at 9:54 AM EDT

## 2022-04-05 NOTE — PROGRESS NOTES
Comprehensive Nutrition Assessment    Type and Reason for Visit:  Reassess    Nutrition Recommendations/Plan: Modify current diet to provide 4 Carb Choices with each meal.  Encourage/monitor PO intakes as tolerated. Monitor need for oral supplements. Will monitor labs, weights, and plan of care. Nutrition Assessment:  Pt reports having an \"okay\" appetite and that she has been eating like she does at home. States for breakfast she ate majority of scrambled eggs, grapes, apple juice, and some jessica. Pt unsure of UBW but reports she thought she weighed 150-155 lb. Weight fluctuations since admission noted. Last BM 4/4. Labs reviewed: Na 132-135 mmol/L, Glucose  mg/dL. Meds reviewed. Malnutrition Assessment:  Malnutrition Status: At risk for malnutrition    Context:  Acute Illness     Findings of the 6 clinical characteristics of malnutrition:  Energy Intake:  Mild decrease in energy intake  Weight Loss:  Unable to assess - Weight fluctuations since admission noted. Body Fat Loss:  1 - Mild body fat loss Orbital   Muscle Mass Loss:  No significant muscle mass loss  Fluid Accumulation:  1 - Mild Extremities   Strength:  Not Performed    Estimated Daily Nutrient Needs:  Energy (kcal):  MSJ x 1.1-1.2 = 6120-4548 kcals/day; Weight Used for Energy Requirements:  Current     Protein (g):  1.2-1.5 gm/kg = 70-85 gm pro/day; Weight Used for Protein Requirements:  Ideal        Fluid (ml/day):  1000 mL/day per diet order/MD    Nutrition Related Findings:  Labs/Meds reviewed. Last BM 4/4. Wounds:  Multiple,Surgical Incision,Wound Vac (to abdomen)       Current Nutrition Therapies:    ADULT DIET; Regular; 3 carb choices (45 gm/meal); No Added Salt (3-4 gm);  Low Potassium (Less than 3000 mg/day); 1000 ml    Anthropometric Measures:  · Height: 5' 5\" (165.1 cm)  · Current Body Weight: 180 lb 1.9 oz (81.7 kg)   · Admission Body Weight: 178 lb (80.7 kg)    · Usual Body Weight:  (150-155 lb per pt report)     · Ideal Body Weight: 125 lbs; % Ideal Body Weight 144.1 %   · BMI: 30  · BMI Categories: Obese Class 1 (BMI 30.0-34. 9)       Nutrition Diagnosis:   · Inadequate oral intake related to  (current condition; recent CABG) as evidenced by  (improving PO intakes)    Nutrition Interventions:   Food and/or Nutrient Delivery:  Modify Current Diet (Provide 4 Carb Choices with each meal.)  Nutrition Education/Counseling:  No recommendation at this time   Coordination of Nutrition Care:  Continue to monitor while inpatient    Goals:  Meet % of estimated nutrition needs. Nutrition Monitoring and Evaluation:   Behavioral-Environmental Outcomes:  None Identified   Food/Nutrient Intake Outcomes:  Food and Nutrient Intake  Physical Signs/Symptoms Outcomes:  Biochemical Data,GI Status,Fluid Status or Edema,Hemodynamic Status,Nutrition Focused Physical Findings,Skin,Weight     Discharge Planning:     Too soon to determine     Electronically signed by Indra Robins RD, LD on 4/5/22 at 11:51 AM EDT    Contact: [phone]

## 2022-04-05 NOTE — HOME CARE
Home Oxygen Evaluation    Home Oxygen Evaluation completed. Patient is on 3 liters per minute via NC. Resting SpO2 = 95%  Resting SpO2 on room air = 90%    SpO2 on room air with exercise = 89%  SpO2 on oxygen as above with exercise = 92%    Nocturnal Oximetry with patient on room air is recommended is SpO2 is between 89% and 95% (requires additional order).     Sri Ny RCP  11:37 AM

## 2022-04-05 NOTE — PROGRESS NOTES
Physical Therapy  Facility/Department: [facility] CAR 1  Daily Treatment Note  NAME: David Forman  : 1960  MRN: [id number]    Date of Service: 2022    Discharge Recommendations:  Patient would benefit from continued therapy after discharge   PT Equipment Recommendations  Equipment Needed: No    Assessment   Body structures, Functions, Activity limitations: Decreased functional mobility ; Decreased endurance;Decreased strength  Assessment: Pt able to complete functional transfers with SBA and ambulated 125ft with RW and CGA, noted fatigue after amb but grossly steady t/o mobility. Pt would benefit from continued PT to address B LE weakness and decreased endurance,  Prognosis: Good  PT Education: Goals;Plan of Care;PT Role;Transfer Training;General Safety;Precautions; Functional Mobility Training;Energy Conservation  Patient Education: CR booklet, all questions answered  REQUIRES PT FOLLOW UP: Yes  Activity Tolerance  Activity Tolerance: Patient limited by fatigue;Patient limited by endurance     Patient Diagnosis(es): The primary encounter diagnosis was Chest pain, unspecified type. Diagnoses of NSTEMI (non-ST elevated myocardial infarction) ([address] and Post-op pain were also pertinent to this visit. has a past medical history of Chronic back pain, Hypertension, Osteoarthritis, and Pneumothorax.   has a past surgical history that includes back surgery; Carpal tunnel release (Right); Appendectomy; Tubal ligation; Cardiac catheterization (2022); and Coronary artery bypass graft (N/A, 3/29/2022).     Restrictions  Restrictions/Precautions  Restrictions/Precautions: Cardiac,Surgical Protocols,Fall Risk,Up as Tolerated,General Precautions  Required Braces or Orthoses?: Yes  Required Braces or Orthoses  Other: Heart Hugger Brace  Position Activity Restriction  Sternal Precautions: 5# Lifting Restrictions,No Pulling,No Pushing  Sternal Precautions: CABG X3 3/29/22, wound vac  Other position/activity restrictions: Recent left rotator cuff repair, 12/1/21,  Subjective   General  Response To Previous Treatment: Patient with no complaints from previous session. Family / Caregiver Present: No  Subjective  Subjective: Pt and RN agreeabl to PT. Pt seated in recliner upon arrival, states \"I feel much better today\". Very pleasant and cooperative with treatment  General Comment  Comments: Pt left seated in recliner with call light within reach, alarm activated  Pain Screening  Patient Currently in Pain: Yes  Pain Assessment  Pain Assessment: 0-10  Pain Level: 5  Pain Type: Acute pain;Surgical pain  Pain Location: Chest;Incision  Pain Descriptors: Sore  Pain Frequency: Intermittent (with movement)  Pain Onset: On-going  Clinical Progression: Gradually improving  Functional Pain Assessment: Prevents or interferes some active activities and ADLs  Non-Pharmaceutical Pain Intervention(s): Ambulation/Increased Activity;Repositioned; Therapeutic presence  Response to Pain Intervention: Patient Satisfied  Vital Signs  Patient Currently in Pain: Yes       Orientation  Orientation  Overall Orientation Status: Within Normal Limits  Cognition      Objective   Bed mobility  Scooting: Stand by assistance  Comment: pt seated in recliner upon arrival and returned to chair after amb  Transfers  Sit to Stand: Stand by assistance  Stand to sit: Stand by assistance  Comment: STS x4 performed with RW, cues for sternal precautions, 2x from toilet and 2x from [address], Cues for use of heart hugger with good return,  Ambulation  Ambulation?: Yes  Ambulation 1  Surface: level tile  Device: Rolling Walker  Other Apparatus: O2  Assistance: Contact guard assistance  Quality of Gait: slow, steady  Gait Deviations: Slow Angella;Decreased step length;Decreased step height  Distance: 125ft  Comments: pt with noted fatigue after amb  Stairs/Curb  Stairs?: No     Balance  Posture: Good  Sitting - Static: Good  Sitting - Dynamic: Good  Standing - Static: Fair;+  Standing - Dynamic: Fair;+  Comments: standing balance assessed with RW  Exercises  Comments: seated B LE exs x20 reps per CR booklet  Other exercises  Other exercises 3: incentive spirometer and acapella x 5     AM-PAC Score  AM-PAC Inpatient Mobility Raw Score : 19 (04/05/22 1216)  AM-PAC Inpatient T-Scale Score : 45.44 (04/05/22 1216)  Mobility Inpatient CMS 0-100% Score: 41.77 (04/05/22 1216)  Mobility Inpatient CMS G-Code Modifier : CK (04/05/22 1216)    Goals  Short term goals  Time Frame for Short term goals: 14 visits  Short term goal 1: Complete transfers with mod I and appropriate device  Short term goal 2: Complete 300 ft of gait with mod I and appropriate device  Short term goal 3: Complete 2 steps with no HR and mod I  Short term goal 4: Participate in 30 minutes of therapy to promote endurance  Short term goal 5: Be independent with cardiac HEP  Patient Goals   Patient goals : To go home    Plan    Plan  Times per week: 7x per week, 1-2x per day  Times per day: Daily  Current Treatment Recommendations: Roro Daly Re-education,Patient/Caregiver Education & Training,Equipment Evaluation, Education, & procurement,Balance Training,Gait Training,Home Exercise Program,Functional Mobility Training,Stair training,Safety Education & Training  Safety Devices  Type of devices:  All fall risk precautions in place,Gait belt,Call light within reach,Left in chair,Nurse notified,Chair alarm in place  Restraints  Initially in place: No     Therapy Time   Individual Concurrent Group Co-treatment   Time In 0940         Time Out 1006         Minutes 26         Timed Code Treatment Minutes: [address]

## 2022-04-05 NOTE — PROGRESS NOTES
Spoke to Dr. Juan Alberto Arriaga to get in touch with Dr. Cortes Abraham for clarification. Writer spoke with Dr. Cortes Abraham Via telephone to clarify orders. Instructed to hold Diuril.

## 2022-04-05 NOTE — PROGRESS NOTES
Renal Progress Note    Patient :  Corbin Deleon; 60 y.o. MRN# [id number]  Location:  1005/1005-01  Attending:  Marianne Cantor MD  Admit Date:  3/22/2022   Hospital Day: [address]; 60 y.o. female with past medical history of COPD, hypertension presented with a chief complaint of midsternal chest pain. In the ER patient's blood pressure was 196/101. She was also found to have NSTEMI, underwent cardiac cath 3/23/2022 was found to have multivessel CAD and underwent successful CABG 3/29/2022. Subjective:     . Today morning sodium 135  Yesterday evening received hypotonic fluids for rapid correction. We were not informed about the repeat labs as instructed to the nursing staff earlier. Today morning sodium 135. No complaints to offer. On Lasix which will be discontinued. Repeat electrolytes pending. Desmopressin already ordered. Receiving hypotonic fluids. Vitals stable  Sodium 135 this morning.   Will discontinue bmp every 6 hours  No acute issues as per RN      Outpatient Medications:     Medications Prior to Admission: [DISCONTINUED] ibuprofen (ADVIL;MOTRIN) 800 MG tablet, ibuprofen 800 mg tablet (Patient not taking: Reported on 3/23/2022)  [DISCONTINUED] naproxen (NAPROSYN) 500 MG tablet, Take 1 tablet by mouth daily With meals (Patient not taking: Reported on 3/23/2022)  b complex vitamins capsule, Take 1 capsule by mouth daily (Patient not taking: Reported on 3/23/2022)  [DISCONTINUED] losartan (COZAAR) 25 MG tablet, Take 25 mg by mouth (Patient not taking: Reported on 3/23/2022)    Current Medications:     Scheduled Meds:    midodrine  10 mg Oral TID WC    polyethylene glycol  17 g Oral Daily    [Held by provider] furosemide  20 mg IntraVENous Daily    amiodarone  200 mg Oral BID    docusate sodium  100 mg Oral BID    insulin glargine  35 Units SubCUTAneous Daily    insulin lispro  0-12 Units SubCUTAneous TID     insulin lispro  0-6 Units SubCUTAneous Nightly    sodium chloride flush  10 mL IntraVENous 2 times per day    aspirin  81 mg Oral Daily    clopidogrel  75 mg Oral Daily    metoprolol tartrate  25 mg Oral BID    atorvastatin  20 mg Oral Nightly    pantoprazole  40 mg Oral Daily    pantoprazole (PROTONIX) 40 mg injection  40 mg IntraVENous Daily    ipratropium-albuterol  1 ampule Inhalation Q4H WA     Continuous Infusions:    dextrose      sodium chloride 50 mL/hr at 22 1750    sodium chloride      propofol Stopped (22 1711)    dextrose      norepinephrine Stopped (22 1533)    nitroGLYCERIN Stopped (22 1853)     PRN Meds:  sennosides-docusate sodium, sodium chloride flush, sodium chloride, ondansetron **OR** ondansetron, oxyCODONE-acetaminophen **OR** oxyCODONE-acetaminophen, fentanNYL **OR** fentanNYL, hydrALAZINE, metoprolol, diphenhydrAMINE, magnesium sulfate, albumin human, glucose, dextrose, glucagon (rDNA), dextrose, bisacodyl, potassium chloride **OR** potassium alternative oral replacement **OR** potassium chloride    Input/Output:           Intake/Output Summary (Last 24 hours) at 2022 1000  Last data filed at 2022 0000  Gross per 24 hour   Intake 1600 ml   Output 1650 ml   Net -50 ml   . Patient Vitals for the past 96 hrs (Last 3 readings):   Weight   22 0600 180 lb 1.9 oz (81.7 kg)   22 0554 182 lb 15.7 oz (83 kg)   22 0415 188 lb 7.9 oz (85.5 kg)       Vital Signs:   Temperature:  Temp: 98.6 °F (37 °C)  TMax:   Temp (24hrs), Av.6 °F (37 °C), Min:98.2 °F (36.8 °C), Max:99 °F (37.2 °C)    Respirations:  Resp: 16  Pulse:   Pulse: 84  BP:    BP: (!) 104/55  BP Range: Systolic (40IKI), SY , Min:87 , WBZ:610       Diastolic (28ZGV), GJO:08, Min:47, Max:63      Physical Examination:       Physical Exam  HENT:      Head: Normocephalic. Nose: Nose normal.      Mouth/Throat:      Mouth: Mucous membranes are moist.   Eyes:      Extraocular Movements: Extraocular movements intact. Conjunctiva/sclera: Conjunctivae normal.   Cardiovascular:      Rate and Rhythm: Normal rate and regular rhythm. Pulses: Normal pulses. Pulmonary:      Effort: Pulmonary effort is normal.      Breath sounds: Normal breath sounds. Musculoskeletal:         General: Normal range of motion. Skin:     General: Skin is warm. Neurological:      Mental Status: She is alert. Labs:       Recent Labs     04/03/22 0351 04/04/22 0322 04/05/22  0250   WBC 11.3 22.6* 14.1*   RBC 3.46* 3.98 3.50*   HGB 11.0* 12.2 10.8*   HCT 31.2* 35.4* 31.5*   MCV 90.2 88.9 90.0   MCH 31.8 30.7 30.9   MCHC 35.3* 34.5 34.3   RDW 12.5 12.7 12.8    See Reflexed IPF Result 90*   MPV 11.1  --  11.4      BMP:   Recent Labs     04/04/22 2005 04/05/22 0250 04/05/22  0820   * 132* 135   K 3.8 3.8 4.1   CL 95* 96* 98   CO2 24 25 26   BUN 11 8 9   CREATININE 0.63 0.59 0.64   GLUCOSE 135* 98 128*   CALCIUM 8.4* 8.2* 8.6      Phosphorus:   No results for input(s): PHOS in the last 72 hours. Magnesium:    Recent Labs     04/03/22 0351 04/04/22 0322 04/05/22  0250   MG 2.0 2.3 2.1     Albumin:  No results for input(s): LABALBU in the last 72 hours.   BNP:    No results found for: BNP  NERY:    No results found for: NERY  SPEP:  Lab Results   Component Value Date    PROT 7.4 03/22/2018     UPEP:   No results found for: LABPE  C3:   No results found for: C3  C4:   No results found for: C4  MPO ANCA:   No results found for: MPO  PR3 ANCA:   No results found for: PR3  Anti-GBM:   No results found for: GBMABIGG  Hep BsAg:       No results found for: HEPBSAG  Hep C AB:        No results found for: HEPCAB    Urinalysis/Chemistries:      Lab Results   Component Value Date    NITRU NEGATIVE 03/23/2022    COLORU Yellow 03/23/2022    PHUR 5.5 03/23/2022    WBCUA 10 TO 20 03/23/2022    RBCUA None 03/23/2022    SPECGRAV 1.012 03/23/2022    LEUKOCYTESUR SMALL 03/23/2022    UROBILINOGEN Normal 03/23/2022    BILIRUBINUR NEGATIVE 03/23/2022 GLUCOSEU 1+ 03/23/2022    KETUA NEGATIVE 03/23/2022     Urine Sodium:     Lab Results   Component Value Date    BRIA 51 04/04/2022     Urine Potassium:  No results found for: KUR  Urine Chloride:  No results found for: CLUR  Urine Osmolarity:   Lab Results   Component Value Date    OSMOU 229 04/04/2022     Urine Protein:   No components found for: TOTALPROTEIN, URINE   Urine Creatinine:   No results found for: LABCREA  Urine Eosinophils:  No components found for: UEOS    Radiology:     Reviewed as available    Assessment:       1. Dilutional hyponatremia -  from fluid overload/pulmonary edema. Urine studies skewed because of Lasix administration  2. Multivessel CAD s/p CABG  3. NSTEMI  4. Essential hypertension. Cozaar held  5. Type 2 diabetes mellitus      Plan:     · Sodium correcting rapidly. Ordered desmopressin. Recheck electrolytes pending  · Plan to correct sodium to 1 32-1 34 till tomorrow morning       Nutrition   Keep patient on renal diet/TF. Avoid nephrotoxic drugs/contrast exposure. We will continue to follow this patient along with you. Dannie Miguel MD  PGY-3 Internal Medicine Resident  [address] [place] [address]  4/5/2022 10:00 AM    Attending Physician Statement  I have discussed the care of Herlinda Aleman, including pertinent history and exam findings,  with the Fellow/Residentt. I have reviewed the key elements of all parts of the encounter with the Fellow/ Resident. I agree with the assessment, plan and orders as documented by the resident.     Jefferson Dickens MD MD, MRCP Momo Burton, [facility]   4/5/2022 2:33 PM    Nephrology [address] [place] [address]

## 2022-04-05 NOTE — PLAN OF CARE
Problem: Activity:  Goal: Risk for activity intolerance will decrease  Description: Risk for activity intolerance will decrease  Outcome: Ongoing  Goal: Will verbalize the importance of balancing activity with adequate rest periods  Description: Will verbalize the importance of balancing activity with adequate rest periods  Outcome: Ongoing  Goal: Ability to tolerate increased activity will improve  Description: Ability to tolerate increased activity will improve  Outcome: Ongoing     Problem: Coping:  Goal: Ability to verbalize feelings about condition will improve  Description: Ability to verbalize feelings about condition will improve  Outcome: Ongoing  Goal: Ability to identify strategies to decrease anxiety will improve  Description: Ability to identify strategies to decrease anxiety will improve  Outcome: Ongoing  Goal: Ability to identify and alter barriers to satisfying sexual function will improve  Description: Ability to identify and alter barriers to satisfying sexual function will improve  Outcome: Ongoing  Goal: Ability to identify and develop effective coping behavior will improve  Description: Ability to identify and develop effective coping behavior will improve  Outcome: Ongoing     Problem: Falls - Risk of:  Goal: Will remain free from falls  Description: Will remain free from falls  Outcome: Ongoing  Goal: Absence of physical injury  Description: Absence of physical injury  Outcome: Ongoing   Patient remains free from falls or injuries, call light with in reach, bed is locked in lowest position with bed alarm on, chair alarm on when in use. Nurse rounds hourly and prn. Nurse continued to monitor patient.

## 2022-04-05 NOTE — PROGRESS NOTES
[facility]  Internal Medicine Teaching Residency Program  Inpatient Daily Progress Note  ______________________________________________________________________________    Patient: Caden Wolf  YOB: 1960   OQY:[id number]    Acct: [de-identified]     Room: [address] [place] [address]  Admit date: 3/22/2022  Today's date: 04/05/22  Number of days in the hospital: 14    SUBJECTIVE   Admitting Diagnosis: 3-vessel CAD  CC: Status postop day 8 CABG x3. Patient was seen and examined at bedside. No acute event overnight. Blood pressure is 105/55. Saturating well on 3 L nasal cannula. Has a normal bowel and bladder movement. Slept well. Labs reviewed and evaluated. Hyponatremia resolved sodium this a.m. 132. Received 1 dose of Samsca and on 0.45 saline at 50 cc/h.    ROS:  Constitutional:  negative for chills, fevers, sweats  Respiratory:  negative for cough, dyspnea on exertion, hemoptysis, shortness of breath, wheezing  Cardiovascular:  negative for chest pain, chest pressure/discomfort, lower extremity edema, palpitations  Gastrointestinal:  negative for abdominal pain, constipation, diarrhea, nausea, vomiting  Neurological:  negative for dizziness, headache    BRIEF HISTORY     The patient is a pleasant 58 y. o. female with a no PMHx formally diagnosed per the pt, EMR shows history of chronic back pain, hypertension, osteoarthritis, COPD and pneumothorax.  Pt presented with a chief complaint of chest pain.  Patient has midsternal chest pain that woke her up from sleep around 4 in the morning.  Patient states the pain is radiating to her neck into her jaw bilaterally.  Patient states it is hard to determine if the pain is traveling down either arm especially the left arm she has had rotator cuff surgery in the past.  Patient did endorse some mild associated shortness of breath and diaphoresis early this morning.  Patient received aspirin and nitroglycerin by EMS prior to arrival which alleviated the chest pain.  Patient denies any significant cardiac history.  Patient's blood pressure on admission was 177/101.  While in the emergency department patient's systolic blood pressure got up to 196.  Patient received Lopressor 25 mg and was started on Cozaar 25 mg.  Patient's blood pressure  Improved. Cardiology was taken on board for elevated troponin and underwent cardiac cath on . Found to have multivessel coronary artery disease with preserved LV function. CT surgery was consulted for CABG and underwent successful surgery CABG x3 without any complication. CARDIAC CATH 3/23/22  LMCA: Ostial 25% stenosis     LAD: Mid 100% stenosis, collateral from RCA     D1: Proximal 70% stenosis, and mid 90% stenosis     LCx: Mid 75% stenosis     OM1 and OM2 ostial 80% stenosis     RCA: Ostial 30% stenosis     PDA: Proximal 80% stenosis     PL branch 75% stenosis     Procedure Summary  Multivessel CAD  Preserved LV function  Recommendations  CV surgery consult for CABG     ECHO 3/23/2022  Summary  Left ventricle is normal in size, global left ventricular systolic function  is low normal, calculated ejection fraction is 52%. Tricuspid valve was not well visualized. Trivial tricuspid regurgitation. Insignificant tricuspid regurgitation, unable to estimate RVSP    OBJECTIVE     Vital Signs:  /70   Pulse 82   Temp 97.9 °F (36.6 °C) (Oral)   Resp 16   Ht 5' 5\" (1.651 m)   Wt 180 lb 1.9 oz (81.7 kg)   SpO2 93%   BMI 29.97 kg/m²     Temp (24hrs), Av.4 °F (36.9 °C), Min:97.9 °F (36.6 °C), Max:99 °F (37.2 °C)    In: 1975   Out: 2450 [Urine:2450]    Physical Exam:  Physical Exam  Vitals and nursing note reviewed. Constitutional:       Appearance: Normal appearance. HENT:      Head: Normocephalic and atraumatic. Nose: Nose normal.      Mouth/Throat:      Mouth: Mucous membranes are moist.      Pharynx: Oropharynx is clear.    Eyes:      Extraocular Movements: Extraocular movements intact. Conjunctiva/sclera: Conjunctivae normal.      Pupils: Pupils are equal, round, and reactive to light. Cardiovascular:      Rate and Rhythm: Normal rate and regular rhythm. Pulses: Normal pulses. Heart sounds: Normal heart sounds. No murmur heard. No friction rub. No gallop. Pulmonary:      Effort: Pulmonary effort is normal. No respiratory distress. Breath sounds: Normal breath sounds. No stridor. No wheezing, rhonchi or rales. Chest:      Chest wall: Tenderness present. Abdominal:      General: Abdomen is flat. Bowel sounds are normal. There is no distension. Palpations: Abdomen is soft. There is no mass. Tenderness: There is no abdominal tenderness. There is no guarding or rebound. Hernia: No hernia is present. Musculoskeletal:         General: No swelling, tenderness, deformity or signs of injury. Normal range of motion. Cervical back: Normal range of motion. Right lower leg: No edema. Left lower leg: No edema. Skin:     General: Skin is warm. Neurological:      General: No focal deficit present. Mental Status: She is alert and oriented to person, place, and time. Mental status is at baseline. Psychiatric:         Mood and Affect: Mood normal.         Behavior: Behavior normal.         Thought Content:  Thought content normal.         Judgment: Judgment normal.           Medications:  Scheduled Medications:    desmopressin  1 mcg IntraVENous Once    midodrine  10 mg Oral TID WC    polyethylene glycol  17 g Oral Daily    [Held by provider] furosemide  20 mg IntraVENous Daily    amiodarone  200 mg Oral BID    docusate sodium  100 mg Oral BID    insulin glargine  35 Units SubCUTAneous Daily    insulin lispro  0-12 Units SubCUTAneous TID WC    insulin lispro  0-6 Units SubCUTAneous Nightly    sodium chloride flush  10 mL IntraVENous 2 times per day    aspirin  81 mg Oral Daily    clopidogrel  75 mg Oral Daily    metoprolol tartrate  25 mg Oral BID    atorvastatin  20 mg Oral Nightly    pantoprazole  40 mg Oral Daily    pantoprazole (PROTONIX) 40 mg injection  40 mg IntraVENous Daily    ipratropium-albuterol  1 ampule Inhalation Q4H WA     Continuous Infusions:    sodium chloride 50 mL/hr at 04/04/22 1750    sodium chloride      propofol Stopped (03/29/22 1711)    dextrose      norepinephrine Stopped (03/29/22 1533)    nitroGLYCERIN Stopped (03/29/22 1853)     PRN Medicationssennosides-docusate sodium, 2 tablet, Daily PRN  sodium chloride flush, 10 mL, PRN  sodium chloride, 25 mL, PRN  ondansetron, 4 mg, Q8H PRN   Or  ondansetron, 4 mg, Q6H PRN  oxyCODONE-acetaminophen, 1 tablet, Q4H PRN   Or  oxyCODONE-acetaminophen, 2 tablet, Q4H PRN  fentanNYL, 25 mcg, Q1H PRN   Or  fentanNYL, 50 mcg, Q1H PRN  hydrALAZINE, 5 mg, Q5 Min PRN  metoprolol, 2.5 mg, Q10 Min PRN  diphenhydrAMINE, 25 mg, Nightly PRN  magnesium sulfate, 2,000 mg, PRN  albumin human, 25 g, PRN  glucose, 15 g, PRN  dextrose, 12.5 g, PRN  glucagon (rDNA), 1 mg, PRN  dextrose, 100 mL/hr, PRN  bisacodyl, 5 mg, Daily PRN  potassium chloride, 40 mEq, PRN   Or  potassium alternative oral replacement, 40 mEq, PRN   Or  potassium chloride, 10 mEq, PRN        Diagnostic Labs:  CBC:   Recent Labs     04/03/22  0351 04/04/22 0322 04/05/22  0250   WBC 11.3 22.6* 14.1*   RBC 3.46* 3.98 3.50*   HGB 11.0* 12.2 10.8*   HCT 31.2* 35.4* 31.5*   MCV 90.2 88.9 90.0   RDW 12.5 12.7 12.8    See Reflexed IPF Result 90*     BMP:   Recent Labs     04/04/22 2005 04/05/22  0250 04/05/22  0820   * 132* 135   K 3.8 3.8 4.1   CL 95* 96* 98   CO2 24 25 26   BUN 11 8 9   CREATININE 0.63 0.59 0.64     BNP: No results for input(s): BNP in the last 72 hours. PT/INR:   Recent Labs     04/03/22  0351   PROTIME 11.0   INR 1.0     APTT:   No results for input(s): APTT in the last 72 hours.   CARDIAC ENZYMES: No results for input(s): CKMB, CKMBINDEX, TROPONINI in the last 72 hours. Invalid input(s): CKTOTAL;3  FASTING LIPID PANEL:  Lab Results   Component Value Date    CHOL 208 (H) 03/22/2018    HDL 67 03/22/2018    TRIG 59 03/22/2018     LIVER PROFILE: No results for input(s): AST, ALT, ALB, BILIDIR, BILITOT, ALKPHOS in the last 72 hours. MICROBIOLOGY:   Lab Results   Component Value Date/Time    CULTURE WRONG TEST ORDERED 03/23/2022 03:22 PM       Imaging:    XR CHEST PORTABLE    Result Date: 3/30/2022  Interval extubation. Unchanged mild bibasilar atelectasis. XR CHEST PORTABLE    Result Date: 3/29/2022  1. Lines and tubes in expected position as above. 2. Interval sternotomy. 3. Linear opacities throughout both lungs, which could be due to atelectasis       ASSESSMENT & PLAN     Assessment and Plan:    Principal Problem:    3-vessel CAD  Active Problems:    Essential hypertension    NSTEMI (non-ST elevated myocardial infarction) ([facility] [address]    Type 2 diabetes mellitus without complication, without long-term current use of insulin ([facility])    Hypomagnesemia    Hypotension  Resolved Problems:    Hypokalemia    Multivessel CAD  - POD 7 for CABG 3/29/22 w/ SLIMA to LAD, RSVG1 to OM, RSVG2 to RPDA   - On dual antiplatelet therapy ASA and Plavix. Lipitor 20 mg nightly. - Advance diet per CT surgery      Essential hypertension - stable  - Cozaar 50 mg PO daily home med-held due to low blood pressure  - Lopressor 25 PO BID .    Constipation: Resolved    Hyponatremia: Improved  Sodium level is 132 this a.m. Sodium corrected too rapidly. Although currently asymptomatic. Nephrology taken on board and received 1 dose of Samsca 7.5 MG and on 0.45 saline at 50 cc/h.     Hypotension: Resolved    Newly diagnosed type 2 diabetes mellitus  -HbA1c 10.7.  -Medium dose sliding scale and Lantus 35 units daily.  -Glucose checks 4 times daily before meals and at bedtime POC glucose   -Diabetic education       Hypokalemia: Resolved     Hypomagnesemia: Resolved    DVT ppx: SCD as per CTS team.     PT/OT/SW: Consulted. Discharge Planning:   Medicine team will sign off. In case of any questions or concerns please do not hesitate to reach out to us. Thank you for allowing us in the care of Mrs. Dixon. Villa Rivers MD  Internal Medicine Resident, PGY-1  [facility]  4/3/7189,46:95 PM    I have discussed the care of Jorge L Elkins , including pertinent history and exam findings,    today with the resident. I have seen and examined the patient and the key elements of all parts of the encounter have been performed by me . I agree with the assessment, plan and orders as documented by the resident. Principal Problem:    3-vessel CAD  Active Problems:    Essential hypertension    NSTEMI (non-ST elevated myocardial infarction) ([facility] [address]    Type 2 diabetes mellitus without complication, without long-term current use of insulin ([facility])    Hypomagnesemia    Hypotension  Resolved Problems:    Hypokalemia        Overall  course ;                                   are improving over time.         Sodium Improved   HTN/DM controlled   Will sign off , Plz call with questions           Electronically signed by Thea Harvey MD

## 2022-04-05 NOTE — PROGRESS NOTES
[facility] Cardiothoracic Surgery  Progress Note    4/5/2022 9:49 AM  Surgeon: Surgeon CTS: Dr. Hilario Soliz MD   S/P: CABGx3     Subjective:  Ms. Reina Bloch   Resting in chair. NV well controlled now. No chest pains or SOB. Objective:  BP (!) 104/55   Pulse 84   Temp 98.6 °F (37 °C) (Oral)   Resp 16   Ht 5' 5\" (1.651 m)   Wt 180 lb 1.9 oz (81.7 kg)   SpO2 93%   BMI 29.97 kg/m²     CV: no murmur noted, Normal S1, S2,  Lungs: clear to auscultation, no wheezes, rales, or rhonchi  Abd: normal bowel sounds   Lower Extremities: Trace edema  Saph Incison: no sign of drainage or infection  Sternal Incison: dressing applied-no excessive drainage or signs of infection noted  CXR-no concerns noted with CXR- needs cough and deep breath altectasis noted on left lower thorax  Labs: Labs reviewed today  CBC:   Recent Labs     04/03/22  0351 04/04/22 0322 04/05/22  0250   WBC 11.3 22.6* 14.1*   HGB 11.0* 12.2 10.8*   HCT 31.2* 35.4* 31.5*   MCV 90.2 88.9 90.0    See Reflexed IPF Result 90*     BMP:   Recent Labs     04/04/22 2005 04/05/22  0250 04/05/22  0820   * 132* 135   K 3.8 3.8 4.1   CL 95* 96* 98   CO2 24 25 26   BUN 11 8 9   CREATININE 0.63 0.59 0.64       I/O: I/O last 3 completed shifts:   In: 2080 [P.O.:1580; I.V.:500]  Out: 1650 [Urine:1650]  Scheduled Meds:   midodrine  10 mg Oral TID WC    polyethylene glycol  17 g Oral Daily    [Held by provider] furosemide  20 mg IntraVENous Daily    amiodarone  200 mg Oral BID    docusate sodium  100 mg Oral BID    insulin glargine  35 Units SubCUTAneous Daily    insulin lispro  0-12 Units SubCUTAneous TID WC    insulin lispro  0-6 Units SubCUTAneous Nightly    sodium chloride flush  10 mL IntraVENous 2 times per day    aspirin  81 mg Oral Daily    clopidogrel  75 mg Oral Daily    metoprolol tartrate  25 mg Oral BID    atorvastatin  20 mg Oral Nightly    pantoprazole  40 mg Oral Daily    pantoprazole (PROTONIX) 40 mg injection  40 mg IntraVENous Daily  ipratropium-albuterol  1 ampule Inhalation Q4H WA     Continuous Infusions:   dextrose      sodium chloride 50 mL/hr at 04/04/22 1750    sodium chloride      propofol Stopped (03/29/22 1711)    dextrose      norepinephrine Stopped (03/29/22 1533)    nitroGLYCERIN Stopped (03/29/22 1853)     PRN Meds:sennosides-docusate sodium, sodium chloride flush, sodium chloride, ondansetron **OR** ondansetron, oxyCODONE-acetaminophen **OR** oxyCODONE-acetaminophen, fentanNYL **OR** fentanNYL, hydrALAZINE, metoprolol, diphenhydrAMINE, magnesium sulfate, albumin human, glucose, dextrose, glucagon (rDNA), dextrose, bisacodyl, potassium chloride **OR** potassium alternative oral replacement **OR** potassium chloride    Beta- Blocker:  Yes  Aspirin: Yes  Lovenox: No  GI: Yes  Plavix: Yes  Coumadin: No  Statin: Yes  ACE: No    Daily Nursing Care:  Please keep SCDS in place as DVT prophylaxis  If not intubated, Please have patient use IS and acapella 10X/hr or during commercial breaks  Please continue BM management  Daily labs and CXR  Daily PT/OT and ambulation  Continue with Case Management for DC planning      Assessment/ Plan:    Improved [place] this AM  Check with nephrology regarding discharging patient today   Home 02 eval today   DC this afternoon    LAKSHMI Pimentel - LUCRETIA

## 2022-04-06 ENCOUNTER — APPOINTMENT (OUTPATIENT)
Dept: GENERAL RADIOLOGY | Age: 62
DRG: 234 | End: 2022-04-06
Payer: COMMERCIAL

## 2022-04-06 VITALS
TEMPERATURE: 98.1 F | DIASTOLIC BLOOD PRESSURE: 65 MMHG | HEIGHT: 65 IN | SYSTOLIC BLOOD PRESSURE: 121 MMHG | WEIGHT: 183.42 LBS | HEART RATE: 80 BPM | BODY MASS INDEX: 30.56 KG/M2 | OXYGEN SATURATION: 91 % | RESPIRATION RATE: 18 BRPM

## 2022-04-06 LAB
ANION GAP SERPL CALCULATED.3IONS-SCNC: 12 MMOL/L (ref 9–17)
BUN BLDV-MCNC: 6 MG/DL (ref 8–23)
CALCIUM SERPL-MCNC: 8.2 MG/DL (ref 8.6–10.4)
CHLORIDE BLD-SCNC: 96 MMOL/L (ref 98–107)
CO2: 22 MMOL/L (ref 20–31)
CREAT SERPL-MCNC: 0.64 MG/DL (ref 0.5–0.9)
GFR AFRICAN AMERICAN: >60 ML/MIN
GFR NON-AFRICAN AMERICAN: >60 ML/MIN
GFR SERPL CREATININE-BSD FRML MDRD: ABNORMAL ML/MIN/{1.73_M2}
GLUCOSE BLD-MCNC: 126 MG/DL (ref 70–99)
GLUCOSE BLD-MCNC: 133 MG/DL (ref 65–105)
GLUCOSE BLD-MCNC: 142 MG/DL (ref 65–105)
GLUCOSE BLD-MCNC: 160 MG/DL (ref 65–105)
GLUCOSE BLD-MCNC: 205 MG/DL (ref 65–105)
HCT VFR BLD CALC: 30.8 % (ref 36.3–47.1)
HEMOGLOBIN: 10.4 G/DL (ref 11.9–15.1)
MAGNESIUM: 1.9 MG/DL (ref 1.6–2.6)
MCH RBC QN AUTO: 31 PG (ref 25.2–33.5)
MCHC RBC AUTO-ENTMCNC: 33.8 G/DL (ref 28.4–34.8)
MCV RBC AUTO: 91.9 FL (ref 82.6–102.9)
NRBC AUTOMATED: 0 PER 100 WBC
PDW BLD-RTO: 12.8 % (ref 11.8–14.4)
PLATELET # BLD: 88 K/UL (ref 138–453)
PMV BLD AUTO: 10.9 FL (ref 8.1–13.5)
POTASSIUM SERPL-SCNC: 4.1 MMOL/L (ref 3.7–5.3)
RBC # BLD: 3.35 M/UL (ref 3.95–5.11)
SODIUM BLD-SCNC: 130 MMOL/L (ref 135–144)
SODIUM BLD-SCNC: 132 MMOL/L (ref 135–144)
WBC # BLD: 14.2 K/UL (ref 3.5–11.3)

## 2022-04-06 PROCEDURE — 6370000000 HC RX 637 (ALT 250 FOR IP)

## 2022-04-06 PROCEDURE — 99232 SBSQ HOSP IP/OBS MODERATE 35: CPT | Performed by: INTERNAL MEDICINE

## 2022-04-06 PROCEDURE — 99024 POSTOP FOLLOW-UP VISIT: CPT | Performed by: NURSE PRACTITIONER

## 2022-04-06 PROCEDURE — 6370000000 HC RX 637 (ALT 250 FOR IP): Performed by: THORACIC SURGERY (CARDIOTHORACIC VASCULAR SURGERY)

## 2022-04-06 PROCEDURE — 85027 COMPLETE CBC AUTOMATED: CPT

## 2022-04-06 PROCEDURE — 6370000000 HC RX 637 (ALT 250 FOR IP): Performed by: NURSE PRACTITIONER

## 2022-04-06 PROCEDURE — 84295 ASSAY OF SERUM SODIUM: CPT

## 2022-04-06 PROCEDURE — 36415 COLL VENOUS BLD VENIPUNCTURE: CPT

## 2022-04-06 PROCEDURE — 2580000003 HC RX 258: Performed by: NURSE PRACTITIONER

## 2022-04-06 PROCEDURE — 97530 THERAPEUTIC ACTIVITIES: CPT

## 2022-04-06 PROCEDURE — 83735 ASSAY OF MAGNESIUM: CPT

## 2022-04-06 PROCEDURE — 97535 SELF CARE MNGMENT TRAINING: CPT

## 2022-04-06 PROCEDURE — 71045 X-RAY EXAM CHEST 1 VIEW: CPT

## 2022-04-06 PROCEDURE — 97110 THERAPEUTIC EXERCISES: CPT

## 2022-04-06 PROCEDURE — 80048 BASIC METABOLIC PNL TOTAL CA: CPT

## 2022-04-06 PROCEDURE — 6370000000 HC RX 637 (ALT 250 FOR IP): Performed by: PHYSICIAN ASSISTANT

## 2022-04-06 RX ORDER — OXYCODONE HYDROCHLORIDE AND ACETAMINOPHEN 5; 325 MG/1; MG/1
1 TABLET ORAL EVERY 8 HOURS PRN
Qty: 15 TABLET | Refills: 0 | Status: SHIPPED | OUTPATIENT
Start: 2022-04-06 | End: 2022-04-18 | Stop reason: SDUPTHER

## 2022-04-06 RX ORDER — FUROSEMIDE 20 MG/1
20 TABLET ORAL EVERY OTHER DAY
Qty: 60 TABLET | Refills: 1 | Status: ON HOLD | OUTPATIENT
Start: 2022-04-06 | End: 2022-04-18 | Stop reason: HOSPADM

## 2022-04-06 RX ORDER — ATORVASTATIN CALCIUM 20 MG/1
20 TABLET, FILM COATED ORAL NIGHTLY
Qty: 30 TABLET | Refills: 3 | Status: SHIPPED | OUTPATIENT
Start: 2022-04-06 | End: 2022-09-09 | Stop reason: SDUPTHER

## 2022-04-06 RX ORDER — SENNA AND DOCUSATE SODIUM 50; 8.6 MG/1; MG/1
2 TABLET, FILM COATED ORAL DAILY
Qty: 15 TABLET | Refills: 0 | Status: SHIPPED | OUTPATIENT
Start: 2022-04-06 | End: 2022-04-14

## 2022-04-06 RX ORDER — PANTOPRAZOLE SODIUM 40 MG/1
40 TABLET, DELAYED RELEASE ORAL DAILY
Qty: 30 TABLET | Refills: 3 | Status: SHIPPED | OUTPATIENT
Start: 2022-04-06 | End: 2022-05-26 | Stop reason: ALTCHOICE

## 2022-04-06 RX ORDER — ONDANSETRON 4 MG/1
4 TABLET, ORALLY DISINTEGRATING ORAL EVERY 8 HOURS PRN
Qty: 30 TABLET | Refills: 0 | Status: SHIPPED | OUTPATIENT
Start: 2022-04-06 | End: 2022-05-23

## 2022-04-06 RX ORDER — ASPIRIN 81 MG/1
81 TABLET ORAL DAILY
Qty: 30 TABLET | Refills: 3 | Status: SHIPPED | OUTPATIENT
Start: 2022-04-06

## 2022-04-06 RX ORDER — AMIODARONE HYDROCHLORIDE 200 MG/1
200 TABLET ORAL 2 TIMES DAILY
Qty: 30 TABLET | Refills: 0 | Status: ON HOLD | OUTPATIENT
Start: 2022-04-06 | End: 2022-04-18 | Stop reason: SDUPTHER

## 2022-04-06 RX ORDER — DOCUSATE SODIUM 100 MG/1
100 CAPSULE, LIQUID FILLED ORAL 2 TIMES DAILY
Qty: 30 CAPSULE | Refills: 0 | Status: SHIPPED | OUTPATIENT
Start: 2022-04-06 | End: 2022-05-23

## 2022-04-06 RX ORDER — CLOPIDOGREL BISULFATE 75 MG/1
75 TABLET ORAL DAILY
Qty: 30 TABLET | Refills: 3 | Status: SHIPPED | OUTPATIENT
Start: 2022-04-06

## 2022-04-06 RX ADMIN — INSULIN LISPRO 2 UNITS: 100 INJECTION, SOLUTION INTRAVENOUS; SUBCUTANEOUS at 08:18

## 2022-04-06 RX ADMIN — AMIODARONE HYDROCHLORIDE 200 MG: 200 TABLET ORAL at 08:15

## 2022-04-06 RX ADMIN — PANTOPRAZOLE SODIUM 40 MG: 40 TABLET, DELAYED RELEASE ORAL at 08:15

## 2022-04-06 RX ADMIN — INSULIN GLARGINE 35 UNITS: 100 INJECTION, SOLUTION SUBCUTANEOUS at 08:18

## 2022-04-06 RX ADMIN — METOPROLOL TARTRATE 25 MG: 25 TABLET ORAL at 08:15

## 2022-04-06 RX ADMIN — DOCUSATE SODIUM 100 MG: 100 CAPSULE ORAL at 08:15

## 2022-04-06 RX ADMIN — OXYCODONE HYDROCHLORIDE AND ACETAMINOPHEN 2 TABLET: 5; 325 TABLET ORAL at 03:54

## 2022-04-06 RX ADMIN — INSULIN LISPRO 2 UNITS: 100 INJECTION, SOLUTION INTRAVENOUS; SUBCUTANEOUS at 12:30

## 2022-04-06 RX ADMIN — Medication 81 MG: at 08:15

## 2022-04-06 RX ADMIN — OXYCODONE HYDROCHLORIDE AND ACETAMINOPHEN 2 TABLET: 5; 325 TABLET ORAL at 10:30

## 2022-04-06 RX ADMIN — CLOPIDOGREL 75 MG: 75 TABLET, FILM COATED ORAL at 08:15

## 2022-04-06 RX ADMIN — SODIUM CHLORIDE, PRESERVATIVE FREE 10 ML: 5 INJECTION INTRAVENOUS at 08:21

## 2022-04-06 RX ADMIN — OXYCODONE HYDROCHLORIDE AND ACETAMINOPHEN 2 TABLET: 5; 325 TABLET ORAL at 15:35

## 2022-04-06 ASSESSMENT — PAIN SCALES - GENERAL
PAINLEVEL_OUTOF10: 3
PAINLEVEL_OUTOF10: 5
PAINLEVEL_OUTOF10: 5
PAINLEVEL_OUTOF10: 7
PAINLEVEL_OUTOF10: 7
PAINLEVEL_OUTOF10: 8

## 2022-04-06 ASSESSMENT — PAIN DESCRIPTION - FREQUENCY: FREQUENCY: CONTINUOUS

## 2022-04-06 ASSESSMENT — PAIN DESCRIPTION - DESCRIPTORS: DESCRIPTORS: ACHING;DISCOMFORT

## 2022-04-06 ASSESSMENT — PAIN DESCRIPTION - PAIN TYPE: TYPE: ACUTE PAIN

## 2022-04-06 ASSESSMENT — PAIN DESCRIPTION - LOCATION: LOCATION: CHEST;INCISION

## 2022-04-06 ASSESSMENT — PAIN DESCRIPTION - ONSET: ONSET: ON-GOING

## 2022-04-06 ASSESSMENT — PAIN DESCRIPTION - ORIENTATION: ORIENTATION: RIGHT;LEFT

## 2022-04-06 NOTE — PLAN OF CARE
Problem:  Activity:  Goal: Risk for activity intolerance will decrease  Description: Risk for activity intolerance will decrease  Outcome: Ongoing  Goal: Will verbalize the importance of balancing activity with adequate rest periods  Description: Will verbalize the importance of balancing activity with adequate rest periods  Outcome: Ongoing  Goal: Ability to tolerate increased activity will improve  Description: Ability to tolerate increased activity will improve  Outcome: Ongoing     Problem: Cardiac:  Goal: Ability to maintain an adequate cardiac output will improve  Description: Ability to maintain an adequate cardiac output will improve  Outcome: Ongoing  Goal: Hemodynamic stability will improve  Description: Hemodynamic stability will improve  Outcome: Ongoing  Goal: Diagnostic test results will improve  Description: Diagnostic test results will improve  Outcome: Ongoing  Goal: Complications related to the disease process, condition or treatment will be avoided or minimized  Description: Complications related to the disease process, condition or treatment will be avoided or minimized  Outcome: Ongoing  Goal: Cerebral tissue perfusion will improve  Description: Cerebral tissue perfusion will improve  Outcome: Ongoing     Problem: Coping:  Goal: Ability to verbalize feelings about condition will improve  Description: Ability to verbalize feelings about condition will improve  Outcome: Ongoing  Goal: Ability to identify strategies to decrease anxiety will improve  Description: Ability to identify strategies to decrease anxiety will improve  Outcome: Ongoing  Goal: Ability to identify and alter barriers to satisfying sexual function will improve  Description: Ability to identify and alter barriers to satisfying sexual function will improve  Outcome: Ongoing  Goal: Ability to identify and develop effective coping behavior will improve  Description: Ability to identify and develop effective coping behavior will improve  Outcome: Ongoing     Problem: Health Behavior:  Goal: Ability to identify changes in lifestyle to reduce recurrence of condition will improve  Description: Ability to identify changes in lifestyle to reduce recurrence of condition will improve  Outcome: Ongoing  Goal: Ability to manage health-related needs will improve  Description: Ability to manage health-related needs will improve  Outcome: Ongoing  Goal: Identification of resources available to assist in meeting health care needs will improve  Description: Identification of resources available to assist in meeting health care needs will improve  Outcome: Ongoing     Problem: Nutritional:  Goal: Ability to identify appropriate dietary choices will improve  Description: Ability to identify appropriate dietary choices will improve  Outcome: Ongoing     Problem: Respiratory:  Goal: Ability to maintain adequate ventilation will improve  Description: Ability to maintain adequate ventilation will improve  Outcome: Ongoing  Goal: Levels of oxygenation will improve  Description: Levels of oxygenation will improve  Outcome: Ongoing     Problem: Sensory:  Goal: Pain level will decrease  Description: Pain level will decrease  Outcome: Ongoing     Problem: Falls - Risk of:  Goal: Will remain free from falls  Description: Will remain free from falls  Outcome: Ongoing  Goal: Absence of physical injury  Description: Absence of physical injury  Outcome: Ongoing     Problem: Infection:  Goal: Will remain free from infection  Description: Will remain free from infection  Outcome: Ongoing     Problem: Safety:  Goal: Free from accidental physical injury  Description: Free from accidental physical injury  Outcome: Ongoing  Goal: Free from intentional harm  Description: Free from intentional harm  Outcome: Ongoing     Problem: Daily Care:  Goal: Daily care needs are met  Description: Daily care needs are met  Outcome: Ongoing     Problem: Pain:  Goal: Pain level will decrease  Description: Pain level will decrease  Outcome: Ongoing  Goal: Patient's pain/discomfort is manageable  Description: Patient's pain/discomfort is manageable  Outcome: Ongoing  Goal: Control of acute pain  Description: Control of acute pain  Outcome: Ongoing  Goal: Control of chronic pain  Description: Control of chronic pain  Outcome: Ongoing     Problem: Skin Integrity:  Goal: Skin integrity will stabilize  Description: Skin integrity will stabilize  Outcome: Ongoing     Problem: Discharge Planning:  Goal: Patients continuum of care needs are met  Description: Patients continuum of care needs are met  Outcome: Ongoing     Problem: OXYGENATION/RESPIRATORY FUNCTION  Goal: Patient will maintain patent airway  Outcome: Ongoing  Goal: Patient will achieve/maintain normal respiratory rate/effort  Description: Respiratory rate and effort will be within normal limits for the patient  Outcome: Ongoing     Problem: SKIN INTEGRITY  Goal: Skin integrity is maintained or improved  Outcome: Ongoing

## 2022-04-06 NOTE — PROGRESS NOTES
Open heart discharge instructions reviewed with patient. Patient verbalized understanding of all instructions. Incisions are clean, dry and intact with surgical glue and steri strip. Patient verbalized understanding of not removing the surgical glue and steri strips and to let it fall off on its own. Patient's daughter was present during the entire discharge and verbalized understanding.

## 2022-04-06 NOTE — PROGRESS NOTES
Physical Therapy  Facility/Department: [facility] CAR 1  Daily Treatment Note  NAME: Lynne Beckham  : 1960  MRN: [id number]    Date of Service: 2022    Discharge Recommendations:  Patient would benefit from continued therapy after discharge   PT Equipment Recommendations  Equipment Needed: No    Assessment   Body structures, Functions, Activity limitations: Decreased functional mobility ; Decreased endurance;Decreased strength  Assessment: Pt able to complete functional transfers with SBA and ambulated 200ft with RW and CGA, noted fatigue after amb but grossly steady t/o mobility. Pt negotiated 4 steps with RHR and LUE HHA and CGA. Pt should be safe to return to prior living situation with intermittent support as needed. Pt would benefit from continued PT to address B LE weakness and decreased endurance,  Prognosis: Good  Decision Making: Medium Complexity  PT Education: Goals;Plan of Care;PT Role;Transfer Training;General Safety;Precautions; Functional Mobility Training;Energy Conservation;Home Exercise Program  Barriers to Learning: none  REQUIRES PT FOLLOW UP: Yes  Activity Tolerance  Activity Tolerance: Patient limited by fatigue;Patient limited by endurance     Patient Diagnosis(es): The primary encounter diagnosis was Chest pain, unspecified type. Diagnoses of NSTEMI (non-ST elevated myocardial infarction) ([facility] [address] and Post-op pain were also pertinent to this visit. has a past medical history of Chronic back pain, Hypertension, Osteoarthritis, and Pneumothorax.   has a past surgical history that includes back surgery; Carpal tunnel release (Right); Appendectomy; Tubal ligation; Cardiac catheterization (2022); and Coronary artery bypass graft (N/A, 3/29/2022).     Restrictions  Restrictions/Precautions  Restrictions/Precautions: Cardiac,Surgical Protocols,Fall Risk,Up as Tolerated,General Precautions  Required Braces or Orthoses?: Yes (surgical bra)  Required Braces or Orthoses  Other: Heart Hugger Brace  Position Activity Restriction  Sternal Precautions: 5# Lifting Restrictions,No Pulling,No Pushing  Sternal Precautions: CABG X3 3/29/22  Other position/activity restrictions: Recent left rotator cuff repair, 12/1/21,  Subjective   General  Response To Previous Treatment: Patient with no complaints from previous session. Family / Caregiver Present: No  Subjective  Subjective: RN and pt agreeable to PT. pt agreeable and pleasant. Pt seated in recliner at start of session. Pain Screening  Patient Currently in Pain: Yes  Pain Assessment  Pain Assessment: 0-10  Pain Level: 5  Pain Type: Acute pain  Pain Location: Chest;Incision  Pain Orientation: Right;Left  Pain Descriptors: Aching;Discomfort  Pain Frequency: Continuous  Pain Onset: On-going  Non-Pharmaceutical Pain Intervention(s): Repositioned; Ambulation/Increased Activity  Response to Pain Intervention: Patient Satisfied  Vital Signs  Patient Currently in Pain: Yes       Orientation  Orientation  Overall Orientation Status: Within Normal Limits  Cognition   Cognition  Overall Cognitive Status: WFL  Objective   Bed mobility  Supine to Sit: Unable to assess  Sit to Supine: Unable to assess  Scooting: Stand by assistance  Comment: Pt begins and ends in recliner  Transfers  Sit to Stand: Stand by assistance  Stand to sit: Stand by assistance  Comment: Cues for hand placement with good return  Ambulation  Ambulation?: Yes  Ambulation 1  Surface: level tile  Device: Rolling Walker  Other Apparatus: O2 (1 L per NC)  Assistance: Contact guard assistance  Quality of Gait: slow, steady  Gait Deviations: Slow Angella;Decreased step length;Decreased step height  Distance: 200ft  Comments: pt with noted fatigue after amb, no LOB. Stairs/Curb  Stairs?: Yes  Stairs  # Steps : 4  Rails: Right ascending  Device: Hand Held Assist (for LUE)  Assistance: Contact guard assistance  Comment: No LOB noted.      Balance  Posture: Good  Sitting - Static: Good  Sitting - Dynamic: Good  Standing - Static: Fair;+  Standing - Dynamic: Fair  Comments: standing balance assessed with RW                    Seated LE exercise program: Long Arc Quads, hip abduction/adduction, heel/toe raises, and marches. Reps: 10x AROM BLE                                                       AM-PAC Score  AM-PAC Inpatient Mobility Raw Score : 19 (04/06/22 1414)  AM-PAC Inpatient T-Scale Score : 45.44 (04/06/22 1414)  Mobility Inpatient CMS 0-100% Score: 41.77 (04/06/22 1414)  Mobility Inpatient CMS G-Code Modifier : CK (04/06/22 1414)          Goals  Short term goals  Time Frame for Short term goals: 14 visits  Short term goal 1: Complete transfers with mod I and appropriate device  Short term goal 2: Complete 300 ft of gait with mod I and appropriate device  Short term goal 3: Complete 2 steps with no HR and mod I  Short term goal 4: Participate in 30 minutes of therapy to promote endurance  Short term goal 5: Be independent with cardiac HEP  Patient Goals   Patient goals : To go home    Plan    Plan  Times per week: 7x per week, 1-2x per day  Times per day: Daily  Current Treatment Recommendations: Madison Gamino Re-education,Patient/Caregiver Education & Training,Equipment Evaluation, Education, & procurement,Balance Training,Gait Training,Home Exercise Program,Functional Mobility Training,Stair training,Safety Education & Training  Safety Devices  Type of devices:  All fall risk precautions in place,Gait belt,Call light within reach,Left in chair,Nurse notified  Restraints  Initially in place: No     Therapy Time   Individual Concurrent Group Co-treatment   Time In 0843         Time Out 0908         Minutes 25         Timed Code Treatment Minutes: 2017 Consuelo Ruelas, PT

## 2022-04-06 NOTE — PROGRESS NOTES
Physician Progress Note      Beto Chacon  CSN #:                  [id number]  :                       1960  ADMIT DATE:       3/22/2022 5:31 AM  [address] [place] DATE:  RESPONDING  PROVIDER #:        Deejay Erickson MD          QUERY TEXT:    Pt admitted with ASHD with CABG x3 and has CHF documented. If possible, please   document in progress notes and discharge summary further specificity   regarding the type and acuity of CHF:    The medical record reflects the following:  Risk Factors: HTN, CHF,  NSTEMI, Fluid overload, hyponatremia, Hyperkalemia  Clinical Indicators: echo- 3/23/2022- EF 52% with normal systolic function. pro-bnp- 20. CXR- no acute cardiopulmonary, repeat 4/3 - left pleural   effusion. trace bilateral lower extremity edema, lungs diminished. Treatment: IV- Lasix, PO- Lopressor, Amiodarone, Cozaar (home med)    Thank Angelina Hicks, [facility]  cell- [phone]  office hours [id number]  email - [email or web address]  Options provided:  -- Acute on Chronic Systolic CHF/HFrEF  -- Acute on Chronic Diastolic CHF/HFpEF  -- Acute on Chronic Systolic and Diastolic CHF  -- Chronic Systolic CHF/HFrEF  -- Chronic Diastolic CHF/HFpEF  -- Chronic Systolic and Diastolic CHF  -- Other - I will add my own diagnosis  -- Disagree - Not applicable / Not valid  -- Disagree - Clinically unable to determine / Unknown  -- Refer to Clinical Documentation Reviewer    PROVIDER RESPONSE TEXT:    Provider disagreed with this query.   not my case    Query created by: Lakeisha Rose on 2022 5:36 PM      Electronically signed by:  Deejay Erickson MD 2022 6:40 AM

## 2022-04-06 NOTE — PROGRESS NOTES
[facility]  Progress Note    4/6/2022 7:56 AM  Surgeon: Surgeon CTS: Dr. Deana Davenport MD   S/P: CABGx3     Subjective:  Ms. Fadumo Jeffers   Resting in chair. NV well controlled now. No chest pains or SOB. Objective:  /65   Pulse 83   Temp 98.2 °F (36.8 °C) (Oral)   Resp 18   Ht 5' 5\" (1.651 m)   Wt 183 lb 6.8 oz (83.2 kg)   SpO2 90%   BMI 30.52 kg/m²     CV: no murmur noted, Normal S1, S2,  Lungs: clear to auscultation, no wheezes, rales, or rhonchi  Abd: normal bowel sounds   Lower Extremities: Trace edema  Saph Incison: no sign of drainage or infection  Sternal Incison: dressing applied-no excessive drainage or signs of infection noted  CXR-no concerns noted with CXR- needs cough and deep breath altectasis noted on left lower thorax  Labs: Labs reviewed today  CBC:   Recent Labs     04/04/22  0322 04/05/22  0250 04/06/22  0500   WBC 22.6* 14.1* 14.2*   HGB 12.2 10.8* 10.4*   HCT 35.4* 31.5* 30.8*   MCV 88.9 90.0 91.9   PLT See Reflexed IPF Result 90* 88*     BMP:   Recent Labs     04/05/22  1604 04/05/22  2041 04/06/22  0500   * 129* 130*   K 3.7 3.6* 4.1   CL 97* 92* 96*   CO2 24 26 22   BUN 9 8 6*   CREATININE 0.60 0.72 0.64       I/O: I/O last 3 completed shifts:   In: 726 [P.O.:875]  Out: 2200 [Urine:2200]  Scheduled Meds:   chlorothiazide  250 mg IntraVENous Once    midodrine  10 mg Oral TID WC    polyethylene glycol  17 g Oral Daily    [Held by provider] furosemide  20 mg IntraVENous Daily    amiodarone  200 mg Oral BID    docusate sodium  100 mg Oral BID    insulin glargine  35 Units SubCUTAneous Daily    insulin lispro  0-12 Units SubCUTAneous TID WC    insulin lispro  0-6 Units SubCUTAneous Nightly    sodium chloride flush  10 mL IntraVENous 2 times per day    aspirin  81 mg Oral Daily    clopidogrel  75 mg Oral Daily    metoprolol tartrate  25 mg Oral BID    atorvastatin  20 mg Oral Nightly    pantoprazole  40 mg Oral Daily    pantoprazole (PROTONIX) 40 mg injection  40 mg IntraVENous Daily    ipratropium-albuterol  1 ampule Inhalation Q4H WA     Continuous Infusions:   sodium chloride      propofol Stopped (03/29/22 1711)    dextrose      norepinephrine Stopped (03/29/22 1533)    nitroGLYCERIN Stopped (03/29/22 1853)     PRN Meds:sennosides-docusate sodium, sodium chloride flush, sodium chloride, ondansetron **OR** ondansetron, oxyCODONE-acetaminophen **OR** oxyCODONE-acetaminophen, fentanNYL **OR** fentanNYL, hydrALAZINE, metoprolol, diphenhydrAMINE, magnesium sulfate, albumin human, glucose, dextrose, glucagon (rDNA), dextrose, bisacodyl, potassium chloride **OR** potassium alternative oral replacement **OR** potassium chloride    Beta- Blocker:  Yes  Aspirin: Yes  Lovenox: No  GI: Yes  Plavix: Yes  Coumadin: No  Statin: Yes  ACE: No    Daily Nursing Care:  Please keep SCDS in place as DVT prophylaxis  If not intubated, Please have patient use IS and acapella 10X/hr or during commercial breaks  Please continue BM management  Daily labs and CXR  Daily PT/OT and ambulation  Continue with Case Management for DC planning      Assessment/ Plan:    Does not qualify for home O2  Pt is ready for DC today  Await nephrology input then DC this afternoon    [facility], [place] - NP

## 2022-04-06 NOTE — CARE COORDINATION
Spoke with Christina Dunn at [facility], discussed that pt is likely to discharge today, Christina Dunn requests notification of discharge so that she knows to pull AVS/ANJU, will schedule pt to be seen by [facility]. Writer will update Christina Dunn. 56 - Placed call to Christina Mona at [facility], notified her that pt was discharging today.     Discharge [facility] Case Management Department  Written by: Zainab Ramos RN    Patient Name: Misti Lombardo  Attending Provider: Sarah Anderson MD  Admit Date: 3/22/2022  5:31 AM  MRN: [id number]  Account: [de-identified]                     : 1960  Discharge Date: 22      Disposition: Home with Kizzy Watts RN

## 2022-04-06 NOTE — PROGRESS NOTES
Physician Progress Note      Yong Healy  RUBI #:                  [id number]  :                       1960  ADMIT DATE:       3/22/2022 5:31 AM  [facility] DATE:  RESPONDING  PROVIDER #:        Tenisha Press TEXT:    Pt admitted with ASHD with CABG x3 and has CHF documented. If possible, please   document in progress notes and discharge summary further specificity   regarding the type and acuity of CHF:    The medical record reflects the following:  Risk Factors: HTN, CHF,  NSTEMI, Fluid overload, hyponatremia, Hyperkalemia  Clinical Indicators: echo- 3/23/2022- EF 52% with normal systolic function. pro-bnp- 20. CXR- no acute cardiopulmonary, repeat 4/3 - left pleural   effusion. trace bilateral lower extremity edema, lungs diminished. Treatment: IV- Lasix, PO- Lopressor, Amiodarone, Cozaar (home med)    [facility]  cell- [phone]  office hours [id number]  email - [email or web address]  Options provided:  -- Acute on Chronic Systolic CHF/HFrEF  -- Acute on Chronic Diastolic CHF/HFpEF  -- Acute on Chronic Systolic and Diastolic CHF  -- Chronic Systolic CHF/HFrEF  -- Chronic Diastolic CHF/HFpEF  -- Chronic Systolic and Diastolic CHF  -- Other - I will add my own diagnosis  -- Disagree - Not applicable / Not valid  -- Disagree - Clinically unable to determine / Unknown  -- Refer to Clinical Documentation Reviewer    PROVIDER RESPONSE TEXT:    This patient has chronic diastolic CHF/HFpEF.     Query created by: Monalisa March on 2022 7:13 AM      Electronically signed by:  Michelle Bernal 2022 7:25 AM

## 2022-04-06 NOTE — PROGRESS NOTES
CLINICAL PHARMACY NOTE: MEDS TO BEDS    Total # of Prescriptions Filled: 18   The following medications were delivered to the patient:  · INSULIN LISPRO   · LANTUS   · TRUE PLUS LANCETS   · METER   · ALCOHOL SWABS   · PEN NEEDLES   · TEST STRIPS   · ASA   · AMARYL 200  · PERCOCET 5-325  · LASIX 20  · PLAVIX 75   ·    · LIPITOR 20  · ZOFRAN 4MG ODT   · PROTONIX 40  · METOPROLOL 25  · SENEXON     Additional Documentation:    MEDS DELIVERED TO PT IN ROOM 1005, PAID 125.49 WITH CARD ON CLOVER

## 2022-04-06 NOTE — PROGRESS NOTES
Renal Progress Note    Patient :  Rmases Gonzalez; 60 y.o. MRN# [id number]  Location:  1005/1005-01  Attending:  Cookie Romero MD  Admit Date:  3/22/2022   Hospital Day: [facility]. Melissa Kuo; 60 y.o. female with past medical history of COPD, hypertension presented with a chief complaint of midsternal chest pain. In the ER patient's blood pressure was 196/101. She was also found to have NSTEMI, underwent cardiac cath 3/23/2022 was found to have multivessel CAD and underwent successful CABG 3/29/2022. Subjective:       Patient's sodium corrected rapidly yesterday morning 120->133, was given 1 dose of desmopressin and Lasix was held. This morning it is 129->130 and repeat Na was 132. No N/V/D.    No acute issues overnight  Patient working with physical therapy    Outpatient Medications:     Medications Prior to Admission: [DISCONTINUED] ibuprofen (ADVIL;MOTRIN) 800 MG tablet, ibuprofen 800 mg tablet (Patient not taking: Reported on 3/23/2022)  [DISCONTINUED] naproxen (NAPROSYN) 500 MG tablet, Take 1 tablet by mouth daily With meals (Patient not taking: Reported on 3/23/2022)  b complex vitamins capsule, Take 1 capsule by mouth daily (Patient not taking: Reported on 3/23/2022)  [DISCONTINUED] losartan (COZAAR) 25 MG tablet, Take 25 mg by mouth (Patient not taking: Reported on 3/23/2022)    Current Medications:     Scheduled Meds:    chlorothiazide  250 mg IntraVENous Once    midodrine  10 mg Oral TID WC    polyethylene glycol  17 g Oral Daily    [Held by provider] furosemide  20 mg IntraVENous Daily    amiodarone  200 mg Oral BID    docusate sodium  100 mg Oral BID    insulin glargine  35 Units SubCUTAneous Daily    insulin lispro  0-12 Units SubCUTAneous TID WC    insulin lispro  0-6 Units SubCUTAneous Nightly    sodium chloride flush  10 mL IntraVENous 2 times per day    aspirin  81 mg Oral Daily    clopidogrel  75 mg Oral Daily    metoprolol tartrate  25 mg Oral BID    atorvastatin  20 mg Oral Nightly    pantoprazole  40 mg Oral Daily    pantoprazole (PROTONIX) 40 mg injection  40 mg IntraVENous Daily    ipratropium-albuterol  1 ampule Inhalation Q4H WA     Continuous Infusions:    sodium chloride      propofol Stopped (22 1711)    dextrose      norepinephrine Stopped (22 1533)    nitroGLYCERIN Stopped (22 1853)     PRN Meds:  sennosides-docusate sodium, sodium chloride flush, sodium chloride, ondansetron **OR** ondansetron, oxyCODONE-acetaminophen **OR** oxyCODONE-acetaminophen, fentanNYL **OR** fentanNYL, hydrALAZINE, metoprolol, diphenhydrAMINE, magnesium sulfate, albumin human, glucose, dextrose, glucagon (rDNA), dextrose, bisacodyl, potassium chloride **OR** potassium alternative oral replacement **OR** potassium chloride    Input/Output:           Intake/Output Summary (Last 24 hours) at 2022 0941  Last data filed at 2022 2225  Gross per 24 hour   Intake 525 ml   Output 1800 ml   Net -1275 ml   . Patient Vitals for the past 96 hrs (Last 3 readings):   Weight   22 0355 183 lb 6.8 oz (83.2 kg)   22 0600 180 lb 1.9 oz (81.7 kg)   22 0554 182 lb 15.7 oz (83 kg)       Vital Signs:   Temperature:  Temp: 98.4 °F (36.9 °C)  TMax:   Temp (24hrs), Av.1 °F (36.7 °C), Min:97.8 °F (36.6 °C), Max:98.4 °F (36.9 °C)    Respirations:  Resp: 16  Pulse:   Pulse: 92  BP:    BP: (!) 150/93  BP Range: Systolic (24BAA), QCK:356 , Min:105 , BEU:296       Diastolic (73NLE), HZW:85, Min:55, Max:93      Physical Examination:       Physical Exam  HENT:      Head: Normocephalic. Nose: Nose normal.      Mouth/Throat:      Mouth: Mucous membranes are moist.   Eyes:      Extraocular Movements: Extraocular movements intact. Conjunctiva/sclera: Conjunctivae normal.   Cardiovascular:      Rate and Rhythm: Normal rate and regular rhythm. Pulses: Normal pulses. Pulmonary:      Effort: Pulmonary effort is normal.      Breath sounds: Normal breath sounds. Musculoskeletal:         General: No swelling. Normal range of motion. Left lower leg: Edema present. Skin:     General: Skin is warm. Neurological:      Mental Status: She is alert. Labs:       Recent Labs     04/04/22  0322 04/05/22  0250 04/06/22  0500   WBC 22.6* 14.1* 14.2*   RBC 3.98 3.50* 3.35*   HGB 12.2 10.8* 10.4*   HCT 35.4* 31.5* 30.8*   MCV 88.9 90.0 91.9   MCH 30.7 30.9 31.0   MCHC 34.5 34.3 33.8   RDW 12.7 12.8 12.8   PLT See Reflexed IPF Result 90* 88*   MPV  --  11.4 10.9      BMP:   Recent Labs     04/05/22  1604 04/05/22 2041 04/06/22  0500   * 129* 130*   K 3.7 3.6* 4.1   CL 97* 92* 96*   CO2 24 26 22   BUN 9 8 6*   CREATININE 0.60 0.72 0.64   GLUCOSE 191* 243* 126*   CALCIUM 8.1* 8.2* 8.2*     Magnesium:    Recent Labs     04/04/22  0322 04/05/22  0250 04/06/22  0500   MG 2.3 2.1 1.9     SPEP:  Lab Results   Component Value Date    PROT 7.4 03/22/2018     Urinalysis/Chemistries:      Lab Results   Component Value Date    NITRU NEGATIVE 03/23/2022    COLORU Yellow 03/23/2022    PHUR 5.5 03/23/2022    WBCUA 10 TO 20 03/23/2022    RBCUA None 03/23/2022    SPECGRAV 1.012 03/23/2022    LEUKOCYTESUR SMALL 03/23/2022    UROBILINOGEN Normal 03/23/2022    BILIRUBINUR NEGATIVE 03/23/2022    GLUCOSEU 1+ 03/23/2022    KETUA NEGATIVE 03/23/2022     Urine Sodium:     Lab Results   Component Value Date    BRIA 51 04/04/2022     Urine Osmolarity:   Lab Results   Component Value Date    OSMOU 229 04/04/2022       Radiology:     Reviewed as available    Assessment:       1. Dilutional hyponatremia -  from fluid overload/pulmonary edema. Urine studies skewed because of Lasix administration. Now seems to be bettter. 2. Multivessel CAD s/p CABG 3/29/2022  3. NSTEMI  4. Essential hypertension. Cozaar held  5. Type 2 diabetes mellitus      Plan:     · Will start Lasix 20 mg PO every other day. · Recommend No NSAIDs on d/c.   BMP in 1 week post discharge and fax results to Dr. Whaley Chimera office; Fax # [phone]. · Follow up with Dr. Eber Barrientos in 3-4 weeks post d/c.   · Ok to d/c from Nephrology standpoint.        Nutrition   Keep patient on renal diet/TF. Avoid nephrotoxic drugs/contrast exposure. We will continue to follow this patient along with you. eRyes Omalley MD  PGY-3 Internal Medicine Resident  [address] [place] [address]  4/6/2022 9:41 AM    Attending Physician Statement  I have discussed the care of this patient, including pertinent history and exam findings, with the Resident/CNP. I have reviewed and edited the key elements of all parts of the encounter with the Resident/CNP. I agree with the assessment, plan and orders as documented by the Resident/CNP. Bhavik Mcpherson MD   Nephrology [address] [place] [address] Drive    This note is created with the assistance of a speech-recognition program. While intending to generate a document that actually reflects the content of the visit, no guarantees can be provided that every mistake has been identified and corrected by editing.

## 2022-04-06 NOTE — PROGRESS NOTES
Occupational Therapy  Facility/Department: [facility] 1  Daily Treatment Note  NAME: Lynne Beckham  : 1960  MRN: [id number]    Date of Service: 2022    Discharge Recommendations:Pt. Would benefit from further skilled OT services to enhance functional outcomes. Patient would benefit from continued therapy after discharge  OT Equipment Recommendations  ADL Assistive Devices: Transfer Tub Bench;Reacher;Sock-Aid Hard;Long-handled Shoe Horn;Grab Bars - toilet;Grab Bars - tub    Assessment   Performance deficits / Impairments: Decreased functional mobility ; Decreased endurance;Decreased ADL status; Decreased high-level IADLs;Decreased coordination;Decreased balance;Decreased safe awareness;Decreased cognition  Treatment Diagnosis: CABG X3 3/29/22  Prognosis: Good  Decision Making: Medium Complexity  OT Education: ADL Adaptive Strategies;Transfer Training;Energy Conservation  Patient Education: EC/WS, sternal precautions, importance of movement upon d/c, pt. angelica KHAN carryover. REQUIRES OT FOLLOW UP: Yes  Activity Tolerance  Activity Tolerance: Patient Tolerated treatment well  Safety Devices  Safety Devices in place: Yes  Type of devices: All fall risk precautions in place;Call light within reach; Patient at risk for falls; Left in chair;Nurse notified; Chair alarm in place  Restraints  Initially in place: No         Patient Diagnosis(es): The primary encounter diagnosis was Chest pain, unspecified type. Diagnoses of NSTEMI (non-ST elevated myocardial infarction) ([facility] [address] and Post-op pain were also pertinent to this visit. has a past medical history of Chronic back pain, Hypertension, Osteoarthritis, and Pneumothorax.   has a past surgical history that includes back surgery; Carpal tunnel release (Right); Appendectomy; Tubal ligation; Cardiac catheterization (2022); and Coronary artery bypass graft (N/A, 3/29/2022).     Restrictions  Restrictions/Precautions  Restrictions/Precautions: Cardiac,Surgical Protocols,Fall Risk,Up as Tolerated,General Precautions  Required Braces or Orthoses?: Yes  Required Braces or Orthoses  Other: Heart Hugger Brace  Position Activity Restriction  Sternal Precautions: 5# Lifting Restrictions,No Pulling,No Pushing  Sternal Precautions: CABG X3 3/29/22, wound vac  Other position/activity restrictions: Recent left rotator cuff repair, 12/1/21,  Subjective   General  Patient assessed for rehabilitation services?: Yes  Response to previous treatment: Patient with no complaints from previous session  Family / Caregiver Present: No  Diagnosis: NSTEMI. S/P CABG x3 (3/29/22). Subjective  Subjective: RN approved Pt to be seen for OT tx, Pt was agreeable and cooperative throughout therapy session. Vital Signs  Patient Currently in Pain: Denies   Orientation  Orientation  Overall Orientation Status: Within Functional Limits  Objective    ADL  Grooming: Setup; Increased time to complete;Modified independent  (Standing at sink, SBA to ensure stability, MI for action with use of RW or countertop for support. (oral care/face and hair washing/hair combing))  UE Bathing: Setup; Increased time to complete;Stand by assistance;Minimal assistance (Standing at sink, min A for back, SBA for front, set up + increased time. Sx soap used.)  LE Bathing: Stand by assistance;Setup;Verbal cueing (nallely area/bottom at standing level + UE support, LEs sitting on chair using 4 figure tech.)  UE Dressing: Setup; Increased time to complete;Stand by assistance;Minimal assistance (Doff/don gown standing at sink- SBA, don heart hugger- min A.)  LE Dressing: Contact guard assistance;Setup;Verbal cueing ([place] long pants- SBA-CGA, verbal cues to use 4 figure tech , pt. angelica KHAN carryover.)  Toileting: Increased time to complete;Stand by assistance (Transfer- SBA-S, frontal hyg (sitting) I.)  Additional Comments: Min verbal cues for EC/WS and sternal precautions throughout, pt. angelica KHAN carryover. Sx. soap used. Balance  Sitting Balance: Independent  Standing Balance: Contact guard assistance (CGA-SBA)  Standing Balance  Time: ~45 minutes  Activity: static/dynamic standing, ADL tasks  Comment: SBA-CGA with RW as needed no LOB. Functional Mobility  Functional - Mobility Device: Rolling Walker  Activity: To/from bathroom  Assist Level: Contact guard assistance  Functional Mobility Comments: Room air, min SOB, cues for pursed lip breathing , pt. demo G carryover. Toilet Transfers  Toilet - Technique: Ambulating  Equipment Used: Standard toilet  Toilet Transfer: Stand by assistance     Transfers  Sit to stand: Stand by assistance  Stand to sit: Stand by assistance  Transfer Comments: w/no LOB, G sternal precautions. Cognition  Overall Cognitive Status: [facility]                                         Plan   Plan  Times per week: 4-6x/week (CABG)  Times per day: Daily  Current Treatment Recommendations: Endurance Training,Self-Care / ADL,Safety Education & Training,Pain Management,Cognitive/Perceptual Training,Functional Mobility Training,Balance Training,Equipment Evaluation, Education, & procurement,Patient/Caregiver Education & Training,Home Management Training                                               AM-PAC Score        AM-[facility] Inpatient Daily Activity Raw Score: 20 (04/06/22 1230)  AM-PAC Inpatient ADL T-Scale Score : 42.03 (04/06/22 1230)  ADL Inpatient CMS 0-100% Score: 38.32 (04/06/22 1230)  ADL Inpatient CMS G-Code Modifier : Fernandez Bills (04/06/22 1230)    Goals  Short term goals  Time Frame for Short term goals: By Discharge  Short term goal 1: Pt will verbalize / demo Mod I and Good Carryover with Sternal Precautions during all Functional ADL tasks. Short term goal 2: Pt will verbalize / demo Mod I and Good Carryover with EC and Ax Pacing during all Functional Tasks and Mobility.   Short term goal 3: Pt will verbalize / demo Mod I with Correct Use of AE / DME during all ADL / Functional Transfers. Short term goal 4: Pt will maintain Fair+ Dynamic Standing Balance (10-12 minutes) while participating in Functional / Leisure tasks. Short term goal 5: Pt will participate in Item Retrieval / Transport / Functional Mobility with Mod I with Correct Use of AE / DME.        Therapy Time   Individual Concurrent Group Co-treatment   Time In 1120         Time Out 1222         Minutes 62         Timed Code Treatment Minutes: [address] [facility], [place]/

## 2022-04-06 NOTE — PLAN OF CARE
Problem:  Activity:  Goal: Risk for activity intolerance will decrease  Description: Risk for activity intolerance will decrease  4/5/2022 2139 by Braulio Eisenmenger  Outcome: Ongoing  4/5/2022 1137 by Nicholas Mays RN  Outcome: Ongoing  Goal: Will verbalize the importance of balancing activity with adequate rest periods  Description: Will verbalize the importance of balancing activity with adequate rest periods  4/5/2022 2139 by Braulio Eisenmenger  Outcome: Ongoing  4/5/2022 1137 by Nicholas Mays RN  Outcome: Ongoing  Goal: Ability to tolerate increased activity will improve  Description: Ability to tolerate increased activity will improve  4/5/2022 2139 by Braulio Eisenmenger  Outcome: Ongoing  4/5/2022 1137 by Nicholas Mays RN  Outcome: Ongoing     Problem: Cardiac:  Goal: Ability to maintain an adequate cardiac output will improve  Description: Ability to maintain an adequate cardiac output will improve  4/5/2022 2139 by Braulio Eisenmenger  Outcome: Ongoing  4/5/2022 1137 by Nicholas Mays RN  Outcome: Ongoing  Goal: Hemodynamic stability will improve  Description: Hemodynamic stability will improve  4/5/2022 2139 by Braulio Eisenmenger  Outcome: Ongoing  4/5/2022 1137 by Nicholas Mays RN  Outcome: Ongoing  Goal: Diagnostic test results will improve  Description: Diagnostic test results will improve  4/5/2022 2139 by Braulio Eisenmenger  Outcome: Ongoing  4/5/2022 1137 by Nicholas Mays RN  Outcome: Ongoing  Goal: Complications related to the disease process, condition or treatment will be avoided or minimized  Description: Complications related to the disease process, condition or treatment will be avoided or minimized  Outcome: Ongoing  Goal: Cerebral tissue perfusion will improve  Description: Cerebral tissue perfusion will improve  Outcome: Ongoing     Problem: Coping:  Goal: Ability to verbalize feelings about condition will improve  Description: Ability to verbalize feelings about condition will improve  4/5/2022 2139 by Nikita Mcmahon Donato  Outcome: Ongoing  4/5/2022 1137 by Smith Hackett RN  Outcome: Ongoing  Goal: Ability to identify strategies to decrease anxiety will improve  Description: Ability to identify strategies to decrease anxiety will improve  4/5/2022 2139 by Almaz Jama  Outcome: Ongoing  4/5/2022 1137 by Smith Hackett RN  Outcome: Ongoing  Goal: Ability to identify and alter barriers to satisfying sexual function will improve  Description: Ability to identify and alter barriers to satisfying sexual function will improve  4/5/2022 2139 by Almaz Jama  Outcome: Ongoing  4/5/2022 1137 by Smith Hackett RN  Outcome: Ongoing  Goal: Ability to identify and develop effective coping behavior will improve  Description: Ability to identify and develop effective coping behavior will improve  4/5/2022 2139 by Almaz Jama  Outcome: Ongoing  4/5/2022 1137 by Smith Hackett RN  Outcome: Ongoing     Problem: Health Behavior:  Goal: Ability to identify changes in lifestyle to reduce recurrence of condition will improve  Description: Ability to identify changes in lifestyle to reduce recurrence of condition will improve  4/5/2022 2139 by Almaz Jama  Outcome: Ongoing  4/5/2022 1137 by Smith Hackett RN  Outcome: Ongoing  Goal: Ability to manage health-related needs will improve  Description: Ability to manage health-related needs will improve  4/5/2022 2139 by Almaz Jama  Outcome: Ongoing  4/5/2022 1137 by Smith Hackett RN  Outcome: Ongoing  Goal: Identification of resources available to assist in meeting health care needs will improve  Description: Identification of resources available to assist in meeting health care needs will improve  4/5/2022 2139 by Almaz Jama  Outcome: Ongoing  4/5/2022 1137 by Smith Hackett RN  Outcome: Ongoing     Problem: Nutritional:  Goal: Ability to identify appropriate dietary choices will improve  Description: Ability to identify appropriate dietary choices will improve  Outcome: Ongoing Problem: Respiratory:  Goal: Ability to maintain adequate ventilation will improve  Description: Ability to maintain adequate ventilation will improve  Outcome: Ongoing  Goal: Levels of oxygenation will improve  Description: Levels of oxygenation will improve  Outcome: Ongoing     Problem: Sensory:  Goal: Pain level will decrease  Description: Pain level will decrease  4/5/2022 2139 by Bobby Major  Outcome: Ongoing  4/5/2022 1137 by Kareem Trejo RN  Outcome: Ongoing     Problem: Falls - Risk of:  Goal: Will remain free from falls  Description: Will remain free from falls  4/5/2022 2139 by Bobby Major  Outcome: Ongoing  4/5/2022 1137 by Kareem Trejo RN  Outcome: Ongoing  Goal: Absence of physical injury  Description: Absence of physical injury  4/5/2022 2139 by Bobby Major  Outcome: Ongoing  4/5/2022 1137 by Kareem Trejo RN  Outcome: Ongoing     Problem: Infection:  Goal: Will remain free from infection  Description: Will remain free from infection  Outcome: Ongoing     Problem: Safety:  Goal: Free from accidental physical injury  Description: Free from accidental physical injury  Outcome: Ongoing  Goal: Free from intentional harm  Description: Free from intentional harm  Outcome: Ongoing     Problem: Daily Care:  Goal: Daily care needs are met  Description: Daily care needs are met  Outcome: Ongoing     Problem: Pain:  Goal: Pain level will decrease  Description: Pain level will decrease  4/5/2022 2139 by Bobby Major  Outcome: Ongoing  4/5/2022 1137 by Kareem Trejo RN  Outcome: Ongoing  Goal: Patient's pain/discomfort is manageable  Description: Patient's pain/discomfort is manageable  4/5/2022 2139 by Bobby Major  Outcome: Ongoing  4/5/2022 1137 by Kareem Trejo RN  Outcome: Ongoing  Goal: Control of acute pain  Description: Control of acute pain  4/5/2022 2139 by Bobby Major  Outcome: Ongoing  4/5/2022 1137 by Kareem Trejo RN  Outcome: Ongoing  Goal: Control of chronic pain  Description: Control of chronic pain  4/5/2022 2139 by Maribell Mirza  Outcome: Ongoing  4/5/2022 1137 by Yair Deleon RN  Outcome: Ongoing     Problem: Skin Integrity:  Goal: Skin integrity will stabilize  Description: Skin integrity will stabilize  4/5/2022 2139 by Maribell Mirza  Outcome: Ongoing  4/5/2022 1137 by Yair Deleon RN  Outcome: Ongoing     Problem: Discharge Planning:  Goal: Patients continuum of care needs are met  Description: Patients continuum of care needs are met  4/5/2022 2139 by Maribell Mirza  Outcome: Ongoing  4/5/2022 1137 by Yair Deleon RN  Outcome: Ongoing     Problem: OXYGENATION/RESPIRATORY FUNCTION  Goal: Patient will maintain patent airway  Outcome: Ongoing  Goal: Patient will achieve/maintain normal respiratory rate/effort  Description: Respiratory rate and effort will be within normal limits for the patient  Outcome: Ongoing     Problem: SKIN INTEGRITY  Goal: Skin integrity is maintained or improved  Outcome: Ongoing

## 2022-04-08 ASSESSMENT — ENCOUNTER SYMPTOMS
EYES NEGATIVE: 1
RESPIRATORY NEGATIVE: 1
ALLERGIC/IMMUNOLOGIC NEGATIVE: 1
GASTROINTESTINAL NEGATIVE: 1
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
## 2022-04-12 ENCOUNTER — TELEPHONE (OUTPATIENT)
Dept: CARDIOTHORACIC SURGERY | Age: 62
End: 2022-04-12

## 2022-04-12 NOTE — TELEPHONE ENCOUNTER
Patient called in regards to getting a refill on pain medication. She was informed per protocol that we usually only give a weeks worth of pain medication and that she can take a Tylenol or Asprin. The writer had informed her that a message would be sent to the provider on call.

## 2022-04-14 ENCOUNTER — TELEMEDICINE (OUTPATIENT)
Dept: FAMILY MEDICINE CLINIC | Age: 62
End: 2022-04-14
Payer: COMMERCIAL

## 2022-04-14 DIAGNOSIS — I10 ESSENTIAL HYPERTENSION: ICD-10-CM

## 2022-04-14 DIAGNOSIS — M75.122 COMPLETE TEAR OF LEFT ROTATOR CUFF, UNSPECIFIED WHETHER TRAUMATIC: ICD-10-CM

## 2022-04-14 DIAGNOSIS — E11.9 TYPE 2 DIABETES MELLITUS WITHOUT COMPLICATION, WITHOUT LONG-TERM CURRENT USE OF INSULIN (HCC): ICD-10-CM

## 2022-04-14 DIAGNOSIS — Z95.1 HX OF CABG: ICD-10-CM

## 2022-04-14 DIAGNOSIS — I25.10 3-VESSEL CAD: ICD-10-CM

## 2022-04-14 DIAGNOSIS — I21.4 NSTEMI (NON-ST ELEVATED MYOCARDIAL INFARCTION) (HCC): Primary | ICD-10-CM

## 2022-04-14 PROBLEM — M75.120 FULL THICKNESS ROTATOR CUFF TEAR: Status: ACTIVE | Noted: 2021-10-22

## 2022-04-14 PROBLEM — M19.019 OSTEOARTHRITIS OF ACROMIOCLAVICULAR JOINT: Status: ACTIVE | Noted: 2021-10-22

## 2022-04-14 PROCEDURE — 99214 OFFICE O/P EST MOD 30 MIN: CPT | Performed by: NURSE PRACTITIONER

## 2022-04-14 PROCEDURE — 3046F HEMOGLOBIN A1C LEVEL >9.0%: CPT | Performed by: NURSE PRACTITIONER

## 2022-04-14 RX ORDER — AMLODIPINE BESYLATE 10 MG/1
TABLET ORAL
COMMUNITY
End: 2022-04-20 | Stop reason: SDUPTHER

## 2022-04-14 RX ORDER — LOSARTAN POTASSIUM 25 MG/1
1 TABLET ORAL DAILY
Status: ON HOLD | COMMUNITY
End: 2022-04-18 | Stop reason: SDUPTHER

## 2022-04-14 SDOH — ECONOMIC STABILITY: TRANSPORTATION INSECURITY
IN THE PAST 12 MONTHS, HAS THE LACK OF TRANSPORTATION KEPT YOU FROM MEDICAL APPOINTMENTS OR FROM GETTING MEDICATIONS?: NO

## 2022-04-14 SDOH — ECONOMIC STABILITY: FOOD INSECURITY: WITHIN THE PAST 12 MONTHS, THE FOOD YOU BOUGHT JUST DIDN'T LAST AND YOU DIDN'T HAVE MONEY TO GET MORE.: NEVER TRUE

## 2022-04-14 SDOH — ECONOMIC STABILITY: TRANSPORTATION INSECURITY
IN THE PAST 12 MONTHS, HAS LACK OF TRANSPORTATION KEPT YOU FROM MEETINGS, WORK, OR FROM GETTING THINGS NEEDED FOR DAILY LIVING?: NO

## 2022-04-14 SDOH — ECONOMIC STABILITY: FOOD INSECURITY: WITHIN THE PAST 12 MONTHS, YOU WORRIED THAT YOUR FOOD WOULD RUN OUT BEFORE YOU GOT MONEY TO BUY MORE.: NEVER TRUE

## 2022-04-14 ASSESSMENT — PATIENT HEALTH QUESTIONNAIRE - PHQ9
SUM OF ALL RESPONSES TO PHQ QUESTIONS 1-9: 1
SUM OF ALL RESPONSES TO PHQ QUESTIONS 1-9: 1
1. LITTLE INTEREST OR PLEASURE IN DOING THINGS: 1
SUM OF ALL RESPONSES TO PHQ9 QUESTIONS 1 & 2: 1
2. FEELING DOWN, DEPRESSED OR HOPELESS: 0
SUM OF ALL RESPONSES TO PHQ QUESTIONS 1-9: 1
SUM OF ALL RESPONSES TO PHQ QUESTIONS 1-9: 1

## 2022-04-14 ASSESSMENT — ENCOUNTER SYMPTOMS
GASTROINTESTINAL NEGATIVE: 1
EYES NEGATIVE: 1
VOMITING: 0
SHORTNESS OF BREATH: 0
COUGH: 0
RESPIRATORY NEGATIVE: 1
NAUSEA: 0
DIARRHEA: 0
CONSTIPATION: 0
ABDOMINAL PAIN: 0

## 2022-04-14 ASSESSMENT — COLUMBIA-SUICIDE SEVERITY RATING SCALE - C-SSRS
1. WITHIN THE PAST MONTH, HAVE YOU WISHED YOU WERE DEAD OR WISHED YOU COULD GO TO SLEEP AND NOT WAKE UP?: NO
6. HAVE YOU EVER DONE ANYTHING, STARTED TO DO ANYTHING, OR PREPARED TO DO ANYTHING TO END YOUR LIFE?: NO
2. HAVE YOU ACTUALLY HAD ANY THOUGHTS OF KILLING YOURSELF?: NO

## 2022-04-14 ASSESSMENT — SOCIAL DETERMINANTS OF HEALTH (SDOH): HOW HARD IS IT FOR YOU TO PAY FOR THE VERY BASICS LIKE FOOD, HOUSING, MEDICAL CARE, AND HEATING?: NOT HARD AT ALL

## 2022-04-14 NOTE — ASSESSMENT & PLAN NOTE
Well-controlled, continue current medications, lifestyle modifications recommended and Return for follow-up in office for A1c and additional diabetic education in the 2nd week of July.      Lab Results   Component Value Date    LABA1C 10.7 (H) 04/02/2022    LABA1C 11.3 (H) 03/23/2022     Lab Results   Component Value Date     04/02/2022

## 2022-04-14 NOTE — ASSESSMENT & PLAN NOTE
Well-controlled, continue current medications and follow-up with [address] [place] [address] [facility] post-discharge as advised.
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
## 2022-04-14 NOTE — ASSESSMENT & PLAN NOTE
Borderline controlled, continue following with Dr. Marcia Hernandez at [facility] as advised.

## 2022-04-15 ENCOUNTER — APPOINTMENT (OUTPATIENT)
Dept: GENERAL RADIOLOGY | Age: 62
DRG: 291 | End: 2022-04-15
Payer: COMMERCIAL

## 2022-04-15 ENCOUNTER — HOSPITAL ENCOUNTER (INPATIENT)
Age: 62
LOS: 2 days | Discharge: HOME HEALTH | DRG: 291 | End: 2022-04-18
Attending: EMERGENCY MEDICINE | Admitting: EMERGENCY MEDICINE
Payer: COMMERCIAL

## 2022-04-15 DIAGNOSIS — I50.9 ACUTE CONGESTIVE HEART FAILURE, UNSPECIFIED HEART FAILURE TYPE (HCC): Primary | ICD-10-CM

## 2022-04-15 DIAGNOSIS — R22.42 LOCALIZED SWELLING OF LEFT LOWER LEG: ICD-10-CM

## 2022-04-15 LAB
ABSOLUTE EOS #: 0.83 K/UL (ref 0–0.44)
ABSOLUTE IMMATURE GRANULOCYTE: 0.1 K/UL (ref 0–0.3)
ABSOLUTE LYMPH #: 2.29 K/UL (ref 1.1–3.7)
ABSOLUTE MONO #: 0.78 K/UL (ref 0.1–1.2)
ANION GAP SERPL CALCULATED.3IONS-SCNC: 16 MMOL/L (ref 9–17)
BASOPHILS # BLD: 1 % (ref 0–2)
BASOPHILS ABSOLUTE: 0.14 K/UL (ref 0–0.2)
BUN BLDV-MCNC: 8 MG/DL (ref 8–23)
CALCIUM SERPL-MCNC: 9.2 MG/DL (ref 8.6–10.4)
CHLORIDE BLD-SCNC: 92 MMOL/L (ref 98–107)
CO2: 21 MMOL/L (ref 20–31)
CREAT SERPL-MCNC: 0.72 MG/DL (ref 0.5–0.9)
EOSINOPHILS RELATIVE PERCENT: 7 % (ref 1–4)
GFR AFRICAN AMERICAN: >60 ML/MIN
GFR NON-AFRICAN AMERICAN: >60 ML/MIN
GFR SERPL CREATININE-BSD FRML MDRD: ABNORMAL ML/MIN/{1.73_M2}
GLUCOSE BLD-MCNC: 253 MG/DL (ref 70–99)
HCT VFR BLD CALC: 35 % (ref 36.3–47.1)
HEMOGLOBIN: 11.4 G/DL (ref 11.9–15.1)
IMMATURE GRANULOCYTES: 1 %
LYMPHOCYTES # BLD: 19 % (ref 24–43)
MCH RBC QN AUTO: 30.5 PG (ref 25.2–33.5)
MCHC RBC AUTO-ENTMCNC: 32.6 G/DL (ref 28.4–34.8)
MCV RBC AUTO: 93.6 FL (ref 82.6–102.9)
MONOCYTES # BLD: 6 % (ref 3–12)
NRBC AUTOMATED: 0 PER 100 WBC
PDW BLD-RTO: 13.2 % (ref 11.8–14.4)
PLATELET # BLD: 366 K/UL (ref 138–453)
PMV BLD AUTO: 10.6 FL (ref 8.1–13.5)
POTASSIUM SERPL-SCNC: 4.1 MMOL/L (ref 3.7–5.3)
PRO-BNP: 2103 PG/ML
RBC # BLD: 3.74 M/UL (ref 3.95–5.11)
SEG NEUTROPHILS: 66 % (ref 36–65)
SEGMENTED NEUTROPHILS ABSOLUTE COUNT: 8.08 K/UL (ref 1.5–8.1)
SODIUM BLD-SCNC: 129 MMOL/L (ref 135–144)
TROPONIN, HIGH SENSITIVITY: 32 NG/L (ref 0–14)
WBC # BLD: 12.2 K/UL (ref 3.5–11.3)

## 2022-04-15 PROCEDURE — 85025 COMPLETE CBC W/AUTO DIFF WBC: CPT

## 2022-04-15 PROCEDURE — 83880 ASSAY OF NATRIURETIC PEPTIDE: CPT

## 2022-04-15 PROCEDURE — 99284 EMERGENCY DEPT VISIT MOD MDM: CPT

## 2022-04-15 PROCEDURE — 84484 ASSAY OF TROPONIN QUANT: CPT

## 2022-04-15 PROCEDURE — 71046 X-RAY EXAM CHEST 2 VIEWS: CPT

## 2022-04-15 PROCEDURE — 80048 BASIC METABOLIC PNL TOTAL CA: CPT

## 2022-04-15 PROCEDURE — 93005 ELECTROCARDIOGRAM TRACING: CPT | Performed by: STUDENT IN AN ORGANIZED HEALTH CARE EDUCATION/TRAINING PROGRAM

## 2022-04-15 ASSESSMENT — ENCOUNTER SYMPTOMS
ABDOMINAL PAIN: 0
SHORTNESS OF BREATH: 1
DIARRHEA: 0
NAUSEA: 0
COUGH: 1
WHEEZING: 0
BLOOD IN STOOL: 0
VOMITING: 0
SORE THROAT: 0

## 2022-04-15 ASSESSMENT — PAIN - FUNCTIONAL ASSESSMENT: PAIN_FUNCTIONAL_ASSESSMENT: 0-10

## 2022-04-15 ASSESSMENT — PAIN SCALES - GENERAL: PAINLEVEL_OUTOF10: 7

## 2022-04-16 PROBLEM — I50.21 ACUTE CLINICAL SYSTOLIC HEART FAILURE (HCC): Status: ACTIVE | Noted: 2022-04-16

## 2022-04-16 LAB
EKG ATRIAL RATE: 74 BPM
EKG ATRIAL RATE: 76 BPM
EKG P AXIS: 53 DEGREES
EKG P AXIS: 56 DEGREES
EKG P-R INTERVAL: 194 MS
EKG P-R INTERVAL: 196 MS
EKG Q-T INTERVAL: 418 MS
EKG Q-T INTERVAL: 438 MS
EKG QRS DURATION: 88 MS
EKG QRS DURATION: 94 MS
EKG QTC CALCULATION (BAZETT): 470 MS
EKG QTC CALCULATION (BAZETT): 486 MS
EKG R AXIS: -11 DEGREES
EKG R AXIS: 3 DEGREES
EKG T AXIS: 42 DEGREES
EKG T AXIS: 61 DEGREES
EKG VENTRICULAR RATE: 74 BPM
EKG VENTRICULAR RATE: 76 BPM
GLUCOSE BLD-MCNC: 127 MG/DL (ref 65–105)
GLUCOSE BLD-MCNC: 141 MG/DL (ref 65–105)
GLUCOSE BLD-MCNC: 153 MG/DL (ref 65–105)
GLUCOSE BLD-MCNC: 156 MG/DL (ref 65–105)
GLUCOSE BLD-MCNC: 244 MG/DL (ref 65–105)
SARS-COV-2, RAPID: NOT DETECTED
SPECIMEN DESCRIPTION: NORMAL
TROPONIN, HIGH SENSITIVITY: 31 NG/L (ref 0–14)

## 2022-04-16 PROCEDURE — 93325 DOPPLER ECHO COLOR FLOW MAPG: CPT

## 2022-04-16 PROCEDURE — 1200000000 HC SEMI PRIVATE

## 2022-04-16 PROCEDURE — G0378 HOSPITAL OBSERVATION PER HR: HCPCS

## 2022-04-16 PROCEDURE — 93010 ELECTROCARDIOGRAM REPORT: CPT | Performed by: INTERNAL MEDICINE

## 2022-04-16 PROCEDURE — 6370000000 HC RX 637 (ALT 250 FOR IP): Performed by: HEALTH CARE PROVIDER

## 2022-04-16 PROCEDURE — 6370000000 HC RX 637 (ALT 250 FOR IP): Performed by: STUDENT IN AN ORGANIZED HEALTH CARE EDUCATION/TRAINING PROGRAM

## 2022-04-16 PROCEDURE — 93308 TTE F-UP OR LMTD: CPT

## 2022-04-16 PROCEDURE — 6360000002 HC RX W HCPCS

## 2022-04-16 PROCEDURE — 6360000002 HC RX W HCPCS: Performed by: STUDENT IN AN ORGANIZED HEALTH CARE EDUCATION/TRAINING PROGRAM

## 2022-04-16 PROCEDURE — 93320 DOPPLER ECHO COMPLETE: CPT

## 2022-04-16 PROCEDURE — 2580000003 HC RX 258: Performed by: STUDENT IN AN ORGANIZED HEALTH CARE EDUCATION/TRAINING PROGRAM

## 2022-04-16 PROCEDURE — 82947 ASSAY GLUCOSE BLOOD QUANT: CPT

## 2022-04-16 PROCEDURE — 87635 SARS-COV-2 COVID-19 AMP PRB: CPT

## 2022-04-16 PROCEDURE — 93005 ELECTROCARDIOGRAM TRACING: CPT | Performed by: EMERGENCY MEDICINE

## 2022-04-16 RX ORDER — AMIODARONE HYDROCHLORIDE 200 MG/1
200 TABLET ORAL 2 TIMES DAILY
Status: DISCONTINUED | OUTPATIENT
Start: 2022-04-16 | End: 2022-04-18 | Stop reason: HOSPADM

## 2022-04-16 RX ORDER — NICOTINE POLACRILEX 4 MG
15 LOZENGE BUCCAL PRN
Status: DISCONTINUED | OUTPATIENT
Start: 2022-04-16 | End: 2022-04-18 | Stop reason: HOSPADM

## 2022-04-16 RX ORDER — SODIUM CHLORIDE 9 MG/ML
INJECTION, SOLUTION INTRAVENOUS PRN
Status: DISCONTINUED | OUTPATIENT
Start: 2022-04-16 | End: 2022-04-18 | Stop reason: HOSPADM

## 2022-04-16 RX ORDER — ONDANSETRON 4 MG/1
4 TABLET, ORALLY DISINTEGRATING ORAL EVERY 8 HOURS PRN
Status: DISCONTINUED | OUTPATIENT
Start: 2022-04-16 | End: 2022-04-18 | Stop reason: HOSPADM

## 2022-04-16 RX ORDER — ASPIRIN 81 MG/1
81 TABLET ORAL DAILY
Status: DISCONTINUED | OUTPATIENT
Start: 2022-04-16 | End: 2022-04-18 | Stop reason: HOSPADM

## 2022-04-16 RX ORDER — PANTOPRAZOLE SODIUM 40 MG/1
40 TABLET, DELAYED RELEASE ORAL DAILY
Status: DISCONTINUED | OUTPATIENT
Start: 2022-04-16 | End: 2022-04-18 | Stop reason: HOSPADM

## 2022-04-16 RX ORDER — LOSARTAN POTASSIUM 25 MG/1
25 TABLET ORAL DAILY
Status: DISCONTINUED | OUTPATIENT
Start: 2022-04-16 | End: 2022-04-18 | Stop reason: HOSPADM

## 2022-04-16 RX ORDER — SODIUM CHLORIDE 0.9 % (FLUSH) 0.9 %
5-40 SYRINGE (ML) INJECTION PRN
Status: DISCONTINUED | OUTPATIENT
Start: 2022-04-16 | End: 2022-04-18 | Stop reason: HOSPADM

## 2022-04-16 RX ORDER — IBUPROFEN 400 MG/1
600 TABLET ORAL ONCE
Status: COMPLETED | OUTPATIENT
Start: 2022-04-16 | End: 2022-04-16

## 2022-04-16 RX ORDER — SODIUM CHLORIDE 0.9 % (FLUSH) 0.9 %
5-40 SYRINGE (ML) INJECTION EVERY 12 HOURS SCHEDULED
Status: DISCONTINUED | OUTPATIENT
Start: 2022-04-16 | End: 2022-04-18 | Stop reason: HOSPADM

## 2022-04-16 RX ORDER — DEXTROSE MONOHYDRATE 25 G/50ML
12.5 INJECTION, SOLUTION INTRAVENOUS PRN
Status: DISCONTINUED | OUTPATIENT
Start: 2022-04-16 | End: 2022-04-18 | Stop reason: HOSPADM

## 2022-04-16 RX ORDER — ACETAMINOPHEN 500 MG
1000 TABLET ORAL EVERY 8 HOURS
Status: DISCONTINUED | OUTPATIENT
Start: 2022-04-16 | End: 2022-04-18 | Stop reason: HOSPADM

## 2022-04-16 RX ORDER — CLOPIDOGREL BISULFATE 75 MG/1
75 TABLET ORAL DAILY
Status: DISCONTINUED | OUTPATIENT
Start: 2022-04-16 | End: 2022-04-18 | Stop reason: HOSPADM

## 2022-04-16 RX ORDER — KETOROLAC TROMETHAMINE 30 MG/ML
30 INJECTION, SOLUTION INTRAMUSCULAR; INTRAVENOUS ONCE
Status: COMPLETED | OUTPATIENT
Start: 2022-04-16 | End: 2022-04-16

## 2022-04-16 RX ORDER — ACETAMINOPHEN 325 MG/1
650 TABLET ORAL EVERY 4 HOURS PRN
Status: DISCONTINUED | OUTPATIENT
Start: 2022-04-16 | End: 2022-04-16

## 2022-04-16 RX ORDER — ATORVASTATIN CALCIUM 20 MG/1
20 TABLET, FILM COATED ORAL NIGHTLY
Status: DISCONTINUED | OUTPATIENT
Start: 2022-04-16 | End: 2022-04-18 | Stop reason: HOSPADM

## 2022-04-16 RX ORDER — AMLODIPINE BESYLATE 10 MG/1
10 TABLET ORAL DAILY
Status: DISCONTINUED | OUTPATIENT
Start: 2022-04-16 | End: 2022-04-18 | Stop reason: HOSPADM

## 2022-04-16 RX ORDER — FUROSEMIDE 20 MG/1
20 TABLET ORAL ONCE
Status: COMPLETED | OUTPATIENT
Start: 2022-04-16 | End: 2022-04-16

## 2022-04-16 RX ORDER — FUROSEMIDE 10 MG/ML
20 INJECTION INTRAMUSCULAR; INTRAVENOUS 2 TIMES DAILY
Status: DISCONTINUED | OUTPATIENT
Start: 2022-04-16 | End: 2022-04-18

## 2022-04-16 RX ORDER — INSULIN GLARGINE 100 [IU]/ML
35 INJECTION, SOLUTION SUBCUTANEOUS NIGHTLY
Status: DISCONTINUED | OUTPATIENT
Start: 2022-04-16 | End: 2022-04-18 | Stop reason: HOSPADM

## 2022-04-16 RX ORDER — FUROSEMIDE 20 MG/1
20 TABLET ORAL EVERY OTHER DAY
Status: DISCONTINUED | OUTPATIENT
Start: 2022-04-16 | End: 2022-04-16

## 2022-04-16 RX ORDER — DEXTROSE MONOHYDRATE 50 MG/ML
100 INJECTION, SOLUTION INTRAVENOUS PRN
Status: DISCONTINUED | OUTPATIENT
Start: 2022-04-16 | End: 2022-04-18 | Stop reason: HOSPADM

## 2022-04-16 RX ADMIN — FUROSEMIDE 20 MG: 10 INJECTION, SOLUTION INTRAMUSCULAR; INTRAVENOUS at 21:46

## 2022-04-16 RX ADMIN — KETOROLAC TROMETHAMINE 30 MG: 30 INJECTION, SOLUTION INTRAMUSCULAR at 04:33

## 2022-04-16 RX ADMIN — PANTOPRAZOLE SODIUM 40 MG: 40 TABLET, DELAYED RELEASE ORAL at 09:37

## 2022-04-16 RX ADMIN — ACETAMINOPHEN 1000 MG: 500 TABLET ORAL at 17:27

## 2022-04-16 RX ADMIN — FUROSEMIDE 20 MG: 20 TABLET ORAL at 01:12

## 2022-04-16 RX ADMIN — LOSARTAN POTASSIUM 25 MG: 25 TABLET, FILM COATED ORAL at 09:38

## 2022-04-16 RX ADMIN — ACETAMINOPHEN 1000 MG: 500 TABLET ORAL at 09:33

## 2022-04-16 RX ADMIN — FUROSEMIDE 20 MG: 10 INJECTION, SOLUTION INTRAMUSCULAR; INTRAVENOUS at 11:32

## 2022-04-16 RX ADMIN — METOPROLOL TARTRATE 25 MG: 25 TABLET ORAL at 09:38

## 2022-04-16 RX ADMIN — SODIUM CHLORIDE, PRESERVATIVE FREE 10 ML: 5 INJECTION INTRAVENOUS at 21:47

## 2022-04-16 RX ADMIN — ACETAMINOPHEN 650 MG: 325 TABLET ORAL at 02:05

## 2022-04-16 RX ADMIN — KETOROLAC TROMETHAMINE 30 MG: 30 INJECTION, SOLUTION INTRAMUSCULAR at 21:47

## 2022-04-16 RX ADMIN — SODIUM CHLORIDE, PRESERVATIVE FREE 10 ML: 5 INJECTION INTRAVENOUS at 09:43

## 2022-04-16 RX ADMIN — CLOPIDOGREL 75 MG: 75 TABLET, FILM COATED ORAL at 09:38

## 2022-04-16 RX ADMIN — INSULIN GLARGINE 35 UNITS: 100 INJECTION, SOLUTION SUBCUTANEOUS at 21:57

## 2022-04-16 RX ADMIN — ENOXAPARIN SODIUM 40 MG: 40 INJECTION SUBCUTANEOUS at 09:37

## 2022-04-16 RX ADMIN — METOPROLOL TARTRATE 25 MG: 25 TABLET ORAL at 21:46

## 2022-04-16 RX ADMIN — AMLODIPINE BESYLATE 10 MG: 10 TABLET ORAL at 09:38

## 2022-04-16 RX ADMIN — ATORVASTATIN CALCIUM 20 MG: 20 TABLET, FILM COATED ORAL at 21:46

## 2022-04-16 RX ADMIN — IBUPROFEN 600 MG: 400 TABLET, FILM COATED ORAL at 09:35

## 2022-04-16 RX ADMIN — Medication 81 MG: at 09:38

## 2022-04-16 RX ADMIN — AMIODARONE HYDROCHLORIDE 200 MG: 200 TABLET ORAL at 21:46

## 2022-04-16 RX ADMIN — AMIODARONE HYDROCHLORIDE 200 MG: 200 TABLET ORAL at 09:38

## 2022-04-16 ASSESSMENT — PAIN DESCRIPTION - PROGRESSION
CLINICAL_PROGRESSION: GRADUALLY WORSENING

## 2022-04-16 ASSESSMENT — PAIN SCALES - WONG BAKER
WONGBAKER_NUMERICALRESPONSE: 0

## 2022-04-16 ASSESSMENT — PAIN DESCRIPTION - LOCATION
LOCATION: NECK;SHOULDER
LOCATION: CHEST

## 2022-04-16 ASSESSMENT — PAIN SCALES - GENERAL
PAINLEVEL_OUTOF10: 7
PAINLEVEL_OUTOF10: 7
PAINLEVEL_OUTOF10: 0
PAINLEVEL_OUTOF10: 7
PAINLEVEL_OUTOF10: 6
PAINLEVEL_OUTOF10: 7
PAINLEVEL_OUTOF10: 6

## 2022-04-16 ASSESSMENT — PAIN DESCRIPTION - FREQUENCY: FREQUENCY: CONTINUOUS

## 2022-04-16 ASSESSMENT — PAIN DESCRIPTION - ONSET: ONSET: ON-GOING

## 2022-04-16 ASSESSMENT — PAIN DESCRIPTION - PAIN TYPE: TYPE: ACUTE PAIN

## 2022-04-16 ASSESSMENT — PAIN DESCRIPTION - DESCRIPTORS: DESCRIPTORS: SHARP;STABBING

## 2022-04-16 ASSESSMENT — PAIN - FUNCTIONAL ASSESSMENT: PAIN_FUNCTIONAL_ASSESSMENT: ACTIVITIES ARE NOT PREVENTED

## 2022-04-16 ASSESSMENT — PAIN DESCRIPTION - ORIENTATION: ORIENTATION: LEFT;ANTERIOR

## 2022-04-16 NOTE — ED PROVIDER NOTES
Tobacco Use    Smoking status: Former Smoker     Packs/day: 0.50     Years: 40.00     Pack years: 20.00     Types: Cigarettes     Quit date: 2021     Years since quittin.4    Smokeless tobacco: Never Used    Tobacco comment: \"off and on throughout the years\"   Vaping Use    Vaping Use: Never used   Substance and Sexual Activity    Alcohol use: No    Drug use: No    Sexual activity: Not on file   Other Topics Concern    Not on file   Social History Narrative    Not on file     Social Determinants of Health     Financial Resource Strain: Low Risk     Difficulty of Paying Living Expenses: Not hard at all   Food Insecurity: No Food Insecurity    Worried About Running Out of Food in the Last Year: Never true    Brian of Food in the Last Year: Never true   Transportation Needs: No Transportation Needs    Lack of Transportation (Medical): No    Lack of Transportation (Non-Medical):  No   Physical Activity:     Days of Exercise per Week: Not on file    Minutes of Exercise per Session: Not on file   Stress:     Feeling of Stress : Not on file   Social Connections:     Frequency of Communication with Friends and Family: Not on file    Frequency of Social Gatherings with Friends and Family: Not on file    Attends Anabaptist Services: Not on file    Active Member of [address] [place] [address] [employer] or Organizations: Not on file    Attends Club or Organization Meetings: Not on file    Marital Status: Not on file   Intimate Partner Violence:     Fear of Current or Ex-Partner: Not on file    Emotionally Abused: Not on file    Physically Abused: Not on file    Sexually Abused: Not on file   Housing Stability:     Unable to Pay for Housing in the Last Year: Not on file    Number of [place] in the Last Year: Not on file    Unstable Housing in the Last Year: Not on file       Family History   Problem Relation Age of Onset    Heart Disease Mother         secondary to scarlet fever    High Cholesterol Mother    Maricel Noble Heart Disease Father     High Blood Pressure Father     High Cholesterol Father     Diabetes Father     Heart Attack Father     Heart Surgery Father         CABG     No Known Problems Sister     No Known Problems Maternal Grandmother     No Known Problems Maternal Grandfather     No Known Problems Paternal Grandmother     Diabetes Paternal Grandfather     Diabetes Brother     Liver Disease Brother     Alcohol Abuse Brother     Diabetes Paternal Cousin         COD        Allergies:  Penicillins    Home Medications:  Prior to Admission medications    Medication Sig Start Date End Date Taking?  Authorizing Provider   amLODIPine (NORVASC) 10 MG tablet amlodipine 10 mg tablet    Historical Provider, MD   losartan (COZAAR) 25 MG tablet Take 1 tablet by mouth daily    Historical Provider, MD   amiodarone (CORDARONE) 200 MG tablet Take 1 tablet by mouth 2 times daily 4/6/22   Sonja Casarez, APRN - NP   aspirin 81 MG EC tablet Take 1 tablet by mouth daily 4/6/22   Sonja Casarez, APRRAMON - NP   atorvastatin (LIPITOR) 20 MG tablet Take 1 tablet by mouth nightly 4/6/22   Sonja Casarez, APRRAMON - NP   clopidogrel (PLAVIX) 75 MG tablet Take 1 tablet by mouth daily 4/6/22   Sonja Casarez APRN - NP   docusate sodium (COLACE) 100 MG capsule Take 1 capsule by mouth 2 times daily 4/6/22   Sonja Casarez, APRN - NP   metoprolol tartrate (LOPRESSOR) 25 MG tablet Take 1 tablet by mouth 2 times daily 4/6/22   Sonja Casarez, APRRAMON - NP   ondansetron (ZOFRAN-ODT) 4 MG disintegrating tablet Take 1 tablet by mouth every 8 hours as needed for Nausea or Vomiting 4/6/22   Sonja Casarez APRRAMON - NP   pantoprazole (PROTONIX) 40 MG tablet Take 1 tablet by mouth daily 4/6/22   Sonja Casarez, APRN - NP   furosemide (LASIX) 20 MG tablet Take 1 tablet by mouth every other day 4/6/22   LAKSHMI Baltazar NP   Insulin Pen Needle 29G X 12MM MISC 1 each by Does not apply route daily 4/5/22   Modesta Bullock MD   insulin glargine (LANTUS SOLOSTAR) 100 UNIT/ML injection pen Inject 35 Units into the skin nightly 4/5/22 5/5/22  Shagufta Hooper MD   insulin lispro, 1 Unit Dial, (HUMALOG KWIKPEN) 100 UNIT/ML SOPN Inject 1-3 Units into the skin 3 times daily (before meals) 4/5/22 5/5/22  Shagufta Hooper MD   Alcohol Swabs 70 % PADS 1 box by Does not apply route daily 4/5/22   Shagufta Hooper MD   Lancets MISC 1 each by Does not apply route 3 times daily 4/5/22   Shagufta Hooper MD   glucose monitoring (FREESTYLE FREEDOM) kit 1 kit by Does not apply route daily as needed (Check blood glucose three times daily in morning and at night before dinner) 4/5/22   Shagufta Hooper MD       REVIEW OF SYSTEMS    (2-9 systems for level 4, 10 or more for level 5)      Review of Systems   Constitutional: Negative for chills, diaphoresis, fatigue and fever. HENT: Negative for congestion and sore throat. Respiratory: Positive for cough and shortness of breath. Negative for wheezing. Cardiovascular: Positive for leg swelling (left leg). Negative for chest pain (soreness) and palpitations. Gastrointestinal: Negative for abdominal pain, blood in stool, diarrhea, nausea and vomiting. Genitourinary: Negative for dysuria, hematuria, vaginal bleeding and vaginal discharge. Musculoskeletal: Negative for arthralgias and myalgias. Skin: Negative for rash and wound. Neurological: Negative for weakness, light-headedness and numbness. PHYSICAL EXAM   (up to 7 for level 4, 8 or more for level 5)      INITIAL VITALS:   /81   Pulse 72   Temp 98.8 °F (37.1 °C) (Oral)   Resp 17   Ht 5' 5\" (1.651 m)   Wt 175 lb (79.4 kg)   SpO2 94%   BMI 29.12 kg/m²     Physical Exam  Vitals and nursing note reviewed. Constitutional:       General: She is not in acute distress. Appearance: Normal appearance. She is well-developed and normal weight. She is not toxic-appearing or diaphoretic. HENT:      Head: Normocephalic and atraumatic. Right Ear: External ear normal.      Left Ear: External ear normal.      Nose: Nose normal.      Mouth/Throat:      Mouth: Mucous membranes are moist.      Pharynx: Oropharynx is clear. Eyes:      Extraocular Movements: Extraocular movements intact. Conjunctiva/sclera: Conjunctivae normal.      Pupils: Pupils are equal, round, and reactive to light. Neck:      Vascular: No JVD. Trachea: No tracheal deviation. Cardiovascular:      Rate and Rhythm: Normal rate and regular rhythm. Pulses: Normal pulses. Heart sounds: Normal heart sounds, S1 normal and S2 normal. No murmur heard. No friction rub. No gallop. Pulmonary:      Effort: Pulmonary effort is normal. No accessory muscle usage or respiratory distress. Breath sounds: Normal breath sounds. Abdominal:      General: Bowel sounds are normal.      Palpations: Abdomen is soft. Tenderness: There is no abdominal tenderness. There is no guarding. Musculoskeletal:         General: No tenderness. Normal range of motion. Cervical back: Normal range of motion and neck supple. No rigidity or tenderness. Right lower leg: No tenderness. No edema. Left lower leg: No tenderness. Edema (Ballotable edema of the left calf the harvested vein site with no erythema, warmth, tenderness, or purulent discharge.) present. Comments: Full A/P ROM of all extremities with mild soreness of the left shoulder where she had a orthopedic surgery. No tenderness palpation of the spinal column without step-offs or deformities. Skin:     General: Skin is warm and dry. Capillary Refill: Capillary refill takes less than 2 seconds. Neurological:      Mental Status: She is alert and oriented to person, place, and time. Motor: No abnormal muscle tone.          DIFFERENTIAL  DIAGNOSIS     PLAN (LABS / IMAGING / EKG):  Orders Placed This Encounter   Procedures    XR CHEST (2 VW)    CBC with Auto Differential    Basic Metabolic Panel w/ Reflex to MG    Troponin    Brain Natriuretic Peptide    EKG 12 Lead    Insert peripheral IV       MEDICATIONS ORDERED:  No orders of the defined types were placed in this encounter.       DDX: ACS/MI, arrhythmia, anemia, electrolyte abnormality, pneumonia, muscle skeletal strain, seroma    DIAGNOSTIC RESULTS / EMERGENCY DEPARTMENT COURSE / MDM   LAB RESULTS:  Results for orders placed or performed during the hospital encounter of 04/15/22   CBC with Auto Differential   Result Value Ref Range    WBC 12.2 (H) 3.5 - 11.3 k/uL    RBC 3.74 (L) 3.95 - 5.11 m/uL    Hemoglobin 11.4 (L) 11.9 - 15.1 g/dL    Hematocrit 35.0 (L) 36.3 - 47.1 %    MCV 93.6 82.6 - 102.9 fL    MCH 30.5 25.2 - 33.5 pg    MCHC 32.6 28.4 - 34.8 g/dL    RDW 13.2 11.8 - 14.4 %    Platelets 914 004 - 604 k/uL    MPV 10.6 8.1 - 13.5 fL    NRBC Automated 0.0 0.0 per 100 WBC    Seg Neutrophils 66 (H) 36 - 65 %    Lymphocytes 19 (L) 24 - 43 %    Monocytes 6 3 - 12 %    Eosinophils % 7 (H) 1 - 4 %    Basophils 1 0 - 2 %    Immature Granulocytes 1 (H) 0 %    Segs Absolute 8.08 1.50 - 8.10 k/uL    Absolute Lymph # 2.29 1.10 - 3.70 k/uL    Absolute Mono # 0.78 0.10 - 1.20 k/uL    Absolute Eos # 0.83 (H) 0.00 - 0.44 k/uL    Basophils Absolute 0.14 0.00 - 0.20 k/uL    Absolute Immature Granulocyte 0.10 0.00 - 0.30 k/uL   Basic Metabolic Panel w/ Reflex to MG   Result Value Ref Range    Glucose 253 (H) 70 - 99 mg/dL    BUN 8 8 - 23 mg/dL    CREATININE 0.72 0.50 - 0.90 mg/dL    Calcium 9.2 8.6 - 10.4 mg/dL    Sodium 129 (L) 135 - 144 mmol/L    Potassium 4.1 3.7 - 5.3 mmol/L    Chloride 92 (L) 98 - 107 mmol/L    CO2 21 20 - 31 mmol/L    Anion Gap 16 9 - 17 mmol/L    GFR Non-African American >60 >60 mL/min    GFR African American >60 >60 mL/min    GFR Comment         Troponin   Result Value Ref Range    Troponin, High Sensitivity 32 (H) 0 - 14 ng/L   Troponin   Result Value Ref Range    Troponin, High Sensitivity 31 (H) 0 - 14 ng/L   Brain

## 2022-04-16 NOTE — ED NOTES
Pt arrived with complaints of SOB and left lower leg swelling. Pt stated that she had a heart procedure on the 29th and has been SOB since. Pt stated that she noticed leg swelling at the insertion site today and wanted to get it checked out. Pt placed on full cardiac monitor. EKG performed. Resident at the bedside. Call light within reach.  Will continue plan of care     Deangelo Everett RN  04/15/22 [id number]

## 2022-04-16 NOTE — CONSULTS
[facility]    Inpatient Consultation Note               Today's Date: 4/16/2022  Patient Name: Godfrey Quintanilla  Date of admission: 4/15/2022 10:23 PM  Patient's age: 60 y. o., 1960  Admission Dx: Acute clinical systolic heart failure (HCC) [I50.21]  Acute congestive heart failure, unspecified heart failure type (HCC) [I50.9]  Localized swelling of left lower leg [R22.42]    Reason for  Consult:  Shortness of breath    Requesting Physician: Fernando Maria MD    CHIEF COMPLAINT:     Chief Complaint   Patient presents with    Shortness of Breath    Leg Swelling       History Obtained From:  Patient, Electronic medical record. HISTORY OF PRESENT ILLNESS:      The patient is a 60 y.o. female who is admitted to the hospital for shortness of breath. Has history of multivessel disease s/p CABG x3 in March 2022. SLIMA to LAD, RSVG1 to OM, RSVG2 to RPDA . Her echocardiogram that was done in March showed EF of 52%. He presented to the hospital with shortness of breath bilateral leg swelling. Denies any chest pain. No nausea or vomiting. No diaphoresis. No palpitation or loss of consciousness    proBNP 2103  EKG normal sinus rhythm with nonspecific ST-T changes      Past Medical History:   has a past medical history of Chronic back pain, Hypertension, Osteoarthritis, and Pneumothorax. Past Surgical History:   has a past surgical history that includes back surgery; Carpal tunnel release (Right); Appendectomy; Tubal ligation; Cardiac catheterization (03/22/2022); and Coronary artery bypass graft (N/A, 3/29/2022). Home Medications:    Prior to Admission medications    Medication Sig Start Date End Date Taking?  Authorizing Provider   amLODIPine (NORVASC) 10 MG tablet amlodipine 10 mg tablet    Historical Provider, MD   losartan (COZAAR) 25 MG tablet Take 1 tablet by mouth daily    Historical Provider, MD   amiodarone (CORDARONE) 200 MG tablet Take 1 tablet by mouth 2 times daily 4/6/22 Sondra Halt, APRN - NP   aspirin 81 MG EC tablet Take 1 tablet by mouth daily 4/6/22   Sondra Halt, APRN - NP   atorvastatin (LIPITOR) 20 MG tablet Take 1 tablet by mouth nightly 4/6/22   Sondra Halt, APRN - NP   clopidogrel (PLAVIX) 75 MG tablet Take 1 tablet by mouth daily 4/6/22   Sondra Halt, APRN - NP   docusate sodium (COLACE) 100 MG capsule Take 1 capsule by mouth 2 times daily 4/6/22   Sondra Halt, APRN - NP   metoprolol tartrate (LOPRESSOR) 25 MG tablet Take 1 tablet by mouth 2 times daily 4/6/22   Sondra Halt, APRN - NP   ondansetron (ZOFRAN-ODT) 4 MG disintegrating tablet Take 1 tablet by mouth every 8 hours as needed for Nausea or Vomiting 4/6/22   Sondra Halt, APRN - NP   pantoprazole (PROTONIX) 40 MG tablet Take 1 tablet by mouth daily 4/6/22   Sondra Halt, APRN - NP   furosemide (LASIX) 20 MG tablet Take 1 tablet by mouth every other day 4/6/22   Sondra Halt, APRN - NP   Insulin Pen Needle 29G X 12MM MISC 1 each by Does not apply route daily 4/5/22   Kasia Andrews MD   insulin glargine (LANTUS SOLOSTAR) 100 UNIT/ML injection pen Inject 35 Units into the skin nightly 4/5/22 5/5/22  Kasia Andrews MD   insulin lispro, 1 Unit Dial, (HUMALOG KWIKPEN) 100 UNIT/ML SOPN Inject 1-3 Units into the skin 3 times daily (before meals) 4/5/22 5/5/22  Kasia Andrews MD   Alcohol Swabs 70 % PADS 1 box by Does not apply route daily 4/5/22   Kasia Andrews MD   Lancets MISC 1 each by Does not apply route 3 times daily 4/5/22   Kasia Andrews MD   glucose monitoring (FREESTYLE FREEDOM) kit 1 kit by Does not apply route daily as needed (Check blood glucose three times daily in morning and at night before dinner) 4/5/22   Kasia Andrews MD        Current Facility-Administered Medications: amLODIPine (NORVASC) tablet 10 mg, 10 mg, Oral, Daily  aspirin EC tablet 81 mg, 81 mg, Oral, Daily  atorvastatin (LIPITOR) tablet 20 mg, 20 mg, Oral, Nightly  clopidogrel (PLAVIX) tablet 75 mg, 75 mg, Oral, Daily  furosemide (LASIX) tablet 20 mg, 20 mg, Oral, Every Other Day  insulin glargine (LANTUS) injection vial 35 Units, 35 Units, SubCUTAneous, Nightly  insulin lispro (HUMALOG) injection vial 1-3 Units, 1-3 Units, SubCUTAneous, TID AC  metoprolol tartrate (LOPRESSOR) tablet 25 mg, 25 mg, Oral, BID  losartan (COZAAR) tablet 25 mg, 25 mg, Oral, Daily  ondansetron (ZOFRAN-ODT) disintegrating tablet 4 mg, 4 mg, Oral, Q8H PRN  pantoprazole (PROTONIX) tablet 40 mg, 40 mg, Oral, Daily  amiodarone (CORDARONE) tablet 200 mg, 200 mg, Oral, BID  sodium chloride flush 0.9 % injection 5-40 mL, 5-40 mL, IntraVENous, 2 times per day  sodium chloride flush 0.9 % injection 5-40 mL, 5-40 mL, IntraVENous, PRN  0.9 % sodium chloride infusion, , IntraVENous, PRN  enoxaparin (LOVENOX) injection 40 mg, 40 mg, SubCUTAneous, Daily  acetaminophen (TYLENOL) tablet 650 mg, 650 mg, Oral, Q4H PRN  glucose (GLUTOSE) 40 % oral gel 15 g, 15 g, Oral, PRN  dextrose 50 % IV solution, 12.5 g, IntraVENous, PRN  glucagon (rDNA) injection 1 mg, 1 mg, IntraMUSCular, PRN  dextrose 5 % solution, 100 mL/hr, IntraVENous, PRN    Allergies:  Penicillins    Social History:   reports that she quit smoking about 5 months ago. Her smoking use included cigarettes. She has a 20.00 pack-year smoking history. She has never used smokeless tobacco. She reports that she does not drink alcohol and does not use drugs. Family History: family history includes Alcohol Abuse in her brother; Diabetes in her brother, father, paternal cousin, and paternal grandfather; Heart Attack in her father; Heart Disease in her father and mother; Heart Surgery in her father; High Blood Pressure in her father; High Cholesterol in her father and mother; Liver Disease in her brother; No Known Problems in her maternal grandfather, maternal grandmother, paternal grandmother, and sister.      REVIEW OF SYSTEMS:      · Constitutional: there has been no unanticipated weight loss. · Eyes: No visual changes   · ENT: No Headaches  · Cardiovascular:  Remaining as above  · Respiratory: No cough  · Gastrointestinal: No abdominal pain. No change in bowel or bladder habits. · Genitourinary: No dysuria, trouble voiding, or hematuria. · Musculoskeletal: No joint complaints. · Neurological: No headache  · Hematologic/Lymphatic: No abnormal bruising or bleeding      PHYSICAL EXAM:      BP (!) 143/81   Pulse 76   Temp 97.2 °F (36.2 °C) (Oral)   Resp 18   Ht 5' 5\" (1.651 m)   Wt 175 lb (79.4 kg)   SpO2 92%   BMI 29.12 kg/m²    No intake or output data in the 24 hours ending 04/16/22 0837      Constitutional and General Appearance:    Alert, cooperative, no distress and appears stated age  Respiratory:  · No for increased work of breathing. · On auscultation: Mild crackles at the bases   Cardiovascular:  · Regular S1 and S2.   · No murmurs  Abdomen:   · No masses or tenderness  · Bowel sounds present  Extremities:  ·  No Cyanosis or Clubbing  ·  Lower extremity edema: minimal   Neurological:  · Alert and oriented. · Moves all extremities well    DATA:    Diagnostics:    CARDIAC CATH 3/23/22  LMCA: Ostial 25% stenosis     LAD: Mid 100% stenosis, collateral from RCA     D1: Proximal 70% stenosis, and mid 90% stenosis     LCx: Mid 75% stenosis     OM1 and OM2 ostial 80% stenosis     RCA: Ostial 30% stenosis     PDA: Proximal 80% stenosis     PL branch 75% stenosis        Procedure Summary  Multivessel CAD  Preserved LV function  Recommendations  CV surgery consult for CABG     ECHO 3/23/2022  Summary  Left ventricle is normal in size, global left ventricular systolic function  is low normal, calculated ejection fraction is 52%. Tricuspid valve was not well visualized. Trivial tricuspid regurgitation.   Insignificant tricuspid regurgitation, unable to estimate RVSP.     Labs:     CBC:   Recent Labs     04/15/22  2244   WBC 12.2*   HGB 11.4*   HCT 35.0*        BMP: Recent Labs     04/15/22  2244   *   K 4.1   CO2 21   BUN 8   CREATININE 0.72   LABGLOM >60   GLUCOSE 253*     Pro-BNP:    Recent Labs     04/15/22  2244   PROBNP 2,103*     FASTING LIPID PANEL:  Lab Results   Component Value Date    HDL 67 03/22/2018    TRIG 59 03/22/2018       Patient's Active Problem List  Principal Problem:    Acute clinical systolic heart failure (HCC)  Resolved Problems:    * No resolved hospital problems. *        IMPRESSION:    1. Shortness of breath due heart failure with preserved EF  2. MV-CAD s/p CABG X 3, 3/29/22 w/ SLIMA to LAD, RSVG1 to OM, RSVG2 to RPDA   3. Preserved EF  4. HTN   5. DM    RECOMMENDATIONS:    Fluid restriction. Sodium 129. Acute systolic CHF. IV Lasix 20 mg twice daily for today. CHF education. Will need to follow-up with CHF clinic. Repeat echocardiogram.  Further recommendations to follow      Thank you for allowing us to participate in the care of [address]. If you have any questions or concerns, please do not hesitate to contact us. Discussed with patient and Nurse. Villa Arce MD, M.D. Fellow, [address] [place] [address]        Please note that part of this chart were generated using voice recognition  dictation software. Although every effort was made to ensure the accuracy of this automated transcription, some errors in transcription may have occurred. Attestation signed by      Attending Physician Statement:    I have discussed the care of  [address] , including pertinent history and exam findings, with the Cardiology fellow/resident. I have seen and examined the patient and the key elements of all parts of the encounter have been performed by me. I agree with the assessment, plan and orders as documented by the fellow/resident, after I modified exam findings and plan of treatments, and the final version is my approved version of the assessment.      Additional Comments:     60 years old female with recent CABG admitted with volume overload with elevated pro BMP with shortness of breath and lower extremity edema responding to IV Lasix. Will repeat echocardiogram.  Continue aspirin, Plavix, beta-blockers, calcium channel blockers and statins. Will increase Lasix p.o. dose on discharge from 20-40 mg daily.       Electronically signed by Pamela Mcgee MD on 4/16/2022 at 3:47 PM      [facility]  [phone]

## 2022-04-16 NOTE — PLAN OF CARE
Problem: Pain:  Goal: Pain level will decrease  Description: Pain level will decrease  4/16/2022 1947 by Chen Tracey RN  Outcome: Ongoing  4/16/2022 0859 by Chen Tracey RN  Outcome: Ongoing  Goal: Control of acute pain  Description: Control of acute pain  4/16/2022 1947 by Chen Tracey RN  Outcome: Ongoing  4/16/2022 0859 by Chen Tracey RN  Outcome: Ongoing  Goal: Control of chronic pain  Description: Control of chronic pain  4/16/2022 1947 by Chen Tracey RN  Outcome: Ongoing  4/16/2022 0859 by Chen Tracey RN  Outcome: Ongoing

## 2022-04-16 NOTE — ED PROVIDER NOTES
Faculty Sign-Out Attestation  Handoff taken on the following patient from prior Attending Physician: Karolyn Duke    I was available and discussed any additional care issues that arose and coordinated the management plans with the resident(s) caring for the patient during my duty period. Any areas of disagreement with residents documentation of care or procedures are noted on the chart. I was personally present for the key portions of any/all procedures during my duty period. I have documented in the chart those procedures where I was not present during the key portions.     2 wk post cabg, new onset chf, graft site seroma,   Needs admit      Jean Cates DO  Attending Physician     Jean Cates,   04/16/22 [address], DO  04/16/22 3408    Admitted per plan,      Jean Cates,   04/16/22 1733

## 2022-04-16 NOTE — CARE COORDINATION
Case Management Initial Discharge Plan  Destiny Bueno,             Met with:patient to discuss discharge plans. Information verified: address, contacts, phone number, , insurance Yes  Insurance Provider: jefry    Emergency Contact/Next of Kin name & number: daughter Ling Alvarez  Who are involved in patient's support system? Family     PCP: Edel Love, APRN - CNP  Date of last visit: telemedicine       Discharge Planning    Living Arrangements:    lives with family    Home has 2 stories  2 stairs to climb to get into front door, f;ight stairs to climb to reach second floor  Location of bedroom/bathroom in home 1st     Patient able to perform ADL's:Independent    Current Services (outpatient & in home) [facility]  DME equipment: walker, shower chair, raised toilet seat       Is patient receiving oral anticoagulation therapy? plavix    chf zone paper: given       Does patient have any issues/concerns obtaining medications? No      Is there a preferred Pharmacy after hours or on weekends? [facility]        Potential Assistance Needed:   f/u pcp, f/u cts, resume current home care services     Patient agreeable to home care: yes, current [facility]  [place] of choice provided:  yes         Evaluation: no      Patient expects to be discharged to:   home    If home: is the family and/or caregiver wiling & able to provide support at home? yes  Who will be providing this support? Family       Transportation provider: daughter  Transportation arrangements needed for discharge: No    Readmission Risk              Risk of Unplanned Readmission:  0             Does patient have a readmission risk score greater than 14?: No  If yes, follow-up appointment must be made within 7 days of discharge.      Goals of Care: safe transition plan      Educated yes on transitional options, provided freedom of choice and are agreeable with plan      Discharge Plan: return home with family resume current home care services with [facility], f/u pcp, f/u cts          Electronically signed by Wellington Graves RN on 4/16/22 at 9:31 AM EDT

## 2022-04-16 NOTE — ED PROVIDER NOTES
[facility]     Emergency Department     Faculty Attestation    I performed a history and physical examination of the patient and discussed management with the resident. I reviewed the residents note and agree with the documented findings including all diagnostic interpretations and plan of care. Any areas of disagreement are noted on the chart. I was personally present for the key portions of any procedures. I have documented in the chart those procedures where I was not present during the key portions. I have reviewed the emergency nurses triage note. I agree with the chief complaint, past medical history, past surgical history, allergies, medications, social and family history as documented unless otherwise noted below. Documentation of the HPI, Physical Exam and Medical Decision Making performed by scribes is based on my personal performance of the HPI, PE and MDM. For Physician Assistant/ Nurse Practitioner cases/documentation I have personally evaluated this patient and have completed at least one if not all key elements of the E/M (history, physical exam, and MDM). Additional findings are as noted. This patient was evaluated in the Emergency Department for symptoms described in the history of present illness. He/she was evaluated in the context of the global COVID-19 pandemic, which necessitated consideration that the patient might be at risk for infection with the SARS-CoV-2 virus that causes COVID-19. Institutional protocols and algorithms that pertain to the evaluation of patients at risk for COVID-19 are in a state of rapid change based on information released by regulatory bodies including the CDC and federal and state organizations. These policies and algorithms were followed during the patient's care in the ED. Primary Care Physician: LAKSHMI Perry - CNP    History:  This is a 60 y.o. female who presents to the Emergency Department with complaint of shortness of breath and leg swelling. Recent CABG 2 to 3 weeks ago. Has had some increased swelling in the left lower extremity at the graft site. Denies any chest pain. Physical:     height is 5' 5\" (1.651 m) and weight is 175 lb (79.4 kg). Her oral temperature is 98.8 °F (37.1 °C). Her blood pressure is 136/79 and her pulse is 73.  Her respiration is 26 and oxygen saturation is 95%.    60 y.o. female no acute distress, cardiac exam regular rate and rhythm no murmurs rubs gallops, pulmonary shows some dullness in the bases bilaterally but no rhonchi or wheezes abdomen soft nontender nondistended, there are appears to be a discrete focal area of swelling next to the graft sites that is suggestive of a seroma    Impression: Shortness of breath, status post CABG    Plan: Cardiac work-up, BNP, may require ultrasound of the lower extremities    EKG Interpretation  EKG Interpretation    Interpreted by emergency department physician    Rhythm: normal sinus   Rate: normal  Axis: normal  Ectopy: none  Conduction: LVH  ST Segments: no acute change  T Waves: no acute change  Q Waves: none    EKG  Impression: left ventricular hypertrophy    Brad Lennon MD      Interpreted by me      Marci Bauer MD, Jennifer Memphis  Attending Emergency Physician         Brad Lennon MD  04/16/22 3927

## 2022-04-16 NOTE — H&P
[facility]  CDU / OBSERVATION eNCOUnter  Resident Note     Pt Name: Misti Lombardo  MRN: [id number]  [place] 1960  Date of evaluation: 4/16/22  Patient's PCP is :  LAKSHMI Goldstein CNP    CHIEF COMPLAINT       Chief Complaint   Patient presents with    Shortness of Breath    Leg Swelling         HISTORY OF PRESENT Robert Rodriguez is a 60 y.o. female who presented to the emergency department with complaint of shortness of breath and leg swelling. Patient had a CABG on 3/29/2022. She had some increased swelling in the left lower extremity at the graft site. She also noted that since the surgery she been having some intermittent shortness of breath along with some left shoulder pain that is stabbing, intermittent, worse with movement. Still sore, but improved since surgery. States she woke up yesterday noticed that the area where they harvested the vein was swollen, but there is no tenderness, warmth or redness. Patient otherwise without any other complaints. In the emergency department, chest x-ray was done evidence for left basilar effusion with adjacent atelectasis. EKG showed some flattening in aVL and inversion in V2, otherwise was nonspecific. Initial troponin was 31, which is stable for her. Emergency room physician talked with the cardiothoracic surgery who agreed for placement in observation unit with reevaluation the morning. Patient was placed in labs for cardiothoracic and cardiology evaluation already. Patient states improvement in symptoms this morning. Awaiting evaluation by CT surgery and cardiology.     Location/Symptom: Left shoulder pain, left lower extremity pain  Timing/Onset: Persistent since CABG  Provocation: Occasionally worse with movement  Quality: Sharp  Radiation: Nonradiating  Severity: 5 out of 10  Timing/Duration: Persistent  Modifying Factors: Improved with medication    REVIEW OF SYSTEMS     General ROS - No fevers, No malaise Ophthalmic ROS - No discharge, No changes in vision  ENT ROS -  No sore throat, No rhinorrhea,   Respiratory ROS - no shortness of breath, no cough, no  wheezing  Cardiovascular ROS - No chest pain, +dyspnea on exertion  Gastrointestinal ROS - No abdominal pain, no nausea or vomiting, no change in bowel habits, no black or bloody stools  Genito-Urinary ROS - No dysuria, trouble voiding, or hematuria  Musculoskeletal ROS - No myalgias, No arthalgias, +LLE pain  Neurological ROS - No headache, no dizziness/lightheadedness, No focal weakness, no loss of sensation  Dermatological ROS - No lesions, No rash     (PQRS) Advance directives on face sheet per hospital policy. No change unless specifically mentioned in chart    PAST MEDICAL HISTORY    has a past medical history of Chronic back pain, Hypertension, Osteoarthritis, and Pneumothorax. I have reviewed the past medical history with the patient and it is pertinent to this complaint. SURGICAL HISTORY      has a past surgical history that includes back surgery; Carpal tunnel release (Right); Appendectomy; Tubal ligation; Cardiac catheterization (03/22/2022); and Coronary artery bypass graft (N/A, 3/29/2022). I have reviewed and agree with Surgical History entered and it is pertinent to this complaint.      CURRENT MEDICATIONS     amLODIPine (NORVASC) tablet 10 mg, Daily  aspirin EC tablet 81 mg, Daily  atorvastatin (LIPITOR) tablet 20 mg, Nightly  clopidogrel (PLAVIX) tablet 75 mg, Daily  furosemide (LASIX) tablet 20 mg, Every Other Day  insulin glargine (LANTUS) injection vial 35 Units, Nightly  insulin lispro (HUMALOG) injection vial 1-3 Units, TID AC  metoprolol tartrate (LOPRESSOR) tablet 25 mg, BID  losartan (COZAAR) tablet 25 mg, Daily  ondansetron (ZOFRAN-ODT) disintegrating tablet 4 mg, Q8H PRN  pantoprazole (PROTONIX) tablet 40 mg, Daily  amiodarone (CORDARONE) tablet 200 mg, BID  sodium chloride flush 0.9 % injection 5-40 mL, 2 times per day  sodium chloride flush 0.9 % injection 5-40 mL, PRN  0.9 % sodium chloride infusion, PRN  enoxaparin (LOVENOX) injection 40 mg, Daily  acetaminophen (TYLENOL) tablet 650 mg, Q4H PRN  glucose (GLUTOSE) 40 % oral gel 15 g, PRN  dextrose 50 % IV solution, PRN  glucagon (rDNA) injection 1 mg, PRN  dextrose 5 % solution, PRN        All medication charted and reviewed. ALLERGIES     is allergic to penicillins. FAMILY HISTORY     She indicated that her mother is . She indicated that her father is . She indicated that her sister is alive. She indicated that her brother is . She indicated that her maternal grandmother is . She indicated that her maternal grandfather is . She indicated that her paternal grandmother is . She indicated that her paternal grandfather is . She indicated that her paternal cousin is . family history includes Alcohol Abuse in her brother; Diabetes in her brother, father, paternal cousin, and paternal grandfather; Heart Attack in her father; Heart Disease in her father and mother; Heart Surgery in her father; High Blood Pressure in her father; High Cholesterol in her father and mother; Liver Disease in her brother; No Known Problems in her maternal grandfather, maternal grandmother, paternal grandmother, and sister. The patient denies any pertinent family history. I have reviewed and agree with the family history entered. I have reviewed the Family History and it is not significant to the case    SOCIAL HISTORY      reports that she quit smoking about 5 months ago. Her smoking use included cigarettes. She has a 20.00 pack-year smoking history. She has never used smokeless tobacco. She reports that she does not drink alcohol and does not use drugs. I have reviewed and agree with all Social.  There are no concerns for substance abuse/use. PHYSICAL EXAM     INITIAL VITALS:  height is 5' 5\" (1.651 m) and weight is 175 lb (79.4 kg). Her oral temperature is 97 °F (36.1 °C). Her blood pressure is 170/98 (abnormal) and her pulse is 81. Her respiration is 20 and oxygen saturation is 94%. CONSTITUTIONAL: AOx4, no apparent distress, appears stated age    HEAD: normocephalic, atraumatic   EYES: PERRLA, EOMI    ENT: moist mucous membranes, uvula midline   NECK: supple, symmetric   BACK: symmetric   LUNGS: clear to auscultation bilaterally   CARDIOVASCULAR: regular rate and rhythm, no murmurs, rubs or gallops   ABDOMEN: soft, non-tender, non-distended with normal active bowel sounds   NEUROLOGIC:  MAEx4, no focal sensory or motor deficits   MUSCULOSKELETAL: no clubbing, cyanosis or edema   SKIN: no rash or wounds; left lower extremity wounds from graft site without any redness, swelling, induration noted. DIFFERENTIAL DIAGNOSIS/MDM:   Chest Pain:  DDX: Emergent: ACS/NSTEMI/STEMI/angina, arrhythmia, trauma, aortic dissection,  PE, PNA, pneumothroax, esophageal rupture, tamponade, Cocaine use  Nonemergent: pneumonia, pericarditis, GERD, MSK, Endocarditis, anxiety  Evaluated for: diaphoresis, present chest pain, tachypnea, BP both arms, heart sounds, JVD, tender chest wall, wheezing    Shortness of Breath:  DDX: sob/wheezing/cough evaluate for COPD exacerbation (home O2, PNA, hospitalization), asthma (past intubation, hospitalization, tobacco history), Pneumothorax, anaphylaxis, anxiety, PE (FH, OCP, tobacco use, BMI, immobilization, recent surgery, cancer, past DVT/ PE), pericardial effusion, CHF, ACS/MI, atelectasis, lower airway obstruction, aspiration, sudden onset, fever, cough, leg swelling.       DIAGNOSTIC RESULTS     EKG: All EKG's are interpreted by the Observation Physician who either signs or Co-signs this chart in the absence of a cardiologist.    EKG Interpretation    Interpreted by observation physician    Rhythm: normal sinus   Rate: normal  Axis: normal  Ectopy: none  Conduction: LVH  ST Segments: no acute change  T Waves: no acute change  Q Waves: none    Clinical Impression: left ventricular hypertrophy    Cassia Kil, DO    RADIOLOGY:   I directly visualized the following  images and reviewed the radiologist interpretations:    XR CHEST (2 VW)    Result Date: 4/15/2022  EXAMINATION: TWO XRAY VIEWS OF THE CHEST 4/15/2022 11:22 pm COMPARISON: Chest radiograph performed 04/06/2022. HISTORY: ORDERING SYSTEM PROVIDED HISTORY: shortness of breath, recent CABG TECHNOLOGIST PROVIDED HISTORY: shortness of breath, recent CABG Reason for Exam: sob FINDINGS: There is a left basilar effusion with adjacent atelectasis. There is no pneumothorax. The mediastinal structures are unremarkable. The upper abdomen unremarkable. The extrathoracic soft tissues are unremarkable. Left basilar effusion with adjacent atelectasis. LABS:  I have reviewed and interpreted all available lab results.   Labs Reviewed   CBC WITH AUTO DIFFERENTIAL - Abnormal; Notable for the following components:       Result Value    WBC 12.2 (*)     RBC 3.74 (*)     Hemoglobin 11.4 (*)     Hematocrit 35.0 (*)     Seg Neutrophils 66 (*)     Lymphocytes 19 (*)     Eosinophils % 7 (*)     Immature Granulocytes 1 (*)     Absolute Eos # 0.83 (*)     All other components within normal limits   BASIC METABOLIC PANEL W/ REFLEX TO MG FOR LOW K - Abnormal; Notable for the following components:    Glucose 253 (*)     Sodium 129 (*)     Chloride 92 (*)     All other components within normal limits   TROPONIN - Abnormal; Notable for the following components:    Troponin, High Sensitivity 32 (*)     All other components within normal limits   TROPONIN - Abnormal; Notable for the following components:    Troponin, High Sensitivity 31 (*)     All other components within normal limits   BRAIN NATRIURETIC PEPTIDE - Abnormal; Notable for the following components:    Pro-BNP 2,103 (*)     All other components within normal limits   POC GLUCOSE FINGERSTICK - Abnormal; Notable for the following components:    POC Glucose 244 (*)     All other components within normal limits   COVID-19, RAPID   POCT GLUCOSE   POCT GLUCOSE       SCREENING TOOLS:    HEART Risk Score for Chest Pain Patients   History and Physical Exam Suspicion Level  (Nausea, Vomiting, Diaphoresis, Radiation, Exertion)   Slightly Suspicious (0 pts)   Moderately Suspicious (1 pt)   Highly Suspicious (2 pts)   EKG Interpretation   Normal (0 pts)   Non-Specific Repolarization Disturbance (1 pt)   Significant ST-Depression (2 pts)   Age of Patient (in years)   = 39 (0 pts)   46-64 (1 pt)   = 65 (2 pts)   Risk Factors   No Risk Factors (0 pts)   1-2 Risk Factors (1 pt)   = 3 Risk Factors (2 pts)   Risk Factors Include:   Hypercholesterolemia   Hypertension   Diabetes Mellitus   Cigarette smoking   Positive family history   Obesity   CAD   (SLE, CKDz, HIV, Cocaine abuse)   Troponin Levels   = Normal Limit (0 pts)   1-3 Times Normal Limit (1 pt)   > 3 Times Normal Limit (2 pts)  TOTAL: 4    Percent Risk for Major Adverse Cardiac Event (MACE)  0-3 pts indicates low risk for MACE   2.5% (DISCHARGE)   4-7 pts indicates moderate risk for MACE  20.3% (OBS)  8-10 pts indicates high risk for MACE  72.7% (EARLY INVASIVE TX)    CDU IMPRESSION / Dylan Obregon is a 60 y.o. female who presents with persistent chest pain/shortness of breath, left shoulder pain, concerns for left lower extremity swelling    · Cardiology following, appreciate recommendations  · Plan for repeat echo, fluid restrictions and Lasix twice daily, patient would need follow-up in the CHF clinic as an outpatient  · Awaiting CT surgery evaluation of graft sites  · Continue home medications and pain control  · Monitor vitals, labs, and imaging  · DISPO: pending consults and clinical improvement    CONSULTS:    [id number]SULT TO [facility]IOLOGY    PROCEDURES:  Not indicated       PATIENT REFERRED TO:    No follow-up provider specified. --  Jyoti Solis DO   Emergency Medicine Resident     This dictation was generated by voice recognition computer software. Although all attempts are made to edit the dictation for accuracy, there may be errors in the transcription that are not intended.

## 2022-04-16 NOTE — ED NOTES
The following labs labeled with pt sticker and tubed to lab:     [] Blue     [] Lavender   [] on ice  [] Green/yellow  [] Green/black [] on ice  [] Yellow  [] Red  [] Pink      [x] COVID-19 swab    [x] Rapid  [] PCR  [] Flu swab  [] Peds Viral Panel     [] Urine Sample  [] Pelvic Cultures  [] Blood Cultures            Ellen Allan RN  04/16/22 0110

## 2022-04-16 NOTE — CARE COORDINATION
04/16/22 1008   Readmission Assessment   Number of Days since last admission? 8-30 days   Previous Disposition Home with Home Health   Who is being Interviewed Patient   What was the patient's/caregiver's perception as to why they think they needed to return back to the hospital?   (shortness of breath)   Did you visit your Primary Care Physician after you left the hospital, before you returned this time? Yes   Did you see a specialist, such as Cardiac, Pulmonary, Orthopedic Physician, etc. after you left the hospital? No   Does the patient report anything that got in the way of taking their medications?  No

## 2022-04-16 NOTE — PROGRESS NOTES
Echo notified and confirmed with lata that she will be getting patient today some time this afternoon.  Will continue to monitor,

## 2022-04-16 NOTE — ED NOTES
The following labs labeled with pt sticker and tubed to lab:     [x] Blue     [x] Lavender   [] on ice  [x] Green/yellow  [] Green/black [] on ice  [] Yellow  [x] Red  [] Pink      [] COVID-19 swab    [] Rapid  [] PCR  [] Flu swab  [] Peds Viral Panel     [] Urine Sample  [] Pelvic Cultures  [] Blood Cultures            Maximiliano North, KONG  04/15/22 8118

## 2022-04-16 NOTE — PROGRESS NOTES
CDU Daily Progress Note  Attending Physician       Pt Name: Homa Michael  MRN: [id number]  [place] 1960  Date of evaluation: 4/16/22    I performed a history and physical examination of the patient and discussed management with the resident. I reviewed the residents note and agree with the documented findings and plan of care. Any areas of disagreement are noted on the chart. I was personally present for the key portions of any procedures. I have documented in the chart those procedures where I was not present during the key portions. I have reviewed the emergency nurses triage note. I agree with the chief complaint, past medical history, past surgical history, allergies, medications, social and family history as documented unless otherwise noted below. Documentation of the HPI, Physical Exam and Medical Decision Making performed by medical students or scribes is based on my personal performance of the HPI, PE and MDM. For Physician Assistant/ Nurse Practitioner cases/documentation I have personally evaluated this patient and have completed at least one if not all key elements of the E/M (history, physical exam, and MDM). Additional findings are as noted. The Family History, Social History and Review of Systems are unchanged from the previous day. No significant events overnight. Patient relates that overall she does feel better than upon arrival but she is having some pain under the left shoulder during my exam.  Cardiology has seen and examined. Recommend Echo, fluid restriction with Lasix BID. Follow up with CHF clinic as outpatient. Appreciate input and consultation.     Kacey Gruber MD,  MD  Attending Physician  Critical Decision Unit

## 2022-04-17 PROBLEM — I50.20 CONGESTIVE HEART FAILURE WITH LEFT VENTRICULAR SYSTOLIC DYSFUNCTION (HCC): Status: ACTIVE | Noted: 2022-04-17

## 2022-04-17 LAB
ANION GAP SERPL CALCULATED.3IONS-SCNC: 15 MMOL/L (ref 9–17)
BUN BLDV-MCNC: 8 MG/DL (ref 8–23)
CALCIUM SERPL-MCNC: 9.4 MG/DL (ref 8.6–10.4)
CHLORIDE BLD-SCNC: 98 MMOL/L (ref 98–107)
CO2: 24 MMOL/L (ref 20–31)
CREAT SERPL-MCNC: 0.75 MG/DL (ref 0.5–0.9)
GFR AFRICAN AMERICAN: >60 ML/MIN
GFR NON-AFRICAN AMERICAN: >60 ML/MIN
GFR SERPL CREATININE-BSD FRML MDRD: ABNORMAL ML/MIN/{1.73_M2}
GLUCOSE BLD-MCNC: 122 MG/DL (ref 65–105)
GLUCOSE BLD-MCNC: 169 MG/DL (ref 65–105)
GLUCOSE BLD-MCNC: 169 MG/DL (ref 70–99)
GLUCOSE BLD-MCNC: 171 MG/DL (ref 65–105)
GLUCOSE BLD-MCNC: 196 MG/DL (ref 65–105)
POTASSIUM SERPL-SCNC: 3.8 MMOL/L (ref 3.7–5.3)
SODIUM BLD-SCNC: 137 MMOL/L (ref 135–144)

## 2022-04-17 PROCEDURE — 80048 BASIC METABOLIC PNL TOTAL CA: CPT

## 2022-04-17 PROCEDURE — 36415 COLL VENOUS BLD VENIPUNCTURE: CPT

## 2022-04-17 PROCEDURE — 6370000000 HC RX 637 (ALT 250 FOR IP): Performed by: STUDENT IN AN ORGANIZED HEALTH CARE EDUCATION/TRAINING PROGRAM

## 2022-04-17 PROCEDURE — 6370000000 HC RX 637 (ALT 250 FOR IP)

## 2022-04-17 PROCEDURE — 6370000000 HC RX 637 (ALT 250 FOR IP): Performed by: EMERGENCY MEDICINE

## 2022-04-17 PROCEDURE — 2580000003 HC RX 258: Performed by: STUDENT IN AN ORGANIZED HEALTH CARE EDUCATION/TRAINING PROGRAM

## 2022-04-17 PROCEDURE — 82947 ASSAY GLUCOSE BLOOD QUANT: CPT

## 2022-04-17 PROCEDURE — 1200000000 HC SEMI PRIVATE

## 2022-04-17 PROCEDURE — 6370000000 HC RX 637 (ALT 250 FOR IP): Performed by: HEALTH CARE PROVIDER

## 2022-04-17 PROCEDURE — 6360000002 HC RX W HCPCS: Performed by: EMERGENCY MEDICINE

## 2022-04-17 PROCEDURE — 6360000002 HC RX W HCPCS: Performed by: STUDENT IN AN ORGANIZED HEALTH CARE EDUCATION/TRAINING PROGRAM

## 2022-04-17 RX ORDER — CYCLOBENZAPRINE HCL 10 MG
10 TABLET ORAL ONCE
Status: COMPLETED | OUTPATIENT
Start: 2022-04-17 | End: 2022-04-17

## 2022-04-17 RX ORDER — BUTALBITAL, ACETAMINOPHEN AND CAFFEINE 50; 325; 40 MG/1; MG/1; MG/1
1 TABLET ORAL EVERY 4 HOURS PRN
Status: DISCONTINUED | OUTPATIENT
Start: 2022-04-17 | End: 2022-04-18 | Stop reason: HOSPADM

## 2022-04-17 RX ORDER — KETOROLAC TROMETHAMINE 30 MG/ML
15 INJECTION, SOLUTION INTRAMUSCULAR; INTRAVENOUS ONCE
Status: COMPLETED | OUTPATIENT
Start: 2022-04-17 | End: 2022-04-17

## 2022-04-17 RX ORDER — IBUPROFEN 400 MG/1
600 TABLET ORAL ONCE
Status: COMPLETED | OUTPATIENT
Start: 2022-04-17 | End: 2022-04-17

## 2022-04-17 RX ADMIN — BUTALBITAL, ACETAMINOPHEN AND CAFFEINE 1 TABLET: 50; 325; 40 TABLET ORAL at 17:34

## 2022-04-17 RX ADMIN — LOSARTAN POTASSIUM 25 MG: 25 TABLET, FILM COATED ORAL at 09:20

## 2022-04-17 RX ADMIN — AMLODIPINE BESYLATE 10 MG: 10 TABLET ORAL at 09:20

## 2022-04-17 RX ADMIN — SODIUM CHLORIDE, PRESERVATIVE FREE 10 ML: 5 INJECTION INTRAVENOUS at 23:09

## 2022-04-17 RX ADMIN — ACETAMINOPHEN 1000 MG: 500 TABLET ORAL at 09:18

## 2022-04-17 RX ADMIN — PANTOPRAZOLE SODIUM 40 MG: 40 TABLET, DELAYED RELEASE ORAL at 09:20

## 2022-04-17 RX ADMIN — FUROSEMIDE 20 MG: 10 INJECTION, SOLUTION INTRAMUSCULAR; INTRAVENOUS at 17:35

## 2022-04-17 RX ADMIN — ATORVASTATIN CALCIUM 20 MG: 20 TABLET, FILM COATED ORAL at 23:08

## 2022-04-17 RX ADMIN — CLOPIDOGREL 75 MG: 75 TABLET, FILM COATED ORAL at 10:56

## 2022-04-17 RX ADMIN — KETOROLAC TROMETHAMINE 15 MG: 30 INJECTION, SOLUTION INTRAMUSCULAR at 11:08

## 2022-04-17 RX ADMIN — Medication 81 MG: at 09:21

## 2022-04-17 RX ADMIN — INSULIN LISPRO 1 UNITS: 100 INJECTION, SOLUTION INTRAVENOUS; SUBCUTANEOUS at 23:33

## 2022-04-17 RX ADMIN — ENOXAPARIN SODIUM 40 MG: 40 INJECTION SUBCUTANEOUS at 09:20

## 2022-04-17 RX ADMIN — SODIUM CHLORIDE, PRESERVATIVE FREE 10 ML: 5 INJECTION INTRAVENOUS at 09:18

## 2022-04-17 RX ADMIN — FUROSEMIDE 20 MG: 10 INJECTION, SOLUTION INTRAMUSCULAR; INTRAVENOUS at 09:20

## 2022-04-17 RX ADMIN — METOPROLOL TARTRATE 25 MG: 25 TABLET ORAL at 23:08

## 2022-04-17 RX ADMIN — IBUPROFEN 600 MG: 400 TABLET, FILM COATED ORAL at 03:20

## 2022-04-17 RX ADMIN — AMIODARONE HYDROCHLORIDE 200 MG: 200 TABLET ORAL at 09:20

## 2022-04-17 RX ADMIN — METOPROLOL TARTRATE 25 MG: 25 TABLET ORAL at 09:20

## 2022-04-17 RX ADMIN — INSULIN GLARGINE 35 UNITS: 100 INJECTION, SOLUTION SUBCUTANEOUS at 23:33

## 2022-04-17 RX ADMIN — INSULIN LISPRO 1 UNITS: 100 INJECTION, SOLUTION INTRAVENOUS; SUBCUTANEOUS at 17:35

## 2022-04-17 RX ADMIN — AMIODARONE HYDROCHLORIDE 200 MG: 200 TABLET ORAL at 23:08

## 2022-04-17 RX ADMIN — CYCLOBENZAPRINE 10 MG: 10 TABLET, FILM COATED ORAL at 23:16

## 2022-04-17 ASSESSMENT — PAIN DESCRIPTION - ORIENTATION
ORIENTATION: LEFT;ANTERIOR
ORIENTATION: LEFT

## 2022-04-17 ASSESSMENT — PAIN DESCRIPTION - PROGRESSION
CLINICAL_PROGRESSION: RAPIDLY WORSENING
CLINICAL_PROGRESSION: GRADUALLY WORSENING
CLINICAL_PROGRESSION: GRADUALLY WORSENING
CLINICAL_PROGRESSION: GRADUALLY IMPROVING
CLINICAL_PROGRESSION: GRADUALLY IMPROVING

## 2022-04-17 ASSESSMENT — PAIN DESCRIPTION - PAIN TYPE
TYPE: ACUTE PAIN

## 2022-04-17 ASSESSMENT — PAIN SCALES - WONG BAKER
WONGBAKER_NUMERICALRESPONSE: 0

## 2022-04-17 ASSESSMENT — PAIN DESCRIPTION - DESCRIPTORS
DESCRIPTORS: DISCOMFORT
DESCRIPTORS: SHARP;STABBING
DESCRIPTORS: ACHING;DULL
DESCRIPTORS: SHARP;STABBING

## 2022-04-17 ASSESSMENT — PAIN SCALES - GENERAL
PAINLEVEL_OUTOF10: 7
PAINLEVEL_OUTOF10: 5
PAINLEVEL_OUTOF10: 4
PAINLEVEL_OUTOF10: 6
PAINLEVEL_OUTOF10: 6
PAINLEVEL_OUTOF10: 5
PAINLEVEL_OUTOF10: 4
PAINLEVEL_OUTOF10: 8
PAINLEVEL_OUTOF10: 0
PAINLEVEL_OUTOF10: 4

## 2022-04-17 ASSESSMENT — PAIN - FUNCTIONAL ASSESSMENT
PAIN_FUNCTIONAL_ASSESSMENT: ACTIVITIES ARE NOT PREVENTED

## 2022-04-17 ASSESSMENT — PAIN DESCRIPTION - LOCATION
LOCATION: BACK;CHEST
LOCATION: CHEST

## 2022-04-17 ASSESSMENT — PAIN DESCRIPTION - FREQUENCY
FREQUENCY: CONTINUOUS

## 2022-04-17 ASSESSMENT — PAIN DESCRIPTION - ONSET
ONSET: ON-GOING

## 2022-04-17 NOTE — PROGRESS NOTES
[address] [facility]  CDU / OBSERVATION ENCOUNTER  ATTENDING NOTE       I performed a history and physical examination of the patient and discussed management with the resident or midlevel provider. I reviewed the resident or midlevel provider's note and agree with the documented findings and plan of care. Any areas of disagreement are noted on the chart. I was personally present for the key portions of any procedures. I have documented in the chart those procedures where I was not present during the key portions. I have reviewed the nurses notes. I agree with the chief complaint, past medical history, past surgical history, allergies, medications, social and family history as documented unless otherwise noted below. The Family history, social history, and ROS are effectively unchanged since admission unless noted elsewhere in the chart. History of CABG 3/29/2022. Patient with increased swelling to lower extremities at graft site. Patient also noted surgeries she has been having intermittent shortness of breath. Patient admitted for cardiology reevaluation. Patient also for cardiothoracic surgery reevaluation. Admitted for repeat echo, fluid restriction, diuresis. On reevaluation patient is improving. Patient has been evaluated by cardiology. Patient is for another day of IV diuresis. Patient meeting admission criteria due to IV diuresis needed.     Julissa Chang MD  Attending Emergency  Physician

## 2022-04-17 NOTE — PROGRESS NOTES
OBS/CDU   RESIDENT NOTE      Patients PCP is:  Ivania Triana, APRN - CNP        SUBJECTIVE      No acute events overnight. Has been able to tolerate a full diet without nausea or vomiting. The patient is urinating on his own and is passing flatus. Denies fever, chills, nausea, vomiting, chest pain, shortness of breath, abdominal pain, focal weakness, numbness, tingling, urinary/bowel symptoms, vision changes, visual hallucinations, or headache. Patient states that she is feeling much improved this morning. Does have a little bit of increased swelling around the vein in her left calf, but denies any pain, redness, induration. Patient states that she has follow-up appointment with her cardiothoracic surgeon this upcoming Wednesday. PHYSICAL EXAM      General: NAD, AO X 3  Heent: EMOI, PERRL  Neck: SUPPLE, NO JVD  Cardiovascular: RRR, S1S2  Pulmonary: CTAB, NO SOB  Abdomen: SOFT, NTTP, ND, +BS  Extremities: +2/4 PULSES DISTAL, NO SWELLING  Neuro / Psych: NO NUMBNESS OR TINGLING, MENTATION AT BASELINE    PERTINENT TEST /EXAMS      I have reviewed all available laboratory results.     MEDICATIONS CURRENT   amLODIPine (NORVASC) tablet 10 mg, Daily  aspirin EC tablet 81 mg, Daily  atorvastatin (LIPITOR) tablet 20 mg, Nightly  clopidogrel (PLAVIX) tablet 75 mg, Daily  insulin glargine (LANTUS) injection vial 35 Units, Nightly  insulin lispro (HUMALOG) injection vial 1-3 Units, TID AC  metoprolol tartrate (LOPRESSOR) tablet 25 mg, BID  losartan (COZAAR) tablet 25 mg, Daily  ondansetron (ZOFRAN-ODT) disintegrating tablet 4 mg, Q8H PRN  pantoprazole (PROTONIX) tablet 40 mg, Daily  amiodarone (CORDARONE) tablet 200 mg, BID  sodium chloride flush 0.9 % injection 5-40 mL, 2 times per day  sodium chloride flush 0.9 % injection 5-40 mL, PRN  0.9 % sodium chloride infusion, PRN  enoxaparin (LOVENOX) injection 40 mg, Daily  glucose (GLUTOSE) 40 % oral gel 15 g, PRN  dextrose 50 % IV solution, PRN  glucagon (rDNA) injection 1 mg, PRN  dextrose 5 % solution, PRN  furosemide (LASIX) injection 20 mg, BID  acetaminophen (TYLENOL) tablet 1,000 mg, Q8H        All medication charted and reviewed. CONSULTS      IP CONSULT TO CARDIOTHORACIC SURGERY  IP CONSULT TO HOSPITALIST  IP CONSULT TO CARDIOLOGY    ASSESSMENT/PLAN       Herlinda Aleman is a 60 y.o. female who presents with persistent chest pain/shortness of breath, left shoulder pain, concerns for left lower extremity swelling     · Cardiology following, appreciate recommendations  § ECHO results without any acute concerns. Normal systolic function globally. EF about 58%. § fluid restrictions and Lasix twice daily,  § follow-up in the CHF clinic as an outpatient  · Patient can follow-up CT surgery as scheduled in the clinic on Wednesday  · Continue home medications and pain control  · Monitor vitals, labs, and imaging  · DISPO: pending consults and clinical improvement      --  Jyoti Solis, DO  Emergency Medicine Resident Physician     This dictation was generated by voice recognition computer software. Although all attempts are made to edit the dictation for accuracy, there may be errors in the transcription that are not intended.

## 2022-04-17 NOTE — PLAN OF CARE
Problem: Pain:  Goal: Pain level will decrease  Description: Pain level will decrease  4/17/2022 0907 by Denia Sanderson RN  Outcome: Ongoing  4/16/2022 1947 by Denia Sanderson RN  Outcome: Ongoing  Goal: Control of acute pain  Description: Control of acute pain  4/17/2022 0907 by Denia Sanderson RN  Outcome: Ongoing  4/16/2022 1947 by Denia Sanderson RN  Outcome: Ongoing  Goal: Control of chronic pain  Description: Control of chronic pain  4/17/2022 0907 by Denia Sanderson RN  Outcome: Ongoing  4/16/2022 1947 by Denia Sanderson RN  Outcome: Ongoing

## 2022-04-17 NOTE — PROGRESS NOTES
[facility]  Progress Note                   Date:   4/17/2022  Patient name: Shruti Melendez  Date of admission:  4/15/2022 10:23 PM  MRN:   [id number]  YOB: 1960  PCP: LAKSHMI Anguiano CNP    Reason for Admission: Acute clinical systolic heart failure ([facility] [I50.21]  Acute congestive heart failure, unspecified heart failure type ([facility] [I50.9]  Localized swelling of left lower leg [R22.42]    Subjective:       Clinical Changes /Abnormalities:Patient seen and examined, states feeling much better denies chest pain or shortness of breath. Tele/vitals/labs reviewed . Discussed case with RN. Review of Systems    Medications:   Scheduled Meds:   amLODIPine  10 mg Oral Daily    aspirin  81 mg Oral Daily    atorvastatin  20 mg Oral Nightly    clopidogrel  75 mg Oral Daily    insulin glargine  35 Units SubCUTAneous Nightly    insulin lispro  1-3 Units SubCUTAneous TID AC    metoprolol tartrate  25 mg Oral BID    losartan  25 mg Oral Daily    pantoprazole  40 mg Oral Daily    amiodarone  200 mg Oral BID    sodium chloride flush  5-40 mL IntraVENous 2 times per day    enoxaparin  40 mg SubCUTAneous Daily    furosemide  20 mg IntraVENous BID    acetaminophen  1,000 mg Oral Q8H     Continuous Infusions:   sodium chloride      dextrose       CBC:   Recent Labs     04/15/22  2244   WBC 12.2*   HGB 11.4*        BMP:    Recent Labs     04/15/22  2244   *   K 4.1   CL 92*   CO2 21   BUN 8   CREATININE 0.72   GLUCOSE 253*     Hepatic:No results for input(s): AST, ALT, ALB, BILITOT, ALKPHOS in the last 72 hours. Troponin:   Recent Labs     04/15/22  2244 04/15/22  2328   TROPHS 32* 31*     BNP: No results for input(s): BNP in the last 72 hours. Lipids: No results for input(s): CHOL, HDL in the last 72 hours. Invalid input(s): LDLCALCU  INR: No results for input(s): INR in the last 72 hours.      DATA:    Diagnostics:    CARDIAC CATH 3/23/22  LMCA: Ostial 25% stenosis     LAD: Mid 100% stenosis, collateral from RCA     D1: Proximal 70% stenosis, and mid 90% stenosis     LCx: Mid 75% stenosis     OM1 and OM2 ostial 80% stenosis     RCA: Ostial 30% stenosis     PDA: Proximal 80% stenosis     PL branch 75% stenosis        Procedure Summary  Multivessel CAD  Preserved LV function  Recommendations  CV surgery consult for CABG     ECHO 3/23/2022  Summary  Left ventricle is normal in size, global left ventricular systolic function  is low normal, calculated ejection fraction is 52%. Tricuspid valve was not well visualized. Trivial tricuspid regurgitation. Insignificant tricuspid regurgitation, unable to estimate RVSP.       ECHO 4/16/2022  Left ventricle is normal in size with normal systolic function globally. Calculated ejection fraction by bi-plane Rodriguez's method is 58 %  Normal right ventricular size and function. Mild mitral regurgitation. Mild tricuspid regurgitation. Estimated right ventricular systolic pressure is 24 mmHg. No significant pericardial effusion is seen. Pleural effusion is noted.     Signature  ----------------------------------------------------------------------------   Electronically signed by Tenisha Navas(Sonographer) on 04/16/2022 04:35   PM  ----------------------------------------------------------------------------     ----------------------------------------------------------------------------   Electronically signed by Kip Gaston(Interpreting physician) on   04/17/2022 11:35 AM    Objective:   Vitals: BP (!) 143/80   Pulse 78   Temp 98 °F (36.7 °C) (Oral)   Resp 16   Ht 5' 5\" (1.651 m)   Wt 175 lb (79.4 kg)   SpO2 94%   BMI 29.12 kg/m²   General appearance: alert and cooperative with exam  HEENT: Head: Normocephalic, no lesions, without obvious abnormality.   Neck:no JVD, trachea midline, no adenopathy  Lungs: Clear to auscultation  Heart: Regular rate and rhythm, s1/s2 auscultated, no murmurs  Abdomen: soft, non-tender, bowel sounds active  Extremities: no edema  Neurologic: not done        Assessment / Acute Cardiac Problems:   1. Shortness of breath due heart failure with preserved EF  2. MV-CAD s/p CABG X 3, 3/29/22 w/ SLIMA to LAD, RSVG1 to OM, RSVG2 to RPDA   3. Preserved EF  4. HTN   5. DM      Patient Active Problem List:     Essential hypertension     Tobacco dependence     History of lumbar laminectomy     DDD (degenerative disc disease), lumbar     Chronic hand pain     Ganglion of joint     Arthritis of carpometacarpal (CMC) joint of both thumbs     NSTEMI (non-ST elevated myocardial infarction) ([facility]     Type 2 diabetes mellitus without complication, without long-term current use of insulin ([facility])     3-vessel CAD     Hypomagnesemia     Hypotension     Chest pain     Osteoarthritis of acromioclavicular joint     Full thickness rotator cuff tear     Acute clinical systolic heart failure ([facility]      Plan of Treatment:   1. Continue amiodarone Norvasc statin Cozaar Lopressor   2. Continue Lasix IV for today. We will switch to p.o. in a.m. if no fluid overload. No documented intake and output. Strict intake and output and document. No labs ordered will order BMP and monitor renal function  3. repeat echo as above -preserved LV function and no wall motion abnormalities  4.  Keep >4, MG >2    Electronically signed by LAKSHMI Cartwright NP on 4/17/2022 at 8:56 AM  [address]  [phone]

## 2022-04-18 ENCOUNTER — PATIENT MESSAGE (OUTPATIENT)
Dept: FAMILY MEDICINE CLINIC | Age: 62
End: 2022-04-18

## 2022-04-18 VITALS
HEIGHT: 65 IN | DIASTOLIC BLOOD PRESSURE: 83 MMHG | HEART RATE: 87 BPM | TEMPERATURE: 98.4 F | SYSTOLIC BLOOD PRESSURE: 139 MMHG | OXYGEN SATURATION: 93 % | WEIGHT: 175 LBS | BODY MASS INDEX: 29.16 KG/M2 | RESPIRATION RATE: 18 BRPM

## 2022-04-18 DIAGNOSIS — G89.18 POST-OP PAIN: ICD-10-CM

## 2022-04-18 LAB
ANION GAP SERPL CALCULATED.3IONS-SCNC: 11 MMOL/L (ref 9–17)
BUN BLDV-MCNC: 10 MG/DL (ref 8–23)
CALCIUM SERPL-MCNC: 9 MG/DL (ref 8.6–10.4)
CHLORIDE BLD-SCNC: 99 MMOL/L (ref 98–107)
CO2: 27 MMOL/L (ref 20–31)
CREAT SERPL-MCNC: 0.79 MG/DL (ref 0.5–0.9)
GFR AFRICAN AMERICAN: >60 ML/MIN
GFR NON-AFRICAN AMERICAN: >60 ML/MIN
GFR SERPL CREATININE-BSD FRML MDRD: ABNORMAL ML/MIN/{1.73_M2}
GLUCOSE BLD-MCNC: 149 MG/DL (ref 65–105)
GLUCOSE BLD-MCNC: 223 MG/DL (ref 65–105)
GLUCOSE BLD-MCNC: 232 MG/DL (ref 70–99)
POTASSIUM SERPL-SCNC: 3.6 MMOL/L (ref 3.7–5.3)
SODIUM BLD-SCNC: 137 MMOL/L (ref 135–144)

## 2022-04-18 PROCEDURE — 6370000000 HC RX 637 (ALT 250 FOR IP): Performed by: SURGERY

## 2022-04-18 PROCEDURE — 80048 BASIC METABOLIC PNL TOTAL CA: CPT

## 2022-04-18 PROCEDURE — 6360000002 HC RX W HCPCS: Performed by: STUDENT IN AN ORGANIZED HEALTH CARE EDUCATION/TRAINING PROGRAM

## 2022-04-18 PROCEDURE — 6370000000 HC RX 637 (ALT 250 FOR IP): Performed by: HEALTH CARE PROVIDER

## 2022-04-18 PROCEDURE — 36415 COLL VENOUS BLD VENIPUNCTURE: CPT

## 2022-04-18 PROCEDURE — 82947 ASSAY GLUCOSE BLOOD QUANT: CPT

## 2022-04-18 PROCEDURE — 6370000000 HC RX 637 (ALT 250 FOR IP): Performed by: STUDENT IN AN ORGANIZED HEALTH CARE EDUCATION/TRAINING PROGRAM

## 2022-04-18 PROCEDURE — 2580000003 HC RX 258: Performed by: STUDENT IN AN ORGANIZED HEALTH CARE EDUCATION/TRAINING PROGRAM

## 2022-04-18 RX ORDER — LANOLIN ALCOHOL/MO/W.PET/CERES
6 CREAM (GRAM) TOPICAL NIGHTLY PRN
Status: DISCONTINUED | OUTPATIENT
Start: 2022-04-18 | End: 2022-04-18

## 2022-04-18 RX ORDER — LANOLIN ALCOHOL/MO/W.PET/CERES
6 CREAM (GRAM) TOPICAL NIGHTLY PRN
Status: DISCONTINUED | OUTPATIENT
Start: 2022-04-18 | End: 2022-04-18 | Stop reason: HOSPADM

## 2022-04-18 RX ORDER — POTASSIUM CHLORIDE 20 MEQ/1
20 TABLET, EXTENDED RELEASE ORAL DAILY
Qty: 60 TABLET | Refills: 3 | Status: SHIPPED | OUTPATIENT
Start: 2022-04-18 | End: 2022-05-23

## 2022-04-18 RX ORDER — FUROSEMIDE 40 MG/1
40 TABLET ORAL DAILY
Status: DISCONTINUED | OUTPATIENT
Start: 2022-04-19 | End: 2022-04-18 | Stop reason: HOSPADM

## 2022-04-18 RX ORDER — FUROSEMIDE 40 MG/1
40 TABLET ORAL DAILY
Qty: 60 TABLET | Refills: 3 | Status: SHIPPED | OUTPATIENT
Start: 2022-04-18

## 2022-04-18 RX ORDER — AMIODARONE HYDROCHLORIDE 200 MG/1
200 TABLET ORAL 2 TIMES DAILY
Qty: 15 TABLET | Refills: 0 | Status: SHIPPED | OUTPATIENT
Start: 2022-04-18 | End: 2022-06-01

## 2022-04-18 RX ORDER — LOSARTAN POTASSIUM 25 MG/1
25 TABLET ORAL DAILY
Qty: 7 TABLET | Refills: 0 | Status: SHIPPED | OUTPATIENT
Start: 2022-04-18

## 2022-04-18 RX ORDER — POTASSIUM CHLORIDE 20 MEQ/1
20 TABLET, EXTENDED RELEASE ORAL 2 TIMES DAILY WITH MEALS
Status: DISCONTINUED | OUTPATIENT
Start: 2022-04-18 | End: 2022-04-18 | Stop reason: HOSPADM

## 2022-04-18 RX ORDER — OXYCODONE HYDROCHLORIDE AND ACETAMINOPHEN 5; 325 MG/1; MG/1
1 TABLET ORAL EVERY 8 HOURS PRN
Qty: 21 TABLET | Refills: 0 | Status: SHIPPED | OUTPATIENT
Start: 2022-04-18 | End: 2022-04-25

## 2022-04-18 RX ADMIN — INSULIN LISPRO 2 UNITS: 100 INJECTION, SOLUTION INTRAVENOUS; SUBCUTANEOUS at 08:27

## 2022-04-18 RX ADMIN — PANTOPRAZOLE SODIUM 40 MG: 40 TABLET, DELAYED RELEASE ORAL at 08:23

## 2022-04-18 RX ADMIN — AMLODIPINE BESYLATE 10 MG: 10 TABLET ORAL at 08:22

## 2022-04-18 RX ADMIN — INSULIN LISPRO 1 UNITS: 100 INJECTION, SOLUTION INTRAVENOUS; SUBCUTANEOUS at 13:05

## 2022-04-18 RX ADMIN — CLOPIDOGREL 75 MG: 75 TABLET, FILM COATED ORAL at 08:22

## 2022-04-18 RX ADMIN — ENOXAPARIN SODIUM 40 MG: 40 INJECTION SUBCUTANEOUS at 08:30

## 2022-04-18 RX ADMIN — AMIODARONE HYDROCHLORIDE 200 MG: 200 TABLET ORAL at 08:23

## 2022-04-18 RX ADMIN — SODIUM CHLORIDE, PRESERVATIVE FREE 10 ML: 5 INJECTION INTRAVENOUS at 08:23

## 2022-04-18 RX ADMIN — Medication 81 MG: at 08:22

## 2022-04-18 RX ADMIN — FUROSEMIDE 20 MG: 10 INJECTION, SOLUTION INTRAMUSCULAR; INTRAVENOUS at 08:23

## 2022-04-18 RX ADMIN — ACETAMINOPHEN 1000 MG: 500 TABLET ORAL at 02:46

## 2022-04-18 RX ADMIN — POTASSIUM CHLORIDE 20 MEQ: 1500 TABLET, EXTENDED RELEASE ORAL at 12:02

## 2022-04-18 RX ADMIN — METOPROLOL TARTRATE 25 MG: 25 TABLET ORAL at 08:23

## 2022-04-18 RX ADMIN — ACETAMINOPHEN 1000 MG: 500 TABLET ORAL at 08:27

## 2022-04-18 RX ADMIN — LOSARTAN POTASSIUM 25 MG: 25 TABLET, FILM COATED ORAL at 08:22

## 2022-04-18 ASSESSMENT — PAIN DESCRIPTION - PAIN TYPE: TYPE: ACUTE PAIN

## 2022-04-18 ASSESSMENT — PAIN SCALES - GENERAL
PAINLEVEL_OUTOF10: 6
PAINLEVEL_OUTOF10: 7
PAINLEVEL_OUTOF10: 7

## 2022-04-18 ASSESSMENT — PAIN DESCRIPTION - LOCATION: LOCATION: BACK

## 2022-04-18 ASSESSMENT — PAIN DESCRIPTION - DESCRIPTORS: DESCRIPTORS: ACHING

## 2022-04-18 ASSESSMENT — PAIN DESCRIPTION - ORIENTATION: ORIENTATION: LEFT

## 2022-04-18 NOTE — CARE COORDINATION
Discharge [facility] Case Management Department  Written by: Sarath Mccoy RN    Patient Name: Lynne Beckham  Attending Provider: No att. providers found  Admit Date: 4/15/2022 10:23 PM  MRN: [id number]  Account: [de-identified]                     : 1960  Discharge Date: 2022      Disposition: home with Med [address] Reynaldo Marquis RN

## 2022-04-18 NOTE — CARE COORDINATION
Transitional planning-talked with patient. Plan is to go home with her daughter and grandkids. Home with [facility]. Has ride. 1300 called [facility] and talked with Lolly Fox. Informed her of patient's discharge.  They will pull needed information from [facility]

## 2022-04-18 NOTE — TELEPHONE ENCOUNTER
From: Warner Harrison  To: Pantera Dean  Sent: 4/18/2022 5:29 PM EDT  Subject: Updated    Dear Riri Carney I was told to notify you that I was in the hospital over the weekend for fluid on my lungs and congestive heart failure. I am home now. I was provided pain medication at the hospital but was told that I had to ask you for a refill. If you could please call in Percocet like we discussed in our video appointment. Thank you.   Destiny

## 2022-04-18 NOTE — PROGRESS NOTES
[facility]  CDU / OBSERVATION ENCOUNTER  ATTENDING NOTE       I performed a history and physical examination of the patient and discussed management with the resident or midlevel provider. I reviewed the resident or midlevel provider's note and agree with the documented findings and plan of care. Any areas of disagreement are noted on the chart. I was personally present for the key portions of any procedures. I have documented in the chart those procedures where I was not present during the key portions. I have reviewed the nurses notes. I agree with the chief complaint, past medical history, past surgical history, allergies, medications, social and family history as documented unless otherwise noted below. The Family history, social history, and ROS are effectively unchanged since admission unless noted elsewhere in the chart. Patient well-appearing now. Patient states back to baseline. Patient has been discussed with cardiology. Medication adjustments made. Patient had refills on her current medications provided for her. Patient has follow-up scheduled. Patient ready for discharge.     Sharon Manzanares MD  Attending Emergency  Physician

## 2022-04-18 NOTE — PROGRESS NOTES
[facility]  Progress Note                   Date:   4/18/2022  Patient name: Chpais Lizarraga  Date of admission:  4/15/2022 10:23 PM  MRN:   [id number]  YOB: 1960  PCP: LAKSHMI Kumari CNP    Reason for Admission: Acute clinical systolic heart failure ([facility] [I50.21]  Acute congestive heart failure, unspecified heart failure type ([address] [I50.9]  Localized swelling of left lower leg [R22.42]  Congestive heart failure with left ventricular systolic dysfunction ([address] [I50.20]    Subjective:       Clinical Changes /Abnormalities:Patient seen and examined,  denies chest pain or shortness of breath. Tele/vitals/labs reviewed . Discussed case with RN. Review of Systems    Medications:   Scheduled Meds:   insulin lispro  0-6 Units SubCUTAneous TID WC    insulin lispro  0-3 Units SubCUTAneous Nightly    amLODIPine  10 mg Oral Daily    aspirin  81 mg Oral Daily    atorvastatin  20 mg Oral Nightly    clopidogrel  75 mg Oral Daily    insulin glargine  35 Units SubCUTAneous Nightly    metoprolol tartrate  25 mg Oral BID    losartan  25 mg Oral Daily    pantoprazole  40 mg Oral Daily    amiodarone  200 mg Oral BID    sodium chloride flush  5-40 mL IntraVENous 2 times per day    enoxaparin  40 mg SubCUTAneous Daily    furosemide  20 mg IntraVENous BID    acetaminophen  1,000 mg Oral Q8H     Continuous Infusions:   sodium chloride      dextrose       CBC:   Recent Labs     04/15/22  2244   WBC 12.2*   HGB 11.4*        BMP:    Recent Labs     04/15/22  2244 04/17/22  0950 04/18/22  0729   * 137 137   K 4.1 3.8 3.6*   CL 92* 98 99   CO2 21 24 27   BUN 8 8 10   CREATININE 0.72 0.75 0.79   GLUCOSE 253* 169* 232*     Hepatic:No results for input(s): AST, ALT, ALB, BILITOT, ALKPHOS in the last 72 hours. Troponin:   Recent Labs     04/15/22  2244 04/15/22  2328   TROPHS 32* 31*     BNP: No results for input(s): BNP in the last 72 hours.   Lipids: No results for input(s): CHOL, HDL in the last 72 hours. Invalid input(s): LDLCALCU  INR: No results for input(s): INR in the last 72 hours. DATA:    Diagnostics:    CARDIAC CATH 3/23/22  LMCA: Ostial 25% stenosis     LAD: Mid 100% stenosis, collateral from RCA     D1: Proximal 70% stenosis, and mid 90% stenosis     LCx: Mid 75% stenosis     OM1 and OM2 ostial 80% stenosis     RCA: Ostial 30% stenosis     PDA: Proximal 80% stenosis     PL branch 75% stenosis        Procedure Summary  Multivessel CAD  Preserved LV function  Recommendations  CV surgery consult for CABG     ECHO 3/23/2022  Summary  Left ventricle is normal in size, global left ventricular systolic function  is low normal, calculated ejection fraction is 52%. Tricuspid valve was not well visualized. Trivial tricuspid regurgitation. Insignificant tricuspid regurgitation, unable to estimate RVSP.       ECHO 4/16/2022  Left ventricle is normal in size with normal systolic function globally. Calculated ejection fraction by bi-plane Rodriguez's method is 58 %  Normal right ventricular size and function. Mild mitral regurgitation. Mild tricuspid regurgitation. Estimated right ventricular systolic pressure is 24 mmHg. No significant pericardial effusion is seen.   Pleural effusion is noted.     Signature  ----------------------------------------------------------------------------   Electronically signed by Tenisha Evans(Sonographer) on 04/16/2022 04:35   PM  ----------------------------------------------------------------------------     ----------------------------------------------------------------------------   Electronically signed by Kip Gaston(Interpreting physician) on   04/17/2022 11:35 AM    Objective:   Vitals: BP (!) 162/99   Pulse 81   Temp 98.2 °F (36.8 °C) (Oral)   Resp 16   Ht 5' 5\" (1.651 m)   Wt 175 lb (79.4 kg)   SpO2 93%   BMI 29.12 kg/m²   General appearance: alert and cooperative with exam  HEENT: Head: Normocephalic, no lesions, without obvious abnormality. Neck:no JVD, trachea midline, no adenopathy  Lungs: Clear to auscultation  Heart: Regular rate and rhythm, s1/s2 auscultated, no murmurs  Abdomen: soft, non-tender, bowel sounds active  Extremities: no edema  Neurologic: not done        Assessment / Acute Cardiac Problems:   1. Shortness of breath due heart failure with preserved EF  2. MV-CAD s/p CABG X 3, 3/29/22 w/ SLIMA to LAD, RSVG1 to OM, RSVG2 to RPDA   3. Preserved EF  4. HTN   5. DM  6. Pleural effusion on ECHO      Patient Active Problem List:     Essential hypertension     Tobacco dependence     History of lumbar laminectomy     DDD (degenerative disc disease), lumbar     Chronic hand pain     Ganglion of joint     Arthritis of carpometacarpal (CMC) joint of both thumbs     NSTEMI (non-ST elevated myocardial infarction) ([facility]     Type 2 diabetes mellitus without complication, without long-term current use of insulin ([facility])     3-vessel CAD     Hypomagnesemia     Hypotension     Chest pain     Osteoarthritis of acromioclavicular joint     Full thickness rotator cuff tear     Acute clinical systolic heart failure ([address]      Plan of Treatment:   1. Continue amiodarone Norvasc statin Cozaar Lopressor   2. will switch lasix to p.o. [place] Reas clinically no fluid overload. 3. repeat echo as above -preserved LV function and no wall motion abnormalities  4.  Ok to discharge from cardiology standpoint, follow up in the office in 2 weeks      Electronically signed by LAKSHMI Pond NP on 4/18/2022 at [address]  [phone]

## 2022-04-18 NOTE — DISCHARGE SUMMARY
CDU Discharge Summary        Patient:  Corbin Deleon  YOB: 1960    MRN: [id number]   Acct: [de-identified]    Primary Care Physician: LAKSHMI Oquendo CNP    Admit date:  4/15/2022 10:23 PM  Discharge date: 4/18/2022    Discharge Diagnoses:     Acute chest pain, shortness of breath, leg swelling due to  Acute congestive heart failure. Improved with lasix and fluid restriction. Follow-up:  Call today/tomorrow for a follow up appointment with LAKSHMI Oquendo CNP , or return to the Emergency Room with worsening symptoms    Stressed to patient the importance of following up with primary care doctor for further workup/management of symptoms. Pt verbalizes understanding and agrees with plan.     Discharge Medications:  Changes to medications           Medication List      START taking these medications    potassium chloride 20 MEQ extended release tablet  Commonly known as: KLOR-CON M  Take 1 tablet by mouth daily        CHANGE how you take these medications    furosemide 40 MG tablet  Commonly known as: LASIX  Take 1 tablet by mouth daily  What changed:   · medication strength  · how much to take  · when to take this        CONTINUE taking these medications    Alcohol Swabs 70 % Pads  1 box by Does not apply route daily     amiodarone 200 MG tablet  Commonly known as: CORDARONE  Take 1 tablet by mouth 2 times daily     amLODIPine 10 MG tablet  Commonly known as: NORVASC     aspirin 81 MG EC tablet  Take 1 tablet by mouth daily     atorvastatin 20 MG tablet  Commonly known as: LIPITOR  Take 1 tablet by mouth nightly     clopidogrel 75 MG tablet  Commonly known as: PLAVIX  Take 1 tablet by mouth daily     docusate sodium 100 MG capsule  Commonly known as: COLACE  Take 1 capsule by mouth 2 times daily     glucose monitoring kit  1 kit by Does not apply route daily as needed (Check blood glucose three times daily in morning and at night before dinner)     insulin lispro (1 Unit Dial) 100 UNIT/ML [Diabetes Mellitus] : no diabetes mellitus Sopn  Commonly known as: HumaLOG KwikPen  Inject 1-3 Units into the skin 3 times daily (before meals)     Insulin Pen Needle 29G X 12MM Misc  1 each by Does not apply route daily     Lancets Misc  1 each by Does not apply route 3 times daily     Lantus SoloStar 100 UNIT/ML injection pen  Generic drug: insulin glargine  Inject 35 Units into the skin nightly     losartan 25 MG tablet  Commonly known as: COZAAR  Take 1 tablet by mouth daily     metoprolol tartrate 25 MG tablet  Commonly known as: LOPRESSOR  Take 1 tablet by mouth 2 times daily     ondansetron 4 MG disintegrating tablet  Commonly known as: ZOFRAN-ODT  Take 1 tablet by mouth every 8 hours as needed for Nausea or Vomiting     pantoprazole 40 MG tablet  Commonly known as: PROTONIX  Take 1 tablet by mouth daily           Where to Get Your Medications      These medications were sent to [address] [address]    Phone: [phone]   · furosemide 40 MG tablet  · potassium chloride 20 MEQ extended release tablet     You can get these medications from any pharmacy    Bring a paper prescription for each of these medications  · amiodarone 200 MG tablet  · losartan 25 MG tablet  · metoprolol tartrate 25 MG tablet         Diet:  ADULT DIET; Regular; Low Fat/Low Chol/High Fiber/2 gm Na; 1200 ml , Advance as tolerated     Activity:  As tolerated    Consultants: IP CONSULT TO CARDIOTHORACIC SURGERY  IP CONSULT TO HOSPITALIST  IP CONSULT TO CARDIOLOGY  IP CONSULT TO HOME CARE NEEDS    Procedures:  Not indicated     Diagnostic Test:   Results for orders placed or performed during the hospital encounter of 04/15/22   COVID-19, Rapid    Specimen: Nasopharyngeal Swab   Result Value Ref Range    Specimen Description . NASOPHARYNGEAL SWAB     SARS-CoV-2, Rapid Not Detected Not Detected   CBC with Auto Differential   Result Value Ref Range    WBC 12.2 (H) 3.5 - 11.3 k/uL [Hypertension] : no hypertension RBC 3.74 (L) 3.95 - 5.11 m/uL    Hemoglobin 11.4 (L) 11.9 - 15.1 g/dL    Hematocrit 35.0 (L) 36.3 - 47.1 %    MCV 93.6 82.6 - 102.9 fL    MCH 30.5 25.2 - 33.5 pg    MCHC 32.6 28.4 - 34.8 g/dL    RDW 13.2 11.8 - 14.4 %    Platelets 561 769 - 339 k/uL    MPV 10.6 8.1 - 13.5 fL    NRBC Automated 0.0 0.0 per 100 WBC    Seg Neutrophils 66 (H) 36 - 65 %    Lymphocytes 19 (L) 24 - 43 %    Monocytes 6 3 - 12 %    Eosinophils % 7 (H) 1 - 4 %    Basophils 1 0 - 2 %    Immature Granulocytes 1 (H) 0 %    Segs Absolute 8.08 1.50 - 8.10 k/uL    Absolute Lymph # 2.29 1.10 - 3.70 k/uL    Absolute Mono # 0.78 0.10 - 1.20 k/uL    Absolute Eos # 0.83 (H) 0.00 - 0.44 k/uL    Basophils Absolute 0.14 0.00 - 0.20 k/uL    Absolute Immature Granulocyte 0.10 0.00 - 0.30 k/uL   Basic Metabolic Panel w/ Reflex to MG   Result Value Ref Range    Glucose 253 (H) 70 - 99 mg/dL    BUN 8 8 - 23 mg/dL    CREATININE 0.72 0.50 - 0.90 mg/dL    Calcium 9.2 8.6 - 10.4 mg/dL    Sodium 129 (L) 135 - 144 mmol/L    Potassium 4.1 3.7 - 5.3 mmol/L    Chloride 92 (L) 98 - 107 mmol/L    CO2 21 20 - 31 mmol/L    Anion Gap 16 9 - 17 mmol/L    GFR Non-African American >60 >60 mL/min    GFR African American >60 >60 mL/min    GFR Comment         Troponin   Result Value Ref Range    Troponin, High Sensitivity 32 (H) 0 - 14 ng/L   Troponin   Result Value Ref Range    Troponin, High Sensitivity 31 (H) 0 - 14 ng/L   Brain Natriuretic Peptide   Result Value Ref Range    Pro-BNP 2,103 (H) <300 pg/mL   Basic Metabolic Panel   Result Value Ref Range    Glucose 169 (H) 70 - 99 mg/dL    BUN 8 8 - 23 mg/dL    CREATININE 0.75 0.50 - 0.90 mg/dL    Calcium 9.4 8.6 - 10.4 mg/dL    Sodium 137 135 - 144 mmol/L    Potassium 3.8 3.7 - 5.3 mmol/L    Chloride 98 98 - 107 mmol/L    CO2 24 20 - 31 mmol/L    Anion Gap 15 9 - 17 mmol/L    GFR Non-African American >60 >60 mL/min    GFR African American >60 >60 mL/min    GFR Comment         Basic Metabolic Panel   Result Value Ref Range [Heart Disease] : no heart disease Glucose 232 (H) 70 - 99 mg/dL    BUN 10 8 - 23 mg/dL    CREATININE 0.79 0.50 - 0.90 mg/dL    Calcium 9.0 8.6 - 10.4 mg/dL    Sodium 137 135 - 144 mmol/L    Potassium 3.6 (L) 3.7 - 5.3 mmol/L    Chloride 99 98 - 107 mmol/L    CO2 27 20 - 31 mmol/L    Anion Gap 11 9 - 17 mmol/L    GFR Non-African American >60 >60 mL/min    GFR African American >60 >60 mL/min    GFR Comment         POC Glucose Fingerstick   Result Value Ref Range    POC Glucose 244 (H) 65 - 105 mg/dL   POC Glucose Fingerstick   Result Value Ref Range    POC Glucose 153 (H) 65 - 105 mg/dL   POC Glucose Fingerstick   Result Value Ref Range    POC Glucose 141 (H) 65 - 105 mg/dL   POC Glucose Fingerstick   Result Value Ref Range    POC Glucose 127 (H) 65 - 105 mg/dL   POC Glucose Fingerstick   Result Value Ref Range    POC Glucose 156 (H) 65 - 105 mg/dL   POC Glucose Fingerstick   Result Value Ref Range    POC Glucose 122 (H) 65 - 105 mg/dL   POC Glucose Fingerstick   Result Value Ref Range    POC Glucose 171 (H) 65 - 105 mg/dL   POC Glucose Fingerstick   Result Value Ref Range    POC Glucose 196 (H) 65 - 105 mg/dL   POC Glucose Fingerstick   Result Value Ref Range    POC Glucose 169 (H) 65 - 105 mg/dL   POC Glucose Fingerstick   Result Value Ref Range    POC Glucose 223 (H) 65 - 105 mg/dL   POC Glucose Fingerstick   Result Value Ref Range    POC Glucose 149 (H) 65 - 105 mg/dL   EKG 12 Lead   Result Value Ref Range    Ventricular Rate 76 BPM    Atrial Rate 76 BPM    P-R Interval 196 ms    QRS Duration 88 ms    Q-T Interval 418 ms    QTc Calculation (Bazett) 470 ms    P Axis 53 degrees    R Axis -11 degrees    T Axis 42 degrees   EKG 12 Lead   Result Value Ref Range    Ventricular Rate 74 BPM    Atrial Rate 74 BPM    P-R Interval 194 ms    QRS Duration 94 ms    Q-T Interval 438 ms    QTc Calculation (Bazett) 486 ms    P Axis 56 degrees    R Axis 3 degrees    T Axis 61 degrees     ECHO Complete 2D W Doppler W Color    Result Date: 3/23/2022  Transthoracic [Autoimmune Disease] : no autoimmune disease Echocardiography Report (TTE)  Patient Name Mendoza Shaikh     Date of Study               03/23/2022               LUIS MANUEL HORVATH   Date of      1960  Gender                      Female  Birth   Age          60 year(s)  Race                           Room Number  2014        Height:                     65 inch, 165.1 cm   Corporate ID [id number]    Weight:                     178 pounds, 80.7 kg  #   Patient Acct [de-identified]   BSA:          1.88 m^2      BMI:     29.62 kg/m^2  #   MR #         [id number]     Sonographer                 Kait Tucker   Accession #  [id number]  Interpreting Physician      [address] [place] [address]   Fellow                   Referring Nurse                           Practitioner   Interpreting             Referring Physician         Adelfo Rodgers  Fellow  Type of Study   TTE procedure:2D Echocardiogram, M-Mode, Doppler, Color Doppler. Procedure Date Date: 03/23/2022 Start: 04:48 PM Study Location: [facility] Technical Quality: Adequate visualization Indications:Pre-Op CABG. History / Tech. Comments: Procedure explained to patient. Echo completed in the Echo Lab. PMHx: Hypertension, Diabetes Patient Status: Inpatient Height: 65 inches Weight: 178 pounds BSA: 1.88 m^2 BMI: 29.62 kg/m^2 HR: 79 bpm Allergies   - Penicillin. CONCLUSIONS Summary Left ventricle is normal in size, global left ventricular systolic function is low normal, calculated ejection fraction is 52%. Tricuspid valve was not well visualized. Trivial tricuspid regurgitation. Insignificant tricuspid regurgitation, unable to estimate RVSP.  Signature ----------------------------------------------------------------------------  Electronically signed by Vera Sparks(Sonographer) on 03/23/2022 05:21 PM ---------------------------------------------------------------------------- ----------------------------------------------------------------------------  Electronically signed by Mark KinseyKit [facility] physician) on 03/23/2022  10:03 [Renal Disease] : no kidney disease, no UTI PM ---------------------------------------------------------------------------- FINDINGS Left Atrium Left atrium is normal in size. Inter-atrial septum is intact with no evidence for an atrial septal defect. Left Ventricle Left ventricle is normal in size, global left ventricular systolic function is low normal, calculated ejection fraction is 52%. Right Atrium Right atrium is normal in size. Right Ventricle Normal right ventricular size and function. Mitral Valve Normal mitral valve structure and function. No evidence of mitral regurgitation. No mitral stenosis. Aortic Valve Aortic valve is trileaflet. No evidence of aortic insufficiency or stenosis. Tricuspid Valve Tricuspid valve was not well visualized. Trivial tricuspid regurgitation. Insignificant tricuspid regurgitation, unable to estimate RVSP. Pulmonic Valve Pulmonic valve not well visualized but Doppler velocities are normal. Pericardial Effusion No significant pericardial effusion is seen. Miscellaneous Normal aortic root dimension. E/E' average = 6.9. IVC normal diameter & inspiratory collapse indicating normal RA filling pressure .  M-mode / 2D Measurements & Calculations:   LVIDd:3.4 cm(3.7 - 5.6 cm)       Diastolic ZRMCUI:185 ml  IVSd:1 cm(0.6 - 1.1 cm)          Systolic RSAUKI:69 ml  BEPOX:2.8 cm(0.6 - 1.1 cm)       Aortic Root:3 cm(2.0 - 3.7 cm)                                   LA Dimension: 3.1 cm(1.9 - 4.0 cm)  Calculated LVEF (%): 52.25 %                                   LVOT:1.9 cm                                   RVDd:3.2 cm   Mitral:                                  Aortic   Valve Area (P1/2-Time): 6.11 cm^2        Peak Velocity: 1.09 m/s  Peak E-Wave: 0.50 m/s                    Mean Velocity: 0.78 m/s  Peak A-Wave: 0.82 m/s                    Peak Gradient: 4.75 mmHg  E/A Ratio: 0.61                          Mean Gradient: 3 mmHg  Peak Gradient: 0.99 mmHg  Mean Gradient: 2 mmHg  Deceleration Time: 144 msec              Area (continuity): [Neurologic Disorder] : no neurologic disorder, no epilepsy 2.6 cm^2  P1/2t: 36 msec                           AV VTI: 19.4 cm   Area (continuity): 3.56 cm^2  Mean Velocity: 0.59 m/s                                            Pulmonic:                                            Peak Velocity: 0.92 m/s                                           Peak Gradient: 3.41 mmHg  Diastology / Tissue Doppler Septal Wall E' velocity:0.06 m/s Septal Wall E/E':9 Lateral Wall E' velocity:0.10 m/s Lateral Wall E/E':4.8    Echocardiogram Limited 2D Adult    Result Date: 4/17/2022  Transthoracic Echocardiography Report (TTE)  Patient Name Elva Kennedy     Date of Study               04/16/2022               [facility]   Date of      1960  Gender                      Female  Birth   Age          60 year(s)  Race                           Room Number  1939        Height:                     65 inch, 165.1 cm   Corporate ID [id number]    Weight:                     175 pounds, 79.4 kg  #   Patient Acct [de-identified]   BSA:          1.87 m^2      BMI:      29.12  #                                                              kg/m^2   MR #         [id number]     Sonographer                 Lou Bauman   Accession #  [id number]  Interpreting Physician      Gloria   Fellow                   Referring Nurse                           Practitioner   Interpreting             Referring Physician         Patt Ross MD  Fellow  Type of Study   TTE procedure:2D Echocardiogram, Doppler, Color Doppler. Procedure Date Date: 04/16/2022 Start: 03:37 PM Study Location: [facility] Indications:S/P CABG. History / Tech. Comments: S/P CABG, Re-Admit Patient Status: Inpatient Height: 65 inches Weight: 175 pounds BSA: 1.87 m^2 BMI: 29.12 kg/m^2 Allergies   - Penicillin. CONCLUSIONS Summary Left ventricle is normal in size with normal systolic function globally. Calculated ejection fraction by bi-plane Rodriguez's method is 58 % Normal right ventricular size and function.  Mild mitral [Psychiatric Disorders] : no psychiatric disorders [Depression] : no depression, no post partum depression regurgitation. Mild tricuspid regurgitation. Estimated right ventricular systolic pressure is 24 mmHg. No significant pericardial effusion is seen. Pleural effusion is noted. Signature ----------------------------------------------------------------------------  Electronically signed by Tenisha Shah(Sonographer) on 04/16/2022 04:35  PM ---------------------------------------------------------------------------- ----------------------------------------------------------------------------  Electronically signed by Kip Gaston(Interpreting physician) on  04/17/2022 11:35 AM ---------------------------------------------------------------------------- FINDINGS Left Ventricle Left ventricle is normal in size with normal systolic function globally. Calculated ejection fraction by bi-plane Rodriguez's method is 58 % Right Ventricle Normal right ventricular size and function. Mitral Valve Mitral valve structure is normal. Mild mitral regurgitation. Tricuspid Valve Tricuspid valve structure is normal. Mild tricuspid regurgitation. Estimated right ventricular systolic pressure is 24 mmHg. Pericardial Effusion No significant pericardial effusion is seen. Pleural Effusion Pleural effusion is noted. Tricuspid:                              Pulmonic:   Estimated RVSP: 24 mmHg  Peak TR Velocity: 1.90 m/s  Peak TR Gradient: 14.44 mmHg  Estimated RA Pressure: 10 mmHg                                          Estimated PASP: 24.44 mmHg      XR CHEST (2 VW)    Result Date: 4/15/2022  EXAMINATION: TWO XRAY VIEWS OF THE CHEST 4/15/2022 11:22 pm COMPARISON: Chest radiograph performed 04/06/2022. HISTORY: ORDERING SYSTEM PROVIDED HISTORY: shortness of breath, recent CABG TECHNOLOGIST PROVIDED HISTORY: shortness of breath, recent CABG Reason for Exam: sob FINDINGS: There is a left basilar effusion with adjacent atelectasis. There is no pneumothorax. The mediastinal structures are unremarkable. The upper abdomen unremarkable.   The [Hepatic Disorder] : no hepatitis, no liver disease extrathoracic soft tissues are unremarkable. Left basilar effusion with adjacent atelectasis. CT CHEST WO CONTRAST    Result Date: 3/23/2022  EXAMINATION: CT OF THE CHEST WITHOUT CONTRAST 3/23/2022 5:18 pm TECHNIQUE: CT of the chest was performed without the administration of intravenous contrast. Multiplanar reformatted images are provided for review. Dose modulation, iterative reconstruction, and/or weight based adjustment of the mA/kV was utilized to reduce the radiation dose to as low as reasonably achievable. COMPARISON: None. HISTORY: ORDERING SYSTEM PROVIDED HISTORY: CABG potential tomorrow am TECHNOLOGIST PROVIDED HISTORY: cabg potential tomorrow am Reason for Exam: cabg potential tomorrow am FINDINGS: Mediastinum: Mild calcific atherosclerosis aorta and coronary arteries. The heart size is normal.  Posterior mediastinal structures appear unremarkable. There are number of shotty lymph nodes in the mediastinum which do not CT size criteria for adenopathy. Lungs/pleura: Area of elongated rounded soft tissue density subpleural posterolateral right lung base axial image 77 most typical for scarring or rounded atelectasis. Minimal focal subsegmental atelectasis or scar posterior basal segment left lower lobe. Scattered subpleural reticulation is demonstrated posterior bilateral lung bases. Lungs appear otherwise clear. No inspissated secretions or endobronchial lesion evident. Upper Abdomen: Prominent hepatic steatosis. Otherwise unremarkable. Soft Tissues/Bones: No acute superficial soft tissue or osseous structure abnormality evident. 1. Probable scarring/rounded atelectasis bilateral lung bases, more nodular in appearance on the right compared to the left. 2. No pulmonary infiltrate or definite suspicious lesion evident. 3. Calcific atherosclerosis aorta and coronary arteries. 4. Hepatic steatosis.  RECOMMENDATIONS: Unavailable     XR CHEST PORTABLE    Result Date: 4/6/2022  EXAMINATION: ONE XRAY VIEW OF THE CHEST 4/6/2022 5:31 am COMPARISON: 04/05/2022 and 04/04/2022 HISTORY: ORDERING SYSTEM PROVIDED HISTORY: Post op open heart surgery TECHNOLOGIST PROVIDED HISTORY: Post op open heart surgery Reason for Exam: upr,post op FINDINGS: Sternotomy wires and postsurgical changes but no lines or tubes in the chest. External leads and tubing overlie the chest. Residual left basilar opacity and summation of the soft tissues. Portions of the left hemidiaphragm and costophrenic angle are obscured. No definite pneumothorax is identified. There is minimal blunting the right costophrenic angle. Cardiac silhouette is stable. Small left pleural effusion and basilar opacity are unchanged. May have a minimal amount of right pleural effusion as well. XR CHEST PORTABLE    Result Date: 4/5/2022  EXAMINATION: ONE XRAY VIEW OF THE CHEST 4/5/2022 5:43 am COMPARISON: Chest x-ray dated 04/04/2022 HISTORY: ORDERING SYSTEM PROVIDED HISTORY: Post op open heart surgery TECHNOLOGIST PROVIDED HISTORY: Post op open heart surgery Reason for Exam: uprt FINDINGS: Left pleural effusion with adjacent atelectasis. Pulmonary edema. Status post sternotomy. Cardiac silhouette is stable. No pneumothorax. Left pleural effusion with adjacent atelectasis. XR CHEST PORTABLE    Result Date: 4/4/2022  EXAMINATION: ONE XRAY VIEW OF THE CHEST 4/4/2022 5:55 am COMPARISON: Chest x-ray dated 04/03/2022 HISTORY: ORDERING SYSTEM PROVIDED HISTORY: Post op open heart surgery TECHNOLOGIST PROVIDED HISTORY: Post op open heart surgery Reason for Exam: post open heart   upright port FINDINGS: Left pleural effusion with adjacent atelectasis. Right basilar atelectasis. Status post sternotomy. Cardiomegaly. No pneumothorax. Pulmonary edema. Left pleural effusion with adjacent atelectasis appears stable compared to prior study. Pulmonary edema.      XR CHEST PORTABLE    Result Date: 4/3/2022  EXAMINATION: ONE XRAY VIEW OF THE CHEST [Thrombophlebitis] : no varicosities, no phlebitis 4/3/2022 5:38 am COMPARISON: 2 April 2022 HISTORY: ORDERING SYSTEM PROVIDED HISTORY: Post op open heart surgery TECHNOLOGIST PROVIDED HISTORY: Post op open heart surgery Reason for Exam: post open heart   upright port FINDINGS: AP portable view of the chest time stamped at 508 hours demonstrates overlying cardiac monitoring electrodes and prior median sternotomy. There is little change in the chest appearance since prior study. Cardiac size is stable. Small effusions with bibasilar opacities redemonstrated. Left subclavian line terminates in the superior vena cava. Vascularity is slightly prominent. No extrapleural air. No significant change from prior study. Continued mild vascular congestion and bibasilar opacities. XR CHEST PORTABLE    Result Date: 4/2/2022  EXAMINATION: ONE XRAY VIEW OF THE CHEST 4/2/2022 7:02 am COMPARISON: 1 April 2022 HISTORY: ORDERING SYSTEM PROVIDED HISTORY: Post op open heart surgery TECHNOLOGIST PROVIDED HISTORY: Post op open heart surgery Reason for Exam: port upright FINDINGS: AP portable view of the chest time stamped at 634 hours demonstrates overlying cardiac monitoring electrodes and prior median sternotomy. Lung volumes are low. Stable cardiac size. No appreciable extrapleural air. Small effusions are noted with bibasilar opacities mildly increased at the right base over prior exam.  Left subclavian line terminates in the superior vena cava. Continued bibasilar opacities, minimally increased at the right base. No extrapleural air. Prior median sternotomy. Subclavian line as above. XR CHEST PORTABLE    Result Date: 4/2/2022  EXAMINATION: ONE XRAY VIEW OF THE CHEST 4/1/2022 10:35 pm COMPARISON: 04/01/2020 and 03/31/2022 HISTORY: ORDERING SYSTEM PROVIDED HISTORY: Four hours post chest tube removal. Removed at 1830. TECHNOLOGIST PROVIDED HISTORY: Four hours post chest tube removal. Removed at 1830.  Reason for Exam: Upright port, 4 hours s/pCT removal [Thyroid Disorder] : no thyroid dysfunction FINDINGS: Sternotomy wires. Stable cardiomegaly. Interval removal of the chest tube bibasilar airspace opacities no definite pneumothorax or recurrent pneumothorax identified. Left subclavian line is unchanged. Removal left-sided chest tube. No definite recurrence of the pneumothorax identified     XR CHEST PORTABLE    Result Date: 4/1/2022  EXAMINATION: ONE XRAY VIEW OF THE CHEST 4/1/2022 5:54 am COMPARISON: 03/31/2022, 03/30/2022 HISTORY: ORDERING SYSTEM PROVIDED HISTORY: Post op open heart surgery TECHNOLOGIST PROVIDED HISTORY: Post op open heart surgery FINDINGS: Left subclavian line remains in place. The cardiac and mediastinal contours appear unchanged. Basilar opacities and pleural effusions are again demonstrated without appreciable change. No new airspace disease identified in the interval.  No evidence for pneumothorax. Small pleural effusions and basilar atelectasis again demonstrated. No significant interval change. XR CHEST PORTABLE    Result Date: 3/31/2022  EXAMINATION: ONE XRAY VIEW OF THE CHEST 3/31/2022 5:54 am COMPARISON: 03/30/2022, 03/29/2022 HISTORY: ORDERING SYSTEM PROVIDED HISTORY: Post op open heart surgery TECHNOLOGIST PROVIDED HISTORY: Post op open heart surgery Reason for Exam: uprt port FINDINGS: Chest tubes and left subclavian line remain in place. The cardiac and mediastinal contours appear unchanged. Basilar opacities and pleural effusions are again demonstrated without appreciable change. No new airspace disease identified in the interval.  No evidence for pneumothorax. No significant interval change. XR CHEST PORTABLE    Result Date: 3/30/2022  EXAMINATION: ONE XRAY VIEW OF THE CHEST 3/30/2022 5:48 am COMPARISON: 03/29/2022 HISTORY: ORDERING SYSTEM PROVIDED HISTORY: Post op open heart surgery TECHNOLOGIST PROVIDED HISTORY: Post op open heart surgery Reason for Exam: post open heart   upright port FINDINGS: Endotracheal tube and enteric tube have been removed. [Trauma] : no trauma/violence Mediastinal drain and left chest tube remain in place. Median sternotomy wires are noted. Heart size is within normal limits. There is mild bibasilar atelectasis, not significantly changed. No sizable pleural effusion or evidence of pneumothorax. Left subclavian central venous catheter remains in place. Interval extubation. Unchanged mild bibasilar atelectasis. XR CHEST PORTABLE    Result Date: 3/29/2022  EXAMINATION: ONE XRAY VIEW OF THE CHEST 3/29/2022 2:56 pm COMPARISON: 03/22/2022 HISTORY: ORDERING SYSTEM PROVIDED HISTORY: Post op open heart surgery TECHNOLOGIST PROVIDED HISTORY: Post op open heart surgery FINDINGS: Interval sternotomy. Cardiomediastinal silhouette is unchanged in size. There is no pleural effusion or pneumothorax. There are linear opacities in the left mid and lower lung as well as the right lung base. Chronic appearing deformity in multiple right-sided ribs. Endotracheal tube terminates 5.5 cm above the diego. Enteric tube is below the diaphragm. Left chest tube and mediastinal drain are noted. 1. Lines and tubes in expected position as above. 2. Interval sternotomy. 3. Linear opacities throughout both lungs, which could be due to atelectasis     XR CHEST PORTABLE    Result Date: 3/22/2022  EXAMINATION: ONE XRAY VIEW OF THE CHEST 3/22/2022 6:10 am COMPARISON: Chest two views January 9, 2013. HISTORY: ORDERING SYSTEM PROVIDED HISTORY: Chest pain TECHNOLOGIST PROVIDED HISTORY: Chest pain Reason for Exam: chest pain   upright port FINDINGS: The heart is normal in size and configuration. The mediastinal contours are within normal limits. The lungs are well aerated. The pleural surfaces are normal and no evidence of a pleural effusion is seen. Right-sided remote rib fractures are again visualized. Bones and soft tissues are otherwise unremarkable. No acute cardiopulmonary abnormality.      VL DUP CAROTID BILATERAL    Result Date: 3/23/2022    [facility] [Blood Transfusion (___ Ml)] : no history of blood transfusion Carotid Duplex Study   Patient Name Anali Bourgeois     Date of Study         03/23/2022               [facility]   Date of      1960  Gender                Female  Birth   Age          60 year(s)  Race                     Room Number  2014        Height:               65 inch, 165.1 cm   Corporate ID [id number]    Weight:               178 pounds, 80.7 kg  #   Patient Acct [de-identified]   BSA:       1.88 m^2   BMI:         29.62 kg/m^2  #   MR #         [id number]     Sonographer           Isra Ron RVT, RDMS   Accession #  [id number]  Interpreting          [address] [place] [address]                           Physician   Referring                Referring Physician   Gabby Cadena. LAKSHMI Rosa-LUCRETIA  Nurse  Practitioner  Procedure Type of Study:   Cerebral: Carotid, Carotid Scan Bilateral.  Indications for Study:Pre-op OHS. Patient Status: In Patient. Comments:Basic Classification of ICA Stenosis: PSV - Peak Systolic Velocity Normal: No plaque or calcification identified, no elevation of PSV Mild: <50% spectral broadening without increased PSV Moderate: 50 - 69% PSV >125 - <230 cm/sec Severe: 70 - 99% PSV >230 cm/sec Critical: 80 - 99% PSV >230cm/sec and/or End Diastolic Velocities >278US/ZFY. Conclusions   Summary   Mild < 50% stenosis of the internal carotid arteries bilaterally. Patent vertebral arteries bilaterally with antegrade flow.    Signature   ----------------------------------------------------------------  Electronically signed by Isra Ron RVT, RDMS(Sonographer) on 03/23/2022 05:14 PM  ----------------------------------------------------------------   ----------------------------------------------------------------  Electronically signed by Roddy Na Reyes,Arthur(Interpreting  physician) on 03/23/2022 08:20 PM  ----------------------------------------------------------------  Findings:   Right Impression:         Left Impression:  The common carotid artery The common carotid artery is normal.  is normal. The carotid bulb has a smooth heterogeneous  The carotid bulb is       plaque causing a <50% stenosis. normal.                            The internal carotid artery has a smooth  The internal carotid      heterogeneous plaque causing a <50% stenosis  artery is normal.         based on velocities. The distal internal  carotid artery is         The external carotid artery is normal.  tortuous. The vertebral artery is patent with antegrade  The external carotid      flow. artery is normal.   The vertebral artery is  patent with antegrade  flow. Risk Factors   - The patient's risk factor(s) include: chronic lung disease, dyslipidemia     and treated arterial hypertension.   - Current - Every day. - The patient's last creatinine was 0.6 mg/dl. Allergies   - Allergy:Penicillin(Drug). Velocities are measured in cm/s ; Diameters are measured in cm Carotid Right Measurements +------------+-------+-------+--------+-------+------------+---------------+ ! Location    ! PSV    ! EDV    ! Angle   ! RI     !%Stenosis   ! Tortuosity     ! +------------+-------+-------+--------+-------+------------+---------------+ ! Prox CCA    !111    !16.2   ! 60      !0.85   !            !               ! +------------+-------+-------+--------+-------+------------+---------------+ ! Mid CCA     !101    !19.9   !60      !0.8    ! !               ! +------------+-------+-------+--------+-------+------------+---------------+ ! Dist CCA    !98.8   !18     !60      !0.82   !            !               ! +------------+-------+-------+--------+-------+------------+---------------+ ! Bulb        !87.6   !17.4   !60      !0.8    ! !               ! +------------+-------+-------+--------+-------+------------+---------------+ ! Prox ICA    !88.8   !19.9   !60      !0.78   !            !               ! +------------+-------+-------+--------+-------+------------+---------------+ ! Mid ICA !77.7   !26.1   ! 60      !0.66   !            !               ! +------------+-------+-------+--------+-------+------------+---------------+ ! Dist ICA    !78.9   !17.4   !60      !0.78   !            !               ! +------------+-------+-------+--------+-------+------------+---------------+ ! Prox ECA    !132    !13.3   ! 60      !0.9    ! !               ! +------------+-------+-------+--------+-------+------------+---------------+ ! Vertebral   !81.5   !25.9   !60      !0.68   !            !               ! +------------+-------+-------+--------+-------+------------+---------------+   - Additional Measurements:ICAPSV/CCAPSV 0.8. ICAEDV/CCAEDV 1.61. Carotid Left Measurements +------------+-------+-------+--------+-------+------------+---------------+ ! Location    ! PSV    ! EDV    ! Angle   ! RI     !%Stenosis   ! Tortuosity     ! +------------+-------+-------+--------+-------+------------+---------------+ ! Prox CCA    !108    !25.1   ! 60      !0.77   !            !               ! +------------+-------+-------+--------+-------+------------+---------------+ ! Mid CCA     !97.2   !30.6   ! 60      !0.69   !            !               ! +------------+-------+-------+--------+-------+------------+---------------+ ! Dist CCA    !96.4   !25.1   ! 60      !0.74   !            !               ! +------------+-------+-------+--------+-------+------------+---------------+ ! Bulb        !90.9   !27.4   !60      !0.7    ! !               ! +------------+-------+-------+--------+-------+------------+---------------+ ! Prox ICA    ! 89.4   !28.2   ! 60      !0.68   !            !               ! +------------+-------+-------+--------+-------+------------+---------------+ ! Mid ICA     ! 89.4   !29.8   !60      !0.67   !            !               ! +------------+-------+-------+--------+-------+------------+---------------+ ! Dist ICA    ! 94.9   !38.4   !60      !0.6    !             !               ! +------------+-------+-------+--------+-------+------------+---------------+ ! Prox ECA    !132    !14.1   ! 60      !0.89   !            !               ! +------------+-------+-------+--------+-------+------------+---------------+ ! Vertebral   !58     !14.1   ! 60      !0.76   !            !               ! +------------+-------+-------+--------+-------+------------+---------------+   - Additional Measurements:ICAPSV/CCAPSV 0.88. ICAEDV/CCAEDV 1.53. VL DUP UPPER EXTREMITY ARTERIES BILATERAL    Result Date: 3/23/2022    [facility]   Upper Extremities Arterial Duplex   Patient Name Dewayne Yip     Date of Study         03/23/2022               [facility]   Date of      1960  Gender                Female  Birth   Age          60 year(s)  Race                     Room Number  2014        Height:               65 inch, 165.1 cm   Corporate ID [id number]    Weight:               178 pounds, 80.7 kg  #   Patient Regina Kerns [de-identified]   BSA:       1.88 m^2   BMI:         29.62 kg/m^2  #   MR #         [id number]     Sonographer           Jaquan Hensley RVT, [facility]   Accession #  [id number]  Interpreting          [address] [facility]                           Physician   Referring                Referring Physician   William Schulte. TIFFANI Adler  Nurse  Practitioner  Procedure Type of Study:   Extremities Arteries: Upper Extremities Arterial Duplex, Arterial Scan  Upper Bilateral.  Indications for Study:Pre-op OHS. Patient Status: In Patient. Conclusions   Summary   Patent radial and ulnar arteries. Arterial sizes are listed in the table  above.    Signature   ----------------------------------------------------------------  Electronically signed by Jaquan Hensley RVT, RDMS(Sonographer) on 03/23/2022 05:10 PM  ----------------------------------------------------------------   ----------------------------------------------------------------  Electronically signed by Horace Davenport Reyes,Arthur(Interpreting  physician) on 03/23/2022 08:19 PM  ----------------------------------------------------------------  Findings:   Right Impression:                    Left Impression:  Patent radial and ulnar arteries. Patent radial and ulnar arteries. Risk Factors   - The patient's risk factor(s) include: chronic lung disease, dyslipidemia     and treated arterial hypertension.   - Current - Every day. - The patient's last creatinine was 0.6 mg/dl. Allergies   - Allergy:Penicillin(Drug). Velocities are measured in cm/s ; Diameters are measured in cm Right Upper Extremities Duplex Measurements +----------------+----+-----+-------------------+---------+----------------+ ! Location        ! PSV ! Ratio! Wave Description   ! AP Diam  !Trans Diam      ! +----------------+----+-----+-------------------+---------+----------------+ ! Dist Brachial   !95.6! ! Triphasic          !         !                ! +----------------+----+-----+-------------------+---------+----------------+ ! Prox Radial     !54.5!0.57 ! Triphasic          !0.36     !0.33            ! +----------------+----+-----+-------------------+---------+----------------+ ! Mid Radial      !70.3!1.29 ! Triphasic          !0.29     !0.3             ! +----------------+----+-----+-------------------+---------+----------------+ ! Dist Radial     !60.9!0.87 ! Biphasic           !0.34     !0.37            ! +----------------+----+-----+-------------------+---------+----------------+ ! Prox Ulnar      !80.1!0.84 ! Triphasic          !0.39     !0.38            ! +----------------+----+-----+-------------------+---------+----------------+ ! Mid Ulnar       !72.2!0.9  ! Triphasic          !0.24     !0.23            ! +----------------+----+-----+-------------------+---------+----------------+ ! Dist Ulnar      !69.3!0.96 ! Biphasic           !0.27     !0.24            ! +----------------+----+-----+-------------------+---------+----------------+ Left Upper Extremities Duplex Measurements +----------------+----+-----+-------------------+---------+----------------+ ! Location        ! PSV ! Ratio! Wave Description   ! AP Diam  !Trans Diam      ! +----------------+----+-----+-------------------+---------+----------------+ ! Dist Brachial   !110 ! ! Triphasic          !         !                ! +----------------+----+-----+-------------------+---------+----------------+ ! Prox Radial     !77.7!0.71 ! Triphasic          !0.3      !0.31            ! +----------------+----+-----+-------------------+---------+----------------+ ! Mid Radial      !80.8!1.04 ! Biphasic           !0.25     !0.26            ! +----------------+----+-----+-------------------+---------+----------------+ ! Dist Radial     !76.4!0.95 ! Triphasic          !0.25     !0.29            ! +----------------+----+-----+-------------------+---------+----------------+ ! Prox Ulnar      ! 117 ! 1.06 ! Triphasic          !0.35     !0.35            ! +----------------+----+-----+-------------------+---------+----------------+ ! Mid Ulnar       !78.4!0.67 ! Biphasic           !0.26     !0.26            ! +----------------+----+-----+-------------------+---------+----------------+ ! Dist Ulnar      !79. 2!1.01 ! Biphasic           !0.22     !0.22            !  +----------------+----+-----+-------------------+---------+----------------+    VL VEIN MAPPING LOWER BILATERAL    Result Date: 3/23/2022    [facility]   Lower Extremities Vein Mapping   Patient Name Anali Bourgeois     Date of Study         03/23/2022               [facility]   Date of      1960  Gender                Female  Birth   Age          60 year(s)  Race                     Room Number  2014        Height:               65 inch, 165.1 cm   Corporate ID [id number]    Weight:               178 pounds, 80.7 kg  #   Patient Chilo [de-identified]   BSA:       1.88 m^2   BMI:         29.62 kg/m^2  #   MR #         [id number]     Sonographer           Geneva Abarca, ANDRES, RDMS   Accession # [id number]  Interpreting          [address] [place] [address]   Referring                Referring Physician   Sincere Baker. LAKSHMI Minor-NP  Nurse  Practitioner  Procedure Type of Study:   Veins: Lower Extremity Vein Mapping. Indications for Study:Pre-op OHS. Patient Status: In Patient. Conclusions   Summary   No DVT in the bilateral femoral veins. Vein sizes are listed in the table above. Signature   ----------------------------------------------------------------  Electronically signed by Heidi May RVT, RDMS(Sonographer) on 03/23/2022 05:03 PM  ----------------------------------------------------------------   ----------------------------------------------------------------  Electronically signed by Harvy Manners Reyes,Arthur(Interpreting  physician) on 03/23/2022 08:16 PM  ----------------------------------------------------------------  Findings:   Right Impression:                    Left Impression:  Common femoral and femoral veins are Common femoral and femoral veins are  compressible. compressible. Distal great saphenous vein is very  Distal great saphenous vein is very  superficial making it difficult to   superficial making it difficult to  uncompress. uncompress. Risk Factors   - The patient's risk factor(s) include: chronic lung disease, dyslipidemia     and treated arterial hypertension.   - Current - Every day. - The patient's last creatinine was 0.6 mg/dl. Allergies   - Allergy:Penicillin(Drug). Velocities are measured in cm/s ; Diameters are measured in cm LE Vein Mapping +--------------------------------------++--------+-----+----+--------+-----+ ! Superficial - Great Saphenous Vein    ! ! Right   ! ! Left!        !     ! +--------------------------------------++--------+-----+----+--------+-----+ ! Location                              ! !Diameter! Depth! !Diameter! Depth! +--------------------------------------++--------+-----+----+--------+-----+ ! Sapheno Femoral Junction              ! !0.44    !     !    !0.68    !     ! +--------------------------------------++--------+-----+----+--------+-----+ ! GSV Mid Thigh                         !!0.32    !     !    !0.26    !     ! +--------------------------------------++--------+-----+----+--------+-----+ ! GSV Knee                              !!0.23    !     !    !0.25    !     ! +--------------------------------------++--------+-----+----+--------+-----+ ! GSV High Calf                         !!0.19    !     !    !0.24    !     ! +--------------------------------------++--------+-----+----+--------+-----+ ! GSV Low Calf                          !!0.18    !     !    !0.24    !     ! +--------------------------------------++--------+-----+----+--------+-----+ ! GSV Ankle                             !!0.18    !     !    !0.19    !     ! +--------------------------------------++--------+-----+----+--------+-----+          Physical Exam:    General appearance - NAD, AOx 3   Lungs -CTAB, no R/R/R  Heart - RRR, no M/R/G  Abdomen - Soft, NT/ND  Neurological:  MAEx4, No focal motor deficit, sensory loss  Extremities - Cap refil <2 sec in all ext., no edema  Skin -warm, dry      Hospital Course:  Clinical course has improved, labs and imaging reviewed. Candida Huang originally presented to the hospital on 4/15/2022 10:23 PM. with chest pain, shortness of breath and left lower leg swelling. At that time it was determined that She required further observation and monitoring for fluid overload, cardiology evaluation, intravenous diuresis, ECHO. She was admitted and labs and imaging were followed daily. Imaging results as above. She is medically stable to be discharged.        Disposition: Home    Patient stated that they will not drive themselves home from the hospital if they have gotten pain killers/ narcotics earlier that day and that they will arrange for transportation on their own or work with the  for a ride. Patient counseled NOT to drive while under the influence of narcotics/ pain killers. Condition: Good    Patient stable and ready for discharge home. I have discussed plan of care with patient and they are in understanding. They were instructed to read discharge paperwork. All of their questions and concerns were addressed. Time Spent: 1 day      --  Christine Becker MD  Emergency Medicine Resident Physician    This dictation was generated by voice recognition computer software. Although all attempts are made to edit the dictation for accuracy, there may be errors in the transcription that are not intended.

## 2022-04-18 NOTE — PROGRESS NOTES
OBS/CDU   RESIDENT NOTE      Patients PCP is:  Isaiah Veronica, APRN - [facility]        SUBJECTIVE      No acute events overnight. Patient states she is much improved this morning, leg swelling has improved and is at her baseline. Denies any pain in her calves, no redness or induration. She denies any chest pain or shortness of breath. No dizziness or lightheadedness. She is able to tolerate a diet, urinating adequately. No signs clinically of fluid overload. PHYSICAL EXAM      General: NAD, AO X 3  Heent: EMOI, PERRL  Neck: SUPPLE, NO JVD  Cardiovascular: RRR, S1S2  Pulmonary: CTAB, NO SOB  Abdomen: SOFT, NTTP, ND, +BS  Extremities: +2/4 PULSES DISTAL, NO SWELLING, no swelling erythema or induration to the left calf site of the venous graft, no pain on palpation  Neuro / Psych: NO NUMBNESS OR TINGLING, MENTATION AT BASELINE    PERTINENT TEST /EXAMS      I have reviewed all available laboratory results.     MEDICATIONS CURRENT   melatonin tablet 6 mg, Nightly PRN  [START ON 4/19/2022] furosemide (LASIX) tablet 40 mg, Daily  potassium chloride (KLOR-CON M) extended release tablet 20 mEq, BID WC  butalbital-acetaminophen-caffeine (FIORICET, ESGIC) per tablet 1 tablet, Q4H PRN  insulin lispro (HUMALOG) injection vial 0-6 Units, TID WC  insulin lispro (HUMALOG) injection vial 0-3 Units, Nightly  amLODIPine (NORVASC) tablet 10 mg, Daily  aspirin EC tablet 81 mg, Daily  atorvastatin (LIPITOR) tablet 20 mg, Nightly  clopidogrel (PLAVIX) tablet 75 mg, Daily  insulin glargine (LANTUS) injection vial 35 Units, Nightly  metoprolol tartrate (LOPRESSOR) tablet 25 mg, BID  losartan (COZAAR) tablet 25 mg, Daily  ondansetron (ZOFRAN-ODT) disintegrating tablet 4 mg, Q8H PRN  pantoprazole (PROTONIX) tablet 40 mg, Daily  amiodarone (CORDARONE) tablet 200 mg, BID  sodium chloride flush 0.9 % injection 5-40 mL, 2 times per day  sodium chloride flush 0.9 % injection 5-40 mL, PRN  0.9 % sodium chloride infusion, PRN  enoxaparin (LOVENOX) injection 40 mg, Daily  glucose (GLUTOSE) 40 % oral gel 15 g, PRN  dextrose 50 % IV solution, PRN  glucagon (rDNA) injection 1 mg, PRN  dextrose 5 % solution, PRN  acetaminophen (TYLENOL) tablet 1,000 mg, Q8H        All medication charted and reviewed. CONSULTS      IP CONSULT TO CARDIOTHORACIC SURGERY  IP CONSULT TO HOSPITALIST  IP CONSULT TO CARDIOLOGY    ASSESSMENT/PLAN       Fay Banks is a 60 y.o. female who presents with persistent chest pain and shortness of breath and left lower leg extremity swelling status post CABG    · Cardiology following  · Echo results show EF of 50% with normal systolic function  · Lasix IV switched to p.o.  · Continue amiodarone, Norvasc, statin, Cozaar, Lopressor  · Okay for discharge  · Continue home medications and pain control  · Monitor vitals, labs, and imaging  · DISPO: pending consults and clinical improvement    --  Kasia Mccarthy MD  Emergency Medicine Resident Physician     This dictation was generated by voice recognition computer software. Although all attempts are made to edit the dictation for accuracy, there may be errors in the transcription that are not intended.

## 2022-04-18 NOTE — CARE COORDINATION
04/18/22 1543   Services After Discharge   [facility] At[facility]After Discharge [address] After Discharge   [address] [place]   ([address])

## 2022-04-18 NOTE — DISCHARGE INSTR - COC
Continuity of Care Form    Patient Name: Misti Lombardo   :  1960  MRN:  [id number]    Admit date:  4/15/2022  Discharge date:  2022    Code Status Order: Full Code   Advance Directives:      Admitting Physician:  Anna Saunders MD  PCP: Cooper Chung APRN - CNP    Discharging Nurse: [facility] Unit/Room#: 8834/8559-45  Discharging Unit Phone Number:     Emergency Contact:   Extended Emergency Contact Information  Primary Emergency Contact: [address] Phone: [phone]  Relation: Child    Past Surgical History:  Past Surgical History:   Procedure Laterality Date    APPENDECTOMY      BACK SURGERY      lumbar surgery  pt does not recall    CARDIAC CATHETERIZATION  2022    CARPAL TUNNEL RELEASE Right     twice     CORONARY ARTERY BYPASS GRAFT N/A 3/29/2022    CABG CORONARY ARTERY BYPASS X3 WITH GARRETT performed by Yong Naidu MD at [facility]         Immunization History: There is no immunization history on file for this patient.     Active Problems:  Patient Active Problem List   Diagnosis Code    Essential hypertension I10    Tobacco dependence F17.200    History of lumbar laminectomy Z98.890    DDD (degenerative disc disease), lumbar M51.36    Chronic hand pain M79.643, G89.29    Ganglion of joint M67.40    Arthritis of carpometacarpal (CMC) joint of both thumbs M18.0    NSTEMI (non-ST elevated myocardial infarction) ([facility] [address] I21.4    Type 2 diabetes mellitus without complication, without long-term current use of insulin (HCC) E11.9    3-vessel CAD I25.10    Hypomagnesemia E83.42    Hypotension I95.9    Chest pain R07.9    Osteoarthritis of acromioclavicular joint M19.019    Full thickness rotator cuff tear M75.120    Acute clinical systolic heart failure (HCC) I50.21    Congestive heart failure with left ventricular systolic dysfunction (HCC) I50.20       Isolation/Infection:   Isolation            No Isolation Patient Infection Status       Infection Onset Added Last Indicated Last Indicated By Review Planned Expiration Resolved Resolved By    None active    Resolved    COVID-19 (Rule Out) 03/22/22 03/22/22 03/22/22 COVID-19, Rapid (Ordered)   03/22/22 Rule-Out Test Resulted            Nurse Assessment:  Last Vital Signs: BP (!) 162/99   Pulse 81   Temp 98.2 °F (36.8 °C) (Oral)   Resp 16   Ht 5' 5\" (1.651 m)   Wt 175 lb (79.4 kg)   SpO2 93%   BMI 29.12 kg/m²     Last documented pain score (0-10 scale): Pain Level: 7  Last Weight:   Wt Readings from Last 1 Encounters:   04/15/22 175 lb (79.4 kg)     Mental Status:  oriented, alert, and able to concentrate and follow conversation    IV Access:  - None    Nursing Mobility/ADLs:  Walking   Independent  Transfer  Independent  Bathing  Independent  Dressing  Independent  [address] Delivery   whole    Wound Care Documentation and Therapy:        Elimination:  Continence: Bowel: Yes  Bladder: Yes  Urinary Catheter: None   Colostomy/Ileostomy/Ileal Conduit: No       Date of Last BM: ***    Intake/Output Summary (Last 24 hours) at 4/18/2022 1143  Last data filed at 4/18/2022 0840  Gross per 24 hour   Intake 1080 ml   Output 850 ml   Net 230 ml     I/O last 3 completed shifts: In: 5 [P.O.:720]  Out: 450 [Urine:450]    Safety Concerns: At Risk for Falls    Impairments/Disabilities:      None    Nutrition Therapy:  Current Nutrition Therapy:   - Oral Diet:  General and Cardiac    Routes of Feeding: Oral  Liquids: Thin Liquids  Daily Fluid Restriction: yes - amount 1200  Last Modified Barium Swallow with Video (Video Swallowing Test): not done    Treatments at the Time of Hospital Discharge:   Respiratory Treatments: n/a  Oxygen Therapy:  is not on home oxygen therapy.   Ventilator:    - No ventilator support    Rehab Therapies: PT/OT per pt  Weight Bearing Status/Restrictions: No weight bearing restrictions  Other Medical Equipment (for information only, NOT a DME order):  n/a  Other Treatments: skilled nursing    Patient's personal belongings (please select all that are sent with patient):  Glasses    RN SIGNATURE:  Electronically signed by Beverly Pineda RN on 4/18/22 at 11:56 AM EDT    CASE MANAGEMENT/SOCIAL WORK SECTION    Inpatient Status Date: 4-    Readmission Risk Assessment Score:  Readmission Risk              Risk of Unplanned Readmission:  19           Discharging to Facility/ Agency   Name: [facility]  Address:  Phone:  Fax:    Dialysis Facility (if applicable)   Name:  Address:  Dialysis Schedule:  Phone:  Fax:    / signature: Electronically signed by Vivek Abrams RN on 4/18/22 at 12:37 PM EDT    PHYSICIAN SECTION    Prognosis: Good    Condition at Discharge: Stable    Rehab Potential (if transferring to Rehab): Good    Recommended Labs or Other Treatments After Discharge:      Physician Certification: I certify the above information and transfer of Misti Lombardo  is necessary for the continuing treatment of the diagnosis listed and that she requires Home Care for greater 30 days.      Update Admission H&P: No change in H&P    PHYSICIAN SIGNATURE:  Electronically signed by Anna Saunders MD on 4/18/22 at 12:50 PM EDT

## 2022-04-20 ENCOUNTER — OFFICE VISIT (OUTPATIENT)
Dept: CARDIOTHORACIC SURGERY | Age: 62
End: 2022-04-20

## 2022-04-20 VITALS
BODY MASS INDEX: 28.66 KG/M2 | DIASTOLIC BLOOD PRESSURE: 84 MMHG | HEIGHT: 65 IN | WEIGHT: 172 LBS | HEART RATE: 82 BPM | SYSTOLIC BLOOD PRESSURE: 143 MMHG | OXYGEN SATURATION: 95 % | RESPIRATION RATE: 17 BRPM | TEMPERATURE: 97.3 F

## 2022-04-20 DIAGNOSIS — I10 ESSENTIAL HYPERTENSION: Primary | ICD-10-CM

## 2022-04-20 PROCEDURE — 99024 POSTOP FOLLOW-UP VISIT: CPT | Performed by: NURSE PRACTITIONER

## 2022-04-20 RX ORDER — AMLODIPINE BESYLATE 10 MG/1
TABLET ORAL
Qty: 30 TABLET | Refills: 0 | Status: SHIPPED | OUTPATIENT
Start: 2022-04-20

## 2022-04-20 RX ORDER — POTASSIUM CHLORIDE 1500 MG/1
TABLET, FILM COATED, EXTENDED RELEASE ORAL
COMMUNITY
Start: 2022-04-18

## 2022-04-20 NOTE — PROGRESS NOTES
[facility]  Office Visit      Subjective:  Ms. Ar Ya is a 60 y.o. female s/p CABG surgery with Dr. Daniella Childs. Patient has no chest pain or shortness of breath. Patient had to be readmitted to the hospital due to fluid overload. Patient states he called her a congestive heart failure patient. Her previous echocardiogram shows no signs of heart failure. Patient does have a history of hyponatremia. Currently resting in bed patient has no chest pain or shortness of breath. Sternal incision is clean dry intact no sign discharge or bleeding. Patient is returned to many of her activities. Physical Exam  Vitals:  Vitals:    04/20/22 0917   BP: (!) 143/84   Pulse:    Resp:    Temp:    SpO2:        General: Alert and Oriented x3. Ambulatory. No apparent distress. Chest:  No abnormality. Equal and symmetric expansion with respiration. Lungs:  Clear to auscultation. Cardiac:  Regular rate and rhythm without murmurs, rubs or gallops. Abdomen:  Soft, non-tender, normoactive bowel sounds. Extremities:  No edema. Intact pulses in all four extremities. Psychiatric: Mood and affect are appropriate.   Sternal incision is clean dry intact no sign discharge or bleeding    Current Medications:    Current Outpatient Medications:     potassium chloride (KLOR-CON M) 20 MEQ TBCR extended release tablet, take 1 tablet by mouth once daily, Disp: , Rfl:     furosemide (LASIX) 40 MG tablet, Take 1 tablet by mouth daily, Disp: 60 tablet, Rfl: 3    potassium chloride (KLOR-CON M) 20 MEQ extended release tablet, Take 1 tablet by mouth daily, Disp: 60 tablet, Rfl: 3    amiodarone (CORDARONE) 200 MG tablet, Take 1 tablet by mouth 2 times daily, Disp: 15 tablet, Rfl: 0    losartan (COZAAR) 25 MG tablet, Take 1 tablet by mouth daily, Disp: 7 tablet, Rfl: 0    metoprolol tartrate (LOPRESSOR) 25 MG tablet, Take 1 tablet by mouth 2 times daily, Disp: 14 tablet, Rfl: 0    oxyCODONE-acetaminophen (PERCOCET) 5-325 MG per tablet, Take 1 tablet by mouth every 8 hours as needed for Pain for up to 7 days. , Disp: 21 tablet, Rfl: 0    amLODIPine (NORVASC) 10 MG tablet, amlodipine 10 mg tablet, Disp: , Rfl:     aspirin 81 MG EC tablet, Take 1 tablet by mouth daily, Disp: 30 tablet, Rfl: 3    atorvastatin (LIPITOR) 20 MG tablet, Take 1 tablet by mouth nightly, Disp: 30 tablet, Rfl: 3    clopidogrel (PLAVIX) 75 MG tablet, Take 1 tablet by mouth daily, Disp: 30 tablet, Rfl: 3    docusate sodium (COLACE) 100 MG capsule, Take 1 capsule by mouth 2 times daily, Disp: 30 capsule, Rfl: 0    ondansetron (ZOFRAN-ODT) 4 MG disintegrating tablet, Take 1 tablet by mouth every 8 hours as needed for Nausea or Vomiting, Disp: 30 tablet, Rfl: 0    pantoprazole (PROTONIX) 40 MG tablet, Take 1 tablet by mouth daily, Disp: 30 tablet, Rfl: 3    Insulin Pen Needle 29G X 12MM MISC, 1 each by Does not apply route daily, Disp: 100 each, Rfl: 3    insulin glargine (LANTUS SOLOSTAR) 100 UNIT/ML injection pen, Inject 35 Units into the skin nightly, Disp: 5 pen, Rfl: 2    insulin lispro, 1 Unit Dial, (HUMALOG KWIKPEN) 100 UNIT/ML SOPN, Inject 1-3 Units into the skin 3 times daily (before meals), Disp: 1 pen, Rfl: 0    Alcohol Swabs 70 % PADS, 1 box by Does not apply route daily, Disp: 100 each, Rfl: 1    Lancets MISC, 1 each by Does not apply route 3 times daily, Disp: 100 each, Rfl: 0    glucose monitoring (FREESTYLE FREEDOM) kit, 1 kit by Does not apply route daily as needed (Check blood glucose three times daily in morning and at night before dinner), Disp: 1 kit, Rfl: 0    Past Surgical History:   Procedure Laterality Date    APPENDECTOMY      BACK SURGERY      lumbar surgery 1995 pt does not recall    CARDIAC CATHETERIZATION  03/22/2022    CARPAL TUNNEL RELEASE Right     twice     CORONARY ARTERY BYPASS GRAFT N/A 3/29/2022    CABG CORONARY ARTERY BYPASS X3 WITH GARRETT performed by Gustavo Pandey MD at [address] [place] [address] LIGATION         Social Hx: reports that she quit smoking about 5 months ago. Her smoking use included cigarettes. She has a 20.00 pack-year smoking history. She has never used smokeless tobacco.    Assessment & Plan:   Patient will follow up with her primary care physician to monitor her BMP and hyponatremia. Discussed with patient about fluid restriction to no more than 6 cups of volume a day. Explained to patient she will not be on lifelong Lasix. Follow-up with cardiothoracic as needed.   Follow-up with cardiology on scheduled date      [address] Theresa Lee, APRN - NP,APRN CNP

## 2022-04-28 RX ORDER — LOSARTAN POTASSIUM 25 MG/1
TABLET ORAL
Qty: 7 TABLET | Refills: 0 | OUTPATIENT
Start: 2022-04-28

## 2022-04-29 DIAGNOSIS — E11.9 TYPE 2 DIABETES MELLITUS WITHOUT COMPLICATION, WITHOUT LONG-TERM CURRENT USE OF INSULIN (HCC): Primary | ICD-10-CM

## 2022-04-29 RX ORDER — INSULIN LISPRO 100 [IU]/ML
1-3 INJECTION, SOLUTION INTRAVENOUS; SUBCUTANEOUS
Qty: 1 PEN | Refills: 5 | Status: SHIPPED | OUTPATIENT
Start: 2022-04-29 | End: 2022-05-29

## 2022-04-29 NOTE — TELEPHONE ENCOUNTER
Pricila Mota is calling to request a refill on the following medication(s):    Last Visit Date (If Applicable):  7/64/9718    Next Visit Date:    5/23/2022    Medication Request:  Requested Prescriptions     Pending Prescriptions Disp Refills    insulin lispro, 1 Unit Dial, (HUMALOG KWIKPEN) 100 UNIT/ML SOPN 1 pen 0     Sig: Inject 1-3 Units into the skin 3 times daily (before meals)

## 2022-05-17 ENCOUNTER — PATIENT MESSAGE (OUTPATIENT)
Dept: FAMILY MEDICINE CLINIC | Age: 62
End: 2022-05-17

## 2022-05-17 DIAGNOSIS — I10 ESSENTIAL HYPERTENSION: ICD-10-CM

## 2022-05-18 RX ORDER — AMIODARONE HYDROCHLORIDE 200 MG/1
TABLET ORAL
Qty: 30 TABLET | OUTPATIENT
Start: 2022-05-18

## 2022-05-18 RX ORDER — AMLODIPINE BESYLATE 10 MG/1
TABLET ORAL
Qty: 30 TABLET | Refills: 0 | OUTPATIENT
Start: 2022-05-18

## 2022-05-18 NOTE — TELEPHONE ENCOUNTER
Corbin Deleon is calling to request a refill on the following medication(s):    Last Visit Date (If Applicable):      Next Visit Date:    2022    Medication Request:  Requested Prescriptions     Pending Prescriptions Disp Refills    Insulin Pen Needle 29G X 12MM MISC 100 each 3     Si each by Does not apply route daily

## 2022-05-18 NOTE — TELEPHONE ENCOUNTER
From: Edith Peabody  To: Maye Valverde  Sent: 5/17/2022 8:47 PM EDT  Subject: Insulin pen needles    I am in need of pen needles as I was having problems with my insurance. Please put an order in as soon as possible.  Thank you

## 2022-05-23 ENCOUNTER — OFFICE VISIT (OUTPATIENT)
Dept: FAMILY MEDICINE CLINIC | Age: 62
End: 2022-05-23
Payer: MEDICAID

## 2022-05-23 VITALS
BODY MASS INDEX: 28.56 KG/M2 | SYSTOLIC BLOOD PRESSURE: 138 MMHG | HEIGHT: 65 IN | OXYGEN SATURATION: 95 % | HEART RATE: 81 BPM | DIASTOLIC BLOOD PRESSURE: 88 MMHG | WEIGHT: 171.4 LBS | TEMPERATURE: 97.8 F | RESPIRATION RATE: 17 BRPM

## 2022-05-23 DIAGNOSIS — R82.998 LEUKOCYTES IN URINE: ICD-10-CM

## 2022-05-23 DIAGNOSIS — R82.90 ABNORMAL URINE ODOR: ICD-10-CM

## 2022-05-23 DIAGNOSIS — E11.65 UNCONTROLLED TYPE 2 DIABETES MELLITUS WITH HYPERGLYCEMIA (HCC): Primary | ICD-10-CM

## 2022-05-23 DIAGNOSIS — Z95.1 HX OF CABG: ICD-10-CM

## 2022-05-23 DIAGNOSIS — I21.4 NSTEMI (NON-ST ELEVATED MYOCARDIAL INFARCTION) (HCC): ICD-10-CM

## 2022-05-23 DIAGNOSIS — I10 ESSENTIAL HYPERTENSION: ICD-10-CM

## 2022-05-23 LAB
BILIRUBIN, POC: NEGATIVE
BLOOD URINE, POC: NEGATIVE
CLARITY, POC: CLEAR
COLOR, POC: YELLOW
GLUCOSE URINE, POC: NEGATIVE
KETONES, POC: NORMAL
LEUKOCYTE EST, POC: NORMAL
NITRITE, POC: POSITIVE
PH, POC: 5.5
PROTEIN, POC: NEGATIVE
SPECIFIC GRAVITY, POC: 1.02
UROBILINOGEN, POC: NORMAL

## 2022-05-23 PROCEDURE — 99214 OFFICE O/P EST MOD 30 MIN: CPT | Performed by: NURSE PRACTITIONER

## 2022-05-23 PROCEDURE — 81003 URINALYSIS AUTO W/O SCOPE: CPT | Performed by: NURSE PRACTITIONER

## 2022-05-23 PROCEDURE — 3046F HEMOGLOBIN A1C LEVEL >9.0%: CPT | Performed by: NURSE PRACTITIONER

## 2022-05-23 RX ORDER — SEMAGLUTIDE 1.34 MG/ML
INJECTION, SOLUTION SUBCUTANEOUS
Qty: 1.5 ML | Refills: 2 | Status: SHIPPED | OUTPATIENT
Start: 2022-05-23 | End: 2022-07-22

## 2022-05-23 RX ORDER — BLOOD-GLUCOSE,RECEIVER,CONT
1 EACH MISCELLANEOUS DAILY
Qty: 1 EACH | Refills: 0 | Status: SHIPPED | OUTPATIENT
Start: 2022-05-23

## 2022-05-23 RX ORDER — BLOOD-GLUCOSE TRANSMITTER
1 EACH MISCELLANEOUS DAILY
Qty: 1 EACH | Refills: 0 | Status: SHIPPED | OUTPATIENT
Start: 2022-05-23

## 2022-05-23 RX ORDER — BLOOD-GLUCOSE SENSOR
1 EACH MISCELLANEOUS WEEKLY
Qty: 3 EACH | Refills: 11 | Status: SHIPPED | OUTPATIENT
Start: 2022-05-23

## 2022-05-23 RX ORDER — NITROGLYCERIN 0.4 MG/1
TABLET SUBLINGUAL
COMMUNITY
Start: 2022-05-17

## 2022-05-23 SDOH — ECONOMIC STABILITY: FOOD INSECURITY: WITHIN THE PAST 12 MONTHS, THE FOOD YOU BOUGHT JUST DIDN'T LAST AND YOU DIDN'T HAVE MONEY TO GET MORE.: NEVER TRUE

## 2022-05-23 SDOH — ECONOMIC STABILITY: FOOD INSECURITY: WITHIN THE PAST 12 MONTHS, YOU WORRIED THAT YOUR FOOD WOULD RUN OUT BEFORE YOU GOT MONEY TO BUY MORE.: NEVER TRUE

## 2022-05-23 ASSESSMENT — PATIENT HEALTH QUESTIONNAIRE - PHQ9
SUM OF ALL RESPONSES TO PHQ QUESTIONS 1-9: 0
SUM OF ALL RESPONSES TO PHQ QUESTIONS 1-9: 0
2. FEELING DOWN, DEPRESSED OR HOPELESS: 0
1. LITTLE INTEREST OR PLEASURE IN DOING THINGS: 0
SUM OF ALL RESPONSES TO PHQ QUESTIONS 1-9: 0
SUM OF ALL RESPONSES TO PHQ9 QUESTIONS 1 & 2: 0
SUM OF ALL RESPONSES TO PHQ QUESTIONS 1-9: 0

## 2022-05-23 ASSESSMENT — ENCOUNTER SYMPTOMS
NAUSEA: 0
CONSTIPATION: 0
VOMITING: 0
ABDOMINAL PAIN: 0
DIARRHEA: 0
EYES NEGATIVE: 1
COUGH: 0
BLURRED VISION: 0

## 2022-05-23 ASSESSMENT — SOCIAL DETERMINANTS OF HEALTH (SDOH): HOW HARD IS IT FOR YOU TO PAY FOR THE VERY BASICS LIKE FOOD, HOUSING, MEDICAL CARE, AND HEATING?: NOT HARD AT ALL

## 2022-05-23 NOTE — PATIENT INSTRUCTIONS
[address], [address] [facility]  [phone]  Call for diabetic eye exam      [facility] PHONE 3651 [facility], 565 [facility], [address]  [phone] ext: 65  [facility]. arcohio. org   Epiphany of the [employer]olidated Alfredo, Gas Service, [address]  [phone]  Service-Intake www.epiphanyoftheTeton Valley Hospitald. [address] [facility] [phone]  [address] [facility] [phone]  Service-Intake http://co.hector. oh./   Mustard The Verdiem, Gas Service, [address]  [phone]  Service-Intake - and Fax [place] Lights. CyberHeart   National Multiple Sclerosis Society [place] Products, Electric, Gas and [address] [phone] ext: 1  Toll Free [facility].org/Chapters/OHA   Ovarian Cancer Connection Electric, Gas Service, [address]  [phone]  [address] www. ovarianconnection. org   Pathway Heating Fuel, Electric, Gas and [address] [phone] ext: [place] www. pathwaytoledo. [address] [place] [address] Heating Fuel, Electric, Gas and [address] [phone]  Service-Intake www.ninfaNemours Children's Hospital, Delawarearmynwohio. [address] [place] [address], [address]  [phone]  [facility] CurrencyBird   2900 [facility] Social Concerns Electric, Gas Service, [address]  [phone]  [address], [address] [place] [address]  [phone]  Administrative www.saint-vaibhav. org     Updated 1/30/20  FOOD RESOURCES      RESOURCE SERVICE PHONE WEB   Outreach of Enrike Johnsonsultanajosh [address] [phone] Administrative www.iccatdarby. org   Sahil Conseco [phone]  [address] - and Fax N/A   James Armendariz [facility] [phone] [address] Lady Ciara [place] [phone] [address] / Pablo Coffee [phone]  [address] Assistance Programs [phone]  Nisa Prude. [address] Feed Your Neighbor [phone]  [address] [phone]  [address] [facility] [address] [phone] [address] [phone]  Service-Intake www.ninfaationcarolineohvaibhav. 5353 [facility] Emergency Food Pantry [phone]  Service-Intake www.[facility]. [facility] Avondalemarco [address] [phone]  Service-Intake www.Eleanor Slater HospitalmunMercy Memorial Hospitalcenter. [address] [place] [address] [phone]  Administrative www. [facility]. [address] Bank Back Pack Program [phone]  Tariq Alea. [address] Meals Program [phone] 450 [facility] for men www.[facility]. [facility] for Social and 351 [facility] [phone] www. mariaelenao. [address] [place] [address] [phone]  [address]. com    Worship Women [employer] Emergency Assistance [phone]  [address] [phone] 3950 [facility] [phone]  Service-Intake www.Sporthold    [employer] Food Pantry / Meals [phone] 112 [facility] [phone]  300 [facility] Pantry / Meals [phone]  [address]  www. abi. [facility] 50 Pantry / [employer] [phone]  Administrative www. latasha. [facility] [phone]  [address] of [address] [facility] / [place] Neighbor [phone]  1840 [facility] Pantry and Grooming Supplies [phone]  [address] http://[email or web address]/    [address] Olive[address] [phone]  1300 [facility] PASSAVANT-CRANBERRY-ER Positive Living Program [phone] ext: 87  [address] [place] [address] www. BigBad. KelDoc    Food for [address] 01-13-72-77 www. feedtoledo. Rom[address] 50 Pantry [phone]  [address] [facility] [address] Assembly of [phone] http://www. restorethevision. Digicompanion    777 [facility] [phone] 227 [facility] Family Pantry and [place] [phone] PristonesingalesAlixaRx.org    201 [facility] [phone]  Ul. [facility] www. [email or web address].SUV    The Institute of Living [phone]  Administrative factoledo. [facility] Suzi Yee,15Th Floor [phone]  Toll Free www. Carbon Credits International    Body of 3 [facility] for [facility] Frank [phone] 6700 FormaFinaSyring[facility] [phone] www. Sitari Pharmaceuticals     Living 546 [facility] [phone]  [address] Morning Blessings [phone] [address] [facility] [address] of 1333 [facility] Feed Your Neighbor [phone] [address] for the Poor Food Pantry [phone]  856 [facility]. org/   [facility] [place] 30 Pantry  [phone] [address]leigh [address] 13 Emergency Food and Hygiene Pantry [phone]  Administrative www. Gl[facility]Green Cross Hospital. [address] [phone]  2000 [facility] [phone]  [address] tv    Helping Hands of [facility] [address] / Chayito Macedo / Leesa Duque [phone]  607 [facility]. [address] [facility] [address] Pantry [facility] [phone]  [place] [address] [phone]  [address] [place] [address] [address] [facility]  [phone]  Manalto.pt    125 [facility] Pantry [phone] [email or web address]"Hipcricket, Inc.".org/   400 [facility] Pantry  [phone] N/A   Food for [address] [place] [address] Pantry [phone] N/A     Updated 1/30/20    Patient Education        Diabetes Blood Sugar Emergencies: Your Action Plan  How can you prevent a blood sugar emergency? An important part of living with diabetes is keeping your blood sugar in your target range. You'll need to know what to do if it's too high or too low. Managing your blood sugar levels helps you avoid emergencies. This care sheet will teach you about the signs of high and low blood sugar. It will help youmake an action plan with your doctor for when these signs occur.   Low blood sugar is more likely to happen if you take certain medicines for diabetes. It canalso happen if you skip a meal, drink alcohol, or exercise more than usual.  You may get high blood sugar if you eat differently than you normally do. One example is eating more carbohydrate than usual. Having a cold, the flu, or other sudden illness can also cause high blood sugar levels. Levels can also rise if you miss a dose ofmedicine. Any change in how you take your medicine may affect your blood sugar level. Soit's important to work with your doctor before you make any changes. Track your blood sugar  Work with your doctor to fill in the blank spaces below that apply to you. Track your levels, know your target range, and write down ways you can get your blood sugar back in your target range. A log book can help you track yourlevels. Take the book to all of your medical appointments.  Check your blood sugar _____ times a day, at these times:________________________________________________. (For example: Before meals, at bedtime, before exercise, during exercise, other.)   Your blood sugar target range before a meal is ___________________. Your blood sugar target range after a meal is _______________________.  Do this--___________________________________________________--to get your blood sugar back within your safe range if your blood sugar results are _________________________________________. (For example: Less than 70 or above 250 mg/dL.)  Call your doctor when your blood sugar results are ___________________________________. (For example: Less than 70 or above 250 mg/dL.)  What are the symptoms of low and high blood sugar? Common symptoms of low blood sugar are sweating and feeling shaky, weak, hungry, or confused. Symptoms can startquickly. Common symptoms of high blood sugar are feeling very thirsty or very hungry. You may also pass urine more oftenthan usual. You may have blurry vision and may lose weight without trying.   But some people may have high or low blood sugar without having any symptoms. That's a good reason to check your blood sugar on a regular schedule. What should you do if you have symptoms? Work with your doctor to fill in the blank spaces below that apply to you. Low blood sugar and \"the rule of 15\"  If you have symptoms of low blood sugar, check your blood sugar. If it's below _____ ( for example, below 70), eat or drink a quick-sugar food that has about 15 grams of carbohydrate. Your goal is to get your level back to your safe range. Check your blood sugar again 15 minutes later. If it's still not in your target range, take another 15 grams of carbohydrate and check your blood sugar again in 15 minutes. Repeatthis until you reach your target. Then go back to your regular testing schedule. Children usually need less than 15 grams of carbohydrate. Check with yourdoctor or diabetes educator for the amount that is right for your child. When you have low blood sugar, it's best to stop or reduce any physical activity until your blood sugar is back in your target range and is stable. If you must stay active, eat or drink 30 grams of carbohydrate. Then check your blood sugar again in 15 minutes. If it's not in your target range, take another 30 grams of carbohydrates. Check your blood sugar again in 15 minutes. Keep doing this until you reach your target. You can then go back to your regulartesting schedule. If your symptoms or blood sugar levels are getting worse or have not improved after 15 minutes, seek medical care right away. If you take insulin, always carry a glucagon emergency kit. Be sure your family, friends, and coworkers know how to give glucagon.    Here are some examples of quick-sugar foods with 15 grams of carbohydrate:   3 or 4 glucose tablets   1 tablespoon (3 teaspoons) table sugar   ½ cup to ¾ cup (4 to 6 ounces) of fruit juice or regular (not diet) soda   Hard candy (such as 6 Life Savers)  High blood sugar  If you have symptoms of high blood sugar, check your blood sugar. Your goal is to get your level back to your target range. If it's above ______ ( for example, above 250), follow these steps:   If you missed a dose of your diabetes medicine, take it now. Take only the amount of medicine that you have been prescribed. Do not take more or less medicine.  Give yourself insulin if your doctor has prescribed it for high blood sugar.  Test for ketones, if the doctor told you to do so. If the results of the ketone test show a moderate-to-large amount of ketones, call the doctor for advice.  Wait 30 minutes after you take the extra insulin or the missed medicine. Check your blood sugar again. If your symptoms or blood sugar levels are getting worse or have not improved after taking these steps, seek medical care right away. Follow-up care is a key part of your treatment and safety. Be sure to make and go to all appointments, and call your doctor if you are having problems. It's also a good idea to know your test results and keep alist of the medicines you take. Where can you learn more? Go to https://Music ConnectpepicewAttentio.[email or web address]. org and sign in to your Dragon Tail account. Enter T477 in the POPRAGEOUS box to learn more about \"Diabetes Blood Sugar Emergencies: Your Action Plan. \"     If you do not have an account, please click on the \"Sign Up Now\" link. Current as of: July 28, 2021               Content Version: 13.2  © 2006-2022 Healthwise, Incorporated. Care instructions adapted under license by [facility] ([facility]). If you have questions about a medical condition or this instruction, always ask your healthcare professional. [place] [address] any warranty or liability for your use of this information.
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
## 2022-05-23 NOTE — PROGRESS NOTES
Depression screening done  Financial resource strain done  Chief Complaint   Patient presents with    Diabetes    Heart Problem     03/29/22 open heart surgery, [facility]

## 2022-05-24 ENCOUNTER — PATIENT MESSAGE (OUTPATIENT)
Dept: FAMILY MEDICINE CLINIC | Age: 62
End: 2022-05-24

## 2022-05-24 DIAGNOSIS — N30.00 ACUTE CYSTITIS WITHOUT HEMATURIA: Primary | ICD-10-CM

## 2022-05-24 PROBLEM — E11.65 UNCONTROLLED TYPE 2 DIABETES MELLITUS WITH HYPERGLYCEMIA (HCC): Status: ACTIVE | Noted: 2022-05-24

## 2022-05-24 PROBLEM — R82.90 ABNORMAL URINE ODOR: Status: ACTIVE | Noted: 2022-05-24

## 2022-05-24 PROBLEM — R82.998 LEUKOCYTES IN URINE: Status: ACTIVE | Noted: 2022-05-24

## 2022-05-24 ASSESSMENT — ENCOUNTER SYMPTOMS: SHORTNESS OF BREATH: 1

## 2022-05-24 NOTE — ASSESSMENT & PLAN NOTE
Uncontrolled, changes made today: Ozempic added and lifestyle modifications recommended. Continue Lantus and sliding scale continue to monitor blood glucose. Will provide CGM providing additional data to help patient determine additional dietary modifications. Diabetes education provided to patient. Questions answered and additional information provided for review at home.

## 2022-05-24 NOTE — ASSESSMENT & PLAN NOTE
Well-controlled, continue current medications and follow-up with [facility] as advised. Patient believes that she had 2 medications discontinued after her recent appointment.   Patient to send [employer] message so that medications can be updated

## 2022-05-24 NOTE — ASSESSMENT & PLAN NOTE
At goal, continue current medications, continue current treatment plan and Follow-up with cardiothoracic surgery and regular cardiology as Advised

## 2022-05-26 ENCOUNTER — TELEPHONE (OUTPATIENT)
Dept: FAMILY MEDICINE CLINIC | Age: 62
End: 2022-05-26

## 2022-05-26 RX ORDER — NITROFURANTOIN 25; 75 MG/1; MG/1
100 CAPSULE ORAL 2 TIMES DAILY
Qty: 20 CAPSULE | Refills: 0 | Status: SHIPPED | OUTPATIENT
Start: 2022-05-26 | End: 2022-06-02

## 2022-05-26 NOTE — TELEPHONE ENCOUNTER
Please call patient to make sure that she saw her [employer] message to inform her that her urine culture came back positive for infection. I have sent in an antibiotic to the [facility] on [address]. I would like her to do a recheck urine sometime after Alexandra 10. Please ask where she would like to go for this and fax the order if it is outside of [facility].    Thank you

## 2022-05-27 RX ORDER — INSULIN GLARGINE 100 [IU]/ML
35 INJECTION, SOLUTION SUBCUTANEOUS NIGHTLY
Qty: 5 PEN | Refills: 2 | Status: SHIPPED | OUTPATIENT
Start: 2022-05-27 | End: 2022-06-26

## 2022-05-27 NOTE — TELEPHONE ENCOUNTER
Marva Alvarez can you please address this one for Gini Baker I am not sure she will see this today pt is out of medication over the weekend Pharmacy stated pt has 2 days of insulin and is in need of Lantus today.

## 2022-05-27 NOTE — TELEPHONE ENCOUNTER
Patient notified and will go to [facility] after 06/10/22 to have her Urine rechecked

## 2022-06-01 ENCOUNTER — PATIENT MESSAGE (OUTPATIENT)
Dept: FAMILY MEDICINE CLINIC | Age: 62
End: 2022-06-01

## 2022-06-01 DIAGNOSIS — E11.65 UNCONTROLLED TYPE 2 DIABETES MELLITUS WITH HYPERGLYCEMIA (HCC): Primary | ICD-10-CM

## 2022-06-01 RX ORDER — LANCETS 30 GAUGE
EACH MISCELLANEOUS
Qty: 100 EACH | Refills: 5 | Status: SHIPPED | OUTPATIENT
Start: 2022-06-01

## 2022-06-01 RX ORDER — PANTOPRAZOLE SODIUM 40 MG/1
TABLET, DELAYED RELEASE ORAL
COMMUNITY
Start: 2022-05-26

## 2022-06-01 RX ORDER — GLUCOSAMINE HCL/CHONDROITIN SU 500-400 MG
CAPSULE ORAL
Qty: 100 STRIP | Refills: 5 | Status: SHIPPED | OUTPATIENT
Start: 2022-06-01

## 2022-06-01 NOTE — TELEPHONE ENCOUNTER
From: Nolan Cushing  To: Diana Riedel  Sent: 6/1/2022 2:52 PM EDT  Subject: Test strips and lancets    Please order these for me. It will help if the insurance pays for them.  Thank you

## 2022-06-08 ENCOUNTER — PATIENT MESSAGE (OUTPATIENT)
Dept: FAMILY MEDICINE CLINIC | Age: 62
End: 2022-06-08

## 2022-06-08 NOTE — TELEPHONE ENCOUNTER
From: Shantal Schroeder  To: Johnny Wang  Sent: 6/8/2022 2:23 PM EDT  Subject: Infection    I finished the antibiotic a couple of days ago. I just wanted to let you know that as I sit here, I can smell that odor slightly coming back. Also, the pharmacy will be contacting you to let you know that my insurance will not ok the weekly insulin. The insurance turned down the lancets and strips also. Any ideas on that? The DEXCOM is way off most of the time. Sorta at a loss here.  Thank you

## 2022-06-13 ENCOUNTER — HOSPITAL ENCOUNTER (OUTPATIENT)
Dept: CARDIAC REHAB | Age: 62
Setting detail: THERAPIES SERIES
Discharge: HOME | End: 2022-06-13
Payer: MEDICAID

## 2022-06-13 VITALS — RESPIRATION RATE: 18 BRPM | BODY MASS INDEX: 28.72 KG/M2 | OXYGEN SATURATION: 95 % | WEIGHT: 172.38 LBS | HEIGHT: 65 IN

## 2022-06-13 PROCEDURE — 93798 PHYS/QHP OP CAR RHAB W/ECG: CPT

## 2022-06-13 ASSESSMENT — EJECTION FRACTION: EF_VALUE: 58

## 2022-06-13 ASSESSMENT — PATIENT HEALTH QUESTIONNAIRE - PHQ9
2. FEELING DOWN, DEPRESSED OR HOPELESS: 1
SUM OF ALL RESPONSES TO PHQ QUESTIONS 1-9: 2
SUM OF ALL RESPONSES TO PHQ QUESTIONS 1-9: 2
SUM OF ALL RESPONSES TO PHQ9 QUESTIONS 1 & 2: 2
1. LITTLE INTEREST OR PLEASURE IN DOING THINGS: 1
SUM OF ALL RESPONSES TO PHQ QUESTIONS 1-9: 2
SUM OF ALL RESPONSES TO PHQ QUESTIONS 1-9: 2

## 2022-06-13 ASSESSMENT — EXERCISE STRESS TEST
PEAK_RPE: 12
PEAK_METS: 2.1
PEAK_BP: 100/62
PEAK_HR: 91
PEAK_BP: 100/62

## 2022-06-14 ENCOUNTER — HOSPITAL ENCOUNTER (OUTPATIENT)
Age: 62
Setting detail: SPECIMEN
Discharge: HOME | End: 2022-06-14

## 2022-06-14 DIAGNOSIS — R82.90 ABNORMAL URINE ODOR: ICD-10-CM

## 2022-06-14 DIAGNOSIS — E87.1 HYPONATREMIA: ICD-10-CM

## 2022-06-14 DIAGNOSIS — N30.00 ACUTE CYSTITIS WITHOUT HEMATURIA: ICD-10-CM

## 2022-06-14 DIAGNOSIS — R82.998 LEUKOCYTES IN URINE: ICD-10-CM

## 2022-06-14 LAB
-: NORMAL
ANION GAP SERPL CALCULATED.3IONS-SCNC: 18 MMOL/L (ref 9–17)
BILIRUBIN URINE: NEGATIVE
BUN BLDV-MCNC: 19 MG/DL (ref 8–23)
CALCIUM SERPL-MCNC: 9.9 MG/DL (ref 8.6–10.4)
CHLORIDE BLD-SCNC: 100 MMOL/L (ref 98–107)
CO2: 23 MMOL/L (ref 20–31)
COLOR: YELLOW
CREAT SERPL-MCNC: 0.83 MG/DL (ref 0.5–0.9)
EPITHELIAL CELLS UA: NORMAL /HPF (ref 0–5)
GFR AFRICAN AMERICAN: >60 ML/MIN
GFR NON-AFRICAN AMERICAN: >60 ML/MIN
GFR SERPL CREATININE-BSD FRML MDRD: ABNORMAL ML/MIN/{1.73_M2}
GLUCOSE BLD-MCNC: 201 MG/DL (ref 70–99)
GLUCOSE URINE: NEGATIVE
KETONES, URINE: NEGATIVE
LEUKOCYTE ESTERASE, URINE: ABNORMAL
NITRITE, URINE: NEGATIVE
PH UA: 5.5 (ref 5–8)
POTASSIUM SERPL-SCNC: 4.4 MMOL/L (ref 3.7–5.3)
PROTEIN UA: NEGATIVE
RBC UA: NORMAL /HPF (ref 0–4)
SODIUM BLD-SCNC: 141 MMOL/L (ref 135–144)
SPECIFIC GRAVITY UA: 1.01 (ref 1–1.03)
TURBIDITY: CLEAR
URINE HGB: NEGATIVE
UROBILINOGEN, URINE: NORMAL
WBC UA: NORMAL /HPF (ref 0–5)

## 2022-06-15 ENCOUNTER — HOSPITAL ENCOUNTER (OUTPATIENT)
Dept: CARDIAC REHAB | Age: 62
Setting detail: THERAPIES SERIES
Discharge: HOME | End: 2022-06-15
Payer: MEDICAID

## 2022-06-15 VITALS — BODY MASS INDEX: 28.59 KG/M2 | WEIGHT: 171.8 LBS

## 2022-06-15 LAB
CULTURE: ABNORMAL
SPECIMEN DESCRIPTION: ABNORMAL

## 2022-06-15 PROCEDURE — 93798 PHYS/QHP OP CAR RHAB W/ECG: CPT

## 2022-06-15 ASSESSMENT — EXERCISE STRESS TEST
PEAK_HR: 87
PEAK_METS: 2.3
PEAK_BP: 120/84
PEAK_RPE: 12

## 2022-06-17 ENCOUNTER — HOSPITAL ENCOUNTER (OUTPATIENT)
Dept: CARDIAC REHAB | Age: 62
Setting detail: THERAPIES SERIES
Discharge: HOME | End: 2022-06-17
Payer: MEDICAID

## 2022-06-17 VITALS — WEIGHT: 174.7 LBS | BODY MASS INDEX: 29.07 KG/M2

## 2022-06-17 PROCEDURE — 93798 PHYS/QHP OP CAR RHAB W/ECG: CPT

## 2022-06-17 ASSESSMENT — EXERCISE STRESS TEST
PEAK_METS: 2.4
PEAK_BP: 124/72
PEAK_HR: 84
PEAK_RPE: 12

## 2022-06-20 ENCOUNTER — HOSPITAL ENCOUNTER (OUTPATIENT)
Dept: CARDIAC REHAB | Age: 62
Setting detail: THERAPIES SERIES
Discharge: HOME | End: 2022-06-20
Payer: MEDICAID

## 2022-06-20 VITALS — BODY MASS INDEX: 28.96 KG/M2 | WEIGHT: 174 LBS

## 2022-06-20 PROCEDURE — 93798 PHYS/QHP OP CAR RHAB W/ECG: CPT

## 2022-06-20 ASSESSMENT — EXERCISE STRESS TEST
PEAK_RPE: 12
PEAK_BP: 122/60
PEAK_METS: 2.8
PEAK_HR: 82

## 2022-06-22 ENCOUNTER — HOSPITAL ENCOUNTER (OUTPATIENT)
Dept: CARDIAC REHAB | Age: 62
Setting detail: THERAPIES SERIES
Discharge: HOME | End: 2022-06-22
Payer: MEDICAID

## 2022-06-24 ENCOUNTER — HOSPITAL ENCOUNTER (OUTPATIENT)
Dept: CARDIAC REHAB | Age: 62
Setting detail: THERAPIES SERIES
Discharge: HOME | End: 2022-06-24
Payer: MEDICAID

## 2022-06-24 VITALS — BODY MASS INDEX: 28.96 KG/M2 | WEIGHT: 174 LBS

## 2022-06-24 PROCEDURE — 93798 PHYS/QHP OP CAR RHAB W/ECG: CPT

## 2022-06-24 ASSESSMENT — EXERCISE STRESS TEST
PEAK_BP: 120/70
PEAK_HR: 86
PEAK_RPE: 12
PEAK_METS: 2.6

## 2022-06-27 ENCOUNTER — HOSPITAL ENCOUNTER (OUTPATIENT)
Dept: CARDIAC REHAB | Age: 62
Setting detail: THERAPIES SERIES
Discharge: HOME | End: 2022-06-27
Payer: MEDICAID

## 2022-06-27 VITALS — BODY MASS INDEX: 29.04 KG/M2 | WEIGHT: 174.5 LBS

## 2022-06-27 PROCEDURE — 93798 PHYS/QHP OP CAR RHAB W/ECG: CPT

## 2022-06-27 ASSESSMENT — EXERCISE STRESS TEST
PEAK_BP: 112/64
PEAK_RPE: 12
PEAK_HR: 80
PEAK_METS: 2.7

## 2022-06-29 ENCOUNTER — APPOINTMENT (OUTPATIENT)
Dept: CARDIAC REHAB | Age: 62
End: 2022-06-29
Payer: MEDICAID

## 2022-07-01 ENCOUNTER — HOSPITAL ENCOUNTER (OUTPATIENT)
Dept: CARDIAC REHAB | Age: 62
Setting detail: THERAPIES SERIES
Discharge: HOME | End: 2022-07-01
Payer: MEDICAID

## 2022-07-06 ENCOUNTER — HOSPITAL ENCOUNTER (OUTPATIENT)
Dept: CARDIAC REHAB | Age: 62
Setting detail: THERAPIES SERIES
Discharge: HOME | End: 2022-07-06
Payer: MEDICAID

## 2022-07-06 VITALS — BODY MASS INDEX: 28.74 KG/M2 | WEIGHT: 172.7 LBS

## 2022-07-06 PROCEDURE — 93798 PHYS/QHP OP CAR RHAB W/ECG: CPT

## 2022-07-06 ASSESSMENT — EXERCISE STRESS TEST
PEAK_BP: 104/66
PEAK_METS: 2.7
PEAK_HR: 83
PEAK_RPE: 12

## 2022-07-08 ENCOUNTER — HOSPITAL ENCOUNTER (OUTPATIENT)
Dept: CARDIAC REHAB | Age: 62
Setting detail: THERAPIES SERIES
Discharge: HOME | End: 2022-07-08
Payer: MEDICAID

## 2022-07-08 VITALS — BODY MASS INDEX: 28.72 KG/M2 | WEIGHT: 172.6 LBS

## 2022-07-08 PROCEDURE — 93798 PHYS/QHP OP CAR RHAB W/ECG: CPT

## 2022-07-08 ASSESSMENT — EXERCISE STRESS TEST
PEAK_RPE: 12
PEAK_BP: 112/78
PEAK_HR: 91
PEAK_METS: 2.7

## 2022-07-11 ENCOUNTER — HOSPITAL ENCOUNTER (OUTPATIENT)
Dept: CARDIAC REHAB | Age: 62
Setting detail: THERAPIES SERIES
Discharge: HOME | End: 2022-07-11
Payer: MEDICAID

## 2022-07-11 VITALS — WEIGHT: 173.4 LBS | BODY MASS INDEX: 28.86 KG/M2

## 2022-07-11 PROCEDURE — 93798 PHYS/QHP OP CAR RHAB W/ECG: CPT

## 2022-07-11 ASSESSMENT — EXERCISE STRESS TEST
PEAK_HR: 80
PEAK_RPE: 12
PEAK_BP: 100/60
PEAK_METS: 2.7

## 2022-07-11 ASSESSMENT — EJECTION FRACTION: EF_VALUE: 58

## 2022-07-13 ENCOUNTER — APPOINTMENT (OUTPATIENT)
Dept: CARDIAC REHAB | Age: 62
End: 2022-07-13
Payer: MEDICAID

## 2022-07-15 ENCOUNTER — APPOINTMENT (OUTPATIENT)
Dept: CARDIAC REHAB | Age: 62
End: 2022-07-15
Payer: MEDICAID

## 2022-07-15 ENCOUNTER — HOSPITAL ENCOUNTER (OUTPATIENT)
Dept: CARDIAC REHAB | Age: 62
Setting detail: THERAPIES SERIES
Discharge: HOME | End: 2022-07-15
Payer: MEDICAID

## 2022-07-15 NOTE — PROGRESS NOTES
Pt called to let us know she will not be in for cardiac rehab today. States she just doesn't feel up to it. Will reschedule.

## 2022-07-18 ENCOUNTER — APPOINTMENT (OUTPATIENT)
Dept: CARDIAC REHAB | Age: 62
End: 2022-07-18
Payer: MEDICAID

## 2022-07-18 ENCOUNTER — HOSPITAL ENCOUNTER (OUTPATIENT)
Dept: CARDIAC REHAB | Age: 62
Setting detail: THERAPIES SERIES
Discharge: HOME | End: 2022-07-18
Payer: MEDICAID

## 2022-07-18 VITALS — BODY MASS INDEX: 28.67 KG/M2 | WEIGHT: 172.3 LBS

## 2022-07-18 PROCEDURE — 93798 PHYS/QHP OP CAR RHAB W/ECG: CPT

## 2022-07-18 ASSESSMENT — EXERCISE STRESS TEST
PEAK_RPE: 12
PEAK_BP: 120/70
PEAK_HR: 80
PEAK_METS: 2.8

## 2022-07-20 ENCOUNTER — APPOINTMENT (OUTPATIENT)
Dept: CARDIAC REHAB | Age: 62
End: 2022-07-20
Payer: MEDICAID

## 2022-07-22 ENCOUNTER — APPOINTMENT (OUTPATIENT)
Dept: CARDIAC REHAB | Age: 62
End: 2022-07-22
Payer: MEDICAID

## 2022-07-25 ENCOUNTER — HOSPITAL ENCOUNTER (OUTPATIENT)
Dept: CARDIAC REHAB | Age: 62
Setting detail: THERAPIES SERIES
Discharge: HOME | End: 2022-07-25
Payer: MEDICAID

## 2022-07-25 ENCOUNTER — APPOINTMENT (OUTPATIENT)
Dept: CARDIAC REHAB | Age: 62
End: 2022-07-25
Payer: MEDICAID

## 2022-07-25 VITALS — BODY MASS INDEX: 28.49 KG/M2 | WEIGHT: 171.2 LBS

## 2022-07-25 PROCEDURE — 93798 PHYS/QHP OP CAR RHAB W/ECG: CPT

## 2022-07-25 ASSESSMENT — EXERCISE STRESS TEST
PEAK_RPE: 12
PEAK_BP: 116/78
PEAK_METS: 3
PEAK_HR: 83

## 2022-07-27 ENCOUNTER — HOSPITAL ENCOUNTER (OUTPATIENT)
Age: 62
Setting detail: SPECIMEN
Discharge: HOME | End: 2022-07-27

## 2022-07-27 ENCOUNTER — APPOINTMENT (OUTPATIENT)
Dept: CARDIAC REHAB | Age: 62
End: 2022-07-27
Payer: MEDICAID

## 2022-07-27 LAB
ALT SERPL-CCNC: 15 U/L (ref 5–33)
AST SERPL-CCNC: 19 U/L
CHOLESTEROL/HDL RATIO: 3
CHOLESTEROL: 153 MG/DL
HDLC SERPL-MCNC: 51 MG/DL
LDL CHOLESTEROL: 83 MG/DL (ref 0–130)
TRIGL SERPL-MCNC: 95 MG/DL

## 2022-07-29 ENCOUNTER — APPOINTMENT (OUTPATIENT)
Dept: CARDIAC REHAB | Age: 62
End: 2022-07-29
Payer: MEDICAID

## 2022-07-29 ENCOUNTER — HOSPITAL ENCOUNTER (OUTPATIENT)
Dept: CARDIAC REHAB | Age: 62
Setting detail: THERAPIES SERIES
Discharge: HOME | End: 2022-07-29
Payer: MEDICAID

## 2022-07-29 VITALS — WEIGHT: 174.3 LBS | BODY MASS INDEX: 29.01 KG/M2

## 2022-07-29 PROCEDURE — 93798 PHYS/QHP OP CAR RHAB W/ECG: CPT

## 2022-07-29 ASSESSMENT — EXERCISE STRESS TEST
PEAK_RPE: 13
PEAK_METS: 2.9
PEAK_HR: 81
PEAK_BP: 100/64

## 2022-07-29 NOTE — PROGRESS NOTES
Pt had a Filet o Fish sandwich from [employer] yesterday, encouraged pt importance of limiting sodium intake. Reviewed with pt fast food is high in sodium. Pt responds she didn't realize that, counseled pt regarding importance of limiting fast food intake, pt v/u. Provided and reviewed with pt with handouts regarding fast food, sodium and sodium tracker.

## 2022-08-01 ENCOUNTER — HOSPITAL ENCOUNTER (OUTPATIENT)
Dept: CARDIAC REHAB | Age: 62
Setting detail: THERAPIES SERIES
Discharge: HOME | End: 2022-08-01
Payer: MEDICAID

## 2022-08-01 VITALS — WEIGHT: 174.9 LBS | BODY MASS INDEX: 29.1 KG/M2

## 2022-08-01 PROCEDURE — 93798 PHYS/QHP OP CAR RHAB W/ECG: CPT

## 2022-08-01 ASSESSMENT — EXERCISE STRESS TEST
PEAK_HR: 85
PEAK_RPE: 13
PEAK_METS: 3
PEAK_BP: 102/64

## 2022-08-05 ENCOUNTER — HOSPITAL ENCOUNTER (OUTPATIENT)
Dept: CARDIAC REHAB | Age: 62
Setting detail: THERAPIES SERIES
Discharge: HOME | End: 2022-08-05
Payer: MEDICAID

## 2022-08-05 VITALS — BODY MASS INDEX: 28.69 KG/M2 | WEIGHT: 172.4 LBS

## 2022-08-05 PROCEDURE — 93798 PHYS/QHP OP CAR RHAB W/ECG: CPT

## 2022-08-05 ASSESSMENT — EXERCISE STRESS TEST
PEAK_BP: 122/70
PEAK_HR: 81
PEAK_RPE: 13
PEAK_METS: 3

## 2022-08-08 ENCOUNTER — HOSPITAL ENCOUNTER (OUTPATIENT)
Dept: CARDIAC REHAB | Age: 62
Setting detail: THERAPIES SERIES
Discharge: HOME | End: 2022-08-08
Payer: MEDICAID

## 2022-08-08 VITALS — BODY MASS INDEX: 28.84 KG/M2 | WEIGHT: 173.3 LBS

## 2022-08-08 PROCEDURE — 93798 PHYS/QHP OP CAR RHAB W/ECG: CPT

## 2022-08-08 RX ORDER — EZETIMIBE 10 MG/1
10 TABLET ORAL DAILY
COMMUNITY

## 2022-08-08 ASSESSMENT — EXERCISE STRESS TEST
PEAK_BP: 122/80
PEAK_BP: 122/80
PEAK_RPE: 12
PEAK_METS: 3
PEAK_HR: 83

## 2022-08-08 ASSESSMENT — EJECTION FRACTION: EF_VALUE: 58

## 2022-08-12 ENCOUNTER — APPOINTMENT (OUTPATIENT)
Dept: CARDIAC REHAB | Age: 62
End: 2022-08-12
Payer: MEDICAID

## 2022-08-15 ENCOUNTER — APPOINTMENT (OUTPATIENT)
Dept: CARDIAC REHAB | Age: 62
End: 2022-08-15
Payer: MEDICAID

## 2022-08-19 ENCOUNTER — APPOINTMENT (OUTPATIENT)
Dept: CARDIAC REHAB | Age: 62
End: 2022-08-19
Payer: MEDICAID

## 2022-08-22 ENCOUNTER — APPOINTMENT (OUTPATIENT)
Dept: CARDIAC REHAB | Age: 62
End: 2022-08-22
Payer: MEDICAID

## 2022-08-22 RX ORDER — PANTOPRAZOLE SODIUM 40 MG/1
TABLET, DELAYED RELEASE ORAL
Qty: 30 TABLET | OUTPATIENT
Start: 2022-08-22

## 2022-08-22 RX ORDER — ATORVASTATIN CALCIUM 20 MG/1
TABLET, FILM COATED ORAL
Qty: 30 TABLET | Refills: 3 | OUTPATIENT
Start: 2022-08-22

## 2022-08-26 ENCOUNTER — HOSPITAL ENCOUNTER (OUTPATIENT)
Dept: CARDIAC REHAB | Age: 62
Setting detail: THERAPIES SERIES
Discharge: HOME | End: 2022-08-26
Payer: MEDICAID

## 2022-08-26 NOTE — PROGRESS NOTES
PT CALLED TO LET US KNOW SHE WILL NOT BE IN FOR CARDIAC REHAB TODAY, SHE DIDN'T GET MUCH SLEEP LAST NIGHT. WILL RESCHEDULE.

## 2022-08-27 ENCOUNTER — OFFICE VISIT (OUTPATIENT)
Dept: FAMILY MEDICINE CLINIC | Age: 62
End: 2022-08-27
Payer: MEDICAID

## 2022-08-27 VITALS
DIASTOLIC BLOOD PRESSURE: 69 MMHG | OXYGEN SATURATION: 98 % | HEART RATE: 68 BPM | TEMPERATURE: 97.8 F | SYSTOLIC BLOOD PRESSURE: 108 MMHG

## 2022-08-27 DIAGNOSIS — L30.9 DERMATITIS: Primary | ICD-10-CM

## 2022-08-27 PROCEDURE — 99213 OFFICE O/P EST LOW 20 MIN: CPT | Performed by: FAMILY MEDICINE

## 2022-08-27 RX ORDER — HYDROXYZINE HYDROCHLORIDE 25 MG/1
25 TABLET, FILM COATED ORAL EVERY 8 HOURS PRN
Qty: 30 TABLET | Refills: 0 | Status: SHIPPED | OUTPATIENT
Start: 2022-08-27 | End: 2022-09-06

## 2022-08-27 ASSESSMENT — ENCOUNTER SYMPTOMS
SORE THROAT: 0
COUGH: 0
WHEEZING: 0
SHORTNESS OF BREATH: 0
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
## 2022-08-29 ENCOUNTER — HOSPITAL ENCOUNTER (OUTPATIENT)
Dept: CARDIAC REHAB | Age: 62
Setting detail: THERAPIES SERIES
Discharge: HOME | End: 2022-08-29
Payer: MEDICAID

## 2022-09-02 ENCOUNTER — APPOINTMENT (OUTPATIENT)
Dept: CARDIAC REHAB | Age: 62
End: 2022-09-02
Payer: MEDICAID

## 2022-09-02 RX ORDER — ATORVASTATIN CALCIUM 20 MG/1
TABLET, FILM COATED ORAL
Qty: 30 TABLET | Refills: 3 | OUTPATIENT
Start: 2022-09-02

## 2022-09-07 ENCOUNTER — APPOINTMENT (OUTPATIENT)
Dept: CARDIAC REHAB | Age: 62
End: 2022-09-07
Payer: MEDICAID

## 2022-09-07 RX ORDER — ATORVASTATIN CALCIUM 20 MG/1
20 TABLET, FILM COATED ORAL NIGHTLY
Qty: 30 TABLET | Refills: 0 | Status: CANCELLED | OUTPATIENT
Start: 2022-09-07

## 2022-09-07 NOTE — TELEPHONE ENCOUNTER
Albert for Destiny to call her insurance provider to discuss with them, why they're not paying for her pen needles.  The office did reach out to the Pharmacy and they gave this message

## 2022-09-09 ENCOUNTER — APPOINTMENT (OUTPATIENT)
Dept: CARDIAC REHAB | Age: 62
End: 2022-09-09
Payer: MEDICAID

## 2022-09-09 ENCOUNTER — PATIENT MESSAGE (OUTPATIENT)
Dept: FAMILY MEDICINE CLINIC | Age: 62
End: 2022-09-09

## 2022-09-09 DIAGNOSIS — E11.65 UNCONTROLLED TYPE 2 DIABETES MELLITUS WITH HYPERGLYCEMIA (HCC): Primary | ICD-10-CM

## 2022-09-09 RX ORDER — ATORVASTATIN CALCIUM 20 MG/1
20 TABLET, FILM COATED ORAL NIGHTLY
Qty: 90 TABLET | Refills: 0 | Status: SHIPPED | OUTPATIENT
Start: 2022-09-09

## 2022-09-09 NOTE — TELEPHONE ENCOUNTER
From: Retta Hatchet  To: Claudia Soto  Sent: 9/9/2022 9:28 AM EDT  Subject: Pen needles    Lucille Body, the pharmacy is calling about the pen needles. Could you also let me know about refilling ATORVASTATIN. I have been out of it all week. I guess the surgeons office isn't refilling it.  Thank you

## 2022-09-12 ENCOUNTER — APPOINTMENT (OUTPATIENT)
Dept: CARDIAC REHAB | Age: 62
End: 2022-09-12
Payer: MEDICAID

## 2022-09-13 RX ORDER — BLOOD-GLUCOSE TRANSMITTER
1 EACH MISCELLANEOUS DAILY
Qty: 1 EACH | Refills: 0 | Status: SHIPPED | OUTPATIENT
Start: 2022-09-13

## 2022-10-11 DIAGNOSIS — E11.65 UNCONTROLLED TYPE 2 DIABETES MELLITUS WITH HYPERGLYCEMIA (HCC): Primary | ICD-10-CM

## 2022-11-05 RX ORDER — INSULIN GLARGINE 100 [IU]/ML
35 INJECTION, SOLUTION SUBCUTANEOUS NIGHTLY
Qty: 10.5 ML | Refills: 1 | Status: SHIPPED | OUTPATIENT
Start: 2022-11-05 | End: 2023-01-04

## 2022-11-15 ENCOUNTER — OFFICE VISIT (OUTPATIENT)
Dept: FAMILY MEDICINE CLINIC | Age: 62
End: 2022-11-15
Payer: MEDICAID

## 2022-11-15 VITALS
DIASTOLIC BLOOD PRESSURE: 67 MMHG | HEIGHT: 65 IN | HEART RATE: 64 BPM | BODY MASS INDEX: 28.39 KG/M2 | SYSTOLIC BLOOD PRESSURE: 112 MMHG | RESPIRATION RATE: 16 BRPM | OXYGEN SATURATION: 99 % | TEMPERATURE: 97.4 F | WEIGHT: 170.38 LBS

## 2022-11-15 DIAGNOSIS — E11.65 UNCONTROLLED TYPE 2 DIABETES MELLITUS WITH HYPERGLYCEMIA (HCC): ICD-10-CM

## 2022-11-15 DIAGNOSIS — M79.641 RIGHT HAND PAIN: ICD-10-CM

## 2022-11-15 DIAGNOSIS — M25.512 ACUTE PAIN OF LEFT SHOULDER: ICD-10-CM

## 2022-11-15 DIAGNOSIS — E11.9 TYPE 2 DIABETES MELLITUS WITHOUT COMPLICATION, WITHOUT LONG-TERM CURRENT USE OF INSULIN (HCC): Primary | ICD-10-CM

## 2022-11-15 DIAGNOSIS — I21.4 NSTEMI (NON-ST ELEVATED MYOCARDIAL INFARCTION) (HCC): ICD-10-CM

## 2022-11-15 DIAGNOSIS — I50.20 CONGESTIVE HEART FAILURE WITH LEFT VENTRICULAR SYSTOLIC DYSFUNCTION (HCC): ICD-10-CM

## 2022-11-15 DIAGNOSIS — I10 ESSENTIAL HYPERTENSION: ICD-10-CM

## 2022-11-15 PROBLEM — M75.42 IMPINGEMENT SYNDROME OF LEFT SHOULDER: Status: ACTIVE | Noted: 2021-12-01

## 2022-11-15 PROBLEM — R93.6 ABNORMAL FINDINGS ON DIAGNOSTIC IMAGING OF LIMBS: Status: ACTIVE | Noted: 2021-10-15

## 2022-11-15 LAB
CREATININE URINE POCT: NORMAL
HBA1C MFR BLD: 6.8 %
MICROALBUMIN/CREAT 24H UR: NORMAL MG/G{CREAT}
MICROALBUMIN/CREAT UR-RTO: NORMAL

## 2022-11-15 PROCEDURE — 3044F HG A1C LEVEL LT 7.0%: CPT | Performed by: NURSE PRACTITIONER

## 2022-11-15 PROCEDURE — 83036 HEMOGLOBIN GLYCOSYLATED A1C: CPT | Performed by: NURSE PRACTITIONER

## 2022-11-15 PROCEDURE — 99213 OFFICE O/P EST LOW 20 MIN: CPT | Performed by: NURSE PRACTITIONER

## 2022-11-15 PROCEDURE — 82044 UR ALBUMIN SEMIQUANTITATIVE: CPT | Performed by: NURSE PRACTITIONER

## 2022-11-15 PROCEDURE — 3078F DIAST BP <80 MM HG: CPT | Performed by: NURSE PRACTITIONER

## 2022-11-15 PROCEDURE — 3074F SYST BP LT 130 MM HG: CPT | Performed by: NURSE PRACTITIONER

## 2022-11-15 RX ORDER — CLOPIDOGREL BISULFATE 75 MG/1
75 TABLET ORAL DAILY
Qty: 90 TABLET | Refills: 1 | Status: SHIPPED | OUTPATIENT
Start: 2022-11-15

## 2022-11-15 RX ORDER — ASPIRIN 81 MG/1
81 TABLET ORAL DAILY
Qty: 90 TABLET | Refills: 1 | Status: SHIPPED | OUTPATIENT
Start: 2022-11-15

## 2022-11-15 RX ORDER — FUROSEMIDE 20 MG/1
TABLET ORAL
COMMUNITY
Start: 2022-09-13

## 2022-11-15 RX ORDER — BLOOD-GLUCOSE,RECEIVER,CONT
1 EACH MISCELLANEOUS DAILY
Qty: 1 EACH | Refills: 0 | Status: SHIPPED | OUTPATIENT
Start: 2022-11-15

## 2022-11-15 RX ORDER — BLOOD-GLUCOSE SENSOR
1 EACH MISCELLANEOUS WEEKLY
Qty: 3 EACH | Refills: 11 | Status: SHIPPED | OUTPATIENT
Start: 2022-11-15

## 2022-11-15 RX ORDER — AMLODIPINE BESYLATE 5 MG/1
TABLET ORAL
COMMUNITY
Start: 2022-09-14

## 2022-11-15 RX ORDER — ATORVASTATIN CALCIUM 20 MG/1
20 TABLET, FILM COATED ORAL NIGHTLY
Qty: 90 TABLET | Refills: 1 | Status: SHIPPED | OUTPATIENT
Start: 2022-11-15

## 2022-11-15 ASSESSMENT — ENCOUNTER SYMPTOMS
COUGH: 0
GASTROINTESTINAL NEGATIVE: 1
CONSTIPATION: 0
NAUSEA: 0
VOMITING: 0
BLURRED VISION: 0
SHORTNESS OF BREATH: 0
ABDOMINAL PAIN: 0
DIARRHEA: 0
EYES NEGATIVE: 1

## 2022-11-15 ASSESSMENT — PATIENT HEALTH QUESTIONNAIRE - PHQ9
SUM OF ALL RESPONSES TO PHQ9 QUESTIONS 1 & 2: 2
10. IF YOU CHECKED OFF ANY PROBLEMS, HOW DIFFICULT HAVE THESE PROBLEMS MADE IT FOR YOU TO DO YOUR WORK, TAKE CARE OF THINGS AT HOME, OR GET ALONG WITH OTHER PEOPLE: 0
5. POOR APPETITE OR OVEREATING: 1
1. LITTLE INTEREST OR PLEASURE IN DOING THINGS: 1
8. MOVING OR SPEAKING SO SLOWLY THAT OTHER PEOPLE COULD HAVE NOTICED. OR THE OPPOSITE, BEING SO FIGETY OR RESTLESS THAT YOU HAVE BEEN MOVING AROUND A LOT MORE THAN USUAL: 0
6. FEELING BAD ABOUT YOURSELF - OR THAT YOU ARE A FAILURE OR HAVE LET YOURSELF OR YOUR FAMILY DOWN: 0
7. TROUBLE CONCENTRATING ON THINGS, SUCH AS READING THE NEWSPAPER OR WATCHING TELEVISION: 0
SUM OF ALL RESPONSES TO PHQ QUESTIONS 1-9: 5
4. FEELING TIRED OR HAVING LITTLE ENERGY: 1
SUM OF ALL RESPONSES TO PHQ QUESTIONS 1-9: 5
SUM OF ALL RESPONSES TO PHQ QUESTIONS 1-9: 5
3. TROUBLE FALLING OR STAYING ASLEEP: 1
SUM OF ALL RESPONSES TO PHQ QUESTIONS 1-9: 5
2. FEELING DOWN, DEPRESSED OR HOPELESS: 1
9. THOUGHTS THAT YOU WOULD BE BETTER OFF DEAD, OR OF HURTING YOURSELF: 0
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
## 2022-12-08 ENCOUNTER — HOSPITAL ENCOUNTER (OUTPATIENT)
Age: 62
Setting detail: SPECIMEN
Discharge: HOME | End: 2022-12-08

## 2022-12-08 LAB
ALT SERPL-CCNC: 18 U/L (ref 5–33)
AST SERPL-CCNC: 22 U/L
CHOLESTEROL/HDL RATIO: 2.6
CHOLESTEROL: 125 MG/DL
HDLC SERPL-MCNC: 49 MG/DL
LDL CHOLESTEROL: 60 MG/DL (ref 0–130)
TRIGL SERPL-MCNC: 79 MG/DL

## 2022-12-13 RX ORDER — POTASSIUM CHLORIDE 1500 MG/1
TABLET, FILM COATED, EXTENDED RELEASE ORAL
Qty: 240 TABLET | OUTPATIENT
Start: 2022-12-13

## 2022-12-13 RX ORDER — INSULIN GLARGINE 100 [IU]/ML
INJECTION, SOLUTION SUBCUTANEOUS
Qty: 9 ML | Refills: 5 | Status: SHIPPED | OUTPATIENT
Start: 2022-12-13

## 2022-12-13 NOTE — TELEPHONE ENCOUNTER
Carson Díaz is calling to request a refill on the following medication(s):    Last Visit Date (If Applicable):  37/25/5838    Next Visit Date:    1/17/2023    Medication Request:  Requested Prescriptions     Pending Prescriptions Disp Refills    LANTUS SOLOSTAR 100 UNIT/ML injection pen [Pharmacy Med Name: LANTUS SOLOSTAR 100 UNIT/ML] 9 mL      Sig: inject 35 units subcutaneously nightly           .

## 2022-12-20 RX ORDER — POTASSIUM CHLORIDE 1500 MG/1
TABLET, FILM COATED, EXTENDED RELEASE ORAL
Qty: 240 TABLET | OUTPATIENT
Start: 2022-12-20

## 2022-12-22 DIAGNOSIS — E11.65 UNCONTROLLED TYPE 2 DIABETES MELLITUS WITH HYPERGLYCEMIA (HCC): ICD-10-CM

## 2022-12-22 RX ORDER — BLOOD-GLUCOSE TRANSMITTER
EACH MISCELLANEOUS
Qty: 1 EACH | Refills: 5 | Status: SHIPPED | OUTPATIENT
Start: 2022-12-22

## 2023-01-05 ENCOUNTER — OFFICE VISIT (OUTPATIENT)
Dept: ORTHOPEDIC SURGERY | Age: 63
End: 2023-01-05
Payer: MEDICAID

## 2023-01-05 VITALS — BODY MASS INDEX: 28.32 KG/M2 | RESPIRATION RATE: 16 BRPM | HEIGHT: 65 IN | WEIGHT: 170 LBS

## 2023-01-05 DIAGNOSIS — M79.641 RIGHT HAND PAIN: ICD-10-CM

## 2023-01-05 DIAGNOSIS — M18.11 PRIMARY OSTEOARTHRITIS OF FIRST CARPOMETACARPAL JOINT OF RIGHT HAND: Primary | ICD-10-CM

## 2023-01-05 PROCEDURE — 99203 OFFICE O/P NEW LOW 30 MIN: CPT | Performed by: ORTHOPAEDIC SURGERY

## 2023-01-05 NOTE — PROGRESS NOTES
ORTHOPEDIC PATIENT EVALUATION      HPI / Chief Complaint  Arminda Tomlinson is a 58 y.o. right-hand-dominant female who presents for evaluation of her right hand. She states that she has been dealing with pain localized to the thumb for the past 2 years. No precipitating trauma or injury. The pain starts at the base of the thumb and radiates into the forearm proximally and distally into the hand. She has a hard time grabbing or holding onto things as a result of this pain. She has been seen by another orthopedic surgeon at the [facility] and treated with a wrist brace and a cortisone injection. Unfortunately the injection failed to provide any relief and her pain has gradually gotten worse. She does have a history of a right hand carpal tunnel release x2 in 2015 and 2016. She is unable to take NSAIDs due to her cardiac history. Past Medical History  Zuleyka Sanders  has a past medical history of Chronic back pain, Hypertension, Osteoarthritis, and Pneumothorax. Past Surgical History  Zuleyka Sanders  has a past surgical history that includes back surgery; Carpal tunnel release (Right); Appendectomy; Tubal ligation; Cardiac catheterization (03/22/2022); and Coronary artery bypass graft (N/A, 3/29/2022).     Current Medications  Current Outpatient Medications   Medication Sig Dispense Refill    Continuous Blood Gluc Transmit (DEXCOM G6 TRANSMITTER) MISC USE AS DIRECTED 1 each 5    LANTUS SOLOSTAR 100 UNIT/ML injection pen inject 35 units subcutaneously nightly 9 mL 5    amLODIPine (NORVASC) 5 MG tablet take 1 tablet by mouth once daily      furosemide (LASIX) 20 MG tablet take 1 tablet by mouth once daily      empagliflozin (JARDIANCE) 10 MG tablet Take 1 tablet by mouth daily 30 tablet 5    atorvastatin (LIPITOR) 20 MG tablet Take 1 tablet by mouth nightly 90 tablet 1    clopidogrel (PLAVIX) 75 MG tablet Take 1 tablet by mouth daily 90 tablet 1    aspirin 81 MG EC tablet Take 1 tablet by mouth daily 90 tablet 1 Continuous Blood Gluc  (DEXCOM G6 ) MACIE 1 Device by Does not apply route daily 1 each 0    Continuous Blood Gluc Sensor (DEXCOM G6 SENSOR) MISC 1 Device by Does not apply route once a week 3 each 11    Insulin Pen Needle 29G X 12MM MISC 1 each by Does not apply route daily 100 each 3    betamethasone valerate (VALISONE) 0.1 % cream Apply topically 2 times daily. Do not use for longer than 2 weeks. 60 g 0    ezetimibe (ZETIA) 10 MG tablet Take 10 mg by mouth in the morning. Lancets MISC Test 3 times a day & as needed for symptoms of irregular blood glucose. Dispense dispense insurance approved lancets (Patient will bring in her meter) 100 each 5    blood glucose monitor strips Test 3 times a day & as needed for symptoms of irregular blood glucose. Dispense dispense insurance approved test strips (Patient will bring in her meter) 100 strip 5    nitroGLYCERIN (NITROSTAT) 0.4 MG SL tablet place 1 tablet under the tongue if needed every 5 minutes for mora...  (REFER TO PRESCRIPTION NOTES). insulin lispro, 1 Unit Dial, (HUMALOG KWIKPEN) 100 UNIT/ML SOPN Inject 1-3 Units into the skin 3 times daily (before meals) 1 pen 5    potassium chloride (KLOR-CON M) 20 MEQ TBCR extended release tablet take 1 tablet by mouth once daily      losartan (COZAAR) 25 MG tablet Take 1 tablet by mouth daily 7 tablet 0    metoprolol tartrate (LOPRESSOR) 25 MG tablet Take 1 tablet by mouth 2 times daily 14 tablet 0    Alcohol Swabs 70 % PADS 1 box by Does not apply route daily 100 each 1    glucose monitoring (FREESTYLE FREEDOM) kit 1 kit by Does not apply route daily as needed (Check blood glucose three times daily in morning and at night before dinner) 1 kit 0     No current facility-administered medications for this visit. Allergies  Allergies have been reviewed. Destiny is allergic to penicillins. Social History  Destiny  reports that she quit smoking about 14 months ago. Her smoking use included cigarettes. She has a 20.00 pack-year smoking history. She has never used smokeless tobacco. She reports that she does not drink alcohol and does not use drugs. Family History  Destiny's family history includes Alcohol Abuse in her brother; Diabetes in her brother, father, paternal cousin, and paternal grandfather; Heart Attack in her father; Heart Disease in her father and mother; Heart Surgery in her father; High Blood Pressure in her father; High Cholesterol in her father and mother; Liver Disease in her brother; No Known Problems in her maternal grandfather, maternal grandmother, paternal grandmother, and sister. Review of Systems   History obtained from the patient. REVIEW OF SYSTEMS:   Constitution: negative for fever, chills, weight loss or malaise   Musculoskeletal: As noted in the HPI   Neurologic: As noted in the HPI    Physical Exam  Resp 16   Ht 5' 5\" (1.651 m)   Wt 170 lb (77.1 kg)   BMI 28.29 kg/m²    General Appearance: alert, well appearing, and in no distress  Mental Status: alert, oriented to person, place, and time  Evaluation of the patient's right hand and upper extremity demonstrates intact skin without warmth erythema or swelling. Sensation is grossly intact light touch in all dermatomes and she has a 2+ radial pulse with brisk cap refill in her fingers. She is tender to palpation at the level of the first [facility] joint and has a positive first [facility] grind test.  Negative Tinel's at the carpal tunnel. She can actively flex, extend, abduct and adduct all of her fingers. Imaging Studies  3 x-ray views of the right hand completed on 1/5/2023 were reviewed independently demonstrating complete first [facility] joint space loss associated with some osteophytic change. No obvious fracture or dislocation appreciated. Assessment and Plan  Yaritza Jiang is a 58 y.o. old female with right hand first [facility] joint osteoarthritis.   I did have a discussion with the patient today educating her about this condition and discussed treatment options available to her including nonoperative and operative intervention. She is undergone treatment as outlined above and at this time would like to proceed with surgical intervention by way of a first [facility] joint arthroplasty. We began brief discussions about this and I recommended that she be referred to my partner who performs a lot of these procedures as she will be in a better hands with him for this. She was amenable and so referral was placed to see Dr. Ariadna Sanches. I will see her back in my clinic as needed but she may return or call at anytime with questions or concerns. This note is created with the assistance of a speech recognition program.  While intending to generate adocument that actually reflects the content of the visit, the document can still have some errors including those of syntax and sound a like substitutions which may escape proof reading. It such instances, actual meaningcan be extrapolated by contextual diversion.

## 2023-01-11 ENCOUNTER — TELEPHONE (OUTPATIENT)
Dept: ORTHOPEDIC SURGERY | Age: 63
End: 2023-01-11

## 2023-01-11 ENCOUNTER — OFFICE VISIT (OUTPATIENT)
Dept: ORTHOPEDIC SURGERY | Age: 63
End: 2023-01-11

## 2023-01-11 VITALS — WEIGHT: 174 LBS | BODY MASS INDEX: 28.99 KG/M2 | HEIGHT: 65 IN

## 2023-01-11 DIAGNOSIS — M79.641 RIGHT HAND PAIN: Primary | ICD-10-CM
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
## 2023-01-11 NOTE — TELEPHONE ENCOUNTER
Patient was seen in the office this morning and was given a referral for an EMG. [facility] ([facility]) is unable to schedule see her until 3/23/23. She is asking if there is another option since Genny wanted to see her back in the office next week. Please contact her to discuss at the earliest convenience. Thank you.

## 2023-01-16 ENCOUNTER — TELEPHONE (OUTPATIENT)
Dept: FAMILY MEDICINE CLINIC | Age: 63
End: 2023-01-16

## 2023-01-16 NOTE — TELEPHONE ENCOUNTER
CALLED TO REMIND OF 1.17.23 NIYAH- LUIS MANUEL WILL CALL BK TO [address] [place] [address].

## 2023-01-18 ENCOUNTER — OFFICE VISIT (OUTPATIENT)
Dept: ORTHOPEDIC SURGERY | Age: 63
End: 2023-01-18

## 2023-01-18 VITALS — BODY MASS INDEX: 28.99 KG/M2 | WEIGHT: 174 LBS | HEIGHT: 65 IN

## 2023-01-18 DIAGNOSIS — Z01.818 PRE-OP TESTING: Primary | ICD-10-CM

## 2023-01-18 NOTE — PROGRESS NOTES
[address]  [address] [facility] Yves [place]ura  Dept: [phone]  Dept Fax: [phone]        Orthopaedic Clinic Follow Up      Subjective:     Edith Peabody is a 58y.o. year old RHD female who presents to our office today for routine follow-up regarding right hand first [facility] joint osteoarthritis. She was referred to us from Dr. Alston Schilder who saw her on 1/5/23 and discussed [facility] joint arthroplasty as treatment for her arthritis. She states she has had right hand pain for a couple years now and has tried anti-inflammatory medication, wrist braces, and an injection in 2021 with no pain relief. Patient has most of the pain located the base of her thumb, and sometimes radiates to her forearm. During her last visit on 1/11/23, she was informed about a [facility] arthroplasty as a surgical intervention for her CMC/basilar thumb arthritis. Patient states that she was amenable to this plan, but it was informed that she should get an EMG for evaluation of a cubital tunnel syndrome of her right upper extremity, but after she left the clinic, she was told by insurance, she cannot get the EMG for least a couple months. Due to this reason, she presented back and stated that she will wait for the EMG to be done at a later time, would like to proceed to surgical intervention. Review of Systems  Gen: no fever, chills, malaise  CV: no chest pain or palpitations  Resp: no cough or shortness of breath  GI: no nausea, vomiting, diarrhea, or constipation  Neuro: no numbness, tingling, or weakness  Msk: As per HPI  10 remaining systems reviewed and negative    Objective : There were no vitals filed for this visit. Body mass index is 28.96 kg/m². General: No acute distress, resting comfortably in the clinic  Neuro: alert. oriented  Eyes: Extra-ocular muscles intact  Pulm: Respirations unlabored and regular.   Skin: warm, well perfused  Psych:   Patient has good fund of knowledge and displays understanging of exam, diagnosis, and plan. MSK-RUE: Skin intact. Minimal deformity of the thumb with slight hyperextension of the CMC joint. Patient is grossly neurovascularly intact to the right upper extremity, with slight dysesthesias in the ulnar nerve distribution of the hand. Positive CMC grind test.  Weakness of  strength compared to contralateral side. She has negative Phalen's, reverse Phalen's, Tinel's sign at the wrist for median nerve compression. Negative Tinel's sign at the elbow for cubital tunnel syndrome. Positive Froment's sign. She does have some pain and reproduction of ulnar nerve symptoms when palpating Guyon's canal.    Radiology:    No new imaging taken at this encounter. Patient has previous imaging on 1/5/23 demonstrating severe CMC osteoarthritis. Assessment:   59 yo RHD female with right hand CMC osteoarthritis. Plan: Today, the patient was seen and examined and we discussed the severity of her right hand CMC arthritis. The patient stated that she would like to proceed with [facility] arthroplasty of the right upper extremity. Due to the patient unable to receive her EMG for possible cubital tunnel release, we informed the patient that we will give her 2 choices. She may either proceed with a CMC arthroplasty, and do a cubital tunnel release at a different day, or wait for the EMG results later today, and complete both CMC arthroplasty and cubital tunnel release at the same time. The patient states that she would like to do the [facility] arthroplasty due to this abdominal pain, and like to get the EMG at a later time. Patient will follow-up in 2 weeks after the [facility] arthroplasty, and will obtain the EMG for her possible cubital tunnel syndrome later at a later date. Risk, alternatives, benefits were explained to the patient, the patient was then consented and boarded for surgical intervention.     Follow up:Return for post-op visit 10-14 days after surgery. No orders of the defined types were placed in this encounter. No orders of the defined types were placed in this encounter.             Electronically signed by Jean Villegas DO on 1/18/2023 at 4:46 PM

## 2023-01-23 RX ORDER — SODIUM CHLORIDE, SODIUM LACTATE, POTASSIUM CHLORIDE, CALCIUM CHLORIDE 600; 310; 30; 20 MG/100ML; MG/100ML; MG/100ML; MG/100ML
1000 INJECTION, SOLUTION INTRAVENOUS CONTINUOUS
OUTPATIENT
Start: 2023-01-23

## 2023-01-23 NOTE — DISCHARGE INSTRUCTIONS
Pre-operative Instructions           NOTHING to eat or drink after midnight the night prior to surgery   (This includes gum, candy, mints, chewing tobacco, etc). Please arrive at the surgery center (Entrance B) by 5:30-5:45 AM on 2/9/2023 (or as directed by your surgeon's office). See Directons to Surgery Center below. Please take only the following medication(s) the day of surgery with a small sip of water: Metoprolol (Lopressor), Amlodipine (Norvasc), Losartan (Cozaar)    Please stop any blood thinning medications  AS DIRECTED BY PRESCRIBING PROVIDER! : Aspirin and Plavix PER YOUR CARDIOLOGIST! Failure to stop these medications as instructed (too soon or too late) may result in injury to you, or your surgery may need to be rescheduled. Below is a list of some examples for your reference. Antiplatelets : (stop blood cells (called platelets) from sticking together and forming a blood clot):   Aspirin (Bufferin, Ecotrin), Clopidogrel (Plavix), Ticagrelor (Brilinta), Prasugrel (Effient), Dipyridamole/aspirin (Aggrenox), Ticlopidine (Ticlid), Eptifibatide (Integrilin)    Anticoagulants: (slow down your body's process of making clots): Warfarin (Coumadin), Rivaroxaban (Xarelto), Dabigatran (Pradaxa), Apixaban (Eliquis), Edoxaban (Savaysa), Heparin/Enoxaparin (Lovenox), Fondaparinux (Arixtra)    NSAIDS: Aspirin (Bufferin, Ecotrin), Ibuprofen (Motrin, Nuprin,Advil), Naproxen (Aleve),Meloxicam (Mobic), Celecoxib (Celebrex), Diclofenac (Voltaren), Etodolac (Lodine), Indomethacin, Ketorolac, Nabumetone, Oxaprozin (Daypro), Piroxicam (Feldene), Excedrin (has aspirin in it)    Herbal supplements: Bromelain, Cinnamon, [employer], [place] (female ginseng), Fish oil, Garlic, Lucrecia,Ginkgo biloba, Grape seed extract, Turmeric, Vitamin E, etc....)    You may continue the rest of your medications through the night before surgery unless instructed otherwise.     If applicable:   Do not take diabetic medications on the day of surgery. Please use/bring daily inhalers with you     PLEASE NOTE:  THE ABOVE (IF ANY) DISCONTINUED MEDS MAY ONLY BE FROM   \"CLEANING UP\" THE MED LIST AND WERE NOT ACTUALLY CANCELLED; SEE CHART FOR DETAILS AND ALWAYS CHECK WITH PRESCRIBING PROVIDER BEFORE DISCONTINUING ANY MEDICATIONS    1/26/23  9:21 AM      ___________________  _______________________  Signature (Provider)              Signature (Patient)         Day of Surgery/Procedure    As a patient at [facility] you can expect quality medical and nursing care that is centered on your individual needs. Our goal is to make your surgical experience as comfortable as possible    Directions to the [address] [place] [address]. [facility] is located in the Emergency Room parking lot on [address] [address] [facility] or there is additional parking across the street. The address is [address] [place] [address]. Please check in at the [facility] upon arrival.     Patient Instructions  In case of any illness please contact your surgeons office for instructions prior to coming to the hospital.    Due to current restrictions you are only allowed 2 adults to be with you. Masks are to be worn and screening will take place on arrival.     Bring your current list of medications, vitamins, herbals and anything you might take on an  \"as needed \" basis. It is important we have a correct list with dosages and frequencies. Please verify your list with your medications at home or bring all of your bottles with you. If you have been given a blood band be sure to bring it with you on the day of surgery. Do not put it on or close the clasp. Use and bring any inhalers if you are currently using one. It is ok to brush your teeth but do not swallow any water. You may be required to provide a urine sample upon your arrival to the pre-op area, so please take this into consideration prior to using the restroom.     No jewelry or piercing's to be worn into surgery because you might be injured because of them. No contact lenses to be worn. It is ok to wear your glasses in pre op but they will be removed prior to going into the operating room. Dentures/partials will likely need to be removed in pre op depending on your type of anesthesia, please do not use adhesives on the day of surgery. Bathe as instructed with the special soap given to you. No lotions, powders or creams after bathing. Wear loose comfortable clothing / shoes that are easy to get on and off over wounds or casts. If you are going to be admitted after surgery please bring your Cpap or BiPap if you have it at home. Keep patient belongings to a minimum and leave valuables at home. If you are staying overnight with us, please bring a SMALL bag of personal items. We cannot accommodate large items, like suitcases. If you are going home after anesthesia or sedation then you must have a responsible adult with you to take you home and to be with you for the first 24 hours after surgery. If you do not have someone to stay with you your surgery might get cancelled. Please contact your surgeon's office to see if other arrangements can be made if you can not find someone to stay with you. If you have any other questions on the day of surgery please contact [phone] or [phone]    If you have any other questions regarding your procedure/surgery please call  your surgeon's office.

## 2023-01-25 NOTE — H&P
History and Physical    Pt Name: New Arteaga  MRN: [id number]  YOB: 1960  Date of evaluation: 1/26/2023  Primary Care Physician: LAKSHMI Cohn CNP    SUBJECTIVE:   History of Chief Complaint:    New Arteaga is a 58 y.o. female who presents for PAT appointment. Patient complains of right hand pain, right first Aia 16 arthritis as well as some numbness/tingling for a couple of years at least. She has failed conservative treatment to include injections and PT. She rates her current right hand pain 7/10, constant but can increase to 10/10. She is right hand dominant. Patient has been scheduled for CARPOMETACARPAL JOINT ARTHROPLASTY - RIGHT. Allergies  is allergic to penicillins. Medications  Prior to Admission medications    Medication Sig Start Date End Date Taking?  Authorizing Provider   Continuous Blood Gluc Transmit (DEXCOM G6 TRANSMITTER) MISC USE AS DIRECTED 12/22/22   LAKSHMI Cohn CNP   LANTUS SOLOSTAR 100 UNIT/ML injection pen inject 35 units subcutaneously nightly 12/13/22   LAKSHMI Cohn CNP   amLODIPine (NORVASC) 5 MG tablet take 1 tablet by mouth once daily 9/14/22   Historical Provider, MD   furosemide (LASIX) 20 MG tablet take 1 tablet by mouth once daily 9/13/22   Historical Provider, MD   empagliflozin (JARDIANCE) 10 MG tablet Take 1 tablet by mouth daily  Patient not taking: Reported on 1/18/2023 11/15/22   LAKSHMI Cohn CNP   atorvastatin (LIPITOR) 20 MG tablet Take 1 tablet by mouth nightly 11/15/22   LAKSHMI Cohn CNP   clopidogrel (PLAVIX) 75 MG tablet Take 1 tablet by mouth daily 11/15/22   LAKSHMI Cohn CNP   aspirin 81 MG EC tablet Take 1 tablet by mouth daily 11/15/22   LAKSHMI Cohn CNP   Continuous Blood Gluc  (DEXCOM G6 ) MACIE 1 Device by Does not apply route daily 11/15/22   LAKSHMI Cohn CNP   Continuous Blood Gluc Sensor (DEXCOM G6 SENSOR) MISC 1 Device by Does not apply route once a week 11/15/22   [place] Mode, APRN - CNP   Insulin Pen Needle 29G X 12MM MISC 1 each by Does not apply route daily 10/11/22   [place] ModeLAKSHMI CNP   betamethasone valerate (VALISONE) 0.1 % cream Apply topically 2 times daily. Do not use for longer than 2 weeks. Patient not taking: Reported on 1/26/2023 8/27/22   Hilario Olson MD   ezetimibe (ZETIA) 10 MG tablet Take 10 mg by mouth in the morning. Historical Provider, MD   Lancets MISC Test 3 times a day & as needed for symptoms of irregular blood glucose. Dispense dispense insurance approved lancets (Patient will bring in her meter) 6/1/22   [place] LAKSHMI Connors CNP   blood glucose monitor strips Test 3 times a day & as needed for symptoms of irregular blood glucose. Dispense dispense insurance approved test strips (Patient will bring in her meter) 6/1/22   Ramsey ModeLAKSHMI CNP   nitroGLYCERIN (NITROSTAT) 0.4 MG SL tablet place 1 tablet under the tongue if needed every 5 minutes for mora...  (REFER TO PRESCRIPTION NOTES).  5/17/22   Historical Provider, MD   insulin lispro, 1 Unit Dial, (HUMALOG KWIKPEN) 100 UNIT/ML SOPN Inject 1-3 Units into the skin 3 times daily (before meals) 4/29/22 1/26/23  [place] LAKSHMI Connors CNP   potassium chloride (KLOR-CON M) 20 MEQ TBCR extended release tablet take 1 tablet by mouth once daily  Patient not taking: Reported on 1/26/2023 4/18/22   Historical Provider, MD   losartan (COZAAR) 25 MG tablet Take 1 tablet by mouth daily 4/18/22   Vinnie Lockhart MD   metoprolol tartrate (LOPRESSOR) 25 MG tablet Take 1 tablet by mouth 2 times daily 4/18/22   Vinnie Lockhart MD   Alcohol Swabs 70 % PADS 1 box by Does not apply route daily 4/5/22   Francisca Mckenzie MD   glucose monitoring (FREESTYLE FREEDOM) kit 1 kit by Does not apply route daily as needed (Check blood glucose three times daily in morning and at night before dinner) 4/5/22   Francisca Mckenzie MD     Past Medical History    has a past medical history of CAD (coronary artery disease), CHF (congestive heart failure) ([facility]), Chronic back pain, Chronic left shoulder pain, Diabetes mellitus ([facility] [address] History of echocardiogram, Hyperlipidemia, Hypertension, NSTEMI (non-ST elevated myocardial infarction) ([facility] [address] Osteoarthritis, Pneumothorax, Under care of service provider, and Under care of service provider. Past Surgical History   has a past surgical history that includes back surgery; Carpal tunnel release (Right); Appendectomy; Tubal ligation; Cardiac catheterization (2022); Coronary artery bypass graft (N/A, 2022); and Shoulder arthroscopy (Left). Social History   reports that she quit smoking about 14 months ago. Her smoking use included cigarettes. She has a 20.00 pack-year smoking history. She has never used smokeless tobacco.    reports no history of alcohol use. reports no history of drug use. Marital Status single  Children 2  Occupation used to be   Family History  Family Status   Relation Name Status    Mother      Father      Sister  Alive    MGM      MGF      PGM      PGF      Brother      Radha Otis       family history includes Alcohol Abuse in her brother; Diabetes in her brother, father, paternal cousin, and paternal grandfather; Heart Attack in her father; Heart Disease in her father and mother; Heart Surgery in her father; High Blood Pressure in her father; High Cholesterol in her father and mother; Liver Disease in her brother; No Known Problems in her maternal grandfather, maternal grandmother, paternal grandmother, and sister.     Review of Systems:  CONSTITUTIONAL:   negative for fevers, chills, fatigue and malaise    EYES:   negative for double vision, blurred vision and photophobia +glasses   HEENT:   negative for tinnitus, epistaxis and sore throat     RESPIRATORY:   negative for cough, shortness of breath, wheezing     CARDIOVASCULAR:   negative for chest pain, palpitations, syncope, edema     GASTROINTESTINAL:   negative for nausea, vomiting     GENITOURINARY:   negative for incontinence     MUSCULOSKELETAL:   +arthralgias: neck, back, left shoulder and see HPI   NEUROLOGICAL:   +n/t right hand   negative for headaches and dizziness     PSYCHIATRIC:   negative for anxiety      OBJECTIVE:   VITALS:  height is 5' 5\" (1.651 m) and weight is 170 lb (77.1 kg). Her temporal temperature is 97.5 °F (36.4 °C). Her blood pressure is 123/74 and her pulse is 75. Her respiration is 18 and oxygen saturation is 99%. CONSTITUTIONAL:alert & oriented x 3, no acute distress. Friendly. Very pleasant. SKIN:  Warm and dry, no rashes on exposed areas of skin   HEAD:  Normocephalic, atraumatic   EYES: EOMs intact. PERRL. Wearing glasses. EARS:  Equal bilaterally, no edema or thickening, skin is intact without lumps or lesions. No discharge. Hearing grossly WNL. NOSE:  Nares patent. No rhinorrhea   MOUTH/THROAT:  Mucous membranes moist, tongue is pink, uvula midline, teeth appear to be intact  NECK:full ROM  LUNGS: Respirations even and non-labored. Clear to auscultation bilaterally, no wheezes, rales, or rhonchi. CARDIOVASCULAR: Regular rate and rhythm, no murmurs/rubs/gallops   ABDOMEN: soft, non-tender, non-distended, bowel sounds active x 4   EXTREMITIES: No edema bilateral lower extremities. No varicosities bilateral lower extremities. NEUROLOGIC: CN II-XII are grossly intact.   Gait is smooth, rhythmic and effortless     Testing:   EK22  Labs pending: drawn 2023   Chest XRay:  22  IMPRESSIONS:   Right 1st CMC arthritis  PLANS:   CARPOMETACARPAL JOINT ARTHROPLASTY- RIGHT    LAKSHMI SHARMA CNP  Electronically signed 2023 at 9:35 AM

## 2023-01-25 NOTE — H&P (VIEW-ONLY)
History and Physical    Pt Name: Connor Ruth  MRN: [id number]  YOB: 1960  Date of evaluation: 1/26/2023  Primary Care Physician: LAKSHMI Wong CNP    SUBJECTIVE:   History of Chief Complaint:    Connor Ruth is a 58 y.o. female who presents for PAT appointment. Patient complains of right hand pain, right first Aia 16 arthritis as well as some numbness/tingling for a couple of years at least. She has failed conservative treatment to include injections and PT. She rates her current right hand pain 7/10, constant but can increase to 10/10. She is right hand dominant. Patient has been scheduled for CARPOMETACARPAL JOINT ARTHROPLASTY - RIGHT. Allergies  is allergic to penicillins. Medications  Prior to Admission medications    Medication Sig Start Date End Date Taking?  Authorizing Provider   Continuous Blood Gluc Transmit (DEXCOM G6 TRANSMITTER) MISC USE AS DIRECTED 12/22/22   LAKSHMI Wong CNP   LANTUS SOLOSTAR 100 UNIT/ML injection pen inject 35 units subcutaneously nightly 12/13/22   LAKSHMI Wong CNP   amLODIPine (NORVASC) 5 MG tablet take 1 tablet by mouth once daily 9/14/22   Historical Provider, MD   furosemide (LASIX) 20 MG tablet take 1 tablet by mouth once daily 9/13/22   Historical Provider, MD   empagliflozin (JARDIANCE) 10 MG tablet Take 1 tablet by mouth daily  Patient not taking: Reported on 1/18/2023 11/15/22   LAKSHMI Wong CNP   atorvastatin (LIPITOR) 20 MG tablet Take 1 tablet by mouth nightly 11/15/22   LAKSHMI Wong CNP   clopidogrel (PLAVIX) 75 MG tablet Take 1 tablet by mouth daily 11/15/22   LAKSHMI Wong CNP   aspirin 81 MG EC tablet Take 1 tablet by mouth daily 11/15/22   LAKSHMI Wong CNP   Continuous Blood Gluc  (DEXCOM G6 ) MACIE 1 Device by Does not apply route daily 11/15/22   LAKSHMI Wong CNP   Continuous Blood Gluc Sensor (DEXCOM G6 SENSOR) MISC 1 Device by Does not apply route once a week 11/15/22   LAKSHMI Tam CNP   Insulin Pen Needle 29G X 12MM MISC 1 each by Does not apply route daily 10/11/22   LAKSHMI Tam CNP   betamethasone valerate (VALISONE) 0.1 % cream Apply topically 2 times daily. Do not use for longer than 2 weeks. Patient not taking: Reported on 1/26/2023 8/27/22   Kennyth Dakin, MD   ezetimibe (ZETIA) 10 MG tablet Take 10 mg by mouth in the morning. Historical Provider, MD   Lancets MISC Test 3 times a day & as needed for symptoms of irregular blood glucose. Dispense dispense insurance approved lancets (Patient will bring in her meter) 6/1/22   LAKSHMI Tam CNP   blood glucose monitor strips Test 3 times a day & as needed for symptoms of irregular blood glucose. Dispense dispense insurance approved test strips (Patient will bring in her meter) 6/1/22   LAKSHMI Tam CNP   nitroGLYCERIN (NITROSTAT) 0.4 MG SL tablet place 1 tablet under the tongue if needed every 5 minutes for mora...  (REFER TO PRESCRIPTION NOTES).  5/17/22   Historical Provider, MD   insulin lispro, 1 Unit Dial, (HUMALOG KWIKPEN) 100 UNIT/ML SOPN Inject 1-3 Units into the skin 3 times daily (before meals) 4/29/22 1/26/23  LAKSHMI Tam CNP   potassium chloride (KLOR-CON M) 20 MEQ TBCR extended release tablet take 1 tablet by mouth once daily  Patient not taking: Reported on 1/26/2023 4/18/22   Historical Provider, MD   losartan (COZAAR) 25 MG tablet Take 1 tablet by mouth daily 4/18/22   Yunier Agrawal MD   metoprolol tartrate (LOPRESSOR) 25 MG tablet Take 1 tablet by mouth 2 times daily 4/18/22   Yunier Agrawal MD   Alcohol Swabs 70 % PADS 1 box by Does not apply route daily 4/5/22   Amanda Arias MD   glucose monitoring (FREESTYLE FREEDOM) kit 1 kit by Does not apply route daily as needed (Check blood glucose three times daily in morning and at night before dinner) 4/5/22   Amanda Arias MD     Past Medical History    has a past medical history of CAD (coronary artery disease), CHF (congestive heart failure) ([facility]), Chronic back pain, Chronic left shoulder pain, Diabetes mellitus ([facility] [address] History of echocardiogram, Hyperlipidemia, Hypertension, NSTEMI (non-ST elevated myocardial infarction) ([facility] [address] Osteoarthritis, Pneumothorax, Under care of service provider, and Under care of service provider. Past Surgical History   has a past surgical history that includes back surgery; Carpal tunnel release (Right); Appendectomy; Tubal ligation; Cardiac catheterization (2022); Coronary artery bypass graft (N/A, 2022); and Shoulder arthroscopy (Left). Social History   reports that she quit smoking about 14 months ago. Her smoking use included cigarettes. She has a 20.00 pack-year smoking history. She has never used smokeless tobacco.    reports no history of alcohol use. reports no history of drug use. Marital Status single  Children 2  Occupation used to be   Family History  Family Status   Relation Name Status    Mother      Father      Sister  Alive    MGM      MGF      PGM      PGF      Brother      Jimbo Kim       family history includes Alcohol Abuse in her brother; Diabetes in her brother, father, paternal cousin, and paternal grandfather; Heart Attack in her father; Heart Disease in her father and mother; Heart Surgery in her father; High Blood Pressure in her father; High Cholesterol in her father and mother; Liver Disease in her brother; No Known Problems in her maternal grandfather, maternal grandmother, paternal grandmother, and sister.     Review of Systems:  CONSTITUTIONAL:   negative for fevers, chills, fatigue and malaise    EYES:   negative for double vision, blurred vision and photophobia +glasses   HEENT:   negative for tinnitus, epistaxis and sore throat     RESPIRATORY:   negative for cough, shortness of breath, wheezing     CARDIOVASCULAR:   negative for chest pain, palpitations, syncope, edema     GASTROINTESTINAL:   negative for nausea, vomiting     GENITOURINARY:   negative for incontinence     MUSCULOSKELETAL:   +arthralgias: neck, back, left shoulder and see HPI   NEUROLOGICAL:   +n/t right hand   negative for headaches and dizziness     PSYCHIATRIC:   negative for anxiety      OBJECTIVE:   VITALS:  height is 5' 5\" (1.651 m) and weight is 170 lb (77.1 kg). Her temporal temperature is 97.5 °F (36.4 °C). Her blood pressure is 123/74 and her pulse is 75. Her respiration is 18 and oxygen saturation is 99%. CONSTITUTIONAL:alert & oriented x 3, no acute distress. Friendly. Very pleasant. SKIN:  Warm and dry, no rashes on exposed areas of skin   HEAD:  Normocephalic, atraumatic   EYES: EOMs intact. PERRL. Wearing glasses. EARS:  Equal bilaterally, no edema or thickening, skin is intact without lumps or lesions. No discharge. Hearing grossly WNL. NOSE:  Nares patent. No rhinorrhea   MOUTH/THROAT:  Mucous membranes moist, tongue is pink, uvula midline, teeth appear to be intact  NECK:full ROM  LUNGS: Respirations even and non-labored. Clear to auscultation bilaterally, no wheezes, rales, or rhonchi. CARDIOVASCULAR: Regular rate and rhythm, no murmurs/rubs/gallops   ABDOMEN: soft, non-tender, non-distended, bowel sounds active x 4   EXTREMITIES: No edema bilateral lower extremities. No varicosities bilateral lower extremities. NEUROLOGIC: CN II-XII are grossly intact.   Gait is smooth, rhythmic and effortless     Testing:   EK22  Labs pending: drawn 2023   Chest XRay:  22  IMPRESSIONS:   Right 1st CMC arthritis  PLANS:   CARPOMETACARPAL JOINT ARTHROPLASTY- RIGHT    LAKSHMI SHARMA CNP  Electronically signed 2023 at 9:35 AM
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
## 2023-01-26 ENCOUNTER — HOSPITAL ENCOUNTER (OUTPATIENT)
Dept: GENERAL RADIOLOGY | Age: 63
Discharge: HOME | End: 2023-01-28
Payer: MEDICAID

## 2023-01-26 ENCOUNTER — HOSPITAL ENCOUNTER (OUTPATIENT)
Dept: PREADMISSION TESTING | Age: 63
Discharge: HOME | End: 2023-01-26
Payer: MEDICAID

## 2023-01-26 VITALS
SYSTOLIC BLOOD PRESSURE: 123 MMHG | OXYGEN SATURATION: 99 % | WEIGHT: 170 LBS | DIASTOLIC BLOOD PRESSURE: 74 MMHG | BODY MASS INDEX: 28.32 KG/M2 | TEMPERATURE: 97.5 F | RESPIRATION RATE: 18 BRPM | HEIGHT: 65 IN | HEART RATE: 75 BPM

## 2023-01-26 DIAGNOSIS — Z01.818 PRE-OP TESTING: ICD-10-CM

## 2023-01-26 LAB
ALBUMIN SERPL-MCNC: 4.6 G/DL (ref 3.5–5.2)
ALBUMIN/GLOBULIN RATIO: 1.4 (ref 1–2.5)
ALP BLD-CCNC: 142 U/L (ref 35–104)
ALT SERPL-CCNC: 18 U/L (ref 5–33)
ANION GAP SERPL CALCULATED.3IONS-SCNC: 13 MMOL/L (ref 9–17)
AST SERPL-CCNC: 21 U/L
BILIRUB SERPL-MCNC: 0.6 MG/DL (ref 0.3–1.2)
BILIRUBIN URINE: NEGATIVE
BUN BLDV-MCNC: 15 MG/DL (ref 8–23)
CALCIUM SERPL-MCNC: 9.8 MG/DL (ref 8.6–10.4)
CHLORIDE BLD-SCNC: 103 MMOL/L (ref 98–107)
CO2: 24 MMOL/L (ref 20–31)
COLOR: YELLOW
CREAT SERPL-MCNC: 0.61 MG/DL (ref 0.5–0.9)
EPITHELIAL CELLS UA: NORMAL /HPF (ref 0–5)
GFR SERPL CREATININE-BSD FRML MDRD: >60 ML/MIN/1.73M2
GLUCOSE BLD-MCNC: 124 MG/DL (ref 70–99)
GLUCOSE URINE: NEGATIVE
HCT VFR BLD CALC: 44.3 % (ref 36.3–47.1)
HEMOGLOBIN: 14.7 G/DL (ref 11.9–15.1)
KETONES, URINE: NEGATIVE
LEUKOCYTE ESTERASE, URINE: ABNORMAL
MCH RBC QN AUTO: 29.8 PG (ref 25.2–33.5)
MCHC RBC AUTO-ENTMCNC: 33.2 G/DL (ref 28.4–34.8)
MCV RBC AUTO: 89.9 FL (ref 82.6–102.9)
NITRITE, URINE: NEGATIVE
NRBC AUTOMATED: 0 PER 100 WBC
PDW BLD-RTO: 12 % (ref 11.8–14.4)
PH UA: 6.5 (ref 5–8)
PLATELET # BLD: 282 K/UL (ref 138–453)
PMV BLD AUTO: 10.7 FL (ref 8.1–13.5)
POTASSIUM SERPL-SCNC: 3.8 MMOL/L (ref 3.7–5.3)
PROTEIN UA: NEGATIVE
RBC # BLD: 4.93 M/UL (ref 3.95–5.11)
RBC UA: NORMAL /HPF (ref 0–4)
SODIUM BLD-SCNC: 140 MMOL/L (ref 135–144)
SPECIFIC GRAVITY UA: 1 (ref 1–1.03)
TOTAL PROTEIN: 7.8 G/DL (ref 6.4–8.3)
TURBIDITY: CLEAR
URINE HGB: NEGATIVE
UROBILINOGEN, URINE: NORMAL
WBC # BLD: 10.5 K/UL (ref 3.5–11.3)
WBC UA: NORMAL /HPF (ref 0–5)

## 2023-01-26 PROCEDURE — 85027 COMPLETE CBC AUTOMATED: CPT

## 2023-01-26 PROCEDURE — 71046 X-RAY EXAM CHEST 2 VIEWS: CPT

## 2023-01-26 PROCEDURE — 80053 COMPREHEN METABOLIC PANEL: CPT

## 2023-01-26 PROCEDURE — 81001 URINALYSIS AUTO W/SCOPE: CPT

## 2023-01-26 PROCEDURE — 36415 COLL VENOUS BLD VENIPUNCTURE: CPT

## 2023-01-26 PROCEDURE — 93005 ELECTROCARDIOGRAM TRACING: CPT

## 2023-01-26 ASSESSMENT — PAIN SCALES - GENERAL: PAINLEVEL_OUTOF10: 7

## 2023-01-26 ASSESSMENT — PAIN DESCRIPTION - ONSET: ONSET: ON-GOING

## 2023-01-26 ASSESSMENT — PAIN DESCRIPTION - DESCRIPTORS: DESCRIPTORS: SHARP

## 2023-01-26 ASSESSMENT — PAIN DESCRIPTION - PAIN TYPE: TYPE: ACUTE PAIN;CHRONIC PAIN

## 2023-01-26 ASSESSMENT — PAIN DESCRIPTION - ORIENTATION: ORIENTATION: RIGHT

## 2023-01-26 ASSESSMENT — PAIN DESCRIPTION - LOCATION: LOCATION: ARM;HAND

## 2023-01-26 ASSESSMENT — PAIN DESCRIPTION - FREQUENCY: FREQUENCY: CONTINUOUS

## 2023-01-27 LAB
EKG ATRIAL RATE: 66 BPM
EKG P AXIS: 58 DEGREES
EKG P-R INTERVAL: 198 MS
EKG Q-T INTERVAL: 434 MS
EKG QRS DURATION: 90 MS
EKG QTC CALCULATION (BAZETT): 454 MS
EKG R AXIS: -23 DEGREES
EKG T AXIS: 22 DEGREES
EKG VENTRICULAR RATE: 66 BPM

## 2023-01-27 PROCEDURE — 93010 ELECTROCARDIOGRAM REPORT: CPT | Performed by: INTERNAL MEDICINE

## 2023-02-04 DIAGNOSIS — E11.65 UNCONTROLLED TYPE 2 DIABETES MELLITUS WITH HYPERGLYCEMIA (HCC): ICD-10-CM

## 2023-02-06 ENCOUNTER — HOSPITAL ENCOUNTER (EMERGENCY)
Age: 63
Discharge: HOME | End: 2023-02-07
Attending: EMERGENCY MEDICINE
Payer: MEDICAID

## 2023-02-06 ENCOUNTER — APPOINTMENT (OUTPATIENT)
Dept: CT IMAGING | Age: 63
End: 2023-02-06
Payer: MEDICAID

## 2023-02-06 VITALS
DIASTOLIC BLOOD PRESSURE: 68 MMHG | WEIGHT: 173 LBS | HEART RATE: 81 BPM | HEIGHT: 65 IN | BODY MASS INDEX: 28.82 KG/M2 | OXYGEN SATURATION: 99 % | SYSTOLIC BLOOD PRESSURE: 134 MMHG | RESPIRATION RATE: 15 BRPM | TEMPERATURE: 97.7 F

## 2023-02-06 DIAGNOSIS — N30.00 ACUTE CYSTITIS WITHOUT HEMATURIA: Primary | ICD-10-CM

## 2023-02-06 LAB
ABSOLUTE EOS #: 0.2 K/UL (ref 0–0.4)
ABSOLUTE LYMPH #: 2.7 K/UL (ref 1–4.8)
ABSOLUTE MONO #: 0.8 K/UL (ref 0.1–1.3)
ALBUMIN SERPL-MCNC: 3.8 G/DL (ref 3.5–5.2)
ALP SERPL-CCNC: 124 U/L (ref 35–104)
ALT SERPL-CCNC: 23 U/L (ref 5–33)
ANION GAP SERPL CALCULATED.3IONS-SCNC: 9 MMOL/L (ref 9–17)
AST SERPL-CCNC: 20 U/L
BACTERIA: NORMAL
BASOPHILS # BLD: 1 % (ref 0–2)
BASOPHILS ABSOLUTE: 0.1 K/UL (ref 0–0.2)
BILIRUB SERPL-MCNC: 0.4 MG/DL (ref 0.3–1.2)
BILIRUBIN URINE: NEGATIVE
BUN SERPL-MCNC: 19 MG/DL (ref 8–23)
CALCIUM SERPL-MCNC: 8.7 MG/DL (ref 8.6–10.4)
CASTS UA: NORMAL /LPF
CHLORIDE SERPL-SCNC: 99 MMOL/L (ref 98–107)
CO2 SERPL-SCNC: 27 MMOL/L (ref 20–31)
COLOR: YELLOW
CREAT SERPL-MCNC: 0.72 MG/DL (ref 0.5–0.9)
EOSINOPHILS RELATIVE PERCENT: 3 % (ref 0–4)
EPITHELIAL CELLS UA: NORMAL /HPF
GFR SERPL CREATININE-BSD FRML MDRD: >60 ML/MIN/1.73M2
GLUCOSE SERPL-MCNC: 125 MG/DL (ref 70–99)
GLUCOSE UR STRIP.AUTO-MCNC: NEGATIVE MG/DL
HCT VFR BLD AUTO: 36.6 % (ref 36–46)
HGB BLD-MCNC: 12.6 G/DL (ref 12–16)
KETONES UR STRIP.AUTO-MCNC: NEGATIVE MG/DL
LEUKOCYTE ESTERASE UR QL STRIP.AUTO: ABNORMAL
LYMPHOCYTES # BLD: 30 % (ref 24–44)
MCH RBC QN AUTO: 29.7 PG (ref 26–34)
MCHC RBC AUTO-ENTMCNC: 34.5 G/DL (ref 31–37)
MCV RBC AUTO: 86.1 FL (ref 80–100)
MONOCYTES # BLD: 9 % (ref 1–7)
NITRITE UR QL STRIP.AUTO: NEGATIVE
PDW BLD-RTO: 12.7 % (ref 11.5–14.9)
PLATELET # BLD AUTO: 274 K/UL (ref 150–450)
PMV BLD AUTO: 8.5 FL (ref 6–12)
POTASSIUM SERPL-SCNC: 4.1 MMOL/L (ref 3.7–5.3)
PROT SERPL-MCNC: 6.6 G/DL (ref 6.4–8.3)
PROT UR STRIP.AUTO-MCNC: 6 MG/DL (ref 5–8)
PROT UR STRIP.AUTO-MCNC: NEGATIVE MG/DL
RBC # BLD: 4.25 M/UL (ref 4–5.2)
RBC CLUMPS #/AREA URNS AUTO: NORMAL /HPF
SEG NEUTROPHILS: 57 % (ref 36–66)
SEGMENTED NEUTROPHILS ABSOLUTE COUNT: 5.4 K/UL (ref 1.3–9.1)
SODIUM SERPL-SCNC: 135 MMOL/L (ref 135–144)
SPECIFIC GRAVITY UA: 1.02 (ref 1–1.03)
TURBIDITY: CLEAR
URINE HGB: NEGATIVE
UROBILINOGEN, URINE: NORMAL
WBC # BLD AUTO: 9.2 K/UL (ref 3.5–11)
WBC UA: NORMAL /HPF

## 2023-02-06 PROCEDURE — 36415 COLL VENOUS BLD VENIPUNCTURE: CPT

## 2023-02-06 PROCEDURE — 6370000000 HC RX 637 (ALT 250 FOR IP): Performed by: EMERGENCY MEDICINE

## 2023-02-06 PROCEDURE — 81001 URINALYSIS AUTO W/SCOPE: CPT

## 2023-02-06 PROCEDURE — 85025 COMPLETE CBC W/AUTO DIFF WBC: CPT

## 2023-02-06 PROCEDURE — 80053 COMPREHEN METABOLIC PANEL: CPT

## 2023-02-06 PROCEDURE — 99284 EMERGENCY DEPT VISIT MOD MDM: CPT

## 2023-02-06 PROCEDURE — 87088 URINE BACTERIA CULTURE: CPT

## 2023-02-06 PROCEDURE — 74176 CT ABD & PELVIS W/O CONTRAST: CPT

## 2023-02-06 PROCEDURE — 87086 URINE CULTURE/COLONY COUNT: CPT

## 2023-02-06 PROCEDURE — 87186 SC STD MICRODIL/AGAR DIL: CPT

## 2023-02-06 RX ORDER — PEN NEEDLE, DIABETIC 29 G X1/2"
NEEDLE, DISPOSABLE MISCELLANEOUS
OUTPATIENT
Start: 2023-02-06

## 2023-02-06 RX ORDER — OXYCODONE HYDROCHLORIDE AND ACETAMINOPHEN 5; 325 MG/1; MG/1
1 TABLET ORAL ONCE
Status: COMPLETED | OUTPATIENT
Start: 2023-02-06 | End: 2023-02-06

## 2023-02-06 RX ORDER — ONDANSETRON 4 MG/1
4 TABLET, ORALLY DISINTEGRATING ORAL ONCE
Status: COMPLETED | OUTPATIENT
Start: 2023-02-06 | End: 2023-02-06

## 2023-02-06 RX ADMIN — ONDANSETRON 4 MG: 4 TABLET, ORALLY DISINTEGRATING ORAL at 22:29

## 2023-02-06 RX ADMIN — OXYCODONE HYDROCHLORIDE AND ACETAMINOPHEN 1 TABLET: 5; 325 TABLET ORAL at 22:29

## 2023-02-06 ASSESSMENT — ENCOUNTER SYMPTOMS
BACK PAIN: 1
VOMITING: 0
ABDOMINAL PAIN: 1
COUGH: 0
NAUSEA: 1

## 2023-02-06 ASSESSMENT — PAIN DESCRIPTION - DESCRIPTORS: DESCRIPTORS: SHARP

## 2023-02-06 ASSESSMENT — PAIN - FUNCTIONAL ASSESSMENT: PAIN_FUNCTIONAL_ASSESSMENT: 0-10

## 2023-02-06 ASSESSMENT — PAIN DESCRIPTION - FREQUENCY: FREQUENCY: CONTINUOUS

## 2023-02-06 ASSESSMENT — LIFESTYLE VARIABLES
HOW OFTEN DO YOU HAVE A DRINK CONTAINING ALCOHOL: NEVER
HOW MANY STANDARD DRINKS CONTAINING ALCOHOL DO YOU HAVE ON A TYPICAL DAY: PATIENT DOES NOT DRINK

## 2023-02-06 ASSESSMENT — PAIN DESCRIPTION - ORIENTATION: ORIENTATION: RIGHT

## 2023-02-06 ASSESSMENT — PAIN DESCRIPTION - PAIN TYPE: TYPE: ACUTE PAIN

## 2023-02-06 ASSESSMENT — PAIN SCALES - GENERAL: PAINLEVEL_OUTOF10: 9

## 2023-02-07 PROCEDURE — 6370000000 HC RX 637 (ALT 250 FOR IP): Performed by: EMERGENCY MEDICINE

## 2023-02-07 RX ORDER — TRAMADOL HYDROCHLORIDE 50 MG/1
50 TABLET ORAL EVERY 4 HOURS PRN
Qty: 10 TABLET | Refills: 0 | Status: SHIPPED | OUTPATIENT
Start: 2023-02-07 | End: 2023-02-12

## 2023-02-07 RX ORDER — NITROFURANTOIN 25; 75 MG/1; MG/1
100 CAPSULE ORAL ONCE
Status: COMPLETED | OUTPATIENT
Start: 2023-02-07 | End: 2023-02-07

## 2023-02-07 RX ORDER — NITROFURANTOIN 25; 75 MG/1; MG/1
100 CAPSULE ORAL 2 TIMES DAILY
Qty: 10 CAPSULE | Refills: 0 | Status: SHIPPED | OUTPATIENT
Start: 2023-02-07 | End: 2023-02-12

## 2023-02-07 RX ORDER — NITROFURANTOIN 25; 75 MG/1; MG/1
100 CAPSULE ORAL EVERY 12 HOURS SCHEDULED
Status: CANCELLED | OUTPATIENT
Start: 2023-02-07

## 2023-02-07 RX ADMIN — NITROFURANTOIN MONOHYDRATE/MACROCRYSTALLINE 100 MG: 25; 75 CAPSULE ORAL at 01:25

## 2023-02-07 ASSESSMENT — PAIN SCALES - GENERAL: PAINLEVEL_OUTOF10: 4

## 2023-02-07 ASSESSMENT — PAIN DESCRIPTION - LOCATION: LOCATION: HIP
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
## 2023-02-07 NOTE — ED TRIAGE NOTES
Mode of arrival (squad #, walk in, police, etc) : walk in        Chief complaint(s): Rt pelvic/flank and leg pain        Arrival Note (brief scenario, treatment PTA, etc). : Rt pelvic/flank and leg pain started this afternoon and getting worse. Pt rates as a 9/10 and sharp. Pt denies burning with urination or fever. C= \"Have you ever felt that you should Cut down on your drinking? \"  no  A= \"Have people Annoyed you by criticizing your drinking? \"  no  G= \"Have you ever felt bad or Guilty about your drinking? \"  no  E= \"Have you ever had a drink as an Eye-opener first thing in the morning to steady your nerves or to help a hangover? \"  no      Deferred []      Reason for deferring:     *If yes to two or more: probable alcohol abuse. *

## 2023-02-08 LAB
MICROORGANISM SPEC CULT: ABNORMAL
SPECIMEN DESCRIPTION: ABNORMAL

## 2023-02-09 ENCOUNTER — APPOINTMENT (OUTPATIENT)
Dept: GENERAL RADIOLOGY | Age: 63
End: 2023-02-09
Attending: ORTHOPAEDIC SURGERY
Payer: MEDICAID

## 2023-02-09 ENCOUNTER — HOSPITAL ENCOUNTER (OUTPATIENT)
Age: 63
Setting detail: OUTPATIENT SURGERY
Discharge: HOME | End: 2023-02-09
Attending: ORTHOPAEDIC SURGERY | Admitting: ORTHOPAEDIC SURGERY
Payer: MEDICAID

## 2023-02-09 ENCOUNTER — ANESTHESIA EVENT (OUTPATIENT)
Dept: OPERATING ROOM | Age: 63
End: 2023-02-09
Payer: MEDICAID

## 2023-02-09 ENCOUNTER — ANESTHESIA (OUTPATIENT)
Dept: OPERATING ROOM | Age: 63
End: 2023-02-09
Payer: MEDICAID

## 2023-02-09 VITALS
DIASTOLIC BLOOD PRESSURE: 57 MMHG | SYSTOLIC BLOOD PRESSURE: 103 MMHG | TEMPERATURE: 97.2 F | OXYGEN SATURATION: 94 % | RESPIRATION RATE: 15 BRPM | HEART RATE: 67 BPM

## 2023-02-09 DIAGNOSIS — G89.18 POST-OP PAIN: Primary | ICD-10-CM

## 2023-02-09 LAB
GLUCOSE BLD-MCNC: 131 MG/DL (ref 65–105)
GLUCOSE BLD-MCNC: 132 MG/DL (ref 74–100)
POC POTASSIUM: 4.7 MMOL/L (ref 3.5–4.5)

## 2023-02-09 PROCEDURE — 3209999900 FLUORO FOR SURGICAL PROCEDURES

## 2023-02-09 PROCEDURE — 2500000003 HC RX 250 WO HCPCS: Performed by: STUDENT IN AN ORGANIZED HEALTH CARE EDUCATION/TRAINING PROGRAM

## 2023-02-09 PROCEDURE — 6360000002 HC RX W HCPCS

## 2023-02-09 PROCEDURE — 2580000003 HC RX 258: Performed by: ORTHOPAEDIC SURGERY

## 2023-02-09 PROCEDURE — 3700000001 HC ADD 15 MINUTES (ANESTHESIA): Performed by: ORTHOPAEDIC SURGERY

## 2023-02-09 PROCEDURE — 7100000011 HC PHASE II RECOVERY - ADDTL 15 MIN: Performed by: ORTHOPAEDIC SURGERY

## 2023-02-09 PROCEDURE — 2500000003 HC RX 250 WO HCPCS: Performed by: ANESTHESIOLOGY

## 2023-02-09 PROCEDURE — 7100000001 HC PACU RECOVERY - ADDTL 15 MIN: Performed by: ORTHOPAEDIC SURGERY

## 2023-02-09 PROCEDURE — 2500000003 HC RX 250 WO HCPCS: Performed by: ORTHOPAEDIC SURGERY

## 2023-02-09 PROCEDURE — 2580000003 HC RX 258: Performed by: ANESTHESIOLOGY

## 2023-02-09 PROCEDURE — C1713 ANCHOR/SCREW BN/BN,TIS/BN: HCPCS | Performed by: ORTHOPAEDIC SURGERY

## 2023-02-09 PROCEDURE — 3700000000 HC ANESTHESIA ATTENDED CARE: Performed by: ORTHOPAEDIC SURGERY

## 2023-02-09 PROCEDURE — 3600000004 HC SURGERY LEVEL 4 BASE: Performed by: ORTHOPAEDIC SURGERY

## 2023-02-09 PROCEDURE — 6360000002 HC RX W HCPCS: Performed by: ANESTHESIOLOGY

## 2023-02-09 PROCEDURE — 64415 NJX AA&/STRD BRCH PLXS IMG: CPT | Performed by: ANESTHESIOLOGY

## 2023-02-09 PROCEDURE — 2709999900 HC NON-CHARGEABLE SUPPLY: Performed by: ORTHOPAEDIC SURGERY

## 2023-02-09 PROCEDURE — 2500000003 HC RX 250 WO HCPCS

## 2023-02-09 PROCEDURE — 73120 X-RAY EXAM OF HAND: CPT

## 2023-02-09 PROCEDURE — 3600000014 HC SURGERY LEVEL 4 ADDTL 15MIN: Performed by: ORTHOPAEDIC SURGERY

## 2023-02-09 PROCEDURE — 82947 ASSAY GLUCOSE BLOOD QUANT: CPT

## 2023-02-09 PROCEDURE — 84132 ASSAY OF SERUM POTASSIUM: CPT

## 2023-02-09 PROCEDURE — 7100000010 HC PHASE II RECOVERY - FIRST 15 MIN: Performed by: ORTHOPAEDIC SURGERY

## 2023-02-09 PROCEDURE — 7100000000 HC PACU RECOVERY - FIRST 15 MIN: Performed by: ORTHOPAEDIC SURGERY

## 2023-02-09 RX ORDER — BUPIVACAINE HYDROCHLORIDE 5 MG/ML
INJECTION, SOLUTION EPIDURAL; INTRACAUDAL
Status: DISCONTINUED | OUTPATIENT
Start: 2023-02-09 | End: 2023-02-09 | Stop reason: SDUPTHER

## 2023-02-09 RX ORDER — SODIUM CHLORIDE 0.9 % (FLUSH) 0.9 %
5-40 SYRINGE (ML) INJECTION PRN
Status: DISCONTINUED | OUTPATIENT
Start: 2023-02-09 | End: 2023-02-09 | Stop reason: HOSPADM

## 2023-02-09 RX ORDER — ONDANSETRON 2 MG/ML
4 INJECTION INTRAMUSCULAR; INTRAVENOUS
Status: DISCONTINUED | OUTPATIENT
Start: 2023-02-09 | End: 2023-02-09 | Stop reason: HOSPADM

## 2023-02-09 RX ORDER — SODIUM CHLORIDE, SODIUM LACTATE, POTASSIUM CHLORIDE, CALCIUM CHLORIDE 600; 310; 30; 20 MG/100ML; MG/100ML; MG/100ML; MG/100ML
1000 INJECTION, SOLUTION INTRAVENOUS CONTINUOUS
Status: DISCONTINUED | OUTPATIENT
Start: 2023-02-09 | End: 2023-02-09 | Stop reason: HOSPADM

## 2023-02-09 RX ORDER — BUPIVACAINE HYDROCHLORIDE 2.5 MG/ML
INJECTION, SOLUTION INFILTRATION; PERINEURAL PRN
Status: DISCONTINUED | OUTPATIENT
Start: 2023-02-09 | End: 2023-02-09 | Stop reason: ALTCHOICE

## 2023-02-09 RX ORDER — OXYCODONE HYDROCHLORIDE AND ACETAMINOPHEN 5; 325 MG/1; MG/1
1 TABLET ORAL EVERY 6 HOURS PRN
Qty: 28 TABLET | Refills: 0 | Status: SHIPPED | OUTPATIENT
Start: 2023-02-09 | End: 2023-02-17 | Stop reason: SDUPTHER

## 2023-02-09 RX ORDER — CLINDAMYCIN PHOSPHATE 900 MG/50ML
900 INJECTION INTRAVENOUS
Status: COMPLETED | OUTPATIENT
Start: 2023-02-09 | End: 2023-02-09

## 2023-02-09 RX ORDER — DEXAMETHASONE SODIUM PHOSPHATE 10 MG/ML
INJECTION INTRAMUSCULAR; INTRAVENOUS PRN
Status: DISCONTINUED | OUTPATIENT
Start: 2023-02-09 | End: 2023-02-09 | Stop reason: SDUPTHER

## 2023-02-09 RX ORDER — MIDAZOLAM HYDROCHLORIDE 1 MG/ML
INJECTION INTRAMUSCULAR; INTRAVENOUS PRN
Status: DISCONTINUED | OUTPATIENT
Start: 2023-02-09 | End: 2023-02-09 | Stop reason: SDUPTHER

## 2023-02-09 RX ORDER — LIDOCAINE HYDROCHLORIDE 10 MG/ML
INJECTION, SOLUTION EPIDURAL; INFILTRATION; INTRACAUDAL; PERINEURAL PRN
Status: DISCONTINUED | OUTPATIENT
Start: 2023-02-09 | End: 2023-02-09 | Stop reason: SDUPTHER

## 2023-02-09 RX ORDER — HYDRALAZINE HYDROCHLORIDE 20 MG/ML
10 INJECTION INTRAMUSCULAR; INTRAVENOUS
Status: DISCONTINUED | OUTPATIENT
Start: 2023-02-09 | End: 2023-02-09 | Stop reason: HOSPADM

## 2023-02-09 RX ORDER — MAGNESIUM HYDROXIDE 1200 MG/15ML
LIQUID ORAL CONTINUOUS PRN
Status: COMPLETED | OUTPATIENT
Start: 2023-02-09 | End: 2023-02-09

## 2023-02-09 RX ORDER — FENTANYL CITRATE 50 UG/ML
INJECTION, SOLUTION INTRAMUSCULAR; INTRAVENOUS PRN
Status: DISCONTINUED | OUTPATIENT
Start: 2023-02-09 | End: 2023-02-09 | Stop reason: SDUPTHER

## 2023-02-09 RX ORDER — PROPOFOL 10 MG/ML
INJECTION, EMULSION INTRAVENOUS PRN
Status: DISCONTINUED | OUTPATIENT
Start: 2023-02-09 | End: 2023-02-09 | Stop reason: SDUPTHER

## 2023-02-09 RX ORDER — SODIUM CHLORIDE 9 MG/ML
INJECTION, SOLUTION INTRAVENOUS PRN
Status: DISCONTINUED | OUTPATIENT
Start: 2023-02-09 | End: 2023-02-09 | Stop reason: HOSPADM

## 2023-02-09 RX ORDER — PHENYLEPHRINE HCL IN 0.9% NACL 1 MG/10 ML
SYRINGE (ML) INTRAVENOUS PRN
Status: DISCONTINUED | OUTPATIENT
Start: 2023-02-09 | End: 2023-02-09 | Stop reason: SDUPTHER

## 2023-02-09 RX ORDER — SODIUM CHLORIDE 0.9 % (FLUSH) 0.9 %
5-40 SYRINGE (ML) INJECTION EVERY 12 HOURS SCHEDULED
Status: DISCONTINUED | OUTPATIENT
Start: 2023-02-09 | End: 2023-02-09 | Stop reason: HOSPADM

## 2023-02-09 RX ORDER — LABETALOL HYDROCHLORIDE 5 MG/ML
10 INJECTION, SOLUTION INTRAVENOUS
Status: DISCONTINUED | OUTPATIENT
Start: 2023-02-09 | End: 2023-02-09 | Stop reason: HOSPADM

## 2023-02-09 RX ORDER — ONDANSETRON 2 MG/ML
INJECTION INTRAMUSCULAR; INTRAVENOUS PRN
Status: DISCONTINUED | OUTPATIENT
Start: 2023-02-09 | End: 2023-02-09 | Stop reason: SDUPTHER

## 2023-02-09 RX ADMIN — DEXAMETHASONE SODIUM PHOSPHATE 10 MG: 10 INJECTION INTRAMUSCULAR; INTRAVENOUS at 07:27

## 2023-02-09 RX ADMIN — Medication 100 MCG: at 07:26

## 2023-02-09 RX ADMIN — CLINDAMYCIN PHOSPHATE 900 MG: 900 INJECTION, SOLUTION INTRAVENOUS at 07:27

## 2023-02-09 RX ADMIN — FENTANYL CITRATE 25 MCG: 50 INJECTION, SOLUTION INTRAMUSCULAR; INTRAVENOUS at 08:32

## 2023-02-09 RX ADMIN — FENTANYL CITRATE 25 MCG: 50 INJECTION, SOLUTION INTRAMUSCULAR; INTRAVENOUS at 07:36

## 2023-02-09 RX ADMIN — Medication 100 MCG: at 07:29

## 2023-02-09 RX ADMIN — BUPIVACAINE HYDROCHLORIDE 30 ML: 5 INJECTION, SOLUTION EPIDURAL; INTRACAUDAL; PERINEURAL at 10:10

## 2023-02-09 RX ADMIN — LIDOCAINE HYDROCHLORIDE 50 MG: 10 INJECTION, SOLUTION EPIDURAL; INFILTRATION; INTRACAUDAL; PERINEURAL at 07:12

## 2023-02-09 RX ADMIN — HYDROMORPHONE HYDROCHLORIDE 0.25 MG: 1 INJECTION, SOLUTION INTRAMUSCULAR; INTRAVENOUS; SUBCUTANEOUS at 09:50

## 2023-02-09 RX ADMIN — HYDROMORPHONE HYDROCHLORIDE 0.25 MG: 1 INJECTION, SOLUTION INTRAMUSCULAR; INTRAVENOUS; SUBCUTANEOUS at 09:45

## 2023-02-09 RX ADMIN — MIDAZOLAM 2 MG: 1 INJECTION INTRAMUSCULAR; INTRAVENOUS at 07:09

## 2023-02-09 RX ADMIN — FENTANYL CITRATE 50 MCG: 50 INJECTION, SOLUTION INTRAMUSCULAR; INTRAVENOUS at 07:12

## 2023-02-09 RX ADMIN — FENTANYL CITRATE 25 MCG: 50 INJECTION, SOLUTION INTRAMUSCULAR; INTRAVENOUS at 07:57

## 2023-02-09 RX ADMIN — HYDROMORPHONE HYDROCHLORIDE 0.5 MG: 1 INJECTION, SOLUTION INTRAMUSCULAR; INTRAVENOUS; SUBCUTANEOUS at 09:36

## 2023-02-09 RX ADMIN — PROPOFOL 160 MG: 10 INJECTION, EMULSION INTRAVENOUS at 07:12

## 2023-02-09 RX ADMIN — ONDANSETRON 4 MG: 2 INJECTION INTRAMUSCULAR; INTRAVENOUS at 08:32

## 2023-02-09 RX ADMIN — FENTANYL CITRATE 25 MCG: 50 INJECTION, SOLUTION INTRAMUSCULAR; INTRAVENOUS at 07:49

## 2023-02-09 RX ADMIN — FENTANYL CITRATE 25 MCG: 50 INJECTION, SOLUTION INTRAMUSCULAR; INTRAVENOUS at 07:32

## 2023-02-09 RX ADMIN — SODIUM CHLORIDE, POTASSIUM CHLORIDE, SODIUM LACTATE AND CALCIUM CHLORIDE: 600; 310; 30; 20 INJECTION, SOLUTION INTRAVENOUS at 07:08

## 2023-02-09 RX ADMIN — HYDROMORPHONE HYDROCHLORIDE 0.5 MG: 1 INJECTION, SOLUTION INTRAMUSCULAR; INTRAVENOUS; SUBCUTANEOUS at 09:30

## 2023-02-09 RX ADMIN — FENTANYL CITRATE 25 MCG: 50 INJECTION, SOLUTION INTRAMUSCULAR; INTRAVENOUS at 07:46

## 2023-02-09 ASSESSMENT — PAIN DESCRIPTION - DESCRIPTORS
DESCRIPTORS: SHARP;PRESSURE
DESCRIPTORS: PRESSURE

## 2023-02-09 ASSESSMENT — PAIN DESCRIPTION - LOCATION
LOCATION: WRIST

## 2023-02-09 ASSESSMENT — PAIN DESCRIPTION - ORIENTATION
ORIENTATION: RIGHT

## 2023-02-09 ASSESSMENT — PAIN SCALES - GENERAL
PAINLEVEL_OUTOF10: 7
PAINLEVEL_OUTOF10: 9
PAINLEVEL_OUTOF10: 7
PAINLEVEL_OUTOF10: 7
PAINLEVEL_OUTOF10: 6
PAINLEVEL_OUTOF10: 7
PAINLEVEL_OUTOF10: 7

## 2023-02-09 ASSESSMENT — LIFESTYLE VARIABLES: SMOKING_STATUS: 1

## 2023-02-09 ASSESSMENT — PAIN - FUNCTIONAL ASSESSMENT: PAIN_FUNCTIONAL_ASSESSMENT: 0-10

## 2023-02-09 NOTE — TELEPHONE ENCOUNTER
Deejay Ernandez is calling to request a refill on the following medication(s):    Last Visit Date (If Applicable):      Next Visit Date:    Visit date not found    Medication Request:  Requested Prescriptions     Pending Prescriptions Disp Refills    Insulin Pen Needle 29G X 12MM MISC 100 each 3     Si each by Does not apply route daily

## 2023-02-09 NOTE — INTERVAL H&P NOTE
Pt Name: Artemio Solorzano  MRN: [id number]  [place]: 1960  Date of evaluation: 2/9/2023    I have reviewed the patient's history and physical examination completed in pre-admission testing on 1/26/23    No changes to history or on examination today, unless noted below. Cardiology clearance on file with ok to hold Plavix and ASA x 7 days which the pt states she did.      ROCIO Daneil Hashimoto, APRN - CNP  2/9/23  6:57 AM

## 2023-02-09 NOTE — PROGRESS NOTES
Peripheral block Brachial Plexus given by Dr. Nguyen Hunt  bupivacaine (PF) 0.5 % - Perineural              30 mL - 2/9/2023 10:10:00 AM- 10:14AM   patient tolerated well. Vitals in chart.

## 2023-02-09 NOTE — OP NOTE
Operative Note      Patient: Connor Ruth  YOB: 1960  MRN: [id number]    Date of Procedure: 2/9/2023    Pre-Op Diagnosis: ARTHRITIS OF 1st Aia 16 JOINT    Post-Op Diagnosis: ARTHRITIS OF 1st Aia 16 JOINT        Procedure(s):  CARPOMETACARPAL JOINT ARTHROPLASTY  (C-ARM, APPLICATION OF SPLINT RIGHT HAND    Surgeon(s):  Yoanna Ochoa DO    Assistant:   Resident: Micah House DO    Anesthesia: General    Estimated Blood Loss (mL): 5cc    Tourniquet time: 75 minutes    Complications: None    Specimens: None    Implants: None      Drains: None    Findings: 1st CMC joint arthritis     Detailed Description of Procedure:   Taylor Sanchez is a 48-year-old female who presents to [facility] surgical department for left basilar thumb arthroplasty. Patient's had progressively worsening left hand and thumb pain especially with  that is failed to improve despite conservative therapy to include brace wear, activity modification, anti-inflammatory medications. Her symptoms are greatly affecting her usual activities of daily living and the use of her left hand for regular activities and as a result she has indicated she would like surgical intervention at this time. The patient was identified preoperatively where consent was obtained, signed, placed on the chart. The procedure was described. Questions were answered. All details of the procedure, as well as risks, benefits and alternatives, including the option of non operative versus operative treatment were discussed.   The patient understands that risks of the surgery include but are not limited to: bleeding, malunion/nonunion, loss of fixation, loss of reduction, hardware failure, angular or rotational deformity, length discrepancy, limp, transfusion, skin blistering or breakdown, progressive post traumatic degenerative joint disease, possible need for further surgery, bone grafting, infection, nerve injury, paralysis, numbness, blood vessel injury, excessive scaring, wound complication or breakdown, failure of symptoms to improve or actual deterioration in condition, significant acute and/or chronic pain, possible need for amputation, permanent loss of motion, and permanent loss of function. As well as the general complications of anesthesia, which include but are not limited to: myocardial infarction and/or heart attack, stroke, multi organ system failure or even possible death, prolonged hospital stay, blood clots, pulmonary embolism, abnormal reaction to medication, visual and neurological disturbances, constipation, ischemic bowel, bowel obstruction, bowel perforation, ileus and mental status changes. No guarantees were made. Patient was taken to the operative suite where she was placed upon upon the operative table. IV antibiotics were instilled perioperatively. General anesthesia was then affected. The left upper extremity was then prepped and draped in usual sterile fashion. After an appropriate surgical timeout tourniquet was inflate. Incision was then made over the dorsal aspect of the thumb CMC joint just dorsal to the APL tendons. Sharp incision was carried through the skin and subcutaneous tissue from the base of the thumb metacarpal to the radial styloid. Sharp incision was carried through the skin and subcutaneous tissue. Full-thickness skin flaps were raised over the subcutaneous tissue where the radial sensory nerve was identified and protected. First dorsal wrist compartment tendons were then released from the first dorsal wrist compartment and there were multiple slips of the APL. These were mobilized. Attention was then drawn to subperiosteal dissection over the base of the thumb metacarpal and over the trapezium which was identified and confirmed with a joystick placed in the trapezium by using fluoroscopy.   The trapezium and was then mobilized with a joystick and circumferential dissection of the trapezium was made sharply removing it sharply without difficulty. Images were made showing removal of the trapezium and no other issues. A suspension arthroplasty was then affected by placing a nonabsorbable suture twice through the base of the FCR and twice through the most dorsal slip of the APL. This suture sling created a nice trampoline effect for the thumb which was visualized under live fluoroscopy so as to provide a suspension for the thumb metacarpal in the appropriate position. The appropriate position was with the thumb adducted and and pronated. Final images were taken and saved showing the thumb in the appropriate position. The incision was then thoroughly irrigated and suctioned. The joint capsule was closed using dissolvable suture as was the subcutaneous tissue being careful not to injure the radial sensory nerve. Running subcuticular strata fix suture was utilized for subcuticular closure and Dermabond was used cutaneously. Sterile dressing was applied and the patient was placed in a well molded well-padded thumb spica splint. Anesthesia was reversed patient was taken to postop recovery room in stable condition there were no immediate postoperative complications and the procedure was tolerated well. The tourniquet was noted to be on for 75 minutes and once it was released the hand became immediately pink, warm, with brisk capillary refill.     Electronically signed by Stanislav Vázquez DO on 2/9/2023 at 3:52 PM
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
## 2023-02-09 NOTE — ANESTHESIA PROCEDURE NOTES
Peripheral Block    Patient location during procedure: post-op  Reason for block: procedure for pain and post-op pain management  Start time: 2/9/2023 10:10 AM  End time: 2/9/2023 10:14 AM  Staffing  Performed: anesthesiologist   Anesthesiologist: Romina Perez MD  Preanesthetic Checklist  Completed: patient identified, IV checked, site marked, risks and benefits discussed, surgical/procedural consents, equipment checked, pre-op evaluation, timeout performed, anesthesia consent given, oxygen available, monitors applied/VS acknowledged, fire risk safety assessment completed and verbalized and blood product R/B/A discussed and consented  Peripheral Block   Patient position: supine  Prep: ChloraPrep  Provider prep: sterile gloves  Patient monitoring: cardiac monitor, continuous pulse ox, continuous capnometry, IV access, oxygen, frequent blood pressure checks and responsive to questions  Block type: Brachial plexus  Supraclavicular  Laterality: right  Injection technique: single-shot  Guidance: ultrasound guided  Local infiltration: bupivacaine  Infiltration strength: 0.5 %  Local infiltration: bupivacaine  Dose: 30 mL    Needle   Needle type: long-bevel   Assessment   Injection assessment: negative aspiration for heme, local visualized surrounding nerve on ultrasound and no intravascular symptoms  Outcomes: uncomplicated    Medications Administered  bupivacaine (PF) 0.5 % - Perineural   30 mL - 2/9/2023 10:10:00 AM

## 2023-02-09 NOTE — PROGRESS NOTES
DC instructions and script for percocet given to patient and daughter. Dr. Murray Counts at bedside evaluating patient. Vitals stable. OK for DC.

## 2023-02-09 NOTE — BRIEF OP NOTE
Brief Postoperative Note      Patient: Neal Hernandez  YOB: 1960  MRN: [id number]    Date of Procedure: 2/9/2023    Pre-Op Diagnosis: Right 1st CMC osteoarthritis    Post-Op Diagnosis: Same       Procedure(s):   - Right 1st cmc arthroplasty with suture suspensionplasty      Surgeon(s):  Allie Amaral DO    Assistant:  Resident: Sidra Richter DO    Anesthesia: General    Estimated Blood Loss (mL): 10    IVF (mL): 368     Complications: None    Specimens:   * No specimens in log *    Implants:  * No implants in log *      Drains: * No LDAs found *    Findings: See op note     Electronically signed by Elle Parnell DO on 2/9/2023 at 8:32 AM

## 2023-02-09 NOTE — DISCHARGE INSTRUCTIONS
Orthopedic Instructions:  -Weight bearing status: no weight bearing to the right hand. Maintain splint on. Do not remove splint until follow up with Dr. Morgan Land.   -Keep dressing clean and dry. -Dress with plastic bag and rubber band to seal and repeat with second bag and rubber band when showering. \"Press & Seal\" wrap also works for sealing the rubber bag when showering.  -Ice (20 minutes on and off 1 hour) and elevate (above heart) as needed for swelling/pain  -Drink plenty of fluids.  -Call the office or come to Emergency Room if signs of infection appear (hot, swollen, red, draining pus, fever)  -Take medications as prescribed.  -Wean off narcotics (percocet/norco) as soon as possible. Do not take tylenol if still taking narcotics. -No alcoholic beverages or driving/operating machinery while on narcotics  -Follow up with Dr. Morgan Land in his office in 10-14 days after surgery/discharge. Call [phone]  to schedule. IF THE NERVE BLOCK DOES NOT WEAR OFF IN 48 HOURS PLEASE CALL THE ANESTHESIA OFFICE [phone]  YOU MAY START TO GAIN SENSATION GRADUALLY. Kelby Richter. ONCE YOU ARE FEELING SENSATION WE RECOMMEND THAT YOU TAKE THE PRESCRIBED PAIN MEDICATION SO THE PAIN IS NOT SUDDEN. No alcoholic beverages, no driving or operating machinery, no making important decisions for 24 hours. You may have a normal diet but should eat lightly day of surgery. Drink plenty of fluids.   Urinate within 8 hours after surgery, if unable to urinate call your doctor  Call your doctor for the following:   Chills   Temperature greater than 101   Pain that is not tolerable despite taking pain medicine as ordered   There is increased swelling, redness or warmth at surgical site   There is increased drainage or bleeding from surgical site   Do not remove surgical dressing unless instructed to do so by your surgeon                  Peripheral Nerve Block: Care Instructions  Overview     A peripheral nerve block is a way to block pain from a part of the body, such as an arm or a leg. It's used for procedures that are done on one specific area of the body. It can also help relieve pain after a procedure. This can reduce the need for other pain medicines. Sometimes a nerve block may be used to relieve pain from an injury, such as a rib fracture. Nerve blocks are done by injecting numbing medicine into the area near a nerve or group of nerves. There are different types of peripheral nerve blocks. The type that is used depends on the area of the body that needs to be numbed. Problems after a nerve block are rare. There's a small risk of nerve damage, infection, or bleeding. Rarely, the medicines used can affect the heart. Follow-up care is a key part of your treatment and safety. Be sure to make and go to all appointments, and call your doctor if you are having problems. It's also a good idea to know your test results and keep a list of the medicines you take. How can you care for yourself at home? Follow all instructions from your doctor about how to take care of the area that was numbed. Be careful not to injure the area while it's still numb. If you move the area, move it slowly and carefully. Be careful with hot and cold. Since you won't feel pain, it's easier for damage from heat or cold to happen. If your doctor leaves a small tube in place to help you stay numb after your procedure, follow your doctor's instructions about how to use it and take care of it. If you had sedation or general anesthesia  Don't sign legal documents or do anything that requires attention to detail until you recover. It takes time for the medicine's effects to completely wear off. Don't drive or operate any machinery for at least 24 hours. Wait until the medicine wears off and you can think clearly and react easily. If you have sleep apnea and you have a CPAP machine, be sure to use it.   After the procedure, make sure to rest. Some people will feel drowsy or dizzy for up to a few hours after waking up. Take your time, and move slowly. Sudden changes in position may cause nausea. Don't drink alcohol for 24 hours. You can eat your normal diet, unless your doctor gives you other instructions. If your stomach is upset, try clear liquids and bland, low-fat foods like plain toast or rice. Drink plenty of fluids (unless your doctor tells you not to). When should you call for help? Call 911 anytime you think you may need emergency care. For example, call if:    You have trouble breathing. You passed out (lost consciousness). Call your doctor now or seek immediate medical care if:    You have nausea or vomiting that gets worse or won't stop. You have a fever. You have a new or worse headache. The medicine is not wearing off by the time the doctor said it should. You have injured the numb area of your body. Watch closely for changes in your health, and be sure to contact your doctor if:    You do not get better as expected. Where can you learn more? Go to http://www.woods.com/ and enter A627 to learn more about \"Peripheral Nerve Block: Care Instructions. \"  Current as of: March 23, 2022               Content Version: 13.5  © 2006-2022 [employer]. Care instructions adapted under license by [facility] ([facility]). If you have questions about a medical condition or this instruction, always ask your healthcare professional. [place] [address] any warranty or liability for your use of this information.

## 2023-02-09 NOTE — ANESTHESIA POSTPROCEDURE EVALUATION
Department of Anesthesiology  Postprocedure Note    Patient: Hermelinda Blizzard  MRN: [id number]  YOB: 1960  Date of evaluation: 2/9/2023      Procedure Summary     Date: 02/09/23 Room / Location: [address]    Anesthesia Start: Shanna Hastings Anesthesia Stop: 2633    Procedure: CARPOMETACARPAL JOINT ARTHROPLASTY  (C-ARM, APPLICATION OF SPLINT RIGHT HAND (Right) Diagnosis:       Arthritis of carpometacarpal joint      (ARTHRITIS OF [facility] JOINT)    Surgeons: Fritz Holly DO Responsible Provider: Grecia Youngblood MD    Anesthesia Type: general ASA Status: 3          Anesthesia Type: No value filed.     Jacquelyn Phase I: Jacquelyn Score: 10    Jacquelyn Phase II: Jacquelyn Score: 10      Anesthesia Post Evaluation    Patient location during evaluation: PACU  Patient participation: complete - patient participated  Level of consciousness: awake  Airway patency: patent  Nausea & Vomiting: no nausea and no vomiting  Complications: no  Cardiovascular status: blood pressure returned to baseline  Respiratory status: acceptable  Hydration status: euvolemic  Comments: BP (!) 103/57   Pulse 76   Temp 97.2 °F (36.2 °C) (Temporal)   Resp 18   SpO2 95%
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
## 2023-02-15 DIAGNOSIS — G89.18 POST-OP PAIN: ICD-10-CM

## 2023-02-15 RX ORDER — OXYCODONE HYDROCHLORIDE AND ACETAMINOPHEN 5; 325 MG/1; MG/1
1 TABLET ORAL EVERY 6 HOURS PRN
Qty: 28 TABLET | Refills: 0 | Status: CANCELLED | OUTPATIENT
Start: 2023-02-15 | End: 2023-02-22

## 2023-02-17 DIAGNOSIS — G89.18 POST-OP PAIN: ICD-10-CM

## 2023-02-17 RX ORDER — OXYCODONE HYDROCHLORIDE AND ACETAMINOPHEN 5; 325 MG/1; MG/1
1 TABLET ORAL EVERY 6 HOURS PRN
Qty: 28 TABLET | Refills: 0 | Status: SHIPPED | OUTPATIENT
Start: 2023-02-17 | End: 2023-02-24

## 2023-02-22 ENCOUNTER — OFFICE VISIT (OUTPATIENT)
Dept: ORTHOPEDIC SURGERY | Age: 63
End: 2023-02-22

## 2023-02-22 VITALS — WEIGHT: 173 LBS | BODY MASS INDEX: 28.82 KG/M2 | HEIGHT: 65 IN

## 2023-02-22 DIAGNOSIS — M18.11 PRIMARY OSTEOARTHRITIS OF FIRST CARPOMETACARPAL JOINT OF RIGHT HAND: Primary | ICD-10-CM

## 2023-02-22 PROCEDURE — 99024 POSTOP FOLLOW-UP VISIT: CPT | Performed by: STUDENT IN AN ORGANIZED HEALTH CARE EDUCATION/TRAINING PROGRAM

## 2023-02-22 RX ORDER — OXYCODONE HYDROCHLORIDE AND ACETAMINOPHEN 5; 325 MG/1; MG/1
1 TABLET ORAL EVERY 6 HOURS PRN
Qty: 28 TABLET | Refills: 0 | Status: SHIPPED | OUTPATIENT
Start: 2023-02-22 | End: 2023-03-01

## 2023-02-22 NOTE — PROGRESS NOTES
[address]  [address] [facility] [address]  Dept: [phone]  Dept Fax: [phone]        Ambulatory Follow Up    Subjective:   Luis Miguel Brizuela is a 58y.o. year old female who presents to our office today for routine followup regarding:   her   1. Primary osteoarthritis of first carpometacarpal joint of right hand    . Chief Complaint   Patient presents with    Post-Op Check     DOS 2/9/23 R [facility] arthroplasty       HPI  Destiny presents today for 2-week postop visit from right [facility] arthroplasty. Patient has been in a splint since her procedure. She notes she has been doing well. She has taken all of her pain medications and requests more today. Overall her pain is controlled with the Percocet. She denies any issues with her incision. Denies any fevers, chills, notes numbness tingling in the fingers. Review of Systems   Constitutional: Negative for fever and chills. HENT: Negative for congestion. Eyes: Negative for blurred vision and double vision. Respiratory: Negative for cough, shortness of breath and wheezing. Cardiovascular: Negative for chest pain and palpitations. Gastrointestinal: Negative for nausea. Negative for vomiting. Musculoskeletal: Positive for right wrist pain  Skin: Negative for itching and rash. Neurological: Negative for dizziness, sensory change and headaches. Psychiatric/Behavioral: Negative for depression and suicidal ideas. I have reviewed the CC, HPI, ROS, PMH, FHX, Social History. I agree with the documentation provided by other staff, residents, and/or medical students and have reviewed their documentation prior to providing my signature indicating agreement. Objective :   General: AAOx3, NAD, appears stated age  Ortho Exam  Right upper extremity: Splint removed. Surgical incision is well approximated with Dermabond overlying the incision.   There is no erythema or drainage. Appropriate postoperative tenderness to palpation. Sensory exam intact about the incision and thumb. Motor function intact to thumb. Compartments soft. 2+ rad pulse. Median/Radial/Ulnar/AIN/PIN motor intact. Median/Radial/Ulnar nerve SILT. CV: no obvious JVD, no dependent edema, distal pulses 2+  Respiratory: chest rise symmetric, unlabored respirations, no audible wheezing  Skin: warm, well perfused, no obvious rashes or lesions  Psych: Patient displays understanding of exam, diagnosis, and plan. Radiology: None today     Assessment:      1. Primary osteoarthritis of first carpometacarpal joint of right hand         Plan:   Patient is doing well today 2 weeks postop from right [facility] arthroplasty. A thermoplastic thumb spica splint was applied. Patient will wear this for 4 weeks. We will see her back at the 6-week point and discontinue splint and begin range of motion. I refilled her Percocet today. Follow up:Return in about 4 weeks (around 3/22/2023). Orders Placed This Encounter   Medications    oxyCODONE-acetaminophen (PERCOCET) 5-325 MG per tablet     Sig: Take 1 tablet by mouth every 6 hours as needed for Pain for up to 7 days. Intended supply: 7 days.  Take lowest dose possible to manage pain Max Daily Amount: 4 tablets     Dispense:  28 tablet     Refill:  0     Reduce doses taken as pain becomes manageable       Earl Lopez DO   Orthopedic Surgery Resident PGY-5  [facility], [facility]

## 2023-02-22 NOTE — PROGRESS NOTES
[address]  [address] [facility] [address]  Dept: [phone]  Dept Fax: [phone]        Ambulatory Follow Up    Subjective:   Arron Duke is a 58y.o. year old female who presents to our office today for routine followup regarding:   her   1. Primary osteoarthritis of first carpometacarpal joint of right hand    . Chief Complaint   Patient presents with    Post-Op Check     DOS 2/9/23 R [facility] arthroplasty       AKI Dixon presents today for postop follow-up from right [facility] [place] arthroplasty. Patient is 2 weeks postop today. Patient has been wearing a thumb spica splint. She denies any issues with her splint. She is been taking Percocet for pain which is controlled her pain well. She denies any complications with her incision. She denies any fever, chills, numbness tingling. Review of Systems   Constitutional: Negative for fever and chills. HENT: Negative for congestion. Eyes: Negative for blurred vision and double vision. Respiratory: Negative for cough, shortness of breath and wheezing. Cardiovascular: Negative for chest pain and palpitations. Gastrointestinal: Negative for nausea. Negative for vomiting. Musculoskeletal: Positive for myalgias and joint pain   Skin: Negative for itching and rash. Neurological: Negative for dizziness, sensory change and headaches. Psychiatric/Behavioral: Negative for depression and suicidal ideas. Objective :   General: AAOx3, NAD, appears stated age  Ortho Exam  RUE: Thumb spica splint removed. Skin incision is healing well with Dermabond overlying skin incision. There is no erythema or drainage. Sensory exam intact about thumb and wrist.  Appropriate postop tenderness to palpation. compartments soft. 2+ rad pulse. Median/Radial/Ulnar/AIN/PIN motor intact. Median/Radial/Ulnar nerve SILT.      CV: no obvious JVD, no dependent edema, distal pulses 2+  Respiratory: chest rise symmetric, unlabored respirations, no audible wheezing  Skin: warm, well perfused, no obvious rashes or lesions  Psych: Patient displays understanding of exam, diagnosis, and plan. Radiology:   None today     Assessment:      1. Primary osteoarthritis of first carpometacarpal joint of right hand           Plan:   Aby Neal is doing well today status post [facility] arthroplasty of the right wrist.  We converted her into a thermoplastic thumb spica splint. She will wear this for 4 weeks. We will see her back at her 6-week postop visit. At that time we will remove the splint and allow her to start range of motion. I refilled her pain medication today. Informed patient this will be her last narcotic prescription. Follow up:Return in about 4 weeks (around 3/22/2023). Orders Placed This Encounter   Medications    oxyCODONE-acetaminophen (PERCOCET) 5-325 MG per tablet     Sig: Take 1 tablet by mouth every 6 hours as needed for Pain for up to 7 days. Intended supply: 7 days.  Take lowest dose possible to manage pain Max Daily Amount: 4 tablets     Dispense:  28 tablet     Refill:  0     Reduce doses taken as pain becomes manageable          Orders Placed This Encounter   Procedures    XR HAND RIGHT (MIN 3 VIEWS)     Standing Status:   Future     Standing Expiration Date:   2/17/2024       Vinay Bonner DO   Orthopedic Surgery Resident PGY-5  [address] [facility]

## 2023-03-10 DIAGNOSIS — E11.9 TYPE 2 DIABETES MELLITUS WITHOUT COMPLICATION, WITHOUT LONG-TERM CURRENT USE OF INSULIN (HCC): ICD-10-CM

## 2023-03-10 NOTE — TELEPHONE ENCOUNTER
Vira Dill is calling to request a refill on the following medication(s):    Last Visit Date (If Applicable):  46/43/7240    Next Visit Date:    Visit date not found    Medication Request:  Requested Prescriptions     Pending Prescriptions Disp Refills    insulin lispro, 1 Unit Dial, (HUMALOG/ADMELOG) 100 UNIT/ML SOPN [Pharmacy Med Name: INSULIN LISPRO 100 UNIT/ML PEN] 3 mL 0     Sig: inject 1 to 3 units subcutaneously three times a day before meals

## 2023-03-14 DIAGNOSIS — E11.9 TYPE 2 DIABETES MELLITUS WITHOUT COMPLICATION, WITHOUT LONG-TERM CURRENT USE OF INSULIN (HCC): ICD-10-CM

## 2023-03-15 RX ORDER — INSULIN LISPRO 100 [IU]/ML
1-3 INJECTION, SOLUTION INTRAVENOUS; SUBCUTANEOUS
Qty: 2.7 ML | Refills: 0 | OUTPATIENT
Start: 2023-03-15 | End: 2023-04-14

## 2023-03-15 RX ORDER — INSULIN LISPRO 100 [IU]/ML
INJECTION, SOLUTION INTRAVENOUS; SUBCUTANEOUS
Qty: 3 ML | Refills: 11 | Status: SHIPPED | OUTPATIENT
Start: 2023-03-15 | End: 2023-03-16 | Stop reason: SDUPTHER

## 2023-03-15 RX ORDER — INSULIN LISPRO 100 [IU]/ML
INJECTION, SOLUTION INTRAVENOUS; SUBCUTANEOUS
Qty: 3 ML | Refills: 0 | OUTPATIENT
Start: 2023-03-15

## 2023-03-16 ENCOUNTER — TELEPHONE (OUTPATIENT)
Dept: FAMILY MEDICINE CLINIC | Age: 63
End: 2023-03-16

## 2023-03-16 DIAGNOSIS — E11.9 TYPE 2 DIABETES MELLITUS WITHOUT COMPLICATION, WITHOUT LONG-TERM CURRENT USE OF INSULIN (HCC): ICD-10-CM

## 2023-03-16 RX ORDER — INSULIN LISPRO 100 [IU]/ML
INJECTION, SOLUTION INTRAVENOUS; SUBCUTANEOUS
Qty: 2 ADJUSTABLE DOSE PRE-FILLED PEN SYRINGE | Refills: 11 | Status: SHIPPED | OUTPATIENT
Start: 2023-03-16

## 2023-03-16 NOTE — TELEPHONE ENCOUNTER
Pharmacy wants to verify qty for humalog due to the current prescription set to last 3 days.     Please advise

## 2023-03-22 ENCOUNTER — OFFICE VISIT (OUTPATIENT)
Dept: ORTHOPEDIC SURGERY | Age: 63
End: 2023-03-22

## 2023-03-22 VITALS — WEIGHT: 173 LBS | HEIGHT: 65 IN | BODY MASS INDEX: 28.82 KG/M2

## 2023-03-22 DIAGNOSIS — M18.11 PRIMARY OSTEOARTHRITIS OF FIRST CARPOMETACARPAL JOINT OF RIGHT HAND: Primary | ICD-10-CM

## 2023-03-22 PROBLEM — M19.049 CMC ARTHRITIS: Status: ACTIVE | Noted: 2023-03-22

## 2023-03-22 PROBLEM — G89.18 POST-OP PAIN: Status: ACTIVE | Noted: 2023-03-22

## 2023-03-22 PROCEDURE — 99024 POSTOP FOLLOW-UP VISIT: CPT | Performed by: STUDENT IN AN ORGANIZED HEALTH CARE EDUCATION/TRAINING PROGRAM

## 2023-03-22 NOTE — PROGRESS NOTES
[address] [facility]  [address] [place] [address] [address]  Dept: [phone]  Dept Fax: [phone]        Orthopaedic Clinic Follow Up      Subjective:   Mary Kay Jeffery is a 58 y.o. female who presents to the clinic today for routine follow up after [facility] joint arthroplasty on 2/9/23, 6 weeks post-op from surgery now. Has been wearing a fast form splint diligently. Has had no issues. Feels her pain is resolving. No changes in sensation. No falls or trauma. Review of Systems  Gen: no fever, chills, malaise  CV: no chest pain or palpitations  Resp: no cough or shortness of breath  GI: no nausea, vomiting, diarrhea, or constipation  Neuro: no seizures, vertigo, or headache  Msk: See HPI  10 remaining systems reviewed and negative    Objective : There were no vitals filed for this visit. Body mass index is 28.79 kg/m². General: No acute distress, resting comfortably in the clinic  Neuro: alert. oriented  Eyes: Extra-ocular muscles intact  Pulm: Respirations unlabored and regular. Skin: warm, well perfused  Psych:   Patient has good fund of knowledge and displays understanding of exam, diagnosis, and plan. MSK:  Right upper extremity: Fast form splint was removed today. Surgical incision site is well approximated. Surgical glue is still intact. There is no signs of dehiscence or infection. Sensation about the thumb and the remainder of the hand is intact. Range of motion of the PIP joint of the thumb is about 25 degrees of flexion to full extension. Has full extension of the MCP joint and approximately 20 degrees of flexion. 15 degrees of ulnar and radial motion at the thumb MCP joint. Sensation intact about the thumb. Radiology:  No x-rays obtained in clinic today. Assessment:   58y.o. year old female being seen for:      ICD-10-CM    1.  Primary osteoarthritis of first carpometacarpal joint of right hand  M18.11

## 2023-03-24 ENCOUNTER — TELEPHONE (OUTPATIENT)
Dept: ORTHOPEDIC SURGERY | Age: 63
End: 2023-03-24

## 2023-04-27 DIAGNOSIS — E11.65 UNCONTROLLED TYPE 2 DIABETES MELLITUS WITH HYPERGLYCEMIA (HCC): ICD-10-CM

## 2023-04-28 RX ORDER — PROCHLORPERAZINE 25 MG/1
1 SUPPOSITORY RECTAL WEEKLY
Qty: 3 EACH | Refills: 11 | Status: SHIPPED | OUTPATIENT
Start: 2023-04-28

## 2023-05-04 ENCOUNTER — APPOINTMENT (OUTPATIENT)
Dept: CT IMAGING | Age: 63
DRG: 247 | End: 2023-05-04
Payer: MEDICAID

## 2023-05-04 ENCOUNTER — APPOINTMENT (OUTPATIENT)
Dept: GENERAL RADIOLOGY | Age: 63
DRG: 247 | End: 2023-05-04
Payer: MEDICAID

## 2023-05-04 ENCOUNTER — HOSPITAL ENCOUNTER (INPATIENT)
Age: 63
LOS: 2 days | Discharge: HOME | DRG: 247 | End: 2023-05-06
Attending: EMERGENCY MEDICINE
Payer: MEDICAID

## 2023-05-04 DIAGNOSIS — I21.4 NSTEMI (NON-ST ELEVATED MYOCARDIAL INFARCTION) (HCC): Primary | ICD-10-CM

## 2023-05-04 PROBLEM — A41.9 SEPTICEMIA (HCC): Status: ACTIVE | Noted: 2023-05-04

## 2023-05-04 LAB
ADENOVIRUS PCR: NOT DETECTED
ALBUMIN SERPL-MCNC: 4.3 G/DL (ref 3.5–5.2)
ALBUMIN/GLOBULIN RATIO: 1.4 (ref 1–2.5)
ALP SERPL-CCNC: 150 U/L (ref 35–104)
ALT SERPL-CCNC: 16 U/L (ref 5–33)
ANION GAP SERPL CALCULATED.3IONS-SCNC: 17 MMOL/L (ref 9–17)
AST SERPL-CCNC: 15 U/L
B PARAP IS1001 DNA NPH QL NAA+NON-PROBE: NOT DETECTED
B PERT DNA SPEC QL NAA+PROBE: NOT DETECTED
BILIRUB SERPL-MCNC: 1.4 MG/DL (ref 0.3–1.2)
BILIRUBIN URINE: NEGATIVE
BUN SERPL-MCNC: 13 MG/DL (ref 8–23)
CALCIUM SERPL-MCNC: 9.5 MG/DL (ref 8.6–10.4)
CHLAMYDIA PNEUMONIAE BY PCR: NOT DETECTED
CHLORIDE SERPL-SCNC: 100 MMOL/L (ref 98–107)
CHP ED QC CHECK: 136
CO2 SERPL-SCNC: 20 MMOL/L (ref 20–31)
COLOR: YELLOW
COMMENT UA: NORMAL
CORONAVIRUS 229E PCR: NOT DETECTED
CORONAVIRUS HKU1 PCR: NOT DETECTED
CORONAVIRUS NL63 PCR: NOT DETECTED
CORONAVIRUS OC43 PCR: NOT DETECTED
CREAT SERPL-MCNC: 0.68 MG/DL (ref 0.5–0.9)
ERYTHROCYTE [SEDIMENTATION RATE] IN BLOOD BY WESTERGREN METHOD: 12 MM/HR (ref 0–30)
FLUAV AG SPEC QL: NEGATIVE
FLUAV RNA NPH QL NAA+NON-PROBE: NOT DETECTED
FLUBV AG SPEC QL: NEGATIVE
FLUBV RNA NPH QL NAA+NON-PROBE: NOT DETECTED
GFR SERPL CREATININE-BSD FRML MDRD: >60 ML/MIN/1.73M2
GLUCOSE BLD-MCNC: 136 MG/DL (ref 65–105)
GLUCOSE BLD-MCNC: 140 MG/DL (ref 65–105)
GLUCOSE SERPL-MCNC: 150 MG/DL (ref 70–99)
GLUCOSE UR STRIP.AUTO-MCNC: NEGATIVE MG/DL
HCT VFR BLD AUTO: 35.9 % (ref 36.3–47.1)
HCT VFR BLD AUTO: 41.4 % (ref 36.3–47.1)
HGB BLD-MCNC: 12.3 G/DL (ref 11.9–15.1)
HGB BLD-MCNC: 14.5 G/DL (ref 11.9–15.1)
HUMAN METAPNEUMOVIRUS PCR: NOT DETECTED
KETONES UR STRIP.AUTO-MCNC: NEGATIVE MG/DL
LACTIC ACID, SEPSIS WHOLE BLOOD: 1.1 MMOL/L (ref 0.5–1.9)
LACTIC ACID, SEPSIS WHOLE BLOOD: 1.6 MMOL/L (ref 0.5–1.9)
LEUKOCYTE ESTERASE UR QL STRIP.AUTO: NEGATIVE
MCH RBC QN AUTO: 30.5 PG (ref 25.2–33.5)
MCH RBC QN AUTO: 30.8 PG (ref 25.2–33.5)
MCHC RBC AUTO-ENTMCNC: 34.3 G/DL (ref 28.4–34.8)
MCHC RBC AUTO-ENTMCNC: 35 G/DL (ref 28.4–34.8)
MCV RBC AUTO: 87 FL (ref 82.6–102.9)
MCV RBC AUTO: 89.8 FL (ref 82.6–102.9)
MYCOPLASMA PNEUMONIAE PCR: NOT DETECTED
NITRITE UR QL STRIP.AUTO: NEGATIVE
NRBC AUTOMATED: 0 PER 100 WBC
NRBC AUTOMATED: 0 PER 100 WBC
PARAINFLUENZA 1 PCR: NOT DETECTED
PARAINFLUENZA 2 PCR: NOT DETECTED
PARAINFLUENZA 3 PCR: NOT DETECTED
PARAINFLUENZA 4 PCR: NOT DETECTED
PARTIAL THROMBOPLASTIN TIME: 30 SEC (ref 23–36.5)
PDW BLD-RTO: 12.1 % (ref 11.8–14.4)
PDW BLD-RTO: 12.2 % (ref 11.8–14.4)
PLATELET # BLD AUTO: 215 K/UL (ref 138–453)
PLATELET # BLD AUTO: 280 K/UL (ref 138–453)
PMV BLD AUTO: 10.4 FL (ref 8.1–13.5)
PMV BLD AUTO: 11.2 FL (ref 8.1–13.5)
POTASSIUM SERPL-SCNC: 3.3 MMOL/L (ref 3.7–5.3)
PROCALCITONIN SERPL-MCNC: 0.04 NG/ML
PROT SERPL-MCNC: 7.4 G/DL (ref 6.4–8.3)
PROT UR STRIP.AUTO-MCNC: 6.5 MG/DL (ref 5–8)
PROT UR STRIP.AUTO-MCNC: NEGATIVE MG/DL
RBC # BLD: 4 M/UL (ref 3.95–5.11)
RBC # BLD: 4.76 M/UL (ref 3.95–5.11)
RESP SYNCYTIAL VIRUS PCR: NOT DETECTED
RHINO/ENTEROVIRUS PCR: NOT DETECTED
SARS-COV-2 RDRP RESP QL NAA+PROBE: NOT DETECTED
SARS-COV-2 RNA NPH QL NAA+NON-PROBE: NOT DETECTED
SODIUM SERPL-SCNC: 137 MMOL/L (ref 135–144)
SPECIFIC GRAVITY UA: 1.01 (ref 1–1.03)
SPECIMEN DESCRIPTION: NORMAL
SPECIMEN DESCRIPTION: NORMAL
TROPONIN I SERPL DL<=0.01 NG/ML-MCNC: 15 NG/L (ref 0–14)
TROPONIN I SERPL DL<=0.01 NG/ML-MCNC: 28 NG/L (ref 0–14)
TROPONIN I SERPL DL<=0.01 NG/ML-MCNC: 8 NG/L (ref 0–14)
TSH SERPL-ACNC: 0.92 UIU/ML (ref 0.3–5)
TURBIDITY: CLEAR
URINE HGB: NEGATIVE
UROBILINOGEN, URINE: NORMAL
WBC # BLD AUTO: 12.9 K/UL (ref 3.5–11.3)
WBC # BLD AUTO: 19.9 K/UL (ref 3.5–11.3)

## 2023-05-04 PROCEDURE — 71045 X-RAY EXAM CHEST 1 VIEW: CPT

## 2023-05-04 PROCEDURE — 6360000002 HC RX W HCPCS: Performed by: STUDENT IN AN ORGANIZED HEALTH CARE EDUCATION/TRAINING PROGRAM

## 2023-05-04 PROCEDURE — 6370000000 HC RX 637 (ALT 250 FOR IP)

## 2023-05-04 PROCEDURE — 2580000003 HC RX 258

## 2023-05-04 PROCEDURE — 6370000000 HC RX 637 (ALT 250 FOR IP): Performed by: NURSE PRACTITIONER

## 2023-05-04 PROCEDURE — 85652 RBC SED RATE AUTOMATED: CPT

## 2023-05-04 PROCEDURE — 84145 PROCALCITONIN (PCT): CPT

## 2023-05-04 PROCEDURE — 99285 EMERGENCY DEPT VISIT HI MDM: CPT

## 2023-05-04 PROCEDURE — 87040 BLOOD CULTURE FOR BACTERIA: CPT

## 2023-05-04 PROCEDURE — 83605 ASSAY OF LACTIC ACID: CPT

## 2023-05-04 PROCEDURE — 36415 COLL VENOUS BLD VENIPUNCTURE: CPT

## 2023-05-04 PROCEDURE — 74177 CT ABD & PELVIS W/CONTRAST: CPT

## 2023-05-04 PROCEDURE — 87635 SARS-COV-2 COVID-19 AMP PRB: CPT

## 2023-05-04 PROCEDURE — 85730 THROMBOPLASTIN TIME PARTIAL: CPT

## 2023-05-04 PROCEDURE — 84484 ASSAY OF TROPONIN QUANT: CPT

## 2023-05-04 PROCEDURE — 2060000002 HC BURN ICU INTERMEDIATE R&B

## 2023-05-04 PROCEDURE — 0202U NFCT DS 22 TRGT SARS-COV-2: CPT

## 2023-05-04 PROCEDURE — 85027 COMPLETE CBC AUTOMATED: CPT

## 2023-05-04 PROCEDURE — 81003 URINALYSIS AUTO W/O SCOPE: CPT

## 2023-05-04 PROCEDURE — 84443 ASSAY THYROID STIM HORMONE: CPT

## 2023-05-04 PROCEDURE — 6360000002 HC RX W HCPCS

## 2023-05-04 PROCEDURE — 82947 ASSAY GLUCOSE BLOOD QUANT: CPT

## 2023-05-04 PROCEDURE — 86140 C-REACTIVE PROTEIN: CPT

## 2023-05-04 PROCEDURE — 6360000004 HC RX CONTRAST MEDICATION

## 2023-05-04 PROCEDURE — 80053 COMPREHEN METABOLIC PANEL: CPT

## 2023-05-04 PROCEDURE — 87804 INFLUENZA ASSAY W/OPTIC: CPT

## 2023-05-04 PROCEDURE — 96374 THER/PROPH/DIAG INJ IV PUSH: CPT

## 2023-05-04 PROCEDURE — 93005 ELECTROCARDIOGRAM TRACING: CPT | Performed by: INTERNAL MEDICINE

## 2023-05-04 RX ORDER — INSULIN LISPRO 100 [IU]/ML
0-8 INJECTION, SOLUTION INTRAVENOUS; SUBCUTANEOUS
Status: DISCONTINUED | OUTPATIENT
Start: 2023-05-05 | End: 2023-05-06 | Stop reason: HOSPADM

## 2023-05-04 RX ORDER — ACETAMINOPHEN 650 MG/1
650 SUPPOSITORY RECTAL EVERY 6 HOURS PRN
Status: DISCONTINUED | OUTPATIENT
Start: 2023-05-04 | End: 2023-05-04 | Stop reason: SDUPTHER

## 2023-05-04 RX ORDER — SODIUM CHLORIDE 9 MG/ML
INJECTION, SOLUTION INTRAVENOUS PRN
Status: DISCONTINUED | OUTPATIENT
Start: 2023-05-04 | End: 2023-05-06 | Stop reason: HOSPADM

## 2023-05-04 RX ORDER — POTASSIUM CHLORIDE 20 MEQ/1
40 TABLET, EXTENDED RELEASE ORAL PRN
Status: DISCONTINUED | OUTPATIENT
Start: 2023-05-04 | End: 2023-05-06 | Stop reason: HOSPADM

## 2023-05-04 RX ORDER — HEPARIN SODIUM 10000 [USP'U]/100ML
5-30 INJECTION, SOLUTION INTRAVENOUS CONTINUOUS
Status: DISCONTINUED | OUTPATIENT
Start: 2023-05-04 | End: 2023-05-05 | Stop reason: ALTCHOICE

## 2023-05-04 RX ORDER — ASPIRIN 81 MG/1
81 TABLET, CHEWABLE ORAL DAILY
Status: DISCONTINUED | OUTPATIENT
Start: 2023-05-05 | End: 2023-05-04 | Stop reason: SDUPTHER

## 2023-05-04 RX ORDER — HEPARIN SODIUM 1000 [USP'U]/ML
60 INJECTION, SOLUTION INTRAVENOUS; SUBCUTANEOUS ONCE
Status: DISCONTINUED | OUTPATIENT
Start: 2023-05-04 | End: 2023-05-04 | Stop reason: SDUPTHER

## 2023-05-04 RX ORDER — HEPARIN SODIUM 1000 [USP'U]/ML
4000 INJECTION, SOLUTION INTRAVENOUS; SUBCUTANEOUS ONCE
Status: COMPLETED | OUTPATIENT
Start: 2023-05-04 | End: 2023-05-04

## 2023-05-04 RX ORDER — FUROSEMIDE 20 MG/1
20 TABLET ORAL DAILY
Status: DISCONTINUED | OUTPATIENT
Start: 2023-05-04 | End: 2023-05-06 | Stop reason: HOSPADM

## 2023-05-04 RX ORDER — SODIUM CHLORIDE 0.9 % (FLUSH) 0.9 %
5-40 SYRINGE (ML) INJECTION EVERY 12 HOURS SCHEDULED
Status: DISCONTINUED | OUTPATIENT
Start: 2023-05-04 | End: 2023-05-06 | Stop reason: HOSPADM

## 2023-05-04 RX ORDER — AMLODIPINE BESYLATE 5 MG/1
5 TABLET ORAL DAILY
Status: DISCONTINUED | OUTPATIENT
Start: 2023-05-04 | End: 2023-05-06 | Stop reason: HOSPADM

## 2023-05-04 RX ORDER — ACETAMINOPHEN 325 MG/1
650 TABLET ORAL EVERY 6 HOURS PRN
Status: DISCONTINUED | OUTPATIENT
Start: 2023-05-04 | End: 2023-05-06 | Stop reason: HOSPADM

## 2023-05-04 RX ORDER — LOSARTAN POTASSIUM 25 MG/1
25 TABLET ORAL DAILY
Status: DISCONTINUED | OUTPATIENT
Start: 2023-05-04 | End: 2023-05-06 | Stop reason: HOSPADM

## 2023-05-04 RX ORDER — SODIUM CHLORIDE, SODIUM LACTATE, POTASSIUM CHLORIDE, CALCIUM CHLORIDE 600; 310; 30; 20 MG/100ML; MG/100ML; MG/100ML; MG/100ML
INJECTION, SOLUTION INTRAVENOUS CONTINUOUS
Status: DISCONTINUED | OUTPATIENT
Start: 2023-05-04 | End: 2023-05-05

## 2023-05-04 RX ORDER — INSULIN LISPRO 100 [IU]/ML
0-4 INJECTION, SOLUTION INTRAVENOUS; SUBCUTANEOUS NIGHTLY
Status: DISCONTINUED | OUTPATIENT
Start: 2023-05-04 | End: 2023-05-06 | Stop reason: HOSPADM

## 2023-05-04 RX ORDER — SODIUM CHLORIDE 0.9 % (FLUSH) 0.9 %
5-40 SYRINGE (ML) INJECTION PRN
Status: DISCONTINUED | OUTPATIENT
Start: 2023-05-04 | End: 2023-05-06 | Stop reason: HOSPADM

## 2023-05-04 RX ORDER — SODIUM CHLORIDE 9 MG/ML
INJECTION, SOLUTION INTRAVENOUS CONTINUOUS
Status: DISCONTINUED | OUTPATIENT
Start: 2023-05-04 | End: 2023-05-04

## 2023-05-04 RX ORDER — POLYETHYLENE GLYCOL 3350 17 G/17G
17 POWDER, FOR SOLUTION ORAL DAILY PRN
Status: DISCONTINUED | OUTPATIENT
Start: 2023-05-04 | End: 2023-05-06 | Stop reason: HOSPADM

## 2023-05-04 RX ORDER — ONDANSETRON 4 MG/1
4 TABLET, ORALLY DISINTEGRATING ORAL EVERY 8 HOURS PRN
Status: DISCONTINUED | OUTPATIENT
Start: 2023-05-04 | End: 2023-05-06 | Stop reason: HOSPADM

## 2023-05-04 RX ORDER — ONDANSETRON 2 MG/ML
4 INJECTION INTRAMUSCULAR; INTRAVENOUS EVERY 6 HOURS PRN
Status: DISCONTINUED | OUTPATIENT
Start: 2023-05-04 | End: 2023-05-06 | Stop reason: HOSPADM

## 2023-05-04 RX ORDER — SODIUM CHLORIDE 0.9 % (FLUSH) 0.9 %
10 SYRINGE (ML) INJECTION PRN
Status: DISCONTINUED | OUTPATIENT
Start: 2023-05-04 | End: 2023-05-06 | Stop reason: HOSPADM

## 2023-05-04 RX ORDER — HEPARIN SODIUM 1000 [USP'U]/ML
30 INJECTION, SOLUTION INTRAVENOUS; SUBCUTANEOUS PRN
Status: DISCONTINUED | OUTPATIENT
Start: 2023-05-04 | End: 2023-05-04 | Stop reason: SDUPTHER

## 2023-05-04 RX ORDER — POTASSIUM CHLORIDE 7.45 MG/ML
10 INJECTION INTRAVENOUS PRN
Status: DISCONTINUED | OUTPATIENT
Start: 2023-05-04 | End: 2023-05-06 | Stop reason: HOSPADM

## 2023-05-04 RX ORDER — ACETAMINOPHEN 650 MG/1
650 SUPPOSITORY RECTAL EVERY 6 HOURS PRN
Status: DISCONTINUED | OUTPATIENT
Start: 2023-05-04 | End: 2023-05-06 | Stop reason: HOSPADM

## 2023-05-04 RX ORDER — EZETIMIBE 10 MG/1
10 TABLET ORAL DAILY
Status: DISCONTINUED | OUTPATIENT
Start: 2023-05-04 | End: 2023-05-06 | Stop reason: HOSPADM

## 2023-05-04 RX ORDER — HEPARIN SODIUM 1000 [USP'U]/ML
4000 INJECTION, SOLUTION INTRAVENOUS; SUBCUTANEOUS PRN
Status: DISCONTINUED | OUTPATIENT
Start: 2023-05-04 | End: 2023-05-05 | Stop reason: ALTCHOICE

## 2023-05-04 RX ORDER — ENOXAPARIN SODIUM 100 MG/ML
40 INJECTION SUBCUTANEOUS DAILY
Status: DISCONTINUED | OUTPATIENT
Start: 2023-05-04 | End: 2023-05-04

## 2023-05-04 RX ORDER — HEPARIN SODIUM 1000 [USP'U]/ML
2000 INJECTION, SOLUTION INTRAVENOUS; SUBCUTANEOUS PRN
Status: DISCONTINUED | OUTPATIENT
Start: 2023-05-04 | End: 2023-05-05 | Stop reason: ALTCHOICE

## 2023-05-04 RX ORDER — NITROGLYCERIN 0.4 MG/1
0.4 TABLET SUBLINGUAL EVERY 5 MIN PRN
Status: DISCONTINUED | OUTPATIENT
Start: 2023-05-04 | End: 2023-05-04 | Stop reason: SDUPTHER

## 2023-05-04 RX ORDER — ASPIRIN 81 MG/1
81 TABLET ORAL DAILY
Status: DISCONTINUED | OUTPATIENT
Start: 2023-05-04 | End: 2023-05-06 | Stop reason: HOSPADM

## 2023-05-04 RX ORDER — NITROGLYCERIN 0.4 MG/1
0.4 TABLET SUBLINGUAL EVERY 5 MIN PRN
Status: DISCONTINUED | OUTPATIENT
Start: 2023-05-04 | End: 2023-05-06 | Stop reason: HOSPADM

## 2023-05-04 RX ORDER — HEPARIN SODIUM 1000 [USP'U]/ML
60 INJECTION, SOLUTION INTRAVENOUS; SUBCUTANEOUS PRN
Status: DISCONTINUED | OUTPATIENT
Start: 2023-05-04 | End: 2023-05-04 | Stop reason: SDUPTHER

## 2023-05-04 RX ORDER — HEPARIN SODIUM 10000 [USP'U]/100ML
5-30 INJECTION, SOLUTION INTRAVENOUS CONTINUOUS
Status: DISCONTINUED | OUTPATIENT
Start: 2023-05-04 | End: 2023-05-04 | Stop reason: SDUPTHER

## 2023-05-04 RX ORDER — MAGNESIUM SULFATE 1 G/100ML
1000 INJECTION INTRAVENOUS PRN
Status: DISCONTINUED | OUTPATIENT
Start: 2023-05-04 | End: 2023-05-06 | Stop reason: HOSPADM

## 2023-05-04 RX ORDER — ATORVASTATIN CALCIUM 80 MG/1
80 TABLET, FILM COATED ORAL NIGHTLY
Status: DISCONTINUED | OUTPATIENT
Start: 2023-05-04 | End: 2023-05-06 | Stop reason: HOSPADM

## 2023-05-04 RX ORDER — ACETAMINOPHEN 325 MG/1
650 TABLET ORAL EVERY 6 HOURS PRN
Status: DISCONTINUED | OUTPATIENT
Start: 2023-05-04 | End: 2023-05-04 | Stop reason: SDUPTHER

## 2023-05-04 RX ORDER — POTASSIUM CHLORIDE 20 MEQ/1
20 TABLET, EXTENDED RELEASE ORAL ONCE
Status: COMPLETED | OUTPATIENT
Start: 2023-05-04 | End: 2023-05-04

## 2023-05-04 RX ORDER — DEXTROSE MONOHYDRATE 100 MG/ML
INJECTION, SOLUTION INTRAVENOUS CONTINUOUS PRN
Status: DISCONTINUED | OUTPATIENT
Start: 2023-05-04 | End: 2023-05-06 | Stop reason: HOSPADM

## 2023-05-04 RX ADMIN — SODIUM CHLORIDE, POTASSIUM CHLORIDE, SODIUM LACTATE AND CALCIUM CHLORIDE: 600; 310; 30; 20 INJECTION, SOLUTION INTRAVENOUS at 19:27

## 2023-05-04 RX ADMIN — POTASSIUM CHLORIDE 20 MEQ: 1500 TABLET, EXTENDED RELEASE ORAL at 17:26

## 2023-05-04 RX ADMIN — IOPAMIDOL 75 ML: 755 INJECTION, SOLUTION INTRAVENOUS at 19:45

## 2023-05-04 RX ADMIN — Medication 1250 MG: at 19:27

## 2023-05-04 RX ADMIN — HEPARIN SODIUM 4000 UNITS: 1000 INJECTION INTRAVENOUS; SUBCUTANEOUS at 22:50

## 2023-05-04 RX ADMIN — ACETAMINOPHEN 650 MG: 325 TABLET ORAL at 23:40

## 2023-05-04 RX ADMIN — METOPROLOL TARTRATE 25 MG: 25 TABLET ORAL at 21:12

## 2023-05-04 RX ADMIN — PIPERACILLIN AND TAZOBACTAM 3375 MG: 3; .375 INJECTION, POWDER, FOR SOLUTION INTRAVENOUS at 18:39

## 2023-05-04 RX ADMIN — ATORVASTATIN CALCIUM 80 MG: 80 TABLET, FILM COATED ORAL at 23:53

## 2023-05-04 RX ADMIN — HEPARIN SODIUM 12 UNITS/KG/HR: 10000 INJECTION, SOLUTION INTRAVENOUS at 22:51

## 2023-05-04 RX ADMIN — ENOXAPARIN SODIUM 40 MG: 100 INJECTION SUBCUTANEOUS at 21:12

## 2023-05-04 ASSESSMENT — ENCOUNTER SYMPTOMS
ABDOMINAL PAIN: 0
SHORTNESS OF BREATH: 1
TROUBLE SWALLOWING: 0
COUGH: 0
VOMITING: 0
FACIAL SWELLING: 0
CHEST TIGHTNESS: 1
NAUSEA: 1
VOICE CHANGE: 0
DIARRHEA: 0

## 2023-05-04 ASSESSMENT — PAIN - FUNCTIONAL ASSESSMENT
PAIN_FUNCTIONAL_ASSESSMENT: PREVENTS OR INTERFERES SOME ACTIVE ACTIVITIES AND ADLS
PAIN_FUNCTIONAL_ASSESSMENT: NONE - DENIES PAIN

## 2023-05-04 ASSESSMENT — PAIN DESCRIPTION - LOCATION: LOCATION: MOUTH;TEETH

## 2023-05-04 ASSESSMENT — PAIN SCALES - GENERAL: PAINLEVEL_OUTOF10: 5

## 2023-05-04 NOTE — ED NOTES
Lab states they will run respiratory panel from previous specimen.      Valery Nelson RN  05/04/23 1931

## 2023-05-04 NOTE — CARE COORDINATION
Case Management Assessment  Initial Evaluation    Date/Time of Evaluation: 5/4/2023 6:40 PM  Assessment Completed by: Liliane Peralta RN    If patient is discharged prior to next notation, then this note serves as note for discharge by case management. Patient Name: Zoila Sahni                   YOB: 1960  Diagnosis: No admission diagnoses are documented for this encounter. Date / Time: 5/4/2023  2:52 PM    Patient Admission Status: Emergency   Readmission Risk (Low < 19, Mod (19-27), High > 27): No data recorded  Current PCP: LAKSHMI Newberry CNP  PCP verified by CM? (P) Yes    Chart Reviewed: Yes      History Provided by: (P) Patient  Patient Orientation: (P) Alert and Oriented    Patient Cognition: (P) Alert    Hospitalization in the last 30 days (Readmission):  No    If yes, Readmission Assessment in CM Navigator will be completed.     Advance Directives:      Code Status: Prior   Patient's Primary Decision Maker is:  self      Discharge Planning:    Patient lives with: (P) Children, Family Members Type of Home: (P) House  Primary Care Giver: (P) Self  Patient Support Systems include: (P) Children   Current Financial resources: (P) Medicaid  Current community resources: (P) None  Current services prior to admission: (P) None            Current DME:              Type of Home Care services:  (P) None    ADLS  Prior functional level: (P) Independent in ADLs/IADLs  Current functional level: (P) Independent in ADLs/IADLs    PT AM-PAC:   /24  OT AM-PAC:   /24    Family can provide assistance at DC: (P) Yes  Would you like Case Management to discuss the discharge plan with any other family members/significant others, and if so, who? (P) No  Plans to Return to Present Housing: (P) Yes  Other Identified Issues/Barriers to RETURNING to current housing: none  Potential Assistance needed at discharge: (P) N/A            Potential DME:    Patient expects to discharge to: (P)

## 2023-05-04 NOTE — ED NOTES
ED to inpatient nurses report      Chief Complaint:  Chief Complaint   Patient presents with    Jaw Pain     Left. Chest Pain     Present to ED from: home    MOA:     LOC: alert and orientated to name, place, date  Mobility: Independent  Oxygen Baseline: 95    Current needs required: none   Pending ED orders: none  Present condition: stable    Pertinent event(s): Pt came into the ed via triage due to having jaw tightness and chest tightness pt has a hx of heart attack and does not know her med's or if she is on blood thinners. Pt is Aox4 pt stated that this started today. Pt is ambulatory. Pt is calm and respirations are equal and regular. Pt has no other C/O and denies SOB.   3.3 K replaced. Receiving antibiotics. Increasing troponin lab.        Mental Status:  Level of Consciousness: Alert (0)    Psych Assessment:   Psychosocial  Psychosocial (WDL): Within Defined Limits  Vital signs   Vitals:    05/04/23 1732 05/04/23 1744 05/04/23 1920 05/04/23 1922   BP:  125/69  121/70   Pulse: (!) 104 100  (!) 118   Resp:  16 18    Temp:  99.4 °F (37.4 °C)     TempSrc:  Oral     SpO2: 93% 100%  95%   Weight:  170 lb (77.1 kg)          Vitals:  Patient Vitals for the past 24 hrs:   BP Temp Temp src Pulse Resp SpO2 Weight   05/04/23 1922 121/70 -- -- (!) 118 -- 95 % --   05/04/23 1920 -- -- -- -- 18 -- --   05/04/23 1744 125/69 99.4 °F (37.4 °C) Oral 100 16 100 % 170 lb (77.1 kg)   05/04/23 1732 -- -- -- (!) 104 -- 93 % --   05/04/23 1731 125/69 -- -- (!) 106 -- 93 % --   05/04/23 1530 (!) 138/108 -- -- (!) 105 -- 94 % --   05/04/23 1515 (!) 141/75 -- -- (!) 110 -- 93 % --   05/04/23 1503 -- -- -- (!) 114 13 94 % --   05/04/23 1500 (!) 151/80 -- -- (!) 113 22 94 % --   05/04/23 1457 (!) 167/78 -- -- (!) 116 21 95 % --   05/04/23 1450 -- (!) 100.8 °F (38.2 °C) Oral -- 18 -- --   05/04/23 1449 (!) 152/86 -- -- (!) 121 -- 95 % --      Visit Vitals  /70   Pulse (!) 118   Temp 99.4 °F (37.4 °C) (Oral)   Resp 18   Wt 170 lb

## 2023-05-04 NOTE — ED NOTES
Report given to receiving 3C floor RN; all questions addressed.       Emely Andrea RN  05/04/23 4392

## 2023-05-04 NOTE — ED PROVIDER NOTES
[facility]  Emergency Department  Faculty Attestation     I performed a history and physical examination of the patient and discussed management with the resident. I reviewed the residents note and agree with the documented findings and plan of care. Any areas of disagreement are noted on the chart. I was personally present for the key portions of any procedures. I have documented in the chart those procedures where I was not present during the key portions. I have reviewed the emergency nurses triage note. I agree with the chief complaint, past medical history, past surgical history, allergies, medications, social and family history as documented unless otherwise noted below. For Physician Assistant/ Nurse Practitioner cases/documentation I have personally evaluated this patient and have completed at least one if not all key elements of the E/M (history, physical exam, and MDM). Additional findings are as noted. Preliminary note started at 3:33 PM EDT    Primary Care Physician:  Celester Essex, APRN - CNP    Screenings:  [unfilled]    CHIEF COMPLAINT       Chief Complaint   Patient presents with    Jaw Pain     Left.     Chest Pain       RECENT VITALS:   /69   Pulse 100   Temp 99.4 °F (37.4 °C) (Oral)   Resp 16   Wt 170 lb (77.1 kg)   SpO2 100%   BMI 28.29 kg/m²     LABS:  Labs Reviewed   CBC - Abnormal; Notable for the following components:       Result Value    WBC 19.9 (*)     MCHC 35.0 (*)     All other components within normal limits   COMPREHENSIVE METABOLIC PANEL - Abnormal; Notable for the following components:    Glucose 150 (*)     Potassium 3.3 (*)     Alkaline Phosphatase 150 (*)     Total Bilirubin 1.4 (*)     All other components within normal limits   TROPONIN - Abnormal; Notable for the following components:    Troponin, High Sensitivity 15 (*)     All other components within normal limits   POC GLUCOSE FINGERSTICK - Abnormal; Notable for
[address] [facility]  Emergency Department  Emergency Medicine Resident Sign-out     Care of Connor Ruth was assumed from Dr. Enedina Jarquin and is being seen for Jaw Pain (Left.) and Chest Pain  . The patient's initial evaluation and plan have been discussed with the prior provider who initially evaluated the patient.      EMERGENCY DEPARTMENT COURSE / MEDICAL DECISION MAKING:       MEDICATIONS GIVEN:  Orders Placed This Encounter   Medications    DISCONTD: potassium bicarb-citric acid (EFFER-K) effervescent tablet 40 mEq    potassium chloride (KLOR-CON M) extended release tablet 20 mEq    vancomycin (VANCOCIN) 1250 mg in sodium chloride 0.9% 250 mL IVPB     Order Specific Question:   Antimicrobial Indications     Answer:   Sepsis of Unknown Etiology    piperacillin-tazobactam (ZOSYN) 3,375 mg in sodium chloride 0.9 % 50 mL IVPB (mini-bag)     Order Specific Question:   Antimicrobial Indications     Answer:   Sepsis of Unknown Etiology    lactated ringers IV soln infusion    iopamidol (ISOVUE-370) 76 % injection 75 mL       LABS / RADIOLOGY:     Labs Reviewed   CBC - Abnormal; Notable for the following components:       Result Value    WBC 19.9 (*)     MCHC 35.0 (*)     All other components within normal limits   COMPREHENSIVE METABOLIC PANEL - Abnormal; Notable for the following components:    Glucose 150 (*)     Potassium 3.3 (*)     Alkaline Phosphatase 150 (*)     Total Bilirubin 1.4 (*)     All other components within normal limits   TROPONIN - Abnormal; Notable for the following components:    Troponin, High Sensitivity 15 (*)     All other components within normal limits   POC GLUCOSE FINGERSTICK - Abnormal; Notable for the following components:    POC Glucose 136 (*)     All other components within normal limits   POCT GLUCOSE - Normal   CULTURE, BLOOD 1   CULTURE, BLOOD 2   COVID-19, RAPID   RAPID INFLUENZA A/B ANTIGENS   RESPIRATORY PANEL, MOLECULAR, WITH COVID-19   TROPONIN   TSH WITH REFLEX
BP: 125/69  Pulse: 100  Respirations: 16  Temp: 99.4 °F (37.4 °C) SpO2: 100 %  SIRS (>2) Non Pregnant       Temp > 38.3C or < 36C   HR > 90   RR > 20   WBC > 12 or < 4 or >10% bands    SIRS (>2) and confirmed or suspected source of infection = Sepsis  Is Sepsis due to likely bacterial infection?: Yes  If not, then sepsis is due to likely viral etiology. Sepsis Identified:  Date: 5/4/23   Time: 1742  Sepsis Orders:   CBC(required): Yes   CMP: Yes   PT/PTT: NO   Blood Cultures x2(required): Yes   Urinalysis and Urine Culture: Yes   Lactate(required): Yes   Antibiotics Given (within 3 hours of sepsis identification, after blood cultures):  Yes    (If unable to obtain IV access for IV antibiotics within 3 hours of identification of sepsis, IM antibiotics is acceptable.)              If lactate >2.0 MUST repeat within 6 hours        IV Fluid Bolus:  Is lactate > 4.0:  No       Fluids must be initiated within 3 hours of sepsis identification. These fluids must have a rate > 125 ml/hr. Is the patient Morbidly Obese (BMI > 30): No      PROCEDURES:  none    CONSULTS:  IP CONSULT TO HOSPITALIST    CRITICAL CARE:  There was significant risk of life threatening deterioration of patient's condition requiring my direct management. Critical care time 15 minutes, excluding any documented procedures. FINAL IMPRESSION      1. Sepsis without acute organ dysfunction, due to unspecified organism [facility])          DISPOSITION / PLAN     DISPOSITION Decision To Admit 05/04/2023 06:29:15 PM      PATIENT REFERRED TO:  No follow-up provider specified.     DISCHARGE MEDICATIONS:  New Prescriptions    No medications on file       Carline Wilcox MD  Emergency Medicine Resident    (Please note that portions of thisnote were completed with a voice recognition program.  Efforts were made to edit the dictations but occasionally words are mis-transcribed.)        Carline Wilcox MD  Resident  05/04/23 1047

## 2023-05-04 NOTE — ED NOTES
Pt came into the ed via triage due to having jaw tightness and chest tightness pt has a hx of heart attack and does not know her med's or if she is on blood thinners. Pt is Aox4 pt stated that this started today. Pt is ambulatory. Pt is calm and respirations are equal and regular. Pt has no other C/O and denies SOB.       Nathalie Mccallum, RN  05/04/23 9659

## 2023-05-05 ENCOUNTER — APPOINTMENT (OUTPATIENT)
Dept: CARDIAC CATH/INVASIVE PROCEDURES | Age: 63
DRG: 247 | End: 2023-05-05
Payer: MEDICAID

## 2023-05-05 LAB
ABSOLUTE EOS #: 0.47 K/UL (ref 0–0.44)
ABSOLUTE IMMATURE GRANULOCYTE: 0.03 K/UL (ref 0–0.3)
ABSOLUTE LYMPH #: 1.15 K/UL (ref 1.1–3.7)
ABSOLUTE MONO #: 0.61 K/UL (ref 0.1–1.2)
ALBUMIN SERPL-MCNC: 3.4 G/DL (ref 3.5–5.2)
ALBUMIN/GLOBULIN RATIO: 1.3 (ref 1–2.5)
ALP SERPL-CCNC: 112 U/L (ref 35–104)
ALT SERPL-CCNC: 12 U/L (ref 5–33)
ANION GAP SERPL CALCULATED.3IONS-SCNC: 11 MMOL/L (ref 9–17)
AST SERPL-CCNC: 12 U/L
BASOPHILS # BLD: 0 % (ref 0–2)
BASOPHILS ABSOLUTE: 0.04 K/UL (ref 0–0.2)
BILIRUB SERPL-MCNC: 1 MG/DL (ref 0.3–1.2)
BUN SERPL-MCNC: 8 MG/DL (ref 8–23)
CALCIUM SERPL-MCNC: 8.7 MG/DL (ref 8.6–10.4)
CHLORIDE SERPL-SCNC: 107 MMOL/L (ref 98–107)
CHOLEST SERPL-MCNC: 91 MG/DL
CHOLESTEROL/HDL RATIO: 2.2
CO2 SERPL-SCNC: 22 MMOL/L (ref 20–31)
CREAT SERPL-MCNC: 0.56 MG/DL (ref 0.5–0.9)
CRP SERPL HS-MCNC: 65.2 MG/L (ref 0–5)
EOSINOPHILS RELATIVE PERCENT: 5 % (ref 1–4)
GFR SERPL CREATININE-BSD FRML MDRD: >60 ML/MIN/1.73M2
GLUCOSE BLD-MCNC: 128 MG/DL (ref 65–105)
GLUCOSE BLD-MCNC: 148 MG/DL (ref 65–105)
GLUCOSE BLD-MCNC: 173 MG/DL (ref 65–105)
GLUCOSE BLD-MCNC: 84 MG/DL (ref 65–105)
GLUCOSE SERPL-MCNC: 126 MG/DL (ref 70–99)
HCT VFR BLD AUTO: 36.1 % (ref 36.3–47.1)
HDLC SERPL-MCNC: 42 MG/DL
HGB BLD-MCNC: 12.2 G/DL (ref 11.9–15.1)
IMMATURE GRANULOCYTES: 0 %
LDLC SERPL CALC-MCNC: 33 MG/DL (ref 0–130)
LYMPHOCYTES # BLD: 12 % (ref 24–43)
MAGNESIUM SERPL-MCNC: 2.2 MG/DL (ref 1.6–2.6)
MCH RBC QN AUTO: 30 PG (ref 25.2–33.5)
MCHC RBC AUTO-ENTMCNC: 33.8 G/DL (ref 28.4–34.8)
MCV RBC AUTO: 88.9 FL (ref 82.6–102.9)
MONOCYTES # BLD: 6 % (ref 3–12)
NRBC AUTOMATED: 0 PER 100 WBC
PARTIAL THROMBOPLASTIN TIME: 98 SEC (ref 23–36.5)
PDW BLD-RTO: 12.2 % (ref 11.8–14.4)
PLATELET # BLD AUTO: 207 K/UL (ref 138–453)
PMV BLD AUTO: 11 FL (ref 8.1–13.5)
POTASSIUM SERPL-SCNC: 3 MMOL/L (ref 3.7–5.3)
PROT SERPL-MCNC: 6 G/DL (ref 6.4–8.3)
RBC # BLD: 4.06 M/UL (ref 3.95–5.11)
SEG NEUTROPHILS: 77 % (ref 36–65)
SEGMENTED NEUTROPHILS ABSOLUTE COUNT: 7.43 K/UL (ref 1.5–8.1)
SODIUM SERPL-SCNC: 140 MMOL/L (ref 135–144)
TRIGL SERPL-MCNC: 78 MG/DL
TROPONIN I SERPL DL<=0.01 NG/ML-MCNC: 45 NG/L (ref 0–14)
WBC # BLD AUTO: 9.7 K/UL (ref 3.5–11.3)

## 2023-05-05 PROCEDURE — C1887 CATHETER, GUIDING: HCPCS

## 2023-05-05 PROCEDURE — 7100000011 HC PHASE II RECOVERY - ADDTL 15 MIN

## 2023-05-05 PROCEDURE — B2131ZZ FLUOROSCOPY OF MULTIPLE CORONARY ARTERY BYPASS GRAFTS USING LOW OSMOLAR CONTRAST: ICD-10-PCS | Performed by: INTERNAL MEDICINE

## 2023-05-05 PROCEDURE — 82947 ASSAY GLUCOSE BLOOD QUANT: CPT

## 2023-05-05 PROCEDURE — 6370000000 HC RX 637 (ALT 250 FOR IP)

## 2023-05-05 PROCEDURE — 2060000002 HC BURN ICU INTERMEDIATE R&B

## 2023-05-05 PROCEDURE — 6370000000 HC RX 637 (ALT 250 FOR IP): Performed by: NURSE PRACTITIONER

## 2023-05-05 PROCEDURE — 94761 N-INVAS EAR/PLS OXIMETRY MLT: CPT

## 2023-05-05 PROCEDURE — 2580000003 HC RX 258: Performed by: NURSE PRACTITIONER

## 2023-05-05 PROCEDURE — 93005 ELECTROCARDIOGRAM TRACING: CPT

## 2023-05-05 PROCEDURE — 7100000010 HC PHASE II RECOVERY - FIRST 15 MIN

## 2023-05-05 PROCEDURE — 02703ZZ DILATION OF CORONARY ARTERY, ONE ARTERY, PERCUTANEOUS APPROACH: ICD-10-PCS | Performed by: INTERNAL MEDICINE

## 2023-05-05 PROCEDURE — 99152 MOD SED SAME PHYS/QHP 5/>YRS: CPT

## 2023-05-05 PROCEDURE — 80061 LIPID PANEL: CPT

## 2023-05-05 PROCEDURE — 83735 ASSAY OF MAGNESIUM: CPT

## 2023-05-05 PROCEDURE — 2709999900 HC NON-CHARGEABLE SUPPLY

## 2023-05-05 PROCEDURE — 4A023N7 MEASUREMENT OF CARDIAC SAMPLING AND PRESSURE, LEFT HEART, PERCUTANEOUS APPROACH: ICD-10-PCS | Performed by: INTERNAL MEDICINE

## 2023-05-05 PROCEDURE — B2151ZZ FLUOROSCOPY OF LEFT HEART USING LOW OSMOLAR CONTRAST: ICD-10-PCS | Performed by: INTERNAL MEDICINE

## 2023-05-05 PROCEDURE — B2111ZZ FLUOROSCOPY OF MULTIPLE CORONARY ARTERIES USING LOW OSMOLAR CONTRAST: ICD-10-PCS | Performed by: INTERNAL MEDICINE

## 2023-05-05 PROCEDURE — C1894 INTRO/SHEATH, NON-LASER: HCPCS

## 2023-05-05 PROCEDURE — 36415 COLL VENOUS BLD VENIPUNCTURE: CPT

## 2023-05-05 PROCEDURE — 2580000003 HC RX 258

## 2023-05-05 PROCEDURE — 6360000002 HC RX W HCPCS

## 2023-05-05 PROCEDURE — 80053 COMPREHEN METABOLIC PANEL: CPT

## 2023-05-05 PROCEDURE — 94760 N-INVAS EAR/PLS OXIMETRY 1: CPT

## 2023-05-05 PROCEDURE — B2181ZZ FLUOROSCOPY OF LEFT INTERNAL MAMMARY BYPASS GRAFT USING LOW OSMOLAR CONTRAST: ICD-10-PCS | Performed by: INTERNAL MEDICINE

## 2023-05-05 PROCEDURE — 6360000002 HC RX W HCPCS: Performed by: STUDENT IN AN ORGANIZED HEALTH CARE EDUCATION/TRAINING PROGRAM

## 2023-05-05 PROCEDURE — C1892 INTRO/SHEATH,FIXED,PEEL-AWAY: HCPCS

## 2023-05-05 PROCEDURE — 2580000003 HC RX 258: Performed by: INTERNAL MEDICINE

## 2023-05-05 PROCEDURE — 85025 COMPLETE CBC W/AUTO DIFF WBC: CPT

## 2023-05-05 PROCEDURE — 84484 ASSAY OF TROPONIN QUANT: CPT

## 2023-05-05 PROCEDURE — 6360000002 HC RX W HCPCS: Performed by: NURSE PRACTITIONER

## 2023-05-05 PROCEDURE — 93459 L HRT ART/GRFT ANGIO: CPT

## 2023-05-05 PROCEDURE — C1874 STENT, COATED/COV W/DEL SYS: HCPCS

## 2023-05-05 PROCEDURE — 92928 PRQ TCAT PLMT NTRAC ST 1 LES: CPT

## 2023-05-05 PROCEDURE — 99153 MOD SED SAME PHYS/QHP EA: CPT

## 2023-05-05 PROCEDURE — 6360000004 HC RX CONTRAST MEDICATION

## 2023-05-05 PROCEDURE — 85730 THROMBOPLASTIN TIME PARTIAL: CPT

## 2023-05-05 PROCEDURE — C1725 CATH, TRANSLUMIN NON-LASER: HCPCS

## 2023-05-05 PROCEDURE — 027135Z DILATION OF CORONARY ARTERY, TWO ARTERIES WITH TWO DRUG-ELUTING INTRALUMINAL DEVICES, PERCUTANEOUS APPROACH: ICD-10-PCS | Performed by: INTERNAL MEDICINE

## 2023-05-05 PROCEDURE — C1769 GUIDE WIRE: HCPCS

## 2023-05-05 PROCEDURE — 99222 1ST HOSP IP/OBS MODERATE 55: CPT | Performed by: INTERNAL MEDICINE

## 2023-05-05 RX ORDER — SODIUM CHLORIDE 9 MG/ML
INJECTION, SOLUTION INTRAVENOUS CONTINUOUS
Status: DISCONTINUED | OUTPATIENT
Start: 2023-05-05 | End: 2023-05-05

## 2023-05-05 RX ADMIN — SODIUM CHLORIDE, PRESERVATIVE FREE 10 ML: 5 INJECTION INTRAVENOUS at 20:06

## 2023-05-05 RX ADMIN — SODIUM CHLORIDE, PRESERVATIVE FREE 10 ML: 5 INJECTION INTRAVENOUS at 20:02

## 2023-05-05 RX ADMIN — POTASSIUM CHLORIDE 10 MEQ: 7.46 INJECTION, SOLUTION INTRAVENOUS at 10:41

## 2023-05-05 RX ADMIN — FUROSEMIDE 20 MG: 20 TABLET ORAL at 08:35

## 2023-05-05 RX ADMIN — SODIUM CHLORIDE: 9 INJECTION, SOLUTION INTRAVENOUS at 02:07

## 2023-05-05 RX ADMIN — POTASSIUM CHLORIDE 10 MEQ: 7.46 INJECTION, SOLUTION INTRAVENOUS at 07:38

## 2023-05-05 RX ADMIN — EZETIMIBE 10 MG: 10 TABLET ORAL at 08:36

## 2023-05-05 RX ADMIN — LOSARTAN POTASSIUM 25 MG: 25 TABLET, FILM COATED ORAL at 08:35

## 2023-05-05 RX ADMIN — PIPERACILLIN AND TAZOBACTAM 3375 MG: 3; .375 INJECTION, POWDER, LYOPHILIZED, FOR SOLUTION INTRAVENOUS at 10:15

## 2023-05-05 RX ADMIN — SODIUM CHLORIDE: 9 INJECTION, SOLUTION INTRAVENOUS at 12:32

## 2023-05-05 RX ADMIN — METOPROLOL TARTRATE 25 MG: 25 TABLET ORAL at 08:35

## 2023-05-05 RX ADMIN — ATORVASTATIN CALCIUM 80 MG: 80 TABLET, FILM COATED ORAL at 20:02

## 2023-05-05 RX ADMIN — Medication 1000 MG: at 08:34

## 2023-05-05 RX ADMIN — Medication 81 MG: at 08:35

## 2023-05-05 RX ADMIN — POTASSIUM CHLORIDE 10 MEQ: 7.46 INJECTION, SOLUTION INTRAVENOUS at 06:22

## 2023-05-05 RX ADMIN — PIPERACILLIN AND TAZOBACTAM 3375 MG: 3; .375 INJECTION, POWDER, LYOPHILIZED, FOR SOLUTION INTRAVENOUS at 02:07

## 2023-05-05 RX ADMIN — AMLODIPINE BESYLATE 5 MG: 5 TABLET ORAL at 08:35

## 2023-05-05 RX ADMIN — SODIUM CHLORIDE: 9 INJECTION, SOLUTION INTRAVENOUS at 06:20

## 2023-05-05 RX ADMIN — POTASSIUM CHLORIDE 10 MEQ: 7.46 INJECTION, SOLUTION INTRAVENOUS at 08:45

## 2023-05-05 RX ADMIN — POTASSIUM CHLORIDE 10 MEQ: 7.46 INJECTION, SOLUTION INTRAVENOUS at 10:13

## 2023-05-05 RX ADMIN — METOPROLOL TARTRATE 25 MG: 25 TABLET ORAL at 20:02

## 2023-05-05 ASSESSMENT — PAIN SCALES - GENERAL
PAINLEVEL_OUTOF10: 0
PAINLEVEL_OUTOF10: 4
PAINLEVEL_OUTOF10: 0
PAINLEVEL_OUTOF10: 0

## 2023-05-05 NOTE — PROGRESS NOTES
SPIRITUAL CARE DEPARTMENT - [facility]  PROGRESS NOTE    Shift date: 5.4.2023  Shift day: Thursday   Shift # 2    Room # 29/29   Name: July Amos                Mu-ism: unknown   Place of Advent: unknown    Referral: Routine Visit    Admit Date & Time: 5/4/2023  2:52 PM    Assessment:  July Amos is a 58 y.o. female in the hospital because of blood clots. Upon entering the room writer observes patient to be slightly anxious. Family member is bedside and appears to be calm and coping. Patient is anticipating interventions tomorrow. Intervention:  Writer introduced self and title as  Writer offered space for the patient  to express feelings, needs, and concerns and provided a ministry presence. Outcome:  Patient appears to remain slightly anxious but coping. Plan:  Chaplains will remain available to offer spiritual and emotional support as needed. Electronically signed by Viola Griffin on 5/4/2023 at 11:07 PM.  [facility]  [phone]       05/04/23 2115   Encounter Summary   Service Provided For: Patient   Referral/Consult From: [facility] Family members   Last Encounter  05/04/23   Complexity of Encounter Moderate   Begin Time 2115   End Time  2125   Total Time Calculated 10 min   Encounter    Type Initial Screen/Assessment   Assessment/Intervention/Outcome   Assessment Coping   Intervention Active listening;Discussed illness injury and its impact; Explored/Affirmed feelings, thoughts, concerns   Outcome Coping       Electronically signed by Trace Riley on 5/4/2023 at 11:07 PM

## 2023-05-05 NOTE — H&P
[facility]  Office: [address], DO, Paris Michael, DO, Kat Lizarraga, DO, Rex Scott Blood, DO, Viola Diggs MD, Alcides Jung MD, Isabel Quan MD, Sandy Bowling MD,  Henry Neely MD, Antonella Rios MD, Emmanuel Rios DO, Linda Olvera MD,  Samantha Woo MD, Chemo Guaman MD, Emeterio Lopez, DO, Milly Kothari MD, America Bueno MD, Poornima Vargas DO, Jonas Linda MD, Angel Luis Knox MD, Shyla Corea MD, Chris Serna MD,  Andrena Jeans, DO, Marium Alejo MD,  Wenceslao Zuñiga, CNP,  Gemini Purvis, CNP, Cristobal Lee, CNP, Cy Green, CNP,  Helga Cortez, DNP, Jc Mckeon, CNP, Rachel Singh, CNP, Graeme Ling, CNP, Lennox Portela, CNP, Rola Phillip, CNP, Sammy Soto PA-C, Gilbert Moyer, CNS, Lázaro Ramires, CNP, Deana Guzman, [facility]         [facility]    HISTORY AND PHYSICAL EXAMINATION            Date:   5/5/2023  Patient name:  Rabia Traore  Date of admission:  5/4/2023  2:52 PM  MRN:   [id number]  Account:  [de-identified]  YOB: 1960  PCP:    LAKSHMI Alexander CNP  Room:   [facility]/3735-75  Code Status:    Full Code    Chief Complaint:     Chief Complaint   Patient presents with    Jaw Pain     Left. Chest Pain       History Obtained From:     patient    History of Present Illness:     Rabia Traore is a 58 y.o. female presents for chest pain, jaw pain and neck pain after waking up. She was initially febrile 100.8 with tachycardia that improved upon arrival to ED. WBC was elevated and was initially started on abx. She had history of jaw pain from bruxism, symptoms since admission have largely resolved but troponins continue to elevate.  Patient was placed on heparin and cardiology was consulted    Past Medical History:     Past Medical History:   Diagnosis Date    CAD (coronary artery disease)     s/p CABG x 3 March 2022    CHF (congestive heart failure)

## 2023-05-05 NOTE — OP NOTE
[facility]  Cardiac catheterization note        Date:   5/5/2023  Patient name: Joey Taylor  Date of admission:  5/4/2023  2:52 PM  MRN:   [id number]  YOB: 1960    Operators:  Consuelo Kimball MD (Attending Physician)     Pre Procedure Conscious Sedation Data:    ASA Class:    [] I [x] II [] III [] IV    Mallampati Class:  [] I [x] II [] III [] IV      Indications:  NSTEMI  CABG     Procedure:   Left heart catheterization   Selective left and right coronary angiography   Left ventriculography   PCI      Access:  [x] Femoral  [] Radial  artery       [x] Right  [] Left    Procedure: After informed consent was obtained with explanation of the risks and benefits, patient was brought to the cath lab. The access area was prepped and draped in sterile fashion. 1% lidocaine was used for local block. The artery was cannulated with 6  Fr sheath with brisk arterial blood return. The side port was frequently flushed and aspirated with normal saline. Findings:    Cardiac Arteries and Lesion Findings     LMCA: Ostial 50% stenosis with significant pressure dampening     LAD: Proximal 60% followed by mid 100% occlusion  LIMA-LAD is patent. LAD beyond anastomosis is patent with 10-20% stenosis     LCx: Mild irregularities 20-30%. OM1 is small and diffusely diseases  OM2 is small caliber with mid 90% stenosis  SVG-OM2 is occluded     RCA: Large and dominant with mid 90% and [id number]% stenosis extending into ostial RPDA, both reduced to 0% with PTCA/JORDAN. RPL is large with ostial 20-30% stenosis, was jailed with RCA stent and required POBA. RPDA is large with ostial 75% stenosis, reduced to 0% with distal RCA stent  SVG-RPDA is occluded. Lesion on Mid RCA: Mid subsection. 90% stenosis 15 mm length reduced to 0%. Pre procedure BILLIE III flow was noted. Post Procedure BILLIE III flow was present. Good runoff was present. The lesion was diagnosed as Moderate Risk (B).       Lesion on R PDA: Ostial.75%

## 2023-05-05 NOTE — CARE COORDINATION
Transitional Planning  Per cardiology note, patient to have a cardiac cath. Goal is to return home with dtr, has transportation, monitor needs following cardiac cath.

## 2023-05-05 NOTE — ED NOTES
The following labs were labeled with appropriate pt sticker and tubed to lab:     [x] Blue     [x] Lavender   [] on ice  [] Green/yellow  [] Green/black [] on ice  [] Larance Chinle  [] on ice  [] Yellow  [] Red  [] Type/ Screen  [] ABG  [] VBG    [] COVID-19 swab    [] Rapid  [] PCR  [] Flu swab  [] Peds Viral Panel     [] Urine Sample  [] Fecal Sample  [] Pelvic Cultures  [] Blood Cultures  [] X 2  [] STREP Cultures       Ana Walters RN  05/04/23 5093

## 2023-05-05 NOTE — PROGRESS NOTES
Received post procedure to [facility] to room 7. Assessment obtained. Restrictions reviewed with patient. Post procedure pathway initiated. Rt groin site soft , band aid dry and intact. No hematoma noted. Patient without complaints. Head of bed flat with rt leg straight. Rt femoral art line with good wave form. Angiomax completed.

## 2023-05-05 NOTE — PROGRESS NOTES
[facility] Pharmacokinetic Monitoring Service - Vancomycin     Joey Taylor is a 58 y.o. female starting on vancomycin therapy for sepsis. Pharmacy consulted by Dr. Lorrie Servin for monitoring and adjustment. Target Concentration: Goal AUC/BARRY 400-600 mg*hr/L    Additional Antimicrobials: Zosyn    Pertinent Laboratory Values: Wt Readings from Last 1 Encounters:   05/04/23 170 lb (77.1 kg)     Temp Readings from Last 1 Encounters:   05/04/23 99.2 °F (37.3 °C) (Oral)     Estimated Creatinine Clearance: 86 mL/min (based on SCr of 0.68 mg/dL). Recent Labs     05/04/23  1516 05/04/23  2224   CREATININE 0.68  --    WBC 19.9* 12.9*     Procalcitonin: n/a    Pertinent Cultures:  Culture Date Source Results        MRSA Nasal Swab: N/A. Non-respiratory infection.     Plan:  Dosing recommendations based on Bayesian software  Start vancomycin 1000mg IVPB every 12 hours  Anticipated AUC of 500 and trough concentration of 15.6 at steady state  Renal labs as indicated   Vancomycin concentration ordered for  @    Pharmacy will continue to monitor patient and adjust therapy as indicated    Thank you for the consult,  Eden Mays [facility]  5/4/2023 11:38 PM

## 2023-05-05 NOTE — PROGRESS NOTES
Line pull started at 1606, pressure held for 10 minutes, site checked, no bleeding/oozing/hematoma noted to right groin site. Bandaid applied to site. Rebeka Talbot RN updated on pt status. Can sit up at 1820.

## 2023-05-05 NOTE — ED NOTES
The following labs were labeled with appropriate pt sticker and tubed to lab:     [] Blue     [] Lavender   [] on ice  [x] Green/yellow  [] Green/black [] on ice  [] [place]ordan [place]  [] on ice  [] Yellow  [] Red  [] Type/ Screen  [] ABG  [] VBG    [] COVID-19 swab    [] Rapid  [] PCR  [] Flu swab  [] Peds Viral Panel     [] Urine Sample  [] Fecal Sample  [] Pelvic Cultures  [] Blood Cultures  [] X 2  [] STREP Cultures       Zeke Hoffmann RN  05/04/23 2628

## 2023-05-05 NOTE — PROGRESS NOTES
Patient admitted, consent signed and questions answered. Patient ready for procedure. Call light to reach with side rails up 2 of 2. Left and right groin clipped with writer and RN [employer] present. No family at bedside with patient. History and physical completed.

## 2023-05-05 NOTE — DISCHARGE INSTRUCTIONS
Discharge Instructions for angiogram  Jordi [address] to shower in AM. Discontinue band aid in AM.   Do not apply further band aids. Keep clean, dry and open to air. No powder or lotion. Do not soak in a pool or tub and do not swim for one week. If there is any bleeding at the catheter site, apply firm pressure with your hands until the bleeding stops. If bleeding continues after 3 minutes call 911. If there is any swelling or firm areas at your puncture site, this could be bleeding under the skin(hematoma), and if you have any concerns seek help immediately. Drink plenty of fluids after the test. This will flush the x-ray dye from your system. Return to your normal diet. The sedative will make you sleepy. Rest until the effects have worn off. Ask your doctor when you will be able to return to work. Avoid heavy lifting objects greater than 10  pounds, physically demanding activities, and sexual activity for 5-7 days. Your activity will also depend upon where the catheter was inserted: Do not sit for long periods of time. Try to change positions frequently. Medications   If advised by your doctor, resume taking your normal medicines. Use acetaminophen (Tylenol) for pain relief. Do not take metformin (Glucophage) or glyburide and metformin (Glucovance) for 48 hours after the test.    If you had to stop taking these medications before the procedure, ask your doctor when you can resume taking them:   If youAnti-inflammatory drugs (eg, ibuprofen )   Blood thinners, such as warfarin (Coumadin)   Clopidogrel (Plavix)   If you are taking medicines, follow these general guidelines:   Take your medicine as directed. Do not change the amount or the schedule. Do not stop taking them without talking to your doctor. Do not share them. Know what the results and side effects. Report them to your doctor. Some drugs can be dangerous when mixed.  Talk to a doctor or pharmacist if you are taking

## 2023-05-05 NOTE — ED NOTES
Report given to 1D receiving floor RN; all questions addressed.       Emely Andrea RN  05/04/23 2516

## 2023-05-05 NOTE — CONSULTS
170 lb (77.1 kg)   SpO2 97%   BMI 28.29 kg/m²    Constitutional and General Appearance: alert, cooperative, in no apparent resp distress HEENT: EOMI, no cervical lymphadenopathy  Respiratory:  Good respiratory effort. No for increased work of breathing. On auscultation: clear to auscultation bilaterally, no basilar rales, no wheezing   Cardiovascular:  regular S1 and S2. No murmur audible   No Jugular venous distention, no hepatojugular reflex  Peripheral pulses are symmetrical and full   Abdomen:  No masses or tenderness  Bowel sounds present  Extremities:   No Cyanosis or Clubbing   Lower extremity edema: No   Skin: Warm and dry  Neurological:  Not done       DATA:    Diagnostics:      EKG        Labs:     CBC:   Recent Labs     05/04/23 2224 05/05/23  0430   WBC 12.9* 9.7   HGB 12.3 12.2   HCT 35.9* 36.1*    207     BMP:   Recent Labs     05/04/23  1516 05/05/23  0430    140   K 3.3* 3.0*   CO2 20 22   BUN 13 8   CREATININE 0.68 0.56   LABGLOM >60 >60   GLUCOSE 150* 126*     BNP: No results for input(s): BNP in the last 72 hours. PT/INR: No results for input(s): PROTIME, INR in the last 72 hours. APTT:  Recent Labs     05/04/23 2224 05/05/23  0430   APTT 30.0 98.0*     CARDIAC ENZYMES:No results for input(s): CKTOTAL, CKMB, CKMBINDEX, TROPONINI in the last 72 hours. FASTING LIPID PANEL:  Lab Results   Component Value Date/Time    HDL 42 05/05/2023 04:30 AM    TRIG 78 05/05/2023 04:30 AM     LIVER PROFILE:  Recent Labs     05/04/23  1516 05/05/23  0430   AST 15 12   ALT 16 12   LABALBU 4.3 3.4*         IMPRESSION:    NSTEMI  HTN   Diabetes Mellitus Type 2   Hx of NSTEMI  Hx of CABG x3   CHF      RECOMMENDATIONS:  Continue heparin drip  Continue ASA  Continue amlodipine   Continue lipitor   Continue lopressor 25mg BID  Will have cardiac catheterization today              Discussed with cardiology attending and fellow  Discussed with patient and nursing.     Cami Morse MD   PGY-1

## 2023-05-06 VITALS
BODY MASS INDEX: 29.57 KG/M2 | HEART RATE: 74 BPM | RESPIRATION RATE: 16 BRPM | WEIGHT: 177.69 LBS | DIASTOLIC BLOOD PRESSURE: 56 MMHG | TEMPERATURE: 98 F | SYSTOLIC BLOOD PRESSURE: 115 MMHG | OXYGEN SATURATION: 96 %

## 2023-05-06 LAB
ALBUMIN SERPL-MCNC: 3.5 G/DL (ref 3.5–5.2)
ANION GAP SERPL CALCULATED.3IONS-SCNC: 10 MMOL/L (ref 9–17)
BUN SERPL-MCNC: 8 MG/DL (ref 8–23)
CALCIUM SERPL-MCNC: 8.7 MG/DL (ref 8.6–10.4)
CHLORIDE SERPL-SCNC: 104 MMOL/L (ref 98–107)
CO2 SERPL-SCNC: 22 MMOL/L (ref 20–31)
CREAT SERPL-MCNC: 0.55 MG/DL (ref 0.5–0.9)
EKG ATRIAL RATE: 121 BPM
EKG ATRIAL RATE: 65 BPM
EKG P AXIS: 60 DEGREES
EKG P AXIS: 66 DEGREES
EKG P-R INTERVAL: 164 MS
EKG P-R INTERVAL: 192 MS
EKG Q-T INTERVAL: 326 MS
EKG Q-T INTERVAL: 430 MS
EKG QRS DURATION: 88 MS
EKG QRS DURATION: 90 MS
EKG QTC CALCULATION (BAZETT): 447 MS
EKG QTC CALCULATION (BAZETT): 462 MS
EKG R AXIS: -20 DEGREES
EKG R AXIS: -25 DEGREES
EKG T AXIS: 11 DEGREES
EKG T AXIS: 56 DEGREES
EKG VENTRICULAR RATE: 121 BPM
EKG VENTRICULAR RATE: 65 BPM
GFR SERPL CREATININE-BSD FRML MDRD: >60 ML/MIN/1.73M2
GLUCOSE BLD-MCNC: 176 MG/DL (ref 65–105)
GLUCOSE BLD-MCNC: 196 MG/DL (ref 65–105)
GLUCOSE SERPL-MCNC: 150 MG/DL (ref 70–99)
PHOSPHATE SERPL-MCNC: 2.7 MG/DL (ref 2.6–4.5)
POTASSIUM SERPL-SCNC: 3.4 MMOL/L (ref 3.7–5.3)
SODIUM SERPL-SCNC: 136 MMOL/L (ref 135–144)

## 2023-05-06 PROCEDURE — 6370000000 HC RX 637 (ALT 250 FOR IP)

## 2023-05-06 PROCEDURE — 93010 ELECTROCARDIOGRAM REPORT: CPT | Performed by: INTERNAL MEDICINE

## 2023-05-06 PROCEDURE — 80069 RENAL FUNCTION PANEL: CPT

## 2023-05-06 PROCEDURE — 99239 HOSP IP/OBS DSCHRG MGMT >30: CPT | Performed by: INTERNAL MEDICINE

## 2023-05-06 PROCEDURE — 2580000003 HC RX 258

## 2023-05-06 PROCEDURE — 2580000003 HC RX 258: Performed by: NURSE PRACTITIONER

## 2023-05-06 PROCEDURE — 82947 ASSAY GLUCOSE BLOOD QUANT: CPT

## 2023-05-06 PROCEDURE — 36415 COLL VENOUS BLD VENIPUNCTURE: CPT

## 2023-05-06 RX ADMIN — FUROSEMIDE 20 MG: 20 TABLET ORAL at 08:12

## 2023-05-06 RX ADMIN — Medication 81 MG: at 08:12

## 2023-05-06 RX ADMIN — ACETAMINOPHEN 650 MG: 325 TABLET ORAL at 06:12

## 2023-05-06 RX ADMIN — EZETIMIBE 10 MG: 10 TABLET ORAL at 08:13

## 2023-05-06 RX ADMIN — SODIUM CHLORIDE, PRESERVATIVE FREE 10 ML: 5 INJECTION INTRAVENOUS at 08:12

## 2023-05-06 RX ADMIN — AMLODIPINE BESYLATE 5 MG: 5 TABLET ORAL at 08:13

## 2023-05-06 RX ADMIN — METOPROLOL TARTRATE 25 MG: 25 TABLET ORAL at 08:12

## 2023-05-06 RX ADMIN — SODIUM CHLORIDE, PRESERVATIVE FREE 10 ML: 5 INJECTION INTRAVENOUS at 08:14

## 2023-05-06 RX ADMIN — LOSARTAN POTASSIUM 25 MG: 25 TABLET, FILM COATED ORAL at 08:12

## 2023-05-06 NOTE — PLAN OF CARE
Problem: Chronic Conditions and Co-morbidities  Goal: Patient's chronic conditions and co-morbidity symptoms are monitored and maintained or improved  5/6/2023 1222 by Yamilka Banerjee RN  Outcome: Progressing  5/6/2023 0228 by Riassa Genao RN  Outcome: Progressing     Problem: Discharge Planning  Goal: Discharge to home or other facility with appropriate resources  5/6/2023 1222 by Yamilka Banerjee RN  Outcome: Progressing  5/6/2023 0228 by Raissa Genao RN  Outcome: Progressing

## 2023-05-06 NOTE — DISCHARGE SUMMARY
[facility]  Office: [address], DO, Adriannemarzena Andrade, DO, Re Mcclure, DO, Virgene Crease Blood, DO, Guera Gilmore MD, Pablo Ramos MD, Drea Gonzalez MD, Carlin Mei MD,  Laura Hernandez MD, Julissa Pena MD, Kiko Medina DO, Raymundo Conklin MD,  Rishabh Rothman MD, Kimmy Garcia MD, Elma Cadena DO, Vin Santo MD, Brittany Phipps MD, Jen Haney DO, Jamshid Castro MD, Butch Ruffin MD, Jesus Alberto Calzada MD, Seun Shine MD,  Denia Hoffman DO, Mary Armstrong MD,  Alice Kirkland, CNP,  Cecilia Dandy, CNP, Carol Wharton, CNP, Jose Brown, CNP,  Jeremy Severin, [facility], Gauri Garcia, CNP, Avery Garcia, CNP, Daryle Jurist, CNP, Jarad Ascencio, CNP, Maira Holland, CNP, Alessandro Forte PA-C, Morelia Moya, CNS, Dixon Borges, CNP, Martha Majano, [facility]    Discharge Summary     Patient ID: Sangeeta Price  :  1960   MRN: [id number]     ACCOUNT:  [de-identified]   Patient's PCP: LAKSHMI Vázquez CNP  Admit Date: 2023   Discharge Date: 2023 ***    Length of Stay: 2  Code Status:  Full Code  Admitting Physician: No admitting provider for patient encounter. Discharge Physician: Kiko Medina DO     Active Discharge Diagnoses:     Hospital Problem Lists:  Principal Problem:    NSTEMI (non-ST elevated myocardial infarction) [facility])  Active Problems:    [facility]  Resolved Problems:    * No resolved hospital problems.  *      Admission Condition:  {condition:13280}     Discharged Condition: {condition:88572}    Hospital Stay:     Hospital Course:  Sangeeta Price is a 58 y.o. female who was admitted for the management of  *** NSTEMI (non-ST elevated myocardial infarction) ([facility] , presented to ER with ***Jaw Pain (Left.) and Chest Pain          Significant therapeutic interventions: ***    Significant Diagnostic Studies:   Labs / Micro:  {WGW}

## 2023-05-06 NOTE — PLAN OF CARE
Problem: Chronic Conditions and Co-morbidities  Goal: Patient's chronic conditions and co-morbidity symptoms are monitored and maintained or improved  5/6/2023 1223 by Rolley Dandy, RN  Outcome: Completed  5/6/2023 1222 by Rolley Dandy, RN  Outcome: Progressing  5/6/2023 0228 by Harjeet Baxter RN  Outcome: Progressing     Problem: Discharge Planning  Goal: Discharge to home or other facility with appropriate resources  5/6/2023 1223 by Rolley Dandy, RN  Outcome: Completed  5/6/2023 1222 by Rolley Dandy, RN  Outcome: Progressing  5/6/2023 0228 by Harjeet Baxter RN  Outcome: Progressing

## 2023-05-06 NOTE — PROGRESS NOTES
[facility]   Progress Note                   Date:   5/6/2023  Patient name: Hermelinda Blizzard  Date of admission:  5/4/2023  2:52 PM  MRN:   [id number]  YOB: 1960  PCP: LAKSHMI Cassidy CNP    Reason for Admission: Septicemia [facility]) [A41.9]  NSTEMI (non-ST elevated myocardial infarction) ([facility] [address] [I21.4]    Subjective:       Clinical Changes / Abnormalities: Pt seen and examined in the room. Pt denies any CP or sob. She states that she is feeling much better       Medications:   Scheduled Meds:   amLODIPine  5 mg Oral Daily    aspirin  81 mg Oral Daily    ezetimibe  10 mg Oral Daily    furosemide  20 mg Oral Daily    losartan  25 mg Oral Daily    metoprolol tartrate  25 mg Oral BID    sodium chloride flush  5-40 mL IntraVENous 2 times per day    sodium chloride flush  5-40 mL IntraVENous 2 times per day    atorvastatin  80 mg Oral Nightly    insulin lispro  0-8 Units SubCUTAneous TID WC    insulin lispro  0-4 Units SubCUTAneous Nightly     Continuous Infusions:   sodium chloride 25 mL/hr at 05/05/23 0620    sodium chloride 25 mL/hr at 05/05/23 0207    dextrose       CBC:   Recent Labs     05/04/23  1516 05/04/23  2224 05/05/23  0430   WBC 19.9* 12.9* 9.7   HGB 14.5 12.3 12.2    215 207     BMP:    Recent Labs     05/04/23  1516 05/05/23  0430    140   K 3.3* 3.0*    107   CO2 20 22   BUN 13 8   CREATININE 0.68 0.56   GLUCOSE 150* 126*     Hepatic:   Recent Labs     05/04/23  1516 05/05/23  0430   AST 15 12   ALT 16 12   BILITOT 1.4* 1.0   ALKPHOS 150* 112*     Troponin:   Recent Labs     05/04/23  1741 05/04/23  2106 05/05/23  0030   TROPHS 15* 28* 45*     BNP: No results for input(s): BNP in the last 72 hours. Lipids:   Recent Labs     05/05/23  0430   CHOL 91   HDL 42     INR: No results for input(s): INR in the last 72 hours.       CARDIAC CATH 5/5/23  Findings:     Cardiac Arteries and Lesion Findings     LMCA: Ostial 50% stenosis with significant pressure

## 2023-05-07 LAB
MICROORGANISM SPEC CULT: NORMAL
MICROORGANISM SPEC CULT: NORMAL
SERVICE CMNT-IMP: NORMAL
SERVICE CMNT-IMP: NORMAL
SPECIMEN DESCRIPTION: NORMAL
SPECIMEN DESCRIPTION: NORMAL

## 2023-05-08 ENCOUNTER — TELEPHONE (OUTPATIENT)
Dept: FAMILY MEDICINE CLINIC | Age: 63
End: 2023-05-08

## 2023-05-08 RX ORDER — INSULIN GLARGINE 100 [IU]/ML
INJECTION, SOLUTION SUBCUTANEOUS
Qty: 9 ML | Refills: 5 | Status: SHIPPED | OUTPATIENT
Start: 2023-05-08

## 2023-05-08 NOTE — TELEPHONE ENCOUNTER
Care Transitions Initial Follow Up Call    Outreach made within 2 business days of discharge: Yes    Patient: Jaime Marley Patient : 1960   MRN: [id number]  Reason for Admission: There are no discharge diagnoses documented for the most recent discharge. Discharge Date: 23       Spoke with: Patient    Discharge department/facility: [facility]    [facility] Interactive Patient Contact:  Was patient able to fill all prescriptions: No: No meds were ordered  Was patient instructed to bring all medications to the follow-up visit: Yes  Is patient taking all medications as directed in the discharge summary?  N/A   Does patient understand their discharge instructions: Yes  Does patient have questions or concerns that need addressed prior to 7-14 day follow up office visit: no    Scheduled appointment with PCP within 7-14 days    Follow Up  Future Appointments   Date Time Provider Leo Tavarez   5/10/2023  4:00 PM [facility]   2023  1:20 PM LAKSHMI Howard - [address], [place]

## 2023-05-09 RX ORDER — ATORVASTATIN CALCIUM 20 MG/1
20 TABLET, FILM COATED ORAL NIGHTLY
COMMUNITY
Start: 2023-04-30

## 2023-05-10 ENCOUNTER — OFFICE VISIT (OUTPATIENT)
Dept: FAMILY MEDICINE CLINIC | Age: 63
End: 2023-05-10

## 2023-05-10 VITALS
HEART RATE: 84 BPM | HEIGHT: 65 IN | SYSTOLIC BLOOD PRESSURE: 116 MMHG | TEMPERATURE: 97.4 F | DIASTOLIC BLOOD PRESSURE: 70 MMHG | BODY MASS INDEX: 28.32 KG/M2 | OXYGEN SATURATION: 96 % | WEIGHT: 170 LBS | RESPIRATION RATE: 16 BRPM

## 2023-05-10 DIAGNOSIS — Z98.890 S/P CARDIAC CATH: ICD-10-CM

## 2023-05-10 DIAGNOSIS — I50.20 CONGESTIVE HEART FAILURE WITH LEFT VENTRICULAR SYSTOLIC DYSFUNCTION (HCC): ICD-10-CM

## 2023-05-10 DIAGNOSIS — Z09 HOSPITAL DISCHARGE FOLLOW-UP: Primary | ICD-10-CM

## 2023-05-10 DIAGNOSIS — F32.89 OTHER DEPRESSION: ICD-10-CM

## 2023-05-10 DIAGNOSIS — Z95.5 HISTORY OF HEART ARTERY STENT: ICD-10-CM

## 2023-05-10 DIAGNOSIS — F41.9 ANXIETY: ICD-10-CM

## 2023-05-10 DIAGNOSIS — I10 ESSENTIAL HYPERTENSION: ICD-10-CM

## 2023-05-10 DIAGNOSIS — E11.9 TYPE 2 DIABETES MELLITUS WITHOUT COMPLICATION, WITHOUT LONG-TERM CURRENT USE OF INSULIN (HCC): ICD-10-CM

## 2023-05-10 RX ORDER — BUPROPION HYDROCHLORIDE 150 MG/1
150 TABLET ORAL EVERY MORNING
Qty: 30 TABLET | Refills: 3 | Status: SHIPPED | OUTPATIENT
Start: 2023-05-10

## 2023-05-10 SDOH — ECONOMIC STABILITY: INCOME INSECURITY: HOW HARD IS IT FOR YOU TO PAY FOR THE VERY BASICS LIKE FOOD, HOUSING, MEDICAL CARE, AND HEATING?: SOMEWHAT HARD

## 2023-05-10 SDOH — ECONOMIC STABILITY: HOUSING INSECURITY
IN THE LAST 12 MONTHS, WAS THERE A TIME WHEN YOU DID NOT HAVE A STEADY PLACE TO SLEEP OR SLEPT IN A SHELTER (INCLUDING NOW)?: NO

## 2023-05-10 SDOH — ECONOMIC STABILITY: FOOD INSECURITY: WITHIN THE PAST 12 MONTHS, YOU WORRIED THAT YOUR FOOD WOULD RUN OUT BEFORE YOU GOT MONEY TO BUY MORE.: SOMETIMES TRUE

## 2023-05-10 SDOH — ECONOMIC STABILITY: FOOD INSECURITY: WITHIN THE PAST 12 MONTHS, THE FOOD YOU BOUGHT JUST DIDN'T LAST AND YOU DIDN'T HAVE MONEY TO GET MORE.: SOMETIMES TRUE

## 2023-05-10 ASSESSMENT — PATIENT HEALTH QUESTIONNAIRE - PHQ9
10. IF YOU CHECKED OFF ANY PROBLEMS, HOW DIFFICULT HAVE THESE PROBLEMS MADE IT FOR YOU TO DO YOUR WORK, TAKE CARE OF THINGS AT HOME, OR GET ALONG WITH OTHER PEOPLE: 1
SUM OF ALL RESPONSES TO PHQ QUESTIONS 1-9: 7
7. TROUBLE CONCENTRATING ON THINGS, SUCH AS READING THE NEWSPAPER OR WATCHING TELEVISION: 1
SUM OF ALL RESPONSES TO PHQ9 QUESTIONS 1 & 2: 2
4. FEELING TIRED OR HAVING LITTLE ENERGY: 1
6. FEELING BAD ABOUT YOURSELF - OR THAT YOU ARE A FAILURE OR HAVE LET YOURSELF OR YOUR FAMILY DOWN: 1
1. LITTLE INTEREST OR PLEASURE IN DOING THINGS: 1
SUM OF ALL RESPONSES TO PHQ QUESTIONS 1-9: 7
3. TROUBLE FALLING OR STAYING ASLEEP: 1
2. FEELING DOWN, DEPRESSED OR HOPELESS: 1
SUM OF ALL RESPONSES TO PHQ QUESTIONS 1-9: 7
SUM OF ALL RESPONSES TO PHQ QUESTIONS 1-9: 7
5. POOR APPETITE OR OVEREATING: 1
8. MOVING OR SPEAKING SO SLOWLY THAT OTHER PEOPLE COULD HAVE NOTICED. OR THE OPPOSITE, BEING SO FIGETY OR RESTLESS THAT YOU HAVE BEEN MOVING AROUND A LOT MORE THAN USUAL: 0
9. THOUGHTS THAT YOU WOULD BE BETTER OFF DEAD, OR OF HURTING YOURSELF: 0

## 2023-05-13 DIAGNOSIS — I21.4 NSTEMI (NON-ST ELEVATED MYOCARDIAL INFARCTION) (HCC): ICD-10-CM

## 2023-05-15 RX ORDER — CLOPIDOGREL BISULFATE 75 MG/1
TABLET ORAL
Qty: 90 TABLET | Refills: 1 | Status: SHIPPED | OUTPATIENT
Start: 2023-05-15

## 2023-05-15 RX ORDER — ASPIRIN 81 MG/1
TABLET, COATED ORAL
Qty: 90 TABLET | Refills: 1 | Status: SHIPPED | OUTPATIENT
Start: 2023-05-15

## 2023-05-23 ENCOUNTER — HOSPITAL ENCOUNTER (OUTPATIENT)
Age: 63
Setting detail: SPECIMEN
Discharge: HOME | End: 2023-05-23

## 2023-05-23 DIAGNOSIS — E11.9 TYPE 2 DIABETES MELLITUS WITHOUT COMPLICATION, WITHOUT LONG-TERM CURRENT USE OF INSULIN (HCC): ICD-10-CM

## 2023-05-24 LAB
EST. AVERAGE GLUCOSE BLD GHB EST-MCNC: 160 MG/DL
HBA1C MFR BLD: 7.2 % (ref 4–6)

## 2023-05-27 PROBLEM — E11.65 UNCONTROLLED TYPE 2 DIABETES MELLITUS WITH HYPERGLYCEMIA (HCC): Status: RESOLVED | Noted: 2022-05-24 | Resolved: 2023-05-27

## 2023-05-28 PROBLEM — F32.A DEPRESSION: Status: ACTIVE | Noted: 2023-05-28

## 2023-05-28 PROBLEM — I25.10 CAD (CORONARY ARTERY DISEASE): Status: ACTIVE | Noted: 2023-05-28

## 2023-05-28 PROBLEM — F41.9 ANXIETY: Status: ACTIVE | Noted: 2023-05-28

## 2023-05-28 ASSESSMENT — ENCOUNTER SYMPTOMS
COUGH: 0
VOMITING: 0
ABDOMINAL PAIN: 0
GASTROINTESTINAL NEGATIVE: 1
CONSTIPATION: 0
SHORTNESS OF BREATH: 0
GASTROINTESTINAL NEGATIVE: 1
VOMITING: 0
NAUSEA: 0
EYES NEGATIVE: 1
CONSTIPATION: 0
EYES NEGATIVE: 1
DIARRHEA: 0
NAUSEA: 0
SHORTNESS OF BREATH: 0
COUGH: 0
ABDOMINAL PAIN: 0
DIARRHEA: 0

## 2023-05-31 ENCOUNTER — TELEMEDICINE (OUTPATIENT)
Dept: FAMILY MEDICINE CLINIC | Age: 63
End: 2023-05-31
Payer: MEDICAID

## 2023-05-31 DIAGNOSIS — F41.9 ANXIETY: Primary | ICD-10-CM

## 2023-05-31 DIAGNOSIS — F32.89 OTHER DEPRESSION: ICD-10-CM

## 2023-05-31 DIAGNOSIS — E11.9 TYPE 2 DIABETES MELLITUS WITHOUT COMPLICATION, WITHOUT LONG-TERM CURRENT USE OF INSULIN (HCC): ICD-10-CM

## 2023-05-31 DIAGNOSIS — I10 ESSENTIAL HYPERTENSION: ICD-10-CM

## 2023-05-31 PROCEDURE — 99213 OFFICE O/P EST LOW 20 MIN: CPT | Performed by: NURSE PRACTITIONER

## 2023-05-31 PROCEDURE — 3051F HG A1C>EQUAL 7.0%<8.0%: CPT | Performed by: NURSE PRACTITIONER

## 2023-05-31 RX ORDER — BLOOD-GLUCOSE SENSOR
1 EACH MISCELLANEOUS
Qty: 1 EACH | Refills: 11 | Status: SHIPPED | OUTPATIENT
Start: 2023-05-31

## 2023-05-31 RX ORDER — PROCHLORPERAZINE 25 MG/1
1 SUPPOSITORY RECTAL DAILY
Qty: 1 EACH | Refills: 0 | Status: SHIPPED | OUTPATIENT
Start: 2023-05-31

## 2023-05-31 RX ORDER — PROCHLORPERAZINE 25 MG/1
1 SUPPOSITORY RECTAL DAILY
Qty: 1 EACH | Refills: 0 | Status: SHIPPED | OUTPATIENT
Start: 2023-05-31 | End: 2023-06-30

## 2023-05-31 RX ORDER — BLOOD-GLUCOSE,RECEIVER,CONT
1 EACH MISCELLANEOUS DAILY
Qty: 1 EACH | Refills: 0 | Status: SHIPPED | OUTPATIENT
Start: 2023-05-31

## 2023-05-31 RX ORDER — PROCHLORPERAZINE 25 MG/1
1 SUPPOSITORY RECTAL
Qty: 1 EACH | Refills: 11 | Status: SHIPPED | OUTPATIENT
Start: 2023-05-31

## 2023-05-31 ASSESSMENT — ENCOUNTER SYMPTOMS
EYES NEGATIVE: 1
DIARRHEA: 0
SHORTNESS OF BREATH: 0
COUGH: 0
CONSTIPATION: 0
VOMITING: 0
ABDOMINAL PAIN: 0
GASTROINTESTINAL NEGATIVE: 1
NAUSEA: 0

## 2023-05-31 ASSESSMENT — PATIENT HEALTH QUESTIONNAIRE - PHQ9
2. FEELING DOWN, DEPRESSED OR HOPELESS: 1
SUM OF ALL RESPONSES TO PHQ QUESTIONS 1-9: 4
SUM OF ALL RESPONSES TO PHQ QUESTIONS 1-9: 4
9. THOUGHTS THAT YOU WOULD BE BETTER OFF DEAD, OR OF HURTING YOURSELF: 0
3. TROUBLE FALLING OR STAYING ASLEEP: 1
SUM OF ALL RESPONSES TO PHQ QUESTIONS 1-9: 4
SUM OF ALL RESPONSES TO PHQ QUESTIONS 1-9: 4
1. LITTLE INTEREST OR PLEASURE IN DOING THINGS: 1
5. POOR APPETITE OR OVEREATING: 0
7. TROUBLE CONCENTRATING ON THINGS, SUCH AS READING THE NEWSPAPER OR WATCHING TELEVISION: 0
4. FEELING TIRED OR HAVING LITTLE ENERGY: 1
8. MOVING OR SPEAKING SO SLOWLY THAT OTHER PEOPLE COULD HAVE NOTICED. OR THE OPPOSITE, BEING SO FIGETY OR RESTLESS THAT YOU HAVE BEEN MOVING AROUND A LOT MORE THAN USUAL: 0
10. IF YOU CHECKED OFF ANY PROBLEMS, HOW DIFFICULT HAVE THESE PROBLEMS MADE IT FOR YOU TO DO YOUR WORK, TAKE CARE OF THINGS AT HOME, OR GET ALONG WITH OTHER PEOPLE: 0
SUM OF ALL RESPONSES TO PHQ9 QUESTIONS 1 & 2: 2
6. FEELING BAD ABOUT YOURSELF - OR THAT YOU ARE A FAILURE OR HAVE LET YOURSELF OR YOUR FAMILY DOWN: 0

## 2023-05-31 NOTE — PATIENT INSTRUCTIONS
New Updates for [facility] ANTHONY    Thank you for choosing [facility] to give you the best care! [facility] ([facility]) is always trying to think of new ways to help their patients. We are asking all patients to try out the new digital registration that is now available through the [employer] [address]. Down load today! . Via the anthony you're now able to update your personal and registration information prior to your upcoming appointment. This will save you time once you arrive at the office to check-in, not to mention your information remains safe!! Many other perks come from signing up for an account, such as:  Requesting refills  Scheduling an appointment  Completing an E-Visit  Sending a message to the office/provider  Having access to your medication list  Paying your bill/copay prior to your appointment  Scheduling your yearly mammogram  Review your test results    If you are not familiar the [facility] ANTHONY, please ask one of us and we will be happy to answer any questions or help you set-up your account.

## 2023-05-31 NOTE — PROGRESS NOTES
[facility]  [address]  [address] [facility] [address]  Dept: [phone]    2023    TELEHEALTH EVALUATION -- Audio/Visual (During CKEBM-88 public health emergency)  Odella Saint (:  1960) has requested an audio/video evaluation for the following concern(s):    HPI:  Diabetes  Hypoglycemia symptoms include nervousness/anxiousness. Pertinent negatives for hypoglycemia include no dizziness or headaches. Associated symptoms include fatigue. Pertinent negatives for diabetes include no chest pain. Appointment to f/u on anxiety/depression. Depression/Anxiety- Took Wellbutrin for 3-4 days, tried one day with taking 2 tablets. She did not notice any improvement in there symptoms or side efffects in the short course    PHQ-9 Total Score: 4 (2023  1:24 PM)  Thoughts that you would be better off dead, or of hurting yourself in some way: 0 (2023  1:24 PM)    Diabetes- requesting new ordes for Dexcom 6 and 7 as her's stopped working.  stopped working she believes. She was sent a new one by the company as hers was reading \"lost connection\". She denies any recent low blood sugars. She has woken up sweating in the middle of the night     Lab Results   Component Value Date    LABA1C 7.2 (H) 2023    LABA1C 6.8 11/15/2022    LABA1C 10.7 (H) 2022     Lab Results   Component Value Date     2023     Patient-Reported Vitals 2023   Patient-Reported Weight 170.0 lbs   Patient-Reported Height 5 feet 5 inches   Patient-Reported Systolic -   Patient-Reported Diastolic -   Patient-Reported Pulse -      Hypertension- Taking     There were no vitals filed for this visit.     Wt Readings from Last 3 Encounters:   23 170 lb (77.1 kg)   05/10/23 170 lb (77.1 kg)   23 177 lb 11.1 oz (80.6 kg)     BP Readings from Last 3 Encounters:   23 118/64   05/10/23 116/70   23 (!) 115/56       REVIEW

## 2023-07-13 ENCOUNTER — TELEMEDICINE (OUTPATIENT)
Dept: FAMILY MEDICINE CLINIC | Age: 63
End: 2023-07-13
Payer: MEDICAID

## 2023-07-13 DIAGNOSIS — F41.9 ANXIETY: ICD-10-CM

## 2023-07-13 DIAGNOSIS — F32.89 OTHER DEPRESSION: Primary | ICD-10-CM

## 2023-07-13 DIAGNOSIS — I10 ESSENTIAL HYPERTENSION: ICD-10-CM

## 2023-07-13 PROCEDURE — 99213 OFFICE O/P EST LOW 20 MIN: CPT | Performed by: NURSE PRACTITIONER

## 2023-07-13 RX ORDER — BUPROPION HYDROCHLORIDE 300 MG/1
300 TABLET ORAL EVERY MORNING
Qty: 30 TABLET | Refills: 3 | Status: SHIPPED | OUTPATIENT
Start: 2023-07-13

## 2023-07-13 ASSESSMENT — PATIENT HEALTH QUESTIONNAIRE - PHQ9
6. FEELING BAD ABOUT YOURSELF - OR THAT YOU ARE A FAILURE OR HAVE LET YOURSELF OR YOUR FAMILY DOWN: 0
SUM OF ALL RESPONSES TO PHQ QUESTIONS 1-9: 7
7. TROUBLE CONCENTRATING ON THINGS, SUCH AS READING THE NEWSPAPER OR WATCHING TELEVISION: 0
1. LITTLE INTEREST OR PLEASURE IN DOING THINGS: 2
SUM OF ALL RESPONSES TO PHQ QUESTIONS 1-9: 7
5. POOR APPETITE OR OVEREATING: 1
SUM OF ALL RESPONSES TO PHQ QUESTIONS 1-9: 7
8. MOVING OR SPEAKING SO SLOWLY THAT OTHER PEOPLE COULD HAVE NOTICED. OR THE OPPOSITE, BEING SO FIGETY OR RESTLESS THAT YOU HAVE BEEN MOVING AROUND A LOT MORE THAN USUAL: 0
10. IF YOU CHECKED OFF ANY PROBLEMS, HOW DIFFICULT HAVE THESE PROBLEMS MADE IT FOR YOU TO DO YOUR WORK, TAKE CARE OF THINGS AT HOME, OR GET ALONG WITH OTHER PEOPLE: 1
3. TROUBLE FALLING OR STAYING ASLEEP: 1
SUM OF ALL RESPONSES TO PHQ QUESTIONS 1-9: 7
4. FEELING TIRED OR HAVING LITTLE ENERGY: 2
SUM OF ALL RESPONSES TO PHQ9 QUESTIONS 1 & 2: 3
9. THOUGHTS THAT YOU WOULD BE BETTER OFF DEAD, OR OF HURTING YOURSELF: 0
2. FEELING DOWN, DEPRESSED OR HOPELESS: 1

## 2023-07-13 NOTE — PATIENT INSTRUCTIONS
New Updates for [facility] ANTHONY    Thank you for choosing [facility] to give you the best care! [facility] ([facility]) is always trying to think of new ways to help their patients. We are asking all patients to try out the new digital registration that is now available through the [employer] [address] Down load today! . Via the anthony you're now able to update your personal and registration information prior to your upcoming appointment. This will save you time once you arrive at the office to check-in, not to mention your information remains safe!! Many other perks come from signing up for an account, such as:  Requesting refills  Scheduling an appointment  Completing an E-Visit  Sending a message to the office/provider  Having access to your medication list  Paying your bill/copay prior to your appointment  Scheduling your yearly mammogram  Review your test results    If you are not familiar the [facility] ANTHONY, please ask one of us and we will be happy to answer any questions or help you set-up your account.

## 2023-07-13 NOTE — PROGRESS NOTES
Supplemental Appropriations Act, this Virtual Visit was conducted with patient's (and/or legal guardian's) consent, to reduce the patient's risk of exposure to COVID-19 and provide necessary medical care. The patient (and/or legal guardian) has also been advised to contact this office for worsening conditions or problems, and seek emergency medical treatment and/or call 911 if deemed necessary. Services were provided through a video synchronous discussion virtually to substitute for in-person clinic visit. Patient and provider were located at their individual homes. --LAKSHMI Castillo CNP on 7/13/2023 at 1:31 PM    (Please note that portions of this note were completed with a voice recognition program. Efforts were made to edit the dictations but occasionally words are mis-transcribed. )      An electronic signature was used to authenticate this note.

## 2023-07-31 ASSESSMENT — ENCOUNTER SYMPTOMS
GASTROINTESTINAL NEGATIVE: 1
EYES NEGATIVE: 1
VOMITING: 0
COUGH: 0
DIARRHEA: 0
NAUSEA: 0
SHORTNESS OF BREATH: 0
CONSTIPATION: 0
ABDOMINAL PAIN: 0

## 2023-08-17 ENCOUNTER — TELEMEDICINE (OUTPATIENT)
Dept: FAMILY MEDICINE CLINIC | Age: 63
End: 2023-08-17
Payer: MEDICAID

## 2023-08-17 DIAGNOSIS — F41.9 ANXIETY: ICD-10-CM

## 2023-08-17 DIAGNOSIS — F32.89 OTHER DEPRESSION: Primary | ICD-10-CM

## 2023-08-17 DIAGNOSIS — I10 ESSENTIAL HYPERTENSION: ICD-10-CM

## 2023-08-17 DIAGNOSIS — E11.9 TYPE 2 DIABETES MELLITUS WITHOUT COMPLICATION, WITHOUT LONG-TERM CURRENT USE OF INSULIN (HCC): ICD-10-CM

## 2023-08-17 PROBLEM — A41.9 SEPSIS, UNSPECIFIED ORGANISM (HCC): Status: ACTIVE | Noted: 2023-05-04

## 2023-08-17 PROCEDURE — 99214 OFFICE O/P EST MOD 30 MIN: CPT | Performed by: NURSE PRACTITIONER

## 2023-08-17 PROCEDURE — 3051F HG A1C>EQUAL 7.0%<8.0%: CPT | Performed by: NURSE PRACTITIONER

## 2023-08-17 RX ORDER — ESCITALOPRAM OXALATE 10 MG/1
TABLET ORAL
Qty: 30 TABLET | Refills: 1 | Status: SHIPPED | OUTPATIENT
Start: 2023-08-17 | End: 2023-09-30

## 2023-08-17 RX ORDER — BUPROPION HYDROCHLORIDE 150 MG/1
150 TABLET ORAL EVERY MORNING
COMMUNITY
Start: 2023-08-07 | End: 2023-08-17

## 2023-08-17 RX ORDER — CLONAZEPAM 1 MG/1
1 TABLET ORAL 2 TIMES DAILY PRN
Qty: 30 TABLET | Refills: 0 | Status: SHIPPED | OUTPATIENT
Start: 2023-08-17 | End: 2023-09-16

## 2023-08-17 NOTE — PROGRESS NOTES
[email or web address]    Smokeless tobacco: Never    Tobacco comments:     \"off and on throughout the years\"   Vaping Use    Vaping Use: Never used   Substance and Sexual Activity    Alcohol use: No    Drug use: No     Social Determinants of Health     Financial Resource Strain: Medium Risk    Difficulty of Paying Living Expenses: Somewhat hard   Food Insecurity: Food Insecurity Present    Worried About Running Out of Food in the Last Year: Sometimes true    Ran Out of Food in the Last Year: Sometimes true   Transportation Needs: Unknown    Lack of Transportation (Non-Medical): No   Housing Stability: Unknown    Unstable Housing in the Last Year: No       SURGICAL HISTORY:    Past Surgical History:   Procedure Laterality Date    APPENDECTOMY      BACK SURGERY      lumbar surgery  pt does not recall    CARDIAC CATHETERIZATION  2022    CARDIAC CATHETERIZATION  2023    JORDAN MID RCA and RT PDA    CARPAL TUNNEL RELEASE Right     twice     CORONARY ARTERY BYPASS GRAFT N/A 2022    CABG CORONARY ARTERY BYPASS X3 WITH GARRETT performed by Corie Preston MD at [address] [facility]  2023    CARPOMETACARPAL JOINT ARTHROPLASTY, APPLICATION OF SPLINT RIGHT HAND    HAND SURGERY Right 2023    CARPOMETACARPAL JOINT ARTHROPLASTY  (C-ARM, APPLICATION OF SPLINT RIGHT HAND performed by Steve Perez DO at [address] ARTHROSCOPY Left     [facility]  or     TUBAL LIGATION         CURRENT MEDICATIONS:    Current Outpatient Medications   Medication Sig Dispense Refill    escitalopram (LEXAPRO) 10 MG tablet Take 0.5 tablets by mouth daily for 14 days, THEN 1 tablet daily. 30 tablet 1    clonazePAM (KLONOPIN) 1 MG tablet Take 1 tablet by mouth 2 times daily as needed for Anxiety for up to 30 days.  Max Daily Amount: 2 mg 30 tablet 0    ezetimibe (ZETIA) 10 MG tablet take 1 tablet by mouth nightly 90 tablet 3    atorvastatin (LIPITOR) 40 MG tablet Take 1 tablet by mouth nightly      Continuous Blood Gluc

## 2023-08-17 NOTE — PATIENT INSTRUCTIONS
New Updates for My [facility] ANTHONY    Thank you for choosing [facility] to give you the best care! [facility] ([facility]) is always trying to think of new ways to help their patients. We are asking all patients to try out the new digital registration that is now available through the [address] Down load today! . Via the anthony you're now able to update your personal and registration information prior to your upcoming appointment. This will save you time once you arrive at the office to check-in, not to mention your information remains safe!! Many other perks come from signing up for an account, such as:  Requesting refills  Scheduling an appointment  Completing an E-Visit  Sending a message to the office/provider  Having access to your medication list  Paying your bill/copay prior to your appointment  Scheduling your yearly mammogram  Review your test results    If you are not familiar the [facility] ANTHONY, please ask one of us and we will be happy to answer any questions or help you set-up your account.

## 2023-08-25 ASSESSMENT — ENCOUNTER SYMPTOMS
SHORTNESS OF BREATH: 0
COUGH: 0
CONSTIPATION: 0
GASTROINTESTINAL NEGATIVE: 1
VOMITING: 0
EYES NEGATIVE: 1
NAUSEA: 0
DIARRHEA: 0
ABDOMINAL PAIN: 0

## 2023-08-25 NOTE — ASSESSMENT & PLAN NOTE
Well-controlled, continue current medications and continue current treatment plan . Continue efforts at eating smaller more frequent meals.

## 2023-08-30 DIAGNOSIS — I10 ESSENTIAL HYPERTENSION: ICD-10-CM

## 2023-08-30 NOTE — TELEPHONE ENCOUNTER
Jt Orosco is calling to request a refill on the following medication(s):    Last Visit Date (If Applicable):  7/94/4611    Next Visit Date:    10/2/2023    Medication Request:  Requested Prescriptions     Pending Prescriptions Disp Refills    furosemide (LASIX) 20 MG tablet 60 tablet 1     Sig: Take 1 tablet by mouth every other day    amLODIPine (NORVASC) 5 MG tablet 30 tablet 0     Sig: Take 1 tablet by mouth daily

## 2023-09-01 RX ORDER — AMLODIPINE BESYLATE 5 MG/1
5 TABLET ORAL DAILY
Qty: 30 TABLET | Refills: 0 | Status: SHIPPED | OUTPATIENT
Start: 2023-09-01

## 2023-09-01 RX ORDER — FUROSEMIDE 20 MG/1
20 TABLET ORAL EVERY OTHER DAY
Qty: 60 TABLET | Refills: 1 | Status: SHIPPED | OUTPATIENT
Start: 2023-09-01

## 2023-09-03 DIAGNOSIS — F41.9 ANXIETY: ICD-10-CM

## 2023-09-03 DIAGNOSIS — F32.89 OTHER DEPRESSION: ICD-10-CM

## 2023-09-05 RX ORDER — BUPROPION HYDROCHLORIDE 150 MG/1
150 TABLET ORAL EVERY MORNING
Qty: 30 TABLET | Refills: 3 | OUTPATIENT
Start: 2023-09-05

## 2023-10-02 NOTE — TELEPHONE ENCOUNTER
Zac Maxwell is calling to request a refill on the following medication(s):    Medication Request:  Requested Prescriptions     Pending Prescriptions Disp Refills    LANTUS SOLOSTAR 100 UNIT/ML injection pen [Pharmacy Med Name: LANTUS SOLOSTAR 100 UNIT/ML] 9 mL 5     Sig: inject 35 units subcutaneously nightly       Last Visit Date (If Applicable):  5/88/8847    Next Visit Date:    10/2/2023

## 2023-10-03 RX ORDER — INSULIN GLARGINE 100 [IU]/ML
INJECTION, SOLUTION SUBCUTANEOUS
Qty: 9 ML | Refills: 5 | Status: SHIPPED | OUTPATIENT
Start: 2023-10-03

## 2023-10-13 DIAGNOSIS — F32.89 OTHER DEPRESSION: ICD-10-CM

## 2023-10-13 DIAGNOSIS — I10 ESSENTIAL HYPERTENSION: ICD-10-CM

## 2023-10-13 DIAGNOSIS — F41.9 ANXIETY: ICD-10-CM

## 2023-10-13 NOTE — TELEPHONE ENCOUNTER
Bulmaro Farfan is calling to request a refill on the following medication(s):    Medication Request:  Requested Prescriptions     Pending Prescriptions Disp Refills    escitalopram (LEXAPRO) 10 MG tablet 30 tablet 0     Sig: Take 1 tablet by mouth daily    amLODIPine (NORVASC) 5 MG tablet 30 tablet 0     Sig: Take 1 tablet by mouth daily       Last Visit Date (If Applicable):  0/23/0411    Next Visit Date:    Visit date not found

## 2023-10-16 DIAGNOSIS — I10 ESSENTIAL HYPERTENSION: ICD-10-CM

## 2023-10-16 DIAGNOSIS — F41.9 ANXIETY: ICD-10-CM

## 2023-10-16 DIAGNOSIS — F32.89 OTHER DEPRESSION: ICD-10-CM

## 2023-10-16 RX ORDER — ESCITALOPRAM OXALATE 10 MG/1
10 TABLET ORAL DAILY
Qty: 30 TABLET | Refills: 0 | Status: SHIPPED | OUTPATIENT
Start: 2023-10-16 | End: 2023-10-23 | Stop reason: SDUPTHER

## 2023-10-16 RX ORDER — AMLODIPINE BESYLATE 5 MG/1
5 TABLET ORAL DAILY
Qty: 30 TABLET | Refills: 0 | Status: SHIPPED | OUTPATIENT
Start: 2023-10-16 | End: 2023-10-23 | Stop reason: SDUPTHER

## 2023-10-17 RX ORDER — ESCITALOPRAM OXALATE 10 MG/1
TABLET ORAL
Qty: 30 TABLET | Refills: 0 | OUTPATIENT
Start: 2023-10-17 | End: 2023-11-15

## 2023-10-17 RX ORDER — AMLODIPINE BESYLATE 5 MG/1
5 TABLET ORAL DAILY
Qty: 30 TABLET | Refills: 0 | OUTPATIENT
Start: 2023-10-17

## 2023-10-17 NOTE — TELEPHONE ENCOUNTER
Please call patient to schedule an appointment. May have to be with an alternative provider if there is nothing available with me.

## 2023-10-20 ENCOUNTER — OFFICE VISIT (OUTPATIENT)
Dept: FAMILY MEDICINE CLINIC | Age: 63
End: 2023-10-20
Payer: MEDICAID

## 2023-10-20 VITALS
OXYGEN SATURATION: 96 % | TEMPERATURE: 98.1 F | DIASTOLIC BLOOD PRESSURE: 68 MMHG | HEART RATE: 68 BPM | HEIGHT: 65 IN | WEIGHT: 171 LBS | BODY MASS INDEX: 28.49 KG/M2 | SYSTOLIC BLOOD PRESSURE: 106 MMHG

## 2023-10-20 DIAGNOSIS — E11.9 TYPE 2 DIABETES MELLITUS WITHOUT COMPLICATION, WITHOUT LONG-TERM CURRENT USE OF INSULIN (HCC): Primary | ICD-10-CM

## 2023-10-20 DIAGNOSIS — I25.10 3-VESSEL CAD: ICD-10-CM

## 2023-10-20 DIAGNOSIS — F32.89 OTHER DEPRESSION: ICD-10-CM

## 2023-10-20 DIAGNOSIS — I10 ESSENTIAL HYPERTENSION: ICD-10-CM

## 2023-10-20 DIAGNOSIS — F41.9 ANXIETY: ICD-10-CM

## 2023-10-20 DIAGNOSIS — I21.4 NSTEMI (NON-ST ELEVATED MYOCARDIAL INFARCTION) (HCC): ICD-10-CM

## 2023-10-20 LAB — HBA1C MFR BLD: 6.8 %

## 2023-10-20 PROCEDURE — 3078F DIAST BP <80 MM HG: CPT | Performed by: NURSE PRACTITIONER

## 2023-10-20 PROCEDURE — 3044F HG A1C LEVEL LT 7.0%: CPT | Performed by: NURSE PRACTITIONER

## 2023-10-20 PROCEDURE — 83036 HEMOGLOBIN GLYCOSYLATED A1C: CPT | Performed by: NURSE PRACTITIONER

## 2023-10-20 PROCEDURE — 3074F SYST BP LT 130 MM HG: CPT | Performed by: NURSE PRACTITIONER

## 2023-10-20 PROCEDURE — 99214 OFFICE O/P EST MOD 30 MIN: CPT | Performed by: NURSE PRACTITIONER

## 2023-10-20 RX ORDER — ASPIRIN 81 MG/1
81 TABLET ORAL DAILY
Qty: 90 TABLET | Refills: 1 | Status: SHIPPED | OUTPATIENT
Start: 2023-10-20

## 2023-10-20 RX ORDER — GLUCAGON 1 MG
1 KIT INJECTION AS NEEDED
Qty: 1 EACH | Refills: 5 | Status: SHIPPED | OUTPATIENT
Start: 2023-10-20

## 2023-10-20 ASSESSMENT — ENCOUNTER SYMPTOMS
NAUSEA: 0
DIARRHEA: 0
EYES NEGATIVE: 1
ABDOMINAL PAIN: 0
CONSTIPATION: 0
VOMITING: 0
GASTROINTESTINAL NEGATIVE: 1
SHORTNESS OF BREATH: 0
COUGH: 0

## 2023-10-23 RX ORDER — ESCITALOPRAM OXALATE 10 MG/1
10 TABLET ORAL DAILY
Qty: 90 TABLET | Refills: 1 | Status: SHIPPED | OUTPATIENT
Start: 2023-10-23 | End: 2024-01-21

## 2023-10-23 RX ORDER — FUROSEMIDE 20 MG/1
20 TABLET ORAL EVERY OTHER DAY
Qty: 45 TABLET | Refills: 1 | Status: SHIPPED | OUTPATIENT
Start: 2023-10-23 | End: 2024-04-20

## 2023-10-23 RX ORDER — AMLODIPINE BESYLATE 5 MG/1
5 TABLET ORAL DAILY
Qty: 90 TABLET | Refills: 1 | Status: SHIPPED | OUTPATIENT
Start: 2023-10-23

## 2023-10-23 RX ORDER — ATORVASTATIN CALCIUM 40 MG/1
40 TABLET, FILM COATED ORAL NIGHTLY
Qty: 90 TABLET | Refills: 2 | Status: SHIPPED | OUTPATIENT
Start: 2023-10-23

## 2023-10-27 DIAGNOSIS — I21.4 NSTEMI (NON-ST ELEVATED MYOCARDIAL INFARCTION) (HCC): ICD-10-CM

## 2023-10-27 RX ORDER — CLOPIDOGREL BISULFATE 75 MG/1
TABLET ORAL
Qty: 90 TABLET | Refills: 1 | Status: SHIPPED | OUTPATIENT
Start: 2023-10-27

## 2023-10-27 NOTE — TELEPHONE ENCOUNTER
Guille Westfall is calling to request a refill on the following medication(s):    Last Visit Date (If Applicable):  09/59/2058    Next Visit Date:    3/1/2024    Medication Request:  Requested Prescriptions     Pending Prescriptions Disp Refills    clopidogrel (PLAVIX) 75 MG tablet [Pharmacy Med Name: CLOPIDOGREL 75 MG TABLET] 90 tablet 1     Sig: take 1 tablet by mouth once daily

## 2023-10-28 PROBLEM — R82.90 ABNORMAL URINE ODOR: Status: RESOLVED | Noted: 2022-05-24 | Resolved: 2023-10-28

## 2023-10-28 PROBLEM — R82.998 LEUKOCYTES IN URINE: Status: RESOLVED | Noted: 2022-05-24 | Resolved: 2023-10-28

## 2023-10-28 PROBLEM — F17.200 TOBACCO DEPENDENCE: Status: RESOLVED | Noted: 2018-03-15 | Resolved: 2023-10-28

## 2023-10-31 ENCOUNTER — TELEPHONE (OUTPATIENT)
Dept: FAMILY MEDICINE CLINIC | Age: 63
End: 2023-10-31

## 2023-10-31 NOTE — TELEPHONE ENCOUNTER
Patient's PA was denied for Glucagon HCl (GLUCAGON EMERGENCY) 1 MG/ML SOLR     Denied  PA Detail   Did not try and fail formulary alternative Case ID: [id number]      Payer:  [employer] Patient's Payer    [phone]    Electronic appeal:  Not supported

## 2023-11-11 DIAGNOSIS — I10 ESSENTIAL HYPERTENSION: ICD-10-CM

## 2023-11-11 DIAGNOSIS — F41.9 ANXIETY: ICD-10-CM

## 2023-11-11 DIAGNOSIS — F32.89 OTHER DEPRESSION: ICD-10-CM

## 2023-11-13 NOTE — TELEPHONE ENCOUNTER
Marv Tucker is calling to request a refill on the following medication(s):    Last Visit Date (If Applicable):  09/05/0341    Next Visit Date:    3/1/2024    Medication Request:  Requested Prescriptions     Pending Prescriptions Disp Refills    amLODIPine (NORVASC) 5 MG tablet 90 tablet 1     Sig: Take 1 tablet by mouth daily    escitalopram (LEXAPRO) 10 MG tablet 90 tablet 1     Sig: Take 1 tablet by mouth daily

## 2023-11-14 RX ORDER — AMLODIPINE BESYLATE 5 MG/1
5 TABLET ORAL DAILY
Qty: 90 TABLET | Refills: 1 | Status: SHIPPED | OUTPATIENT
Start: 2023-11-14

## 2023-11-14 RX ORDER — ESCITALOPRAM OXALATE 10 MG/1
10 TABLET ORAL DAILY
Qty: 90 TABLET | Refills: 1 | Status: SHIPPED | OUTPATIENT
Start: 2023-11-14 | End: 2024-05-12

## 2024-01-17 ENCOUNTER — TELEPHONE (OUTPATIENT)
Dept: FAMILY MEDICINE CLINIC | Age: 64
End: 2024-01-17

## 2024-01-17 DIAGNOSIS — E11.9 TYPE 2 DIABETES MELLITUS WITHOUT COMPLICATION, WITHOUT LONG-TERM CURRENT USE OF INSULIN (HCC): Primary | ICD-10-CM

## 2024-01-17 RX ORDER — INSULIN ASPART 100 [IU]/ML
INJECTION, SOLUTION INTRAVENOUS; SUBCUTANEOUS
Qty: 5 ADJUSTABLE DOSE PRE-FILLED PEN SYRINGE | Refills: 3 | Status: SHIPPED | OUTPATIENT
Start: 2024-01-17

## 2024-01-17 NOTE — TELEPHONE ENCOUNTER
[employer] has sent a letter to the patient and the office to inform us that:    Humalog Kwik Inj -not on insurance formulary.    This medication needs to be changed. Please advise    Lov: 10/20/2023  Nov: 03/01/2024    Covered medications are:  Novolog, Novolin 70/30 Novolin N, Novolin R

## 2024-01-17 NOTE — TELEPHONE ENCOUNTER
Spoke to Destiny and she has been notified of the medication change. New RX faxed over to [facility] on [address] [phone] (F)  Sliding scale information mailed to her.

## 2024-02-19 DIAGNOSIS — I21.4 NSTEMI (NON-ST ELEVATED MYOCARDIAL INFARCTION) (HCC): ICD-10-CM

## 2024-02-19 RX ORDER — NITROGLYCERIN 0.4 MG/1
TABLET SUBLINGUAL
Qty: 25 TABLET | Refills: 1 | Status: SHIPPED | OUTPATIENT
Start: 2024-02-19

## 2024-02-27 RX ORDER — INSULIN GLARGINE 100 [IU]/ML
INJECTION, SOLUTION SUBCUTANEOUS
Qty: 9 ML | Refills: 5 | Status: SHIPPED | OUTPATIENT
Start: 2024-02-27

## 2024-02-28 DIAGNOSIS — I21.4 NSTEMI (NON-ST ELEVATED MYOCARDIAL INFARCTION) (HCC): ICD-10-CM

## 2024-02-28 NOTE — TELEPHONE ENCOUNTER
Destiny Bueno is calling to request a refill on the following medication(s):    Last Visit Date (If Applicable):  Visit date not found    Next Visit Date:    Visit date not found    Medication Request:  Requested Prescriptions     Pending Prescriptions Disp Refills    nitroGLYCERIN (NITROSTAT) 0.4 MG SL tablet [Pharmacy Med Name: NITROGLYCERIN 0.4 MG TABLET SL] 25 tablet 1     Sig: place 1 tablet under the tongue if needed every 5 minutes for chest pain for 3 doses IF NO RELIEF AFTER THIRD DOSE CALL 911.

## 2024-03-01 ENCOUNTER — OFFICE VISIT (OUTPATIENT)
Dept: FAMILY MEDICINE CLINIC | Age: 64
End: 2024-03-01
Payer: MEDICARE

## 2024-03-01 ENCOUNTER — HOSPITAL ENCOUNTER (OUTPATIENT)
Age: 64
Setting detail: SPECIMEN
Discharge: HOME | End: 2024-03-01

## 2024-03-01 VITALS
BODY MASS INDEX: 28.99 KG/M2 | WEIGHT: 174 LBS | DIASTOLIC BLOOD PRESSURE: 84 MMHG | RESPIRATION RATE: 15 BRPM | OXYGEN SATURATION: 96 % | HEART RATE: 76 BPM | HEIGHT: 65 IN | SYSTOLIC BLOOD PRESSURE: 126 MMHG | TEMPERATURE: 97.6 F

## 2024-03-01 DIAGNOSIS — R41.3 MEMORY CHANGES: ICD-10-CM

## 2024-03-01 DIAGNOSIS — Z79.899 MEDICATION MANAGEMENT: ICD-10-CM

## 2024-03-01 DIAGNOSIS — E11.9 TYPE 2 DIABETES MELLITUS WITHOUT COMPLICATION, WITHOUT LONG-TERM CURRENT USE OF INSULIN (HCC): Primary | ICD-10-CM

## 2024-03-01 DIAGNOSIS — I50.20 CONGESTIVE HEART FAILURE WITH LEFT VENTRICULAR SYSTOLIC DYSFUNCTION (HCC): ICD-10-CM

## 2024-03-01 DIAGNOSIS — E11.9 TYPE 2 DIABETES MELLITUS WITHOUT COMPLICATION, WITHOUT LONG-TERM CURRENT USE OF INSULIN (HCC): ICD-10-CM

## 2024-03-01 DIAGNOSIS — I10 ESSENTIAL HYPERTENSION: ICD-10-CM

## 2024-03-01 DIAGNOSIS — J01.90 ACUTE BACTERIAL SINUSITIS: ICD-10-CM

## 2024-03-01 DIAGNOSIS — Z98.890 S/P CARDIAC CATH: ICD-10-CM

## 2024-03-01 DIAGNOSIS — B96.89 ACUTE BACTERIAL SINUSITIS: ICD-10-CM

## 2024-03-01 DIAGNOSIS — F41.9 ANXIETY: ICD-10-CM

## 2024-03-01 LAB — HBA1C MFR BLD: 7 %

## 2024-03-01 PROCEDURE — 99214 OFFICE O/P EST MOD 30 MIN: CPT | Performed by: NURSE PRACTITIONER

## 2024-03-01 PROCEDURE — 3051F HG A1C>EQUAL 7.0%<8.0%: CPT | Performed by: NURSE PRACTITIONER

## 2024-03-01 PROCEDURE — 3074F SYST BP LT 130 MM HG: CPT | Performed by: NURSE PRACTITIONER

## 2024-03-01 PROCEDURE — 3079F DIAST BP 80-89 MM HG: CPT | Performed by: NURSE PRACTITIONER

## 2024-03-01 PROCEDURE — 1124F ACP DISCUSS-NO DSCNMKR DOCD: CPT | Performed by: NURSE PRACTITIONER

## 2024-03-01 PROCEDURE — 83036 HEMOGLOBIN GLYCOSYLATED A1C: CPT | Performed by: NURSE PRACTITIONER

## 2024-03-01 RX ORDER — AZITHROMYCIN 250 MG/1
TABLET, FILM COATED ORAL
Qty: 6 TABLET | Refills: 0 | Status: SHIPPED | OUTPATIENT
Start: 2024-03-01 | End: 2024-03-11

## 2024-03-01 RX ORDER — NITROGLYCERIN 0.4 MG/1
TABLET SUBLINGUAL
Qty: 25 TABLET | Refills: 1 | Status: SHIPPED | OUTPATIENT
Start: 2024-03-01

## 2024-03-01 RX ORDER — CLONAZEPAM 1 MG/1
1 TABLET ORAL 2 TIMES DAILY PRN
Qty: 30 TABLET | Refills: 0 | Status: SHIPPED | OUTPATIENT
Start: 2024-03-01 | End: 2024-03-31

## 2024-03-01 RX ORDER — GLUCAGON 1 MG
1 KIT INJECTION AS NEEDED
Qty: 1 EACH | Refills: 5 | Status: SHIPPED | OUTPATIENT
Start: 2024-03-01

## 2024-03-01 RX ORDER — PEN NEEDLE, DIABETIC 29 G X1/2"
1 NEEDLE, DISPOSABLE MISCELLANEOUS 4 TIMES DAILY PRN
COMMUNITY
Start: 2024-02-28

## 2024-03-01 ASSESSMENT — PATIENT HEALTH QUESTIONNAIRE - PHQ9
SUM OF ALL RESPONSES TO PHQ QUESTIONS 1-9: 7
4. FEELING TIRED OR HAVING LITTLE ENERGY: 1
10. IF YOU CHECKED OFF ANY PROBLEMS, HOW DIFFICULT HAVE THESE PROBLEMS MADE IT FOR YOU TO DO YOUR WORK, TAKE CARE OF THINGS AT HOME, OR GET ALONG WITH OTHER PEOPLE: 1
2. FEELING DOWN, DEPRESSED OR HOPELESS: 1
SUM OF ALL RESPONSES TO PHQ QUESTIONS 1-9: 7
3. TROUBLE FALLING OR STAYING ASLEEP: 1
8. MOVING OR SPEAKING SO SLOWLY THAT OTHER PEOPLE COULD HAVE NOTICED. OR THE OPPOSITE, BEING SO FIGETY OR RESTLESS THAT YOU HAVE BEEN MOVING AROUND A LOT MORE THAN USUAL: 0
7. TROUBLE CONCENTRATING ON THINGS, SUCH AS READING THE NEWSPAPER OR WATCHING TELEVISION: 1
5. POOR APPETITE OR OVEREATING: 1
9. THOUGHTS THAT YOU WOULD BE BETTER OFF DEAD, OR OF HURTING YOURSELF: 0
SUM OF ALL RESPONSES TO PHQ QUESTIONS 1-9: 7
SUM OF ALL RESPONSES TO PHQ QUESTIONS 1-9: 7
1. LITTLE INTEREST OR PLEASURE IN DOING THINGS: 1
SUM OF ALL RESPONSES TO PHQ9 QUESTIONS 1 & 2: 2
6. FEELING BAD ABOUT YOURSELF - OR THAT YOU ARE A FAILURE OR HAVE LET YOURSELF OR YOUR FAMILY DOWN: 1

## 2024-03-01 ASSESSMENT — ENCOUNTER SYMPTOMS
VOMITING: 0
CONSTIPATION: 0
RHINORRHEA: 1
COUGH: 0
NAUSEA: 0
ABDOMINAL PAIN: 0
SHORTNESS OF BREATH: 0
EYES NEGATIVE: 1
DIARRHEA: 0
GASTROINTESTINAL NEGATIVE: 1

## 2024-03-01 NOTE — PATIENT INSTRUCTIONS
Thank you for choosing [facility].  We know you have options when it comes to your healthcare; we appreciate that you chose us. Our goal is to provide exceptional  service and world class care to every patient.  You will be receiving a survey via email or text message asking for your feedback.  Please take a few minutes to share your thoughts about your recent visit. Your comments helps us understand what we do well and ways we can improve.  Thank you in advance for your valuable feedback.              New Updates for [employer] GELY    Thank you for choosing [facility] to give you the best care! [facility] is always trying to think of new ways to help their patients. We are asking all patients to try out the new digital registration that is now available through the [employer]. Down load today!. Via the gely you're now able to update your personal and registration information prior to your upcoming appointment. This will save you time once you arrive at the office to check-in, not to mention your information remains safe!! Many other perks come from signing up for an account, such as:  Requesting refills  Scheduling an appointment  Completing an E-Visit  Sending a message to the office/provider  Having access to your medication list  Paying your bill/copay prior to your appointment  Scheduling your yearly mammogram  Review your test results    If you are not familiar the [employer] GELY, please ask one of us and we will be happy to answer any questions or help you set-up your account.

## 2024-03-01 NOTE — PROGRESS NOTES
[facility]  [facility]  [address]  [address]  [facility] [address]  Dept: [phone]    3/1/2024    CHIEF COMPLAINT    Chief Complaint   Patient presents with    Diabetes    Depression    Health Maintenance     Patient is declining to do the Mammogram, Cervical, Colonoscopy    Memory Loss    Sinus Problem       HPI    Destiny Bueno is a 64 y.o. female who presents   Chief Complaint   Patient presents with    Diabetes    Depression    Health Maintenance     Patient is declining to do the Mammogram, Cervical, Colonoscopy    Memory Loss    Sinus Problem   .  HPI    Appointment to f/u on DM and depression. Wishes to discuss memory concerns and sinus concerns today also.     Depression/anxiety - Taking lexapro 10 mg daily. Taking Clonazepam intermittently. Last filled 8/17/23 for 30 tablets.   Due for UDS today. Clonazepam last taken Monday.  Feels a difference and her daughter sees it.    PHQ-9 Total Score: 7 (3/1/2024  1:12 PM)  Thoughts that you would be better off dead, or of hurting yourself in some way: 0 (3/1/2024  1:12 PM)    Depression/anxiety hx- Wellbutrin caused intolerable cotton mouth which was painful.   Also reports she has taken medication in the remote past, but does not recall the name of the medication.  She knows that it was popular, but she doesn't recall which one.      Hypertension, CHF&  CAD- taking Plavix, Aspirin, Zetia, metoprolol, losartan, lipitor & amlodipine.   Patient d/c Lasix from every other day due to feeling like she is too dehydrated when taking this.   Has nitro, but has not needed it.   Seeing cardiology as advised and has a stress test and echo scheduled at [facility] on 3/26/2024 with a f/u on 8/26/24.     Memory concerns- Started worrying about this roughly  6 months ago. Didn't seem too concerning at first, but she is noting it is still happening.  Will be driving and forget where she is going.  Her daughter said that she does

## 2024-03-04 LAB
6-ACETYLMORPHINE, UR: NOT DETECTED
7-AMINOCLONAZEPAM, URINE: NOT DETECTED
ALPHA-OH-ALPRAZ, URINE: NOT DETECTED
ALPHA-OH-MIDAZOLAM, URINE: NOT DETECTED
ALPRAZOLAM, URINE: NOT DETECTED
AMPHETAMINES, URINE: NOT DETECTED
BARBITURATES, URINE: NEGATIVE
BENZOYLECGONINE, UR: NEGATIVE
BUPRENORPHINE URINE: NOT DETECTED
CARISOPRODOL, UR: NEGATIVE
CLONAZEPAM, URINE: NOT DETECTED
CODEINE, URINE: NOT DETECTED
CREAT UR-MCNC: 24.3 MG/DL (ref 20–400)
DIAZEPAM, URINE: NOT DETECTED
EER PAIN MGT DRUG PANEL, HIGH RES/EMIT U: NORMAL
ETHYL GLUCURONIDE UR: NEGATIVE
FENTANYL URINE: NOT DETECTED
GABAPENTIN: NOT DETECTED
HYDROCODONE, URINE: NOT DETECTED
HYDROMORPHONE, URINE: NOT DETECTED
LORAZEPAM, URINE: NOT DETECTED
MARIJUANA METAB, UR: NORMAL
MDA, UR: NOT DETECTED
MDEA, EVE, UR: NOT DETECTED
MDMA URINE: NOT DETECTED
MEPERIDINE METAB, UR: NOT DETECTED
METHADONE, URINE: NEGATIVE
METHAMPHETAMINE, URINE: NOT DETECTED
METHYLPHENIDATE: NOT DETECTED
MIDAZOLAM, URINE: NOT DETECTED
MORPHINE, OPI1M: NOT DETECTED
NALOXONE URINE: NOT DETECTED
NORBUPRENORPHINE, URINE: NOT DETECTED
NORDIAZEPAM, URINE: NOT DETECTED
NORFENTANYL, URINE: NOT DETECTED
NORHYDROCODONE, URINE: NOT DETECTED
NOROXYCODONE, URINE: NOT DETECTED
NOROXYMORPHONE, URINE: NOT DETECTED
OXAZEPAM, URINE: NOT DETECTED
OXYCODONE URINE: NOT DETECTED
OXYMORPHONE, URINE: NOT DETECTED
PAIN MGT DRUG PANEL, HI RES, UR: NORMAL
PCP,URINE: NEGATIVE
PHENTERMINE, UR: NOT DETECTED
PREGABALIN: NOT DETECTED
TAPENTADOL, URINE: NOT DETECTED
TAPENTADOL-O-SULFATE, URINE: NOT DETECTED
TEMAZEPAM, URINE: NOT DETECTED
TRAMADOL, URINE: NEGATIVE
ZOLPIDEM METABOLITE (ZCA), URINE: NOT DETECTED
ZOLPIDEM, URINE: NOT DETECTED

## 2024-03-10 DIAGNOSIS — I21.4 NSTEMI (NON-ST ELEVATED MYOCARDIAL INFARCTION) (HCC): ICD-10-CM

## 2024-03-11 NOTE — TELEPHONE ENCOUNTER
Destiny Bueno is calling to request a refill on the following medication(s):    Last Visit Date (If Applicable):  3/1/2024    Next Visit Date:    Visit date not found    Medication Request:  Requested Prescriptions     Pending Prescriptions Disp Refills    aspirin (ASPIRIN LOW DOSE) 81 MG EC tablet 90 tablet 1     Sig: Take 1 tablet by mouth daily    clopidogrel (PLAVIX) 75 MG tablet 90 tablet 1     Sig: Take 1 tablet by mouth daily

## 2024-03-12 RX ORDER — CLOPIDOGREL BISULFATE 75 MG/1
75 TABLET ORAL DAILY
Qty: 90 TABLET | Refills: 1 | Status: SHIPPED | OUTPATIENT
Start: 2024-03-12

## 2024-03-12 RX ORDER — ASPIRIN 81 MG/1
81 TABLET ORAL DAILY
Qty: 90 TABLET | Refills: 1 | Status: SHIPPED | OUTPATIENT
Start: 2024-03-12

## 2024-03-29 DIAGNOSIS — E11.9 TYPE 2 DIABETES MELLITUS WITHOUT COMPLICATION, WITHOUT LONG-TERM CURRENT USE OF INSULIN (HCC): ICD-10-CM

## 2024-03-30 RX ORDER — INSULIN ASPART 100 [IU]/ML
INJECTION, SOLUTION INTRAVENOUS; SUBCUTANEOUS
Qty: 15 ML | Refills: 0 | Status: SHIPPED | OUTPATIENT
Start: 2024-03-30

## 2024-04-17 RX ORDER — PEN NEEDLE, DIABETIC 29 G X1/2"
NEEDLE, DISPOSABLE MISCELLANEOUS
Qty: 120 EACH | Refills: 11 | Status: SHIPPED | OUTPATIENT
Start: 2024-04-17

## 2024-05-05 DIAGNOSIS — F32.89 OTHER DEPRESSION: ICD-10-CM

## 2024-05-05 DIAGNOSIS — I10 ESSENTIAL HYPERTENSION: ICD-10-CM

## 2024-05-05 DIAGNOSIS — F41.9 ANXIETY: ICD-10-CM

## 2024-05-07 RX ORDER — AMLODIPINE BESYLATE 5 MG/1
5 TABLET ORAL DAILY
Qty: 90 TABLET | Refills: 1 | Status: SHIPPED | OUTPATIENT
Start: 2024-05-07

## 2024-05-07 RX ORDER — ESCITALOPRAM OXALATE 10 MG/1
10 TABLET ORAL DAILY
Qty: 90 TABLET | Refills: 1 | Status: SHIPPED | OUTPATIENT
Start: 2024-05-07 | End: 2024-11-03

## 2024-05-11 DIAGNOSIS — E11.9 TYPE 2 DIABETES MELLITUS WITHOUT COMPLICATION, WITHOUT LONG-TERM CURRENT USE OF INSULIN (HCC): ICD-10-CM

## 2024-05-12 ENCOUNTER — PATIENT MESSAGE (OUTPATIENT)
Dept: FAMILY MEDICINE CLINIC | Age: 64
End: 2024-05-12

## 2024-05-13 RX ORDER — LOSARTAN POTASSIUM 25 MG/1
25 TABLET ORAL DAILY
Qty: 30 TABLET | Refills: 5 | Status: SHIPPED | OUTPATIENT
Start: 2024-05-13

## 2024-05-13 RX ORDER — PROCHLORPERAZINE 25 MG/1
1 SUPPOSITORY RECTAL DAILY
Qty: 1 EACH | Refills: 5 | Status: SHIPPED | OUTPATIENT
Start: 2024-05-13

## 2024-05-13 NOTE — TELEPHONE ENCOUNTER
From: Destiny Bueno  To: Cathy Ramires  Sent: 5/12/2024 10:02 PM EDT  Subject: Metoprolol/losartan    I can not refill these scripts through my chart. I need them asap. Thank you

## 2024-06-03 DIAGNOSIS — E11.9 TYPE 2 DIABETES MELLITUS WITHOUT COMPLICATION, WITHOUT LONG-TERM CURRENT USE OF INSULIN (HCC): ICD-10-CM

## 2024-06-03 RX ORDER — PROCHLORPERAZINE 25 MG/1
1 SUPPOSITORY RECTAL
Qty: 1 EACH | Refills: 11 | OUTPATIENT
Start: 2024-06-03

## 2024-06-04 RX ORDER — PROCHLORPERAZINE 25 MG/1
SUPPOSITORY RECTAL
Qty: 3 EACH | Refills: 5 | Status: SHIPPED | OUTPATIENT
Start: 2024-06-04

## 2024-06-28 ENCOUNTER — OFFICE VISIT (OUTPATIENT)
Dept: FAMILY MEDICINE CLINIC | Age: 64
End: 2024-06-28
Payer: MEDICARE

## 2024-06-28 VITALS
WEIGHT: 178 LBS | HEART RATE: 64 BPM | BODY MASS INDEX: 29.66 KG/M2 | DIASTOLIC BLOOD PRESSURE: 68 MMHG | HEIGHT: 65 IN | SYSTOLIC BLOOD PRESSURE: 126 MMHG

## 2024-06-28 DIAGNOSIS — I50.20 CONGESTIVE HEART FAILURE WITH LEFT VENTRICULAR SYSTOLIC DYSFUNCTION (HCC): ICD-10-CM

## 2024-06-28 DIAGNOSIS — E83.42 HYPOMAGNESEMIA: ICD-10-CM

## 2024-06-28 DIAGNOSIS — I50.21 ACUTE CLINICAL SYSTOLIC HEART FAILURE (HCC): ICD-10-CM

## 2024-06-28 DIAGNOSIS — I25.10 3-VESSEL CAD: ICD-10-CM

## 2024-06-28 DIAGNOSIS — Z00.00 WELCOME TO MEDICARE PREVENTIVE VISIT: Primary | ICD-10-CM

## 2024-06-28 DIAGNOSIS — Z78.0 POST-MENOPAUSE: ICD-10-CM

## 2024-06-28 DIAGNOSIS — I21.4 NSTEMI (NON-ST ELEVATED MYOCARDIAL INFARCTION) (HCC): ICD-10-CM

## 2024-06-28 DIAGNOSIS — E11.9 TYPE 2 DIABETES MELLITUS WITHOUT COMPLICATION, WITHOUT LONG-TERM CURRENT USE OF INSULIN (HCC): ICD-10-CM

## 2024-06-28 DIAGNOSIS — F32.89 OTHER DEPRESSION: ICD-10-CM

## 2024-06-28 DIAGNOSIS — R41.3 MEMORY CHANGES: ICD-10-CM

## 2024-06-28 DIAGNOSIS — Z53.20 MAMMOGRAM DECLINED: ICD-10-CM

## 2024-06-28 DIAGNOSIS — F41.9 ANXIETY: ICD-10-CM

## 2024-06-28 DIAGNOSIS — I10 ESSENTIAL HYPERTENSION: ICD-10-CM

## 2024-06-28 DIAGNOSIS — Z28.21 REFUSED PNEUMOCOCCAL VACCINATION: ICD-10-CM

## 2024-06-28 LAB — HBA1C MFR BLD: 7.1 %

## 2024-06-28 PROCEDURE — 3051F HG A1C>EQUAL 7.0%<8.0%: CPT | Performed by: NURSE PRACTITIONER

## 2024-06-28 PROCEDURE — G0402 INITIAL PREVENTIVE EXAM: HCPCS | Performed by: NURSE PRACTITIONER

## 2024-06-28 PROCEDURE — 99213 OFFICE O/P EST LOW 20 MIN: CPT | Performed by: NURSE PRACTITIONER

## 2024-06-28 PROCEDURE — 3074F SYST BP LT 130 MM HG: CPT | Performed by: NURSE PRACTITIONER

## 2024-06-28 PROCEDURE — 83036 HEMOGLOBIN GLYCOSYLATED A1C: CPT | Performed by: NURSE PRACTITIONER

## 2024-06-28 PROCEDURE — 3078F DIAST BP <80 MM HG: CPT | Performed by: NURSE PRACTITIONER

## 2024-06-28 PROCEDURE — 1124F ACP DISCUSS-NO DSCNMKR DOCD: CPT | Performed by: NURSE PRACTITIONER

## 2024-06-28 RX ORDER — ESCITALOPRAM OXALATE 20 MG/1
20 TABLET ORAL DAILY
Qty: 90 TABLET | Refills: 1 | Status: SHIPPED | OUTPATIENT
Start: 2024-06-28 | End: 2024-12-25

## 2024-06-28 SDOH — ECONOMIC STABILITY: FOOD INSECURITY: WITHIN THE PAST 12 MONTHS, YOU WORRIED THAT YOUR FOOD WOULD RUN OUT BEFORE YOU GOT MONEY TO BUY MORE.: NEVER TRUE

## 2024-06-28 SDOH — ECONOMIC STABILITY: FOOD INSECURITY: WITHIN THE PAST 12 MONTHS, THE FOOD YOU BOUGHT JUST DIDN'T LAST AND YOU DIDN'T HAVE MONEY TO GET MORE.: NEVER TRUE

## 2024-06-28 SDOH — ECONOMIC STABILITY: INCOME INSECURITY: HOW HARD IS IT FOR YOU TO PAY FOR THE VERY BASICS LIKE FOOD, HOUSING, MEDICAL CARE, AND HEATING?: NOT HARD AT ALL

## 2024-06-28 ASSESSMENT — ENCOUNTER SYMPTOMS
COUGH: 0
DIARRHEA: 0
CONSTIPATION: 0
VOMITING: 0
SHORTNESS OF BREATH: 0
ABDOMINAL PAIN: 0
EYES NEGATIVE: 1
NAUSEA: 0
GASTROINTESTINAL NEGATIVE: 1

## 2024-06-28 ASSESSMENT — PATIENT HEALTH QUESTIONNAIRE - PHQ9
2. FEELING DOWN, DEPRESSED OR HOPELESS: MORE THAN HALF THE DAYS
SUM OF ALL RESPONSES TO PHQ QUESTIONS 1-9: 14
SUM OF ALL RESPONSES TO PHQ QUESTIONS 1-9: 14
7. TROUBLE CONCENTRATING ON THINGS, SUCH AS READING THE NEWSPAPER OR WATCHING TELEVISION: NOT AT ALL
SUM OF ALL RESPONSES TO PHQ QUESTIONS 1-9: 14
6. FEELING BAD ABOUT YOURSELF - OR THAT YOU ARE A FAILURE OR HAVE LET YOURSELF OR YOUR FAMILY DOWN: SEVERAL DAYS
3. TROUBLE FALLING OR STAYING ASLEEP: NEARLY EVERY DAY
5. POOR APPETITE OR OVEREATING: NEARLY EVERY DAY
SUM OF ALL RESPONSES TO PHQ9 QUESTIONS 1 & 2: 4
SUM OF ALL RESPONSES TO PHQ QUESTIONS 1-9: 14
9. THOUGHTS THAT YOU WOULD BE BETTER OFF DEAD, OR OF HURTING YOURSELF: NOT AT ALL
10. IF YOU CHECKED OFF ANY PROBLEMS, HOW DIFFICULT HAVE THESE PROBLEMS MADE IT FOR YOU TO DO YOUR WORK, TAKE CARE OF THINGS AT HOME, OR GET ALONG WITH OTHER PEOPLE: NOT DIFFICULT AT ALL
8. MOVING OR SPEAKING SO SLOWLY THAT OTHER PEOPLE COULD HAVE NOTICED. OR THE OPPOSITE, BEING SO FIGETY OR RESTLESS THAT YOU HAVE BEEN MOVING AROUND A LOT MORE THAN USUAL: NOT AT ALL
4. FEELING TIRED OR HAVING LITTLE ENERGY: NEARLY EVERY DAY
1. LITTLE INTEREST OR PLEASURE IN DOING THINGS: MORE THAN HALF THE DAYS

## 2024-06-28 ASSESSMENT — LIFESTYLE VARIABLES: HOW OFTEN DO YOU HAVE A DRINK CONTAINING ALCOHOL: NEVER

## 2024-06-28 NOTE — PROGRESS NOTES
Medicare Annual Wellness Visit    Destiny Bueno is here for Medicare AWV (Pt is here for Annual Medicare Wellness Exam. )    Assessment & Plan   Welcome to Medicare preventive visit  Type 2 diabetes mellitus without complication, without long-term current use of insulin (HCC)  Assessment & Plan:   Well-controlled, continue current medications.  Orders:  -     POCT glycosylated hemoglobin (Hb A1C)  Essential hypertension  -     CBC with Auto Differential; Future  -     Comprehensive Metabolic Panel; Future  -     TSH with Reflex; Future  -     metoprolol tartrate (LOPRESSOR) 25 MG tablet; Take 1 tablet by mouth 2 times daily, Disp-180 tablet, R-1Normal  Other depression  -     escitalopram (LEXAPRO) 20 MG tablet; Take 1 tablet by mouth daily, Disp-90 tablet, R-1Normal  Anxiety  -     escitalopram (LEXAPRO) 20 MG tablet; Take 1 tablet by mouth daily, Disp-90 tablet, R-1Normal  NSTEMI (non-ST elevated myocardial infarction) (HCC)  Assessment & Plan:   Monitored by specialist- no acute findings meriting change in the plan  Orders:  -     Lipid Panel; Future  Memory changes  Congestive heart failure with left ventricular systolic dysfunction (HCC)  Assessment & Plan:   Monitored by specialist- no acute findings meriting change in the plan  Post-menopause  3-vessel CAD  -     Lipid Panel; Future  Mammogram declined  Refused pneumococcal vaccination  Acute clinical systolic heart failure (HCC)  Hypomagnesemia  -     Magnesium; Future    Hypertension stable. Continue current medications and treatment plan. Continue as planned to see cardiology in August.  Anxiety/Depression borderline controlled. Will increase the Lexapro. Encouraged to go to [employer] to aid in reduction of stress and anxiety. Continue clonazepam sparingly as needed.  Get updated lab works.     Health Maintenance reviewed - declined mammogram, DEXA, CT lung screening and all vaccines .    Recommendations for Preventive Services Due: see orders and

## 2024-06-28 NOTE — PATIENT INSTRUCTIONS
vision test   You look at pieces of printed test patterns in various colors. You say what number or symbol you see.  Your doctor may have you trace the number or symbol using a pointer.  How do these tests feel?  There is very little chance of having a problem from this test. If dilating drops are used for a vision test, they may make the eyes sting and cause a medicine taste in the mouth.  Follow-up care is a key part of your treatment and safety. Be sure to make and go to all appointments, and call your doctor if you are having problems. It's also a good idea to know your test results and keep a list of the medicines you take.  Where can you learn more?  Go to https://www.[employer].net/patientEd and enter G551 to learn more about \"Learning About Vision Tests.\"  Current as of: June 5, 2023  Content Version: 14.1  © 0920-8194 [employer]   Care instructions adapted under license by [employer]. If you have questions about a medical condition or this instruction, always ask your healthcare professional. [employer] disclaims any warranty or liability for your use of this information.           Eating Healthy Foods: Care Instructions  With every meal, you can make healthy food choices. Try to eat a variety of fruits, vegetables, whole grains, lean proteins, and low-fat dairy products. This can help you get the right balance of nutrients, including vitamins and minerals. Small changes add up over time. You can start by adding one healthy food to your meals each day.    Try to make half your plate fruits and vegetables, one-fourth whole grains, and one-fourth lean proteins. Try including dairy with your meals.   Eat more fruits and vegetables. Try to have them with most meals and snacks.   Foods for healthy eating        Fruits   These can be fresh, frozen, canned, or dried.  Try to choose whole fruit rather than fruit juice.  Eat a variety of colors.        Vegetables   These can be fresh,

## 2024-07-01 RX ORDER — INSULIN GLARGINE 100 [IU]/ML
INJECTION, SOLUTION SUBCUTANEOUS
Qty: 9 ML | Refills: 5 | Status: SHIPPED | OUTPATIENT
Start: 2024-07-01

## 2024-07-01 NOTE — TELEPHONE ENCOUNTER
Destiny Bueno is calling to request a refill on the following medication(s):    Last Visit Date (If Applicable):  6/28/2024    Next Visit Date:    Visit date not found    Medication Request:  Requested Prescriptions     Pending Prescriptions Disp Refills    LANTUS SOLOSTAR 100 UNIT/ML injection pen [Pharmacy Med Name: LANTUS INJ SOLOSTAR 100 Solution Pen-injector] 9 mL 0     Sig: INJECT 35 UNITS SUBCUTANESOULY DAILY IN THE EVENING

## 2024-07-09 DIAGNOSIS — I10 ESSENTIAL HYPERTENSION: ICD-10-CM

## 2024-07-10 DIAGNOSIS — E11.9 TYPE 2 DIABETES MELLITUS WITHOUT COMPLICATION, WITHOUT LONG-TERM CURRENT USE OF INSULIN (HCC): ICD-10-CM

## 2024-07-10 RX ORDER — PROCHLORPERAZINE 25 MG/1
SUPPOSITORY RECTAL
Qty: 3 EACH | Refills: 5 | Status: SHIPPED | OUTPATIENT
Start: 2024-07-10

## 2024-07-13 DIAGNOSIS — E11.9 TYPE 2 DIABETES MELLITUS WITHOUT COMPLICATION, WITHOUT LONG-TERM CURRENT USE OF INSULIN (HCC): ICD-10-CM

## 2024-07-13 DIAGNOSIS — I25.10 3-VESSEL CAD: ICD-10-CM

## 2024-07-13 DIAGNOSIS — I21.4 NSTEMI (NON-ST ELEVATED MYOCARDIAL INFARCTION) (HCC): ICD-10-CM

## 2024-07-15 RX ORDER — ATORVASTATIN CALCIUM 40 MG/1
40 TABLET, FILM COATED ORAL NIGHTLY
Qty: 90 TABLET | Refills: 1 | Status: SHIPPED | OUTPATIENT
Start: 2024-07-15

## 2024-07-20 DIAGNOSIS — E11.9 TYPE 2 DIABETES MELLITUS WITHOUT COMPLICATION, WITHOUT LONG-TERM CURRENT USE OF INSULIN (HCC): ICD-10-CM

## 2024-07-20 DIAGNOSIS — I25.10 3-VESSEL CAD: ICD-10-CM

## 2024-07-20 DIAGNOSIS — I21.4 NSTEMI (NON-ST ELEVATED MYOCARDIAL INFARCTION) (HCC): ICD-10-CM

## 2024-07-22 NOTE — TELEPHONE ENCOUNTER
Destiny Bueno is calling to request a refill on the following medication(s):    Last Visit Date (If Applicable):  6/28/2024    Next Visit Date:    Visit date not found    Medication Request:  Requested Prescriptions     Pending Prescriptions Disp Refills    atorvastatin (LIPITOR) 40 MG tablet 90 tablet 1     Sig: Take 1 tablet by mouth nightly

## 2024-07-24 RX ORDER — ATORVASTATIN CALCIUM 40 MG/1
40 TABLET, FILM COATED ORAL NIGHTLY
Qty: 90 TABLET | Refills: 1 | Status: SHIPPED | OUTPATIENT
Start: 2024-07-24

## 2024-08-24 DIAGNOSIS — I21.4 NSTEMI (NON-ST ELEVATED MYOCARDIAL INFARCTION) (HCC): ICD-10-CM

## 2024-08-27 ENCOUNTER — HOSPITAL ENCOUNTER (OUTPATIENT)
Facility: CLINIC | Age: 64
Discharge: HOME | End: 2024-08-29
Payer: MEDICARE

## 2024-08-27 ENCOUNTER — HOSPITAL ENCOUNTER (OUTPATIENT)
Dept: GENERAL RADIOLOGY | Facility: CLINIC | Age: 64
Discharge: HOME | End: 2024-08-29
Payer: MEDICARE

## 2024-08-27 DIAGNOSIS — S89.91XA INJURY OF RIGHT KNEE, INITIAL ENCOUNTER: ICD-10-CM

## 2024-08-27 PROCEDURE — 73564 X-RAY EXAM KNEE 4 OR MORE: CPT

## 2024-08-27 RX ORDER — CLOPIDOGREL BISULFATE 75 MG/1
75 TABLET ORAL DAILY
Qty: 90 TABLET | Refills: 0 | Status: SHIPPED | OUTPATIENT
Start: 2024-08-27

## 2024-08-27 RX ORDER — ASPIRIN 81 MG/1
81 TABLET ORAL DAILY
Qty: 90 TABLET | Refills: 0 | Status: SHIPPED | OUTPATIENT
Start: 2024-08-27

## 2024-09-04 DIAGNOSIS — E11.9 TYPE 2 DIABETES MELLITUS WITHOUT COMPLICATION, WITHOUT LONG-TERM CURRENT USE OF INSULIN (HCC): ICD-10-CM

## 2024-09-04 NOTE — TELEPHONE ENCOUNTER
Destiny Bueno is calling to request a refill on the following medication(s):    Last Visit Date (If Applicable):  Visit date not found    Next Visit Date:    Visit date not found    Medication Request:  Requested Prescriptions     Pending Prescriptions Disp Refills    insulin aspart (NOVOLOG FLEXPEN) 100 UNIT/ML injection pen 15 mL 0     Sig: INJECT PER SLIDING SCALE THREE TIMES DAILY BEFORE MEALS AND AT BEDTIME. FOR BLOOD SUGAR(BG) 100-150 GIVE 2 UNITS, FOR BG -200 GIVE 4 UNITS,FOR BG -250 GIVE 8 UNITS, FOR BG -300 GIVE 12 UNITS, FOR BG  OR OVER GIVE 16 UNITS,  UNITS IN 24 HOURS.

## 2024-09-06 DIAGNOSIS — F41.9 ANXIETY: ICD-10-CM

## 2024-09-06 DIAGNOSIS — E11.9 TYPE 2 DIABETES MELLITUS WITHOUT COMPLICATION, WITHOUT LONG-TERM CURRENT USE OF INSULIN (HCC): ICD-10-CM

## 2024-09-06 RX ORDER — PROCHLORPERAZINE 25 MG/1
1 SUPPOSITORY RECTAL DAILY
Qty: 1 EACH | Refills: 5 | Status: SHIPPED | OUTPATIENT
Start: 2024-09-06

## 2024-09-06 RX ORDER — PROCHLORPERAZINE 25 MG/1
SUPPOSITORY RECTAL
Qty: 3 EACH | Refills: 5 | Status: SHIPPED | OUTPATIENT
Start: 2024-09-06

## 2024-09-07 ENCOUNTER — HOSPITAL ENCOUNTER (EMERGENCY)
Age: 64
Discharge: HOME | End: 2024-09-07
Attending: STUDENT IN AN ORGANIZED HEALTH CARE EDUCATION/TRAINING PROGRAM
Payer: MEDICARE

## 2024-09-07 ENCOUNTER — APPOINTMENT (OUTPATIENT)
Dept: GENERAL RADIOLOGY | Age: 64
End: 2024-09-07
Payer: MEDICARE

## 2024-09-07 VITALS
RESPIRATION RATE: 18 BRPM | BODY MASS INDEX: 24.96 KG/M2 | DIASTOLIC BLOOD PRESSURE: 97 MMHG | SYSTOLIC BLOOD PRESSURE: 158 MMHG | TEMPERATURE: 98.2 F | WEIGHT: 150 LBS | HEART RATE: 75 BPM | OXYGEN SATURATION: 100 %

## 2024-09-07 DIAGNOSIS — M79.604 RIGHT LEG PAIN: Primary | ICD-10-CM

## 2024-09-07 DIAGNOSIS — M25.551 RIGHT HIP PAIN: ICD-10-CM

## 2024-09-07 DIAGNOSIS — S76.911A MUSCLE STRAIN OF RIGHT THIGH, INITIAL ENCOUNTER: ICD-10-CM

## 2024-09-07 PROCEDURE — 96372 THER/PROPH/DIAG INJ SC/IM: CPT

## 2024-09-07 PROCEDURE — 99284 EMERGENCY DEPT VISIT MOD MDM: CPT

## 2024-09-07 PROCEDURE — 73502 X-RAY EXAM HIP UNI 2-3 VIEWS: CPT

## 2024-09-07 PROCEDURE — 73552 X-RAY EXAM OF FEMUR 2/>: CPT

## 2024-09-07 PROCEDURE — 6360000002 HC RX W HCPCS

## 2024-09-07 RX ORDER — HYDROCODONE BITARTRATE AND ACETAMINOPHEN 5; 325 MG/1; MG/1
1 TABLET ORAL EVERY 8 HOURS PRN
Qty: 10 TABLET | Refills: 0 | Status: SHIPPED | OUTPATIENT
Start: 2024-09-07 | End: 2024-09-10

## 2024-09-07 RX ORDER — INSULIN ASPART 100 [IU]/ML
INJECTION, SOLUTION INTRAVENOUS; SUBCUTANEOUS
Qty: 15 ML | Refills: 1 | Status: SHIPPED | OUTPATIENT
Start: 2024-09-07

## 2024-09-07 RX ORDER — KETOROLAC TROMETHAMINE 15 MG/ML
15 INJECTION, SOLUTION INTRAMUSCULAR; INTRAVENOUS ONCE
Status: COMPLETED | OUTPATIENT
Start: 2024-09-07 | End: 2024-09-07

## 2024-09-07 RX ORDER — HYDROCODONE BITARTRATE AND ACETAMINOPHEN 5; 325 MG/1; MG/1
1 TABLET ORAL ONCE
Status: DISCONTINUED | OUTPATIENT
Start: 2024-09-07 | End: 2024-09-07

## 2024-09-07 RX ADMIN — KETOROLAC TROMETHAMINE 15 MG: 15 INJECTION, SOLUTION INTRAMUSCULAR; INTRAVENOUS at 11:40

## 2024-09-07 ASSESSMENT — PAIN DESCRIPTION - ORIENTATION
ORIENTATION: RIGHT
ORIENTATION: RIGHT

## 2024-09-07 ASSESSMENT — PAIN - FUNCTIONAL ASSESSMENT
PAIN_FUNCTIONAL_ASSESSMENT: ACTIVITIES ARE NOT PREVENTED
PAIN_FUNCTIONAL_ASSESSMENT: 0-10

## 2024-09-07 ASSESSMENT — PAIN DESCRIPTION - LOCATION
LOCATION: HIP
LOCATION: ARM

## 2024-09-07 ASSESSMENT — PAIN DESCRIPTION - PAIN TYPE: TYPE: ACUTE PAIN

## 2024-09-07 ASSESSMENT — PAIN SCALES - GENERAL
PAINLEVEL_OUTOF10: 7
PAINLEVEL_OUTOF10: 7

## 2024-09-07 ASSESSMENT — PAIN DESCRIPTION - DESCRIPTORS
DESCRIPTORS: DISCOMFORT
DESCRIPTORS: ACHING

## 2024-09-07 ASSESSMENT — LIFESTYLE VARIABLES: HOW OFTEN DO YOU HAVE A DRINK CONTAINING ALCOHOL: NEVER

## 2024-09-07 NOTE — ED NOTES
Pt came into the ed via triage due to having right hip pain and right lower leg pain   Pt stated she hit her leg walking at home a couple days ago and now is having worse pain   Pt has no other C/O   Pt is Aox4  and ambulatory   RR are equal and regular   Pt has a bruise on the lower right shin

## 2024-09-07 NOTE — ED PROVIDER NOTES
[facility] ED  Emergency Department Encounter  Emergency Medicine Resident     Pt Name:Destiny Bueno  MRN: [id number]  Birthdate 1960  Date of evaluation: 9/7/24  PCP:  Cathy Ramires APRN - CNP  Note Started: 9:46 AM EDT      CHIEF COMPLAINT       No chief complaint on file.      HISTORY OF PRESENT ILLNESS  (Location/Symptom, Timing/Onset, Context/Setting, Quality, Duration, Modifying Factors, Severity.)      Destiny Bueno is a 64 y.o. female who presents with complaint of right leg pain.  States that approximately 3 weeks ago she struck the corner of a wall with her right lower leg.  She was since seen at urgent care for this issue where she had x-rays taken of her right knee.  Patient states that they did not find any fractures just evidence of her arthritis.  Patient states that she has taken ibuprofen and Tylenol at home for alleviation of her pain however has offered little relief.  Patient states that since then her thigh and right hip have been increasing in pain.  Patient still able to ambulate without issue.  States that she has been favoring the right leg due to its pain and fear of further injury.  Patient denies warmth, erythema, inability to bear weight on the right leg.  No other complaints at this time.    PAST MEDICAL / SURGICAL / SOCIAL / FAMILY HISTORY      has a past medical history of CAD (coronary artery disease), CHF (congestive heart failure) ([facility]), Chronic back pain, Chronic left shoulder pain, COVID-19 vaccine series not administered, Diabetes mellitus (HCC), History of echocardiogram, Hyperlipidemia, Hypertension, NSTEMI (non-ST elevated myocardial infarction) ([facility]), Osteoarthritis, Pneumothorax, Under care of service provider, and Under care of service provider.       has a past surgical history that includes back surgery; Carpal tunnel release (Right); Appendectomy; Tubal ligation; Cardiac catheterization (03/22/2022); Coronary artery bypass graft (N/A,

## 2024-09-07 NOTE — DISCHARGE INSTRUCTIONS
You were seen today for your complaint of right leg pain.  Based on your physical exam and imaging there is no acute fracture noted of your right femur or right hip.  Will be prescribed 3 days of Norco to take every 8 hours as needed for pain.  Please follow-up with primary care physician if this pain continues.  Experiencing inability to walk, swollen, red joints, please return to the emergency department to be evaluated.

## 2024-09-07 NOTE — ED PROVIDER NOTES
[facility]     Emergency Department     Faculty Attestation    I performed a history and physical examination of the patient and discussed management with the resident. I have reviewed and agree with the resident’s findings including all diagnostic interpretations, and treatment plans as written at the time of my review. Any areas of disagreement are noted on the chart. I was personally present for the key portions of any procedures. I have documented in the chart those procedures where I was not present during the key portions. For Physician Assistant/ Nurse Practitioner cases/documentation I have personally evaluated this patient and have completed at least one if not all key elements of the E/M (history, physical exam, and MDM). Additional findings are as noted.    PtName: Destiny Bueno  MRN: [id number]  Birthdate 1960  Date of evaluation: 9/7/24  Note Started: 9:48 AM EDT    Primary Care Physician: Cathy Ramires APRN - CNP    Brief HPI:  64-year-old female presents emergency department with right hip pain and right thigh pain.  Recent history of lower leg trauma, she had plain films of the knee at that time which were unremarkable.  She reports that the pain is increasing in the right hip.  She is able to bear weight and ambulate.  Denies fevers or chills.    Pertinent Physical Exam Findings:  Vitals:    09/07/24 0938   BP: (!) 158/97   Pulse: 75   Resp: 18   Temp: 98.2 °F (36.8 °C)   SpO2: 100%   Appears well, resting actively, no acute distress.  Full passive and active range of motion of the right hip and right knee.  No obvious trauma.  Tenderness over the right greater trochanter.  Right lower extremity is soft and neurovascularly intact.    Medical Decision Making: Patient is a 64 y.o. female presenting to the emergency department with right hip pain. The chart was reviewed for pertinent history relating to the chief complaint.  The patient appears

## 2024-09-08 RX ORDER — CLONAZEPAM 1 MG/1
1 TABLET ORAL 2 TIMES DAILY PRN
Qty: 30 TABLET | Refills: 0 | Status: SHIPPED | OUTPATIENT
Start: 2024-09-08 | End: 2024-10-08

## 2024-09-09 RX ORDER — INSULIN ASPART 100 [IU]/ML
INJECTION, SOLUTION INTRAVENOUS; SUBCUTANEOUS
Qty: 15 ML | Refills: 5 | Status: SHIPPED | OUTPATIENT
Start: 2024-09-09

## 2024-09-11 ENCOUNTER — OFFICE VISIT (OUTPATIENT)
Dept: ORTHOPEDIC SURGERY | Age: 64
End: 2024-09-11
Payer: MEDICARE

## 2024-09-11 VITALS — HEIGHT: 65 IN | BODY MASS INDEX: 24.99 KG/M2 | WEIGHT: 150 LBS

## 2024-09-11 DIAGNOSIS — M25.561 RIGHT KNEE PAIN, UNSPECIFIED CHRONICITY: Primary | ICD-10-CM

## 2024-09-11 DIAGNOSIS — M79.651 ACUTE THIGH PAIN, RIGHT: ICD-10-CM

## 2024-09-11 PROCEDURE — 99203 OFFICE O/P NEW LOW 30 MIN: CPT

## 2024-09-11 PROCEDURE — 1124F ACP DISCUSS-NO DSCNMKR DOCD: CPT

## 2024-10-02 DIAGNOSIS — E11.9 TYPE 2 DIABETES MELLITUS WITHOUT COMPLICATION, WITHOUT LONG-TERM CURRENT USE OF INSULIN (HCC): Primary | ICD-10-CM

## 2024-10-02 RX ORDER — ESCITALOPRAM OXALATE 10 MG/1
10 TABLET ORAL DAILY
COMMUNITY
Start: 2024-07-09 | End: 2024-10-03 | Stop reason: ALTCHOICE

## 2024-10-03 ENCOUNTER — TELEMEDICINE (OUTPATIENT)
Dept: FAMILY MEDICINE CLINIC | Age: 64
End: 2024-10-03

## 2024-10-03 DIAGNOSIS — I10 ESSENTIAL HYPERTENSION: ICD-10-CM

## 2024-10-03 DIAGNOSIS — F32.89 OTHER DEPRESSION: ICD-10-CM

## 2024-10-03 DIAGNOSIS — M79.604 RIGHT LEG PAIN: ICD-10-CM

## 2024-10-03 DIAGNOSIS — I21.4 NSTEMI (NON-ST ELEVATED MYOCARDIAL INFARCTION) (HCC): ICD-10-CM

## 2024-10-03 DIAGNOSIS — F41.9 ANXIETY: Primary | ICD-10-CM

## 2024-10-03 DIAGNOSIS — E11.9 TYPE 2 DIABETES MELLITUS WITHOUT COMPLICATION, WITHOUT LONG-TERM CURRENT USE OF INSULIN (HCC): ICD-10-CM

## 2024-10-03 ASSESSMENT — PATIENT HEALTH QUESTIONNAIRE - PHQ9
SUM OF ALL RESPONSES TO PHQ QUESTIONS 1-9: 10
1. LITTLE INTEREST OR PLEASURE IN DOING THINGS: MORE THAN HALF THE DAYS
3. TROUBLE FALLING OR STAYING ASLEEP: MORE THAN HALF THE DAYS
SUM OF ALL RESPONSES TO PHQ QUESTIONS 1-9: 10
5. POOR APPETITE OR OVEREATING: SEVERAL DAYS
SUM OF ALL RESPONSES TO PHQ9 QUESTIONS 1 & 2: 4
6. FEELING BAD ABOUT YOURSELF - OR THAT YOU ARE A FAILURE OR HAVE LET YOURSELF OR YOUR FAMILY DOWN: NOT AT ALL
4. FEELING TIRED OR HAVING LITTLE ENERGY: MORE THAN HALF THE DAYS
9. THOUGHTS THAT YOU WOULD BE BETTER OFF DEAD, OR OF HURTING YOURSELF: NOT AT ALL
7. TROUBLE CONCENTRATING ON THINGS, SUCH AS READING THE NEWSPAPER OR WATCHING TELEVISION: SEVERAL DAYS
8. MOVING OR SPEAKING SO SLOWLY THAT OTHER PEOPLE COULD HAVE NOTICED. OR THE OPPOSITE, BEING SO FIGETY OR RESTLESS THAT YOU HAVE BEEN MOVING AROUND A LOT MORE THAN USUAL: NOT AT ALL
10. IF YOU CHECKED OFF ANY PROBLEMS, HOW DIFFICULT HAVE THESE PROBLEMS MADE IT FOR YOU TO DO YOUR WORK, TAKE CARE OF THINGS AT HOME, OR GET ALONG WITH OTHER PEOPLE: SOMEWHAT DIFFICULT
SUM OF ALL RESPONSES TO PHQ QUESTIONS 1-9: 10
2. FEELING DOWN, DEPRESSED OR HOPELESS: MORE THAN HALF THE DAYS
SUM OF ALL RESPONSES TO PHQ QUESTIONS 1-9: 10

## 2024-10-03 NOTE — PROGRESS NOTES
P [facility]  [facility] MEDICINE  [address]  [address]  [place] [address]  Dept: [phone]    10/3/2024    TELEHEALTH EVALUATION -- Audio/Visual   Destiny Bueno (:  1960) has requested an audio/video evaluation for the following concern(s):    HPI:  HPI     Appointment to follow-up on anxiety.    Anxiety- Lexapro increased from 10 mg to 20 mg at last visit.  Continues taking clonazepam as needed.  Feels the increase is helping. She has taken Clonopin a few times in the last 30-45 day.    PHQ-9 Total Score: 10 (10/3/2024 12:54 PM)  Thoughts that you would be better off dead, or of hurting yourself in some way: 0 (10/3/2024 12:54 PM)    Depression/anxiety hx- Wellbutrin caused intolerable cotton mouth which was painful.   Also reports she has taken medication in the remote past, but does not recall the name of the medication.  She knows that it was popular, but she doesn't recall which one.     Diabetes- Taking Lantus 35 units at night.   She has been having more BG alarms with her CGM as low at 40 glucose level.   She has had a few times her BG has increased up to 300. She has used her Novolog 2-3 times in the last week.     Right leg pain- she hit her knee on the sharp corner of a wall. She was evaluated in the ER on 2024 due to persistent pain.   Did see ortho. Did not go to PT. Suppose to f/u with ortho on 10/9. Will likely cancel   Pain has since resolved.     Hypertension, CHF&  CAD- taking Plavix, Aspirin, Zetia, metoprolol, losartan, Lipitor & amlodipine.   Patient d/c Lasix from every other day due to feeling like she is too dehydrated when taking this.   Has nitro, but has not needed it.      There were no vitals filed for this visit.        10/3/2024     9:35 AM   Patient-Reported Vitals   Patient-Reported Weight 175   Patient-Reported Height 5'5\"   Patient-Reported Systolic 135 mmHg   Patient-Reported Diastolic 64 mmHg   Patient-Reported Pulse

## 2024-10-03 NOTE — PATIENT INSTRUCTIONS
Thank you for choosing [facility].  We know you have options when it comes to your healthcare; we appreciate that you chose us. Our goal is to provide exceptional  service and world class care to every patient.  You will be receiving a survey via email or text message asking for your feedback.  Please take a few minutes to share your thoughts about your recent visit. Your comments helps us understand what we do well and ways we can improve.  Thank you in advance for your valuable feedback.            New Updates for [employer] [facility]    Thank you for choosing [facility] to give you the best care! [facility] is always trying to think of new ways to help their patients. We are asking all patients to try out the new digital registration that is now available through the [employer] [facility]. Down load today!. Via the gely you're now able to update your personal and registration information prior to your upcoming appointment. This will save you time once you arrive at the office to check-in, not to mention your information remains safe!! Many other perks come from signing up for an account, such as:  Requesting refills  Scheduling an appointment  Completing an E-Visit  Sending a message to the office/provider  Having access to your medication list  Paying your bill/copay prior to your appointment  Scheduling your yearly mammogram  Review your test results    If you are not familiar the [employer] [facility], please ask one of us and we will be happy to answer any questions or help you set-up your account.

## 2024-10-04 ENCOUNTER — HOSPITAL ENCOUNTER (OUTPATIENT)
Age: 64
Setting detail: SPECIMEN
Discharge: HOME | End: 2024-10-04

## 2024-10-04 DIAGNOSIS — E83.42 HYPOMAGNESEMIA: ICD-10-CM

## 2024-10-04 DIAGNOSIS — I10 ESSENTIAL HYPERTENSION: ICD-10-CM

## 2024-10-04 LAB
ALBUMIN SERPL-MCNC: 4.5 G/DL (ref 3.5–5.2)
ALBUMIN/GLOB SERPL: 2 {RATIO} (ref 1–2.5)
ALP SERPL-CCNC: 133 U/L (ref 35–104)
ALT SERPL-CCNC: 15 U/L (ref 10–35)
ANION GAP SERPL CALCULATED.3IONS-SCNC: 8 MMOL/L (ref 9–16)
AST SERPL-CCNC: 23 U/L (ref 10–35)
BASOPHILS # BLD: 0.07 K/UL (ref 0–0.2)
BASOPHILS NFR BLD: 1 % (ref 0–2)
BILIRUB SERPL-MCNC: 0.8 MG/DL (ref 0–1.2)
BUN SERPL-MCNC: 11 MG/DL (ref 8–23)
CALCIUM SERPL-MCNC: 9.3 MG/DL (ref 8.6–10.4)
CHLORIDE SERPL-SCNC: 103 MMOL/L (ref 98–107)
CO2 SERPL-SCNC: 28 MMOL/L (ref 20–31)
CREAT SERPL-MCNC: 0.7 MG/DL (ref 0.5–0.9)
EOSINOPHIL # BLD: 0.25 K/UL (ref 0–0.44)
EOSINOPHILS RELATIVE PERCENT: 3 % (ref 1–4)
ERYTHROCYTE [DISTWIDTH] IN BLOOD BY AUTOMATED COUNT: 12.1 % (ref 11.8–14.4)
GFR, ESTIMATED: >90 ML/MIN/1.73M2
GLUCOSE SERPL-MCNC: 105 MG/DL (ref 74–99)
HCT VFR BLD AUTO: 42.3 % (ref 36.3–47.1)
HGB BLD-MCNC: 13.7 G/DL (ref 11.9–15.1)
IMM GRANULOCYTES # BLD AUTO: <0.03 K/UL (ref 0–0.3)
IMM GRANULOCYTES NFR BLD: 0 %
LYMPHOCYTES NFR BLD: 2.43 K/UL (ref 1.1–3.7)
LYMPHOCYTES RELATIVE PERCENT: 28 % (ref 24–43)
MAGNESIUM SERPL-MCNC: 2.4 MG/DL (ref 1.6–2.4)
MCH RBC QN AUTO: 29.2 PG (ref 25.2–33.5)
MCHC RBC AUTO-ENTMCNC: 32.4 G/DL (ref 28.4–34.8)
MCV RBC AUTO: 90.2 FL (ref 82.6–102.9)
MONOCYTES NFR BLD: 0.67 K/UL (ref 0.1–1.2)
MONOCYTES NFR BLD: 8 % (ref 3–12)
NEUTROPHILS NFR BLD: 60 % (ref 36–65)
NEUTS SEG NFR BLD: 5.12 K/UL (ref 1.5–8.1)
NRBC BLD-RTO: 0 PER 100 WBC
PLATELET # BLD AUTO: 271 K/UL (ref 138–453)
PMV BLD AUTO: 11.4 FL (ref 8.1–13.5)
POTASSIUM SERPL-SCNC: 5.2 MMOL/L (ref 3.7–5.3)
PROT SERPL-MCNC: 7.4 G/DL (ref 6.6–8.7)
RBC # BLD AUTO: 4.69 M/UL (ref 3.95–5.11)
SODIUM SERPL-SCNC: 139 MMOL/L (ref 136–145)
TSH SERPL DL<=0.05 MIU/L-ACNC: 1.9 UIU/ML (ref 0.27–4.2)
WBC OTHER # BLD: 8.6 K/UL (ref 3.5–11.3)

## 2024-10-19 NOTE — ASSESSMENT & PLAN NOTE
Chronic, at goal (stable), changes made today: A1c stable, however patient experiencing some hypoglycemia.  Will reduce Lantus to 30 units daily and separate into a.m. and p.m. dosing.

## 2024-10-23 RX ORDER — LOSARTAN POTASSIUM 25 MG/1
25 TABLET ORAL DAILY
Qty: 30 TABLET | Refills: 5 | Status: SHIPPED | OUTPATIENT
Start: 2024-10-23

## 2024-10-24 ENCOUNTER — TELEPHONE (OUTPATIENT)
Dept: FAMILY MEDICINE CLINIC | Age: 64
End: 2024-10-24

## 2024-10-24 NOTE — TELEPHONE ENCOUNTER
Medication Management Service    Date: 10/24/2024  Patient's Name: Destiny Bueno YOB: 1960            _____________________________________________________________________________________________    Destiny Bueno is a 64 y.o. female referred to ambulatory pharmacy by Cathy Ramires APRN - CNP for diabetes management. Patient's A1c has been consistently around 7%. Patient is currently prescribed Novolog and Lantus. In addition, patient is current prescribed aspirin, atorvastatin, losartan. Per chart review, patient has previously been on Jardiance, Humalog, Humulin R, Ozempic prior to current therapy.    Lab Results   Component Value Date    LABA1C 7.1 06/28/2024    LABA1C 7.0 03/01/2024    LABA1C 6.8 10/20/2023       Spoke to patient to discuss DM management referral.    Rah Donato, JoanieD, [facility]  Ambulatory Clinical Pharmacist   [facility] Specialty Medication Service  Phone: [phone]  [facility] Medicine  Phone: [phone] option 1    For Pharmacy Admin Tracking Only    Program: Medical Group  CPA in place:  No  Recommendation Provided To: Patient/Caregiver: 1 via Telephone  Intervention Detail: Scheduled Appointment  Intervention Accepted By: Patient/Caregiver: 1  Gap Closed?: No   Time Spent (min): 10

## 2024-11-05 ENCOUNTER — PHARMACY VISIT (OUTPATIENT)
Dept: FAMILY MEDICINE CLINIC | Age: 64
End: 2024-11-05

## 2024-11-05 DIAGNOSIS — E11.9 TYPE 2 DIABETES MELLITUS WITHOUT COMPLICATION, WITHOUT LONG-TERM CURRENT USE OF INSULIN (HCC): Primary | ICD-10-CM

## 2024-11-05 DIAGNOSIS — E11.9 TYPE 2 DIABETES MELLITUS WITHOUT COMPLICATION, WITHOUT LONG-TERM CURRENT USE OF INSULIN (HCC): ICD-10-CM

## 2024-11-05 DIAGNOSIS — I21.4 NSTEMI (NON-ST ELEVATED MYOCARDIAL INFARCTION) (HCC): ICD-10-CM

## 2024-11-05 DIAGNOSIS — I25.10 3-VESSEL CAD: ICD-10-CM
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
## 2024-11-07 RX ORDER — ATORVASTATIN CALCIUM 40 MG/1
40 TABLET, FILM COATED ORAL NIGHTLY
Qty: 90 TABLET | Refills: 1 | Status: SHIPPED | OUTPATIENT
Start: 2024-11-07

## 2024-11-20 DIAGNOSIS — I10 ESSENTIAL HYPERTENSION: ICD-10-CM

## 2024-11-21 RX ORDER — AMLODIPINE BESYLATE 5 MG/1
5 TABLET ORAL DAILY
Qty: 90 TABLET | Refills: 1 | Status: SHIPPED | OUTPATIENT
Start: 2024-11-21

## 2024-11-27 DIAGNOSIS — I21.4 NSTEMI (NON-ST ELEVATED MYOCARDIAL INFARCTION) (HCC): ICD-10-CM

## 2024-11-29 RX ORDER — CLOPIDOGREL BISULFATE 75 MG/1
75 TABLET ORAL DAILY
Qty: 90 TABLET | Refills: 0 | Status: SHIPPED | OUTPATIENT
Start: 2024-11-29

## 2024-12-04 DIAGNOSIS — I21.4 NSTEMI (NON-ST ELEVATED MYOCARDIAL INFARCTION) (HCC): ICD-10-CM

## 2024-12-05 RX ORDER — ASPIRIN 81 MG/1
81 TABLET ORAL DAILY
Qty: 90 TABLET | Refills: 1 | Status: SHIPPED | OUTPATIENT
Start: 2024-12-05

## 2025-01-01 DIAGNOSIS — F41.9 ANXIETY: ICD-10-CM

## 2025-01-01 DIAGNOSIS — I10 ESSENTIAL HYPERTENSION: ICD-10-CM

## 2025-01-01 DIAGNOSIS — F32.89 OTHER DEPRESSION: ICD-10-CM

## 2025-01-02 ENCOUNTER — TELEPHONE (OUTPATIENT)
Dept: FAMILY MEDICINE CLINIC | Age: 65
End: 2025-01-02

## 2025-01-02 NOTE — TELEPHONE ENCOUNTER
Medication Management Service    Date: 1/2/2025  Patient's Name: Destiny Bueno YOB: 1960            _____________________________________________________________________________________________    Reached patient to reschedule appointment for DM management. Patient requested a phone call back at the end of the month to reschedule her appointment. Will continue to outreach as appropriate..    Rah Donato, JoanieD, [facility]  Ambulatory Clinical Pharmacist   [facility] Specialty Medication Service  Phone: [phone]  [facility] Medicine  Phone: [phone] option 1    For Pharmacy Admin Tracking Only    Program: Medical Group  CPA in place:  Yes  Time Spent (min): 10\

## 2025-01-03 RX ORDER — ESCITALOPRAM OXALATE 20 MG/1
20 TABLET ORAL DAILY
Qty: 90 TABLET | Refills: 0 | Status: SHIPPED | OUTPATIENT
Start: 2025-01-03 | End: 2025-04-03

## 2025-01-03 RX ORDER — METOPROLOL TARTRATE 25 MG/1
25 TABLET, FILM COATED ORAL 2 TIMES DAILY
Qty: 180 TABLET | Refills: 0 | Status: SHIPPED | OUTPATIENT
Start: 2025-01-03

## 2025-02-11 ENCOUNTER — TELEPHONE (OUTPATIENT)
Dept: FAMILY MEDICINE CLINIC | Age: 65
End: 2025-02-11

## 2025-02-11 NOTE — TELEPHONE ENCOUNTER
Medication Management Service    Date: 2/11/2025  Patient's Name: Destiny Bueno YOB: 1960            _____________________________________________________________________________________________    Patient was last seen by clinical pharmacy 11/5/2024. Patient declined clinical pharmacy at this time. Will discharge from clinical services at this time.    Rah Donato, PharmD, [facility]  Ambulatory Clinical Pharmacist   [facility] Specialty Medication Service  Phone: [phone]  [facility] Medicine  Phone: [phone] option 1    For Pharmacy Admin Tracking Only    Program: Medical Group  CPA in place:  Yes  Time Spent (min): 5

## 2025-02-18 SDOH — ECONOMIC STABILITY: FOOD INSECURITY: WITHIN THE PAST 12 MONTHS, THE FOOD YOU BOUGHT JUST DIDN'T LAST AND YOU DIDN'T HAVE MONEY TO GET MORE.: NEVER TRUE

## 2025-02-18 SDOH — ECONOMIC STABILITY: INCOME INSECURITY: IN THE LAST 12 MONTHS, WAS THERE A TIME WHEN YOU WERE NOT ABLE TO PAY THE MORTGAGE OR RENT ON TIME?: YES

## 2025-02-18 SDOH — ECONOMIC STABILITY: FOOD INSECURITY: WITHIN THE PAST 12 MONTHS, YOU WORRIED THAT YOUR FOOD WOULD RUN OUT BEFORE YOU GOT MONEY TO BUY MORE.: NEVER TRUE

## 2025-02-18 ASSESSMENT — PATIENT HEALTH QUESTIONNAIRE - PHQ9
2. FEELING DOWN, DEPRESSED OR HOPELESS: NOT AT ALL
6. FEELING BAD ABOUT YOURSELF - OR THAT YOU ARE A FAILURE OR HAVE LET YOURSELF OR YOUR FAMILY DOWN: SEVERAL DAYS
8. MOVING OR SPEAKING SO SLOWLY THAT OTHER PEOPLE COULD HAVE NOTICED. OR THE OPPOSITE, BEING SO FIGETY OR RESTLESS THAT YOU HAVE BEEN MOVING AROUND A LOT MORE THAN USUAL: NOT AT ALL
SUM OF ALL RESPONSES TO PHQ QUESTIONS 1-9: 11
SUM OF ALL RESPONSES TO PHQ QUESTIONS 1-9: 11
3. TROUBLE FALLING OR STAYING ASLEEP: NEARLY EVERY DAY
SUM OF ALL RESPONSES TO PHQ QUESTIONS 1-9: 11
9. THOUGHTS THAT YOU WOULD BE BETTER OFF DEAD, OR OF HURTING YOURSELF: NOT AT ALL
5. POOR APPETITE OR OVEREATING: NEARLY EVERY DAY
8. MOVING OR SPEAKING SO SLOWLY THAT OTHER PEOPLE COULD HAVE NOTICED. OR THE OPPOSITE - BEING SO FIDGETY OR RESTLESS THAT YOU HAVE BEEN MOVING AROUND A LOT MORE THAN USUAL: NOT AT ALL
6. FEELING BAD ABOUT YOURSELF - OR THAT YOU ARE A FAILURE OR HAVE LET YOURSELF OR YOUR FAMILY DOWN: SEVERAL DAYS
1. LITTLE INTEREST OR PLEASURE IN DOING THINGS: SEVERAL DAYS
7. TROUBLE CONCENTRATING ON THINGS, SUCH AS READING THE NEWSPAPER OR WATCHING TELEVISION: NOT AT ALL
4. FEELING TIRED OR HAVING LITTLE ENERGY: NEARLY EVERY DAY
7. TROUBLE CONCENTRATING ON THINGS, SUCH AS READING THE NEWSPAPER OR WATCHING TELEVISION: NOT AT ALL
1. LITTLE INTEREST OR PLEASURE IN DOING THINGS: SEVERAL DAYS
3. TROUBLE FALLING OR STAYING ASLEEP: NEARLY EVERY DAY
4. FEELING TIRED OR HAVING LITTLE ENERGY: NEARLY EVERY DAY
10. IF YOU CHECKED OFF ANY PROBLEMS, HOW DIFFICULT HAVE THESE PROBLEMS MADE IT FOR YOU TO DO YOUR WORK, TAKE CARE OF THINGS AT HOME, OR GET ALONG WITH OTHER PEOPLE: SOMEWHAT DIFFICULT
2. FEELING DOWN, DEPRESSED OR HOPELESS: NOT AT ALL
SUM OF ALL RESPONSES TO PHQ QUESTIONS 1-9: 11
SUM OF ALL RESPONSES TO PHQ QUESTIONS 1-9: 11
5. POOR APPETITE OR OVEREATING: NEARLY EVERY DAY
9. THOUGHTS THAT YOU WOULD BE BETTER OFF DEAD, OR OF HURTING YOURSELF: NOT AT ALL
10. IF YOU CHECKED OFF ANY PROBLEMS, HOW DIFFICULT HAVE THESE PROBLEMS MADE IT FOR YOU TO DO YOUR WORK, TAKE CARE OF THINGS AT HOME, OR GET ALONG WITH OTHER PEOPLE: SOMEWHAT DIFFICULT

## 2025-02-21 ENCOUNTER — OFFICE VISIT (OUTPATIENT)
Dept: FAMILY MEDICINE CLINIC | Age: 65
End: 2025-02-21

## 2025-02-21 ENCOUNTER — HOSPITAL ENCOUNTER (OUTPATIENT)
Age: 65
Setting detail: SPECIMEN
Discharge: HOME | End: 2025-02-21

## 2025-02-21 VITALS
HEART RATE: 72 BPM | OXYGEN SATURATION: 98 % | HEIGHT: 65 IN | BODY MASS INDEX: 29.82 KG/M2 | RESPIRATION RATE: 16 BRPM | SYSTOLIC BLOOD PRESSURE: 142 MMHG | WEIGHT: 179 LBS | TEMPERATURE: 99.4 F | DIASTOLIC BLOOD PRESSURE: 74 MMHG

## 2025-02-21 DIAGNOSIS — R82.998 LEUKOCYTES IN URINE: ICD-10-CM

## 2025-02-21 DIAGNOSIS — I10 ESSENTIAL HYPERTENSION: ICD-10-CM

## 2025-02-21 DIAGNOSIS — I21.4 NSTEMI (NON-ST ELEVATED MYOCARDIAL INFARCTION) (HCC): ICD-10-CM

## 2025-02-21 DIAGNOSIS — F41.9 ANXIETY: ICD-10-CM

## 2025-02-21 DIAGNOSIS — E11.9 TYPE 2 DIABETES MELLITUS WITHOUT COMPLICATION, WITHOUT LONG-TERM CURRENT USE OF INSULIN (HCC): Primary | ICD-10-CM

## 2025-02-21 DIAGNOSIS — F32.89 OTHER DEPRESSION: ICD-10-CM

## 2025-02-21 DIAGNOSIS — R07.81 RIB PAIN ON RIGHT SIDE: ICD-10-CM

## 2025-02-21 DIAGNOSIS — I50.20 CONGESTIVE HEART FAILURE WITH LEFT VENTRICULAR SYSTOLIC DYSFUNCTION (HCC): ICD-10-CM

## 2025-02-21 LAB
BILIRUBIN, POC: NEGATIVE
BLOOD URINE, POC: NEGATIVE
CLARITY, POC: CLEAR
COLOR, POC: ABNORMAL
CREAT UR-MCNC: 13.9 MG/DL (ref 28–217)
GLUCOSE URINE, POC: NEGATIVE MG/DL
HBA1C MFR BLD: 7.3 %
KETONES, POC: NEGATIVE MG/DL
LEUKOCYTE EST, POC: ABNORMAL
MICROALBUMIN UR-MCNC: <12 MG/L (ref 0–20)
MICROALBUMIN/CREAT UR-RTO: ABNORMAL MCG/MG CREAT (ref 0–25)
NITRITE, POC: NEGATIVE
PH, POC: 7
PROTEIN, POC: NEGATIVE MG/DL
SPECIFIC GRAVITY, POC: 1.01
UROBILINOGEN, POC: ABNORMAL MG/DL

## 2025-02-21 RX ORDER — PEN NEEDLE, DIABETIC 29 G X1/2"
NEEDLE, DISPOSABLE MISCELLANEOUS
Qty: 100 EACH | Refills: 3 | Status: SHIPPED | OUTPATIENT
Start: 2025-02-21

## 2025-02-21 RX ORDER — INSULIN GLARGINE 100 [IU]/ML
15 INJECTION, SOLUTION SUBCUTANEOUS 2 TIMES DAILY
Qty: 15 ML | Refills: 2 | Status: SHIPPED | OUTPATIENT
Start: 2025-02-21

## 2025-02-21 RX ORDER — NITROFURANTOIN 25; 75 MG/1; MG/1
100 CAPSULE ORAL 2 TIMES DAILY
Qty: 10 CAPSULE | Refills: 0 | Status: SHIPPED | OUTPATIENT
Start: 2025-02-21 | End: 2025-02-26

## 2025-02-21 NOTE — PROGRESS NOTES
[facility]  [facility] MEDICINE  [address]  [address]  [place] [address]  Dept: [phone]    2/21/2025    CHIEF COMPLAINT    Chief Complaint   Patient presents with    Diabetes    Sinusitis    Flank Pain     Last week she was coughing and feels like she pulled a muscle        HPI    Destiny Bueno is a 65 y.o. female who presents   Chief Complaint   Patient presents with    Diabetes    Sinusitis    Flank Pain     Last week she was coughing and feels like she pulled a muscle      Appointment to follow-up on anxiety.     Anxiety- Lexapro increased from 10 mg to 20 mg at last visit.  Continues taking clonazepam as needed.  Feels the increase is helping. She has taken Clonopin a few times in the last 30-45 day.     PHQ-9 Total Score: 11 (2/18/2025  8:03 AM)  Thoughts that you would be better off dead, or of hurting yourself in some way: 0 (2/18/2025  8:03 AM)     Depression/anxiety hx- Wellbutrin caused intolerable cotton mouth which was painful.   Also reports she has taken medication in the remote past, but does not recall the name of the medication.  She knows that it was popular, but she doesn't recall which one.     Urinary symptoms- started having some urinary odor after her open heart surgery. Having some urinary cramping/lower abdominal cramps in her right lower back.     Sinus sx- for the last week, worse over the last 7 days. Always has some sx due to her allergy to   Cough, slight fever, achy and extra tired.   She has taken some nyquil   She did cough about 1 week ago and noted significant pain in her upper right rib cage near the lateral breast. Denies SOB.      Diabetes- Taking Lantus 15 units twice daily. Having some low BG alarms being sick, overall before she was sick she was feeling better overall.  BG ranging  most of the time. Usually just over 100    Lab Results       Component                Value               Date

## 2025-02-21 NOTE — PATIENT INSTRUCTIONS
Thank you for choosing [facility].  We know you have options when it comes to your healthcare; we appreciate that you chose us. Our goal is to provide exceptional  service and world class care to every patient.  You will be receiving a survey via email or text message asking for your feedback.  Please take a few minutes to share your thoughts about your recent visit. Your comments helps us understand what we do well and ways we can improve.  Thank you in advance for your valuable feedback.            New Updates for [employer] [facility]    Thank you for choosing [facility] to give you the best care! [facility] is always trying to think of new ways to help their patients. We are asking all patients to try out the new digital registration that is now available through the [employer]. Down load today!. Via the gely you're now able to update your personal and registration information prior to your upcoming appointment. This will save you time once you arrive at the office to check-in, not to mention your information remains safe!! Many other perks come from signing up for an account, such as:  Requesting refills  Scheduling an appointment  Completing an E-Visit  Sending a message to the office/provider  Having access to your medication list  Paying your bill/copay prior to your appointment  Scheduling your yearly mammogram  Review your test results    If you are not familiar the [employer] [facility], please ask one of us and we will be happy to answer any questions or help you set-up your account.

## 2025-02-23 LAB
MICROORGANISM SPEC CULT: ABNORMAL
SPECIMEN DESCRIPTION: ABNORMAL

## 2025-02-25 ENCOUNTER — HOSPITAL ENCOUNTER (OUTPATIENT)
Age: 65
Discharge: HOME | End: 2025-02-27
Payer: MEDICARE

## 2025-02-25 ENCOUNTER — HOSPITAL ENCOUNTER (OUTPATIENT)
Dept: GENERAL RADIOLOGY | Age: 65
Discharge: HOME | End: 2025-02-27
Payer: MEDICARE

## 2025-02-25 DIAGNOSIS — R07.81 RIB PAIN ON RIGHT SIDE: ICD-10-CM

## 2025-02-25 PROCEDURE — 71101 X-RAY EXAM UNILAT RIBS/CHEST: CPT

## 2025-03-04 DIAGNOSIS — E11.9 TYPE 2 DIABETES MELLITUS WITHOUT COMPLICATION, WITHOUT LONG-TERM CURRENT USE OF INSULIN (HCC): ICD-10-CM

## 2025-03-04 DIAGNOSIS — I21.4 NSTEMI (NON-ST ELEVATED MYOCARDIAL INFARCTION) (HCC): ICD-10-CM

## 2025-03-04 NOTE — TELEPHONE ENCOUNTER
Patient comment: Expiration 2025-06         Destiny YULIET Bueno is calling to request a refill on the following medication(s):    Last Visit Date (If Applicable):  2/21/2025    Next Visit Date:    5/23/2025    Medication Request:  Requested Prescriptions     Pending Prescriptions Disp Refills    Glucagon HCl (GLUCAGON EMERGENCY) 1 MG/ML SOLR 1 each 5     Sig: Inject 1 mg as directed as needed (low blood sugar)    clopidogrel (PLAVIX) 75 MG tablet 90 tablet 0     Sig: Take 1 tablet by mouth daily

## 2025-03-09 ENCOUNTER — RESULTS FOLLOW-UP (OUTPATIENT)
Dept: GENERAL RADIOLOGY | Age: 65
End: 2025-03-09

## 2025-03-09 PROBLEM — A41.9 SEPSIS, UNSPECIFIED ORGANISM (HCC): Status: RESOLVED | Noted: 2023-05-04 | Resolved: 2025-03-09

## 2025-03-09 PROBLEM — A41.9 SEPTICEMIA (HCC): Status: RESOLVED | Noted: 2023-05-04 | Resolved: 2025-03-09

## 2025-03-09 RX ORDER — GLUCAGON 1 MG
1 KIT INJECTION AS NEEDED
Qty: 1 EACH | Refills: 5 | Status: SHIPPED | OUTPATIENT
Start: 2025-03-09

## 2025-03-09 RX ORDER — CLOPIDOGREL BISULFATE 75 MG/1
75 TABLET ORAL DAILY
Qty: 90 TABLET | Refills: 0 | Status: SHIPPED | OUTPATIENT
Start: 2025-03-09

## 2025-03-31 ENCOUNTER — PATIENT MESSAGE (OUTPATIENT)
Dept: FAMILY MEDICINE CLINIC | Age: 65
End: 2025-03-31

## 2025-04-01 DIAGNOSIS — I10 ESSENTIAL HYPERTENSION: ICD-10-CM

## 2025-04-01 DIAGNOSIS — F41.9 ANXIETY: ICD-10-CM

## 2025-04-01 DIAGNOSIS — F32.89 OTHER DEPRESSION: ICD-10-CM

## 2025-04-02 RX ORDER — ESCITALOPRAM OXALATE 20 MG/1
20 TABLET ORAL DAILY
Qty: 90 TABLET | Refills: 1 | Status: SHIPPED | OUTPATIENT
Start: 2025-04-02 | End: 2025-09-29

## 2025-04-02 RX ORDER — METOPROLOL TARTRATE 25 MG/1
25 TABLET, FILM COATED ORAL 2 TIMES DAILY
Qty: 180 TABLET | Refills: 1 | Status: SHIPPED | OUTPATIENT
Start: 2025-04-02

## 2025-04-10 DIAGNOSIS — E11.9 TYPE 2 DIABETES MELLITUS WITHOUT COMPLICATION, WITHOUT LONG-TERM CURRENT USE OF INSULIN: ICD-10-CM

## 2025-04-11 NOTE — TELEPHONE ENCOUNTER
Destiny Bueno is calling to request a refill on the following medication(s):    Last Visit Date (If Applicable):  2/21/2025    Next Visit Date:    5/23/2025    Medication Request:  Requested Prescriptions     Pending Prescriptions Disp Refills    Continuous Glucose Sensor (DEXCOM G6 SENSOR) MISC 3 each 5     Sig: use as directed every week    losartan (COZAAR) 25 MG tablet 30 tablet 5     Sig: Take 1 tablet by mouth daily

## 2025-04-13 RX ORDER — LOSARTAN POTASSIUM 25 MG/1
25 TABLET ORAL DAILY
Qty: 30 TABLET | Refills: 5 | Status: SHIPPED | OUTPATIENT
Start: 2025-04-13

## 2025-04-13 RX ORDER — PROCHLORPERAZINE 25 MG/1
SUPPOSITORY RECTAL
Qty: 3 EACH | Refills: 5 | Status: SHIPPED | OUTPATIENT
Start: 2025-04-13

## 2025-04-29 DIAGNOSIS — E11.9 TYPE 2 DIABETES MELLITUS WITHOUT COMPLICATION, WITHOUT LONG-TERM CURRENT USE OF INSULIN (HCC): ICD-10-CM

## 2025-04-29 RX ORDER — INSULIN GLARGINE 100 [IU]/ML
15 INJECTION, SOLUTION SUBCUTANEOUS 2 TIMES DAILY
Qty: 15 ML | Refills: 2 | Status: SHIPPED | OUTPATIENT
Start: 2025-04-29

## 2025-05-07 DIAGNOSIS — I10 ESSENTIAL HYPERTENSION: ICD-10-CM

## 2025-05-07 RX ORDER — AMLODIPINE BESYLATE 5 MG/1
5 TABLET ORAL DAILY
Qty: 100 TABLET | Refills: 1 | Status: SHIPPED | OUTPATIENT
Start: 2025-05-07

## 2025-05-07 RX ORDER — LOSARTAN POTASSIUM 25 MG/1
25 TABLET ORAL DAILY
Qty: 100 TABLET | Refills: 1 | Status: SHIPPED | OUTPATIENT
Start: 2025-05-07

## 2025-05-16 DIAGNOSIS — E11.9 TYPE 2 DIABETES MELLITUS WITHOUT COMPLICATION, WITHOUT LONG-TERM CURRENT USE OF INSULIN (HCC): ICD-10-CM

## 2025-05-19 RX ORDER — INSULIN GLARGINE 100 [IU]/ML
15 INJECTION, SOLUTION SUBCUTANEOUS 2 TIMES DAILY
Qty: 15 ML | Refills: 2 | Status: SHIPPED | OUTPATIENT
Start: 2025-05-19 | End: 2025-05-23 | Stop reason: SDUPTHER

## 2025-05-19 NOTE — TELEPHONE ENCOUNTER
Destiny Bueno is calling to request a refill on the following medication(s):    Last Visit Date (If Applicable):  2/21/2025    Next Visit Date:    5/23/2025    Medication Request:  Requested Prescriptions     Pending Prescriptions Disp Refills    insulin glargine (LANTUS SOLOSTAR) 100 UNIT/ML injection pen 15 mL 2     Sig: Inject 15 Units into the skin 2 times daily

## 2025-05-20 DIAGNOSIS — I21.4 NSTEMI (NON-ST ELEVATED MYOCARDIAL INFARCTION) (HCC): ICD-10-CM

## 2025-05-23 ENCOUNTER — OFFICE VISIT (OUTPATIENT)
Dept: FAMILY MEDICINE CLINIC | Age: 65
End: 2025-05-23

## 2025-05-23 VITALS
HEART RATE: 52 BPM | BODY MASS INDEX: 28.26 KG/M2 | HEIGHT: 65 IN | SYSTOLIC BLOOD PRESSURE: 118 MMHG | WEIGHT: 169.6 LBS | OXYGEN SATURATION: 97 % | DIASTOLIC BLOOD PRESSURE: 77 MMHG

## 2025-05-23 DIAGNOSIS — R11.0 NAUSEA: ICD-10-CM

## 2025-05-23 DIAGNOSIS — M54.50 LUMBAR BACK PAIN: ICD-10-CM

## 2025-05-23 DIAGNOSIS — E11.65 TYPE 2 DIABETES MELLITUS WITH HYPERGLYCEMIA, WITH LONG-TERM CURRENT USE OF INSULIN (HCC): ICD-10-CM

## 2025-05-23 DIAGNOSIS — Z53.20 COLON CANCER SCREENING DECLINED: ICD-10-CM

## 2025-05-23 DIAGNOSIS — F41.9 ANXIETY: ICD-10-CM

## 2025-05-23 DIAGNOSIS — K04.7 DENTAL INFECTION: ICD-10-CM

## 2025-05-23 DIAGNOSIS — Z53.20 MAMMOGRAM DECLINED: ICD-10-CM

## 2025-05-23 DIAGNOSIS — Z87.891 PERSONAL HISTORY OF TOBACCO USE: ICD-10-CM

## 2025-05-23 DIAGNOSIS — Z11.59 NEED FOR HEPATITIS C SCREENING TEST: ICD-10-CM

## 2025-05-23 DIAGNOSIS — J01.90 ACUTE BACTERIAL SINUSITIS: ICD-10-CM

## 2025-05-23 DIAGNOSIS — E83.42 HYPOMAGNESEMIA: ICD-10-CM

## 2025-05-23 DIAGNOSIS — Z28.21 REFUSED PNEUMOCOCCAL VACCINATION: ICD-10-CM

## 2025-05-23 DIAGNOSIS — I50.20 CONGESTIVE HEART FAILURE WITH LEFT VENTRICULAR SYSTOLIC DYSFUNCTION (HCC): ICD-10-CM

## 2025-05-23 DIAGNOSIS — I21.4 NSTEMI (NON-ST ELEVATED MYOCARDIAL INFARCTION) (HCC): ICD-10-CM

## 2025-05-23 DIAGNOSIS — I10 ESSENTIAL HYPERTENSION: ICD-10-CM

## 2025-05-23 DIAGNOSIS — F32.89 OTHER DEPRESSION: ICD-10-CM

## 2025-05-23 DIAGNOSIS — Z79.4 TYPE 2 DIABETES MELLITUS WITH HYPERGLYCEMIA, WITH LONG-TERM CURRENT USE OF INSULIN (HCC): ICD-10-CM

## 2025-05-23 DIAGNOSIS — B96.89 ACUTE BACTERIAL SINUSITIS: ICD-10-CM

## 2025-05-23 DIAGNOSIS — Z00.00 INITIAL MEDICARE ANNUAL WELLNESS VISIT: Primary | ICD-10-CM

## 2025-05-23 LAB — HBA1C MFR BLD: 7.5 %

## 2025-05-23 RX ORDER — LEVOFLOXACIN 750 MG/1
750 TABLET, FILM COATED ORAL DAILY
Qty: 10 TABLET | Refills: 0 | Status: SHIPPED | OUTPATIENT
Start: 2025-05-23 | End: 2025-06-02

## 2025-05-23 RX ORDER — METRONIDAZOLE 500 MG/1
500 TABLET ORAL 3 TIMES DAILY
Qty: 30 TABLET | Refills: 0 | Status: SHIPPED | OUTPATIENT
Start: 2025-05-23 | End: 2025-06-02

## 2025-05-23 RX ORDER — ONDANSETRON 4 MG/1
4 TABLET, ORALLY DISINTEGRATING ORAL 3 TIMES DAILY PRN
Qty: 60 TABLET | Refills: 1 | Status: SHIPPED | OUTPATIENT
Start: 2025-05-23

## 2025-05-23 RX ORDER — CHLORHEXIDINE GLUCONATE ORAL RINSE 1.2 MG/ML
15 SOLUTION DENTAL 2 TIMES DAILY
Qty: 1893 ML | Refills: 1 | Status: SHIPPED | OUTPATIENT
Start: 2025-05-23 | End: 2025-09-26

## 2025-05-23 RX ORDER — ASPIRIN 81 MG/1
81 TABLET ORAL DAILY
Qty: 90 TABLET | Refills: 1 | Status: SHIPPED | OUTPATIENT
Start: 2025-05-23

## 2025-05-23 RX ORDER — PREDNISONE 20 MG/1
20 TABLET ORAL 2 TIMES DAILY
Qty: 6 TABLET | Refills: 0 | Status: SHIPPED | OUTPATIENT
Start: 2025-05-23 | End: 2025-05-26

## 2025-05-23 RX ORDER — LEVOFLOXACIN 750 MG/1
750 TABLET, FILM COATED ORAL DAILY
Qty: 10 TABLET | Refills: 0 | Status: SHIPPED | OUTPATIENT
Start: 2025-05-23 | End: 2025-05-23

## 2025-05-23 RX ORDER — INSULIN GLARGINE 100 [IU]/ML
10 INJECTION, SOLUTION SUBCUTANEOUS NIGHTLY
Qty: 9 ML | Refills: 3
Start: 2025-05-23

## 2025-05-23 RX ORDER — HYDROCODONE BITARTRATE AND ACETAMINOPHEN 5; 325 MG/1; MG/1
1 TABLET ORAL EVERY 4 HOURS PRN
Qty: 30 TABLET | Refills: 0 | Status: SHIPPED | OUTPATIENT
Start: 2025-05-23 | End: 2025-05-31 | Stop reason: SDUPTHER

## 2025-05-23 ASSESSMENT — PATIENT HEALTH QUESTIONNAIRE - PHQ9
2. FEELING DOWN, DEPRESSED OR HOPELESS: NOT AT ALL
SUM OF ALL RESPONSES TO PHQ QUESTIONS 1-9: 0
9. THOUGHTS THAT YOU WOULD BE BETTER OFF DEAD, OR OF HURTING YOURSELF: NOT AT ALL
6. FEELING BAD ABOUT YOURSELF - OR THAT YOU ARE A FAILURE OR HAVE LET YOURSELF OR YOUR FAMILY DOWN: NOT AT ALL
4. FEELING TIRED OR HAVING LITTLE ENERGY: NOT AT ALL
1. LITTLE INTEREST OR PLEASURE IN DOING THINGS: NOT AT ALL
8. MOVING OR SPEAKING SO SLOWLY THAT OTHER PEOPLE COULD HAVE NOTICED. OR THE OPPOSITE, BEING SO FIGETY OR RESTLESS THAT YOU HAVE BEEN MOVING AROUND A LOT MORE THAN USUAL: NOT AT ALL
7. TROUBLE CONCENTRATING ON THINGS, SUCH AS READING THE NEWSPAPER OR WATCHING TELEVISION: NOT AT ALL
5. POOR APPETITE OR OVEREATING: NOT AT ALL
SUM OF ALL RESPONSES TO PHQ QUESTIONS 1-9: 0
SUM OF ALL RESPONSES TO PHQ QUESTIONS 1-9: 0
3. TROUBLE FALLING OR STAYING ASLEEP: NOT AT ALL
10. IF YOU CHECKED OFF ANY PROBLEMS, HOW DIFFICULT HAVE THESE PROBLEMS MADE IT FOR YOU TO DO YOUR WORK, TAKE CARE OF THINGS AT HOME, OR GET ALONG WITH OTHER PEOPLE: NOT DIFFICULT AT ALL
SUM OF ALL RESPONSES TO PHQ QUESTIONS 1-9: 0

## 2025-05-23 NOTE — PATIENT INSTRUCTIONS
directive as a gift to the people who care for you. If you have one, they won't have to make tough decisions by themselves.  For more information, including forms for your state, see the [employer] website (www.caringinfo.org/planning/advance-directives/).  Follow-up care is a key part of your treatment and safety. Be sure to make and go to all appointments, and call your doctor if you are having problems. It's also a good idea to know your test results and keep a list of the medicines you take.  What should you include in an advance directive?  Many states have a unique advance directive form. (It may ask you to address specific issues.) Or you might use a universal form that's approved by many states.  If your form doesn't tell you what to address, it may be hard to know what to include in your advance directive. Use the questions below to help you get started.  Who do you want to make decisions about your medical care if you are not able to?  What life-support measures do you want if you have a serious illness that gets worse over time or can't be cured?  What are you most afraid of that might happen? (Maybe you're afraid of having pain, losing your independence, or being kept alive by machines.)  Where would you prefer to die? (Your home? A hospital? A nursing home?)  Do you want to donate your organs when you die?  Do you want certain Jewish practices performed before you die?  When should you call for help?  Be sure to contact your doctor if you have any questions.  Where can you learn more?  Go to https://www.[employer].net/patientEd and enter R264 to learn more about \"Advance Directives: Care Instructions.\"  Current as of: March 1, 2024  Content Version: 14.4  © 9121-9055 [employer].   Care instructions adapted under license by [employer]. If you have questions about a medical condition or this instruction, always ask your healthcare professional. [employer] disclaims any

## 2025-05-23 NOTE — PROGRESS NOTES
be provided, with instructions to take it with breakfast and a snack, and to ensure adequate hydration.  - A referral to Dr. Rodriguez at the clinic will be made, and she is advised to contact them next week if she does not receive a call.  - An order for a lumbar x-ray will be placed, which she can undergo at [facility] on Saturday.    5. Hypertension   -stable despite significant pain. Continue current medications and treatment plan.    6.  Anxiety/depression  - Stable despite significant pain.  Continue current medications and treatment plan.  Endorses that she very rarely takes clonazepam and does not need a refill at this time.    5. Health Maintenance.  - She has declined a mammogram and pneumonia vaccine.  - A CT lung screening will be ordered.  - A hepatitis C test will be ordered during her next round of blood work.  - Labs will be ordered before 10/2025, including lipid panel, blood counts, kidney function, thyroid function, and magnesium levels.           No follow-ups on file.     Subjective   The following acute and/or chronic problems were also addressed today:    History of Present Illness  The patient is a 65-year-old female who presents for a medicare annual wellness and diabetic follow-up.    She has been utilizing a continuous glucose monitor, which has indicated a potential upward trend in her blood glucose levels. Despite this, she continues to administer insulin.   She experienced an episode of hypoglycemia one morning, prompting her to monitor her blood glucose levels throughout the day.   Her last insulin administration was on 04/27/2025. She reports fluctuations in her blood glucose levels but does not have a comprehensive understanding of the numerical values.   Her dietary habits include consuming a can of soup daily, toast or strudel with coffee, and occasional meals later in the day.  She also intermittently consumes peanut butter and toast, and occasionally a banana. She expresses fatigue

## 2025-05-27 DIAGNOSIS — I21.4 NSTEMI (NON-ST ELEVATED MYOCARDIAL INFARCTION) (HCC): ICD-10-CM

## 2025-05-28 ENCOUNTER — HOSPITAL ENCOUNTER (OUTPATIENT)
Age: 65
Setting detail: SPECIMEN
Discharge: HOME | End: 2025-05-28

## 2025-05-28 DIAGNOSIS — I10 ESSENTIAL HYPERTENSION: ICD-10-CM

## 2025-05-28 DIAGNOSIS — Z11.59 NEED FOR HEPATITIS C SCREENING TEST: ICD-10-CM

## 2025-05-28 DIAGNOSIS — E83.42 HYPOMAGNESEMIA: ICD-10-CM

## 2025-05-28 LAB
ALBUMIN SERPL-MCNC: 4.3 G/DL (ref 3.5–5.2)
ALBUMIN/GLOB SERPL: 1.6 {RATIO} (ref 1–2.5)
ALP SERPL-CCNC: 146 U/L (ref 35–104)
ALT SERPL-CCNC: 25 U/L (ref 10–35)
ANION GAP SERPL CALCULATED.3IONS-SCNC: 11 MMOL/L (ref 9–16)
AST SERPL-CCNC: 23 U/L (ref 10–35)
BASOPHILS # BLD: 0.09 K/UL (ref 0–0.2)
BASOPHILS NFR BLD: 1 % (ref 0–2)
BILIRUB SERPL-MCNC: 0.6 MG/DL (ref 0–1.2)
BUN SERPL-MCNC: 11 MG/DL (ref 8–23)
CALCIUM SERPL-MCNC: 9.7 MG/DL (ref 8.6–10.4)
CHLORIDE SERPL-SCNC: 98 MMOL/L (ref 98–107)
CO2 SERPL-SCNC: 27 MMOL/L (ref 20–31)
CREAT SERPL-MCNC: 0.9 MG/DL (ref 0.6–0.9)
EOSINOPHIL # BLD: 0.15 K/UL (ref 0–0.44)
EOSINOPHILS RELATIVE PERCENT: 1 % (ref 1–4)
ERYTHROCYTE [DISTWIDTH] IN BLOOD BY AUTOMATED COUNT: 12.3 % (ref 11.8–14.4)
GFR, ESTIMATED: 71 ML/MIN/1.73M2
GLUCOSE SERPL-MCNC: 128 MG/DL (ref 74–99)
HCT VFR BLD AUTO: 40.9 % (ref 36.3–47.1)
HCV AB SERPL QL IA: NONREACTIVE
HGB BLD-MCNC: 13.8 G/DL (ref 11.9–15.1)
IMM GRANULOCYTES # BLD AUTO: 0.04 K/UL (ref 0–0.3)
IMM GRANULOCYTES NFR BLD: 0 %
LYMPHOCYTES NFR BLD: 2.51 K/UL (ref 1.1–3.7)
LYMPHOCYTES RELATIVE PERCENT: 24 % (ref 24–43)
MAGNESIUM SERPL-MCNC: 2.4 MG/DL (ref 1.6–2.4)
MCH RBC QN AUTO: 30.3 PG (ref 25.2–33.5)
MCHC RBC AUTO-ENTMCNC: 33.7 G/DL (ref 28.4–34.8)
MCV RBC AUTO: 89.9 FL (ref 82.6–102.9)
MONOCYTES NFR BLD: 0.96 K/UL (ref 0.1–1.2)
MONOCYTES NFR BLD: 9 % (ref 3–12)
NEUTROPHILS NFR BLD: 65 % (ref 36–65)
NEUTS SEG NFR BLD: 6.95 K/UL (ref 1.5–8.1)
NRBC BLD-RTO: 0 PER 100 WBC
PLATELET # BLD AUTO: 285 K/UL (ref 138–453)
PMV BLD AUTO: 11.6 FL (ref 8.1–13.5)
POTASSIUM SERPL-SCNC: 4.3 MMOL/L (ref 3.7–5.3)
PROT SERPL-MCNC: 7 G/DL (ref 6.6–8.7)
RBC # BLD AUTO: 4.55 M/UL (ref 3.95–5.11)
SODIUM SERPL-SCNC: 136 MMOL/L (ref 136–145)
TSH SERPL DL<=0.05 MIU/L-ACNC: 3.32 UIU/ML (ref 0.27–4.2)
WBC OTHER # BLD: 10.7 K/UL (ref 3.5–11.3)

## 2025-05-30 DIAGNOSIS — M54.50 LUMBAR BACK PAIN: ICD-10-CM

## 2025-05-30 DIAGNOSIS — F41.9 ANXIETY: ICD-10-CM

## 2025-05-30 RX ORDER — CLOPIDOGREL BISULFATE 75 MG/1
75 TABLET ORAL DAILY
Qty: 90 TABLET | Refills: 0 | Status: SHIPPED | OUTPATIENT
Start: 2025-05-30

## 2025-05-30 RX ORDER — CLONAZEPAM 1 MG/1
1 TABLET ORAL 2 TIMES DAILY PRN
Qty: 30 TABLET | Refills: 0 | Status: CANCELLED | OUTPATIENT
Start: 2025-05-30 | End: 2025-06-29

## 2025-05-30 NOTE — TELEPHONE ENCOUNTER
Destiny Bueno is calling to request a refill on the following medication(s):    Last Visit Date (If Applicable):  5/23/2025    Next Visit Date:    9/26/2025    Medication Request:  Requested Prescriptions     Pending Prescriptions Disp Refills    clonazePAM (KLONOPIN) 1 MG tablet 30 tablet 0     Sig: Take 1 tablet by mouth 2 times daily as needed for Anxiety for up to 30 days. Max Daily Amount: 2 mg

## 2025-05-31 RX ORDER — HYDROCODONE BITARTRATE AND ACETAMINOPHEN 5; 325 MG/1; MG/1
1 TABLET ORAL
Qty: 42 TABLET | Refills: 0 | Status: SHIPPED | OUTPATIENT
Start: 2025-05-31 | End: 2025-06-07

## 2025-06-01 ENCOUNTER — RESULTS FOLLOW-UP (OUTPATIENT)
Dept: FAMILY MEDICINE CLINIC | Age: 65
End: 2025-06-01

## 2025-06-06 DIAGNOSIS — E11.9 TYPE 2 DIABETES MELLITUS WITHOUT COMPLICATION, WITHOUT LONG-TERM CURRENT USE OF INSULIN (HCC): ICD-10-CM

## 2025-06-06 DIAGNOSIS — I21.4 NSTEMI (NON-ST ELEVATED MYOCARDIAL INFARCTION) (HCC): ICD-10-CM

## 2025-06-06 DIAGNOSIS — I25.10 3-VESSEL CAD: ICD-10-CM

## 2025-06-06 NOTE — TELEPHONE ENCOUNTER
Destiny Bueno is calling to request a refill on the following medication(s):    Last Visit Date (If Applicable):  5/23/2025    Next Visit Date:    9/26/2025    Medication Request:  Requested Prescriptions     Pending Prescriptions Disp Refills    clopidogrel (PLAVIX) 75 MG tablet 90 tablet 0     Sig: Take 1 tablet by mouth daily    atorvastatin (LIPITOR) 40 MG tablet 90 tablet 1     Sig: Take 1 tablet by mouth nightly

## 2025-06-11 RX ORDER — CLOPIDOGREL BISULFATE 75 MG/1
75 TABLET ORAL DAILY
Qty: 90 TABLET | Refills: 1 | Status: SHIPPED | OUTPATIENT
Start: 2025-06-11

## 2025-06-11 RX ORDER — ATORVASTATIN CALCIUM 40 MG/1
40 TABLET, FILM COATED ORAL NIGHTLY
Qty: 90 TABLET | Refills: 1 | Status: SHIPPED | OUTPATIENT
Start: 2025-06-11

## 2025-06-14 DIAGNOSIS — R11.0 NAUSEA: ICD-10-CM

## 2025-06-16 NOTE — TELEPHONE ENCOUNTER
Destiny Bueno is calling to request a refill on the following medication(s):    Last Visit Date (If Applicable):  5/23/2025    Next Visit Date:    9/26/2025    Medication Request:  Requested Prescriptions     Pending Prescriptions Disp Refills    ondansetron (ZOFRAN-ODT) 4 MG disintegrating tablet 60 tablet 1     Sig: Take 1 tablet by mouth 3 times daily as needed for Nausea or Vomiting

## 2025-06-19 DIAGNOSIS — M54.50 LUMBAR BACK PAIN: ICD-10-CM

## 2025-06-19 DIAGNOSIS — E11.9 TYPE 2 DIABETES MELLITUS WITHOUT COMPLICATION, WITHOUT LONG-TERM CURRENT USE OF INSULIN (HCC): ICD-10-CM

## 2025-06-19 DIAGNOSIS — E11.65 TYPE 2 DIABETES MELLITUS WITH HYPERGLYCEMIA, WITH LONG-TERM CURRENT USE OF INSULIN (HCC): Primary | ICD-10-CM

## 2025-06-19 DIAGNOSIS — Z79.4 TYPE 2 DIABETES MELLITUS WITH HYPERGLYCEMIA, WITH LONG-TERM CURRENT USE OF INSULIN (HCC): Primary | ICD-10-CM

## 2025-06-20 RX ORDER — GLUCAGON 1 MG
1 KIT INJECTION AS NEEDED
Qty: 1 EACH | Refills: 5 | Status: CANCELLED | OUTPATIENT
Start: 2025-06-20

## 2025-06-20 RX ORDER — HYDROCODONE BITARTRATE AND ACETAMINOPHEN 5; 325 MG/1; MG/1
1 TABLET ORAL
Qty: 42 TABLET | Refills: 0 | Status: SHIPPED | OUTPATIENT
Start: 2025-06-20 | End: 2025-06-27

## 2025-06-20 RX ORDER — ONDANSETRON 4 MG/1
4 TABLET, ORALLY DISINTEGRATING ORAL 3 TIMES DAILY PRN
Qty: 60 TABLET | Refills: 1 | OUTPATIENT
Start: 2025-06-20

## 2025-06-26 ENCOUNTER — TELEPHONE (OUTPATIENT)
Dept: FAMILY MEDICINE CLINIC | Age: 65
End: 2025-06-26

## 2025-06-28 DIAGNOSIS — I21.4 NSTEMI (NON-ST ELEVATED MYOCARDIAL INFARCTION) (HCC): ICD-10-CM

## 2025-06-28 DIAGNOSIS — M54.50 LUMBAR BACK PAIN: ICD-10-CM

## 2025-06-28 RX ORDER — ASPIRIN 81 MG/1
81 TABLET ORAL DAILY
Qty: 90 TABLET | Refills: 1 | Status: CANCELLED | OUTPATIENT
Start: 2025-06-28

## 2025-06-30 NOTE — TELEPHONE ENCOUNTER
Destiny Bueno is calling to request a refill on the following medication(s):    Last Visit Date (If Applicable):  5/23/2025    Next Visit Date:    9/26/2025    Medication Request:  Requested Prescriptions     Pending Prescriptions Disp Refills    aspirin 81 MG EC tablet 90 tablet 1     Sig: Take 1 tablet by mouth daily

## 2025-07-03 ENCOUNTER — PATIENT MESSAGE (OUTPATIENT)
Dept: FAMILY MEDICINE CLINIC | Age: 65
End: 2025-07-03

## 2025-07-03 DIAGNOSIS — M54.50 LUMBAR BACK PAIN: ICD-10-CM

## 2025-07-03 RX ORDER — HYDROCODONE BITARTRATE AND ACETAMINOPHEN 5; 325 MG/1; MG/1
1 TABLET ORAL
Qty: 42 TABLET | Refills: 0 | Status: SHIPPED | OUTPATIENT
Start: 2025-07-03 | End: 2025-07-10

## 2025-07-03 RX ORDER — HYDROCODONE BITARTRATE AND ACETAMINOPHEN 5; 325 MG/1; MG/1
1 TABLET ORAL
Qty: 42 TABLET | Refills: 0 | Status: SHIPPED | OUTPATIENT
Start: 2025-07-03 | End: 2025-07-03 | Stop reason: SDUPTHER

## 2025-07-08 VITALS — BODY MASS INDEX: 27.99 KG/M2 | WEIGHT: 168 LBS | HEIGHT: 65 IN

## 2025-07-08 DIAGNOSIS — I10 ESSENTIAL HYPERTENSION: ICD-10-CM

## 2025-07-08 DIAGNOSIS — F32.89 OTHER DEPRESSION: ICD-10-CM

## 2025-07-08 DIAGNOSIS — F41.9 ANXIETY: ICD-10-CM

## 2025-07-08 NOTE — PRE-PROCEDURE INSTRUCTIONS
ARRIVE AT THE HOSPITAL ON Monday 7/21/2025 arrive at 07:45am (phone pat)    Once you enter the hospital lobby, take the elevators to the second floor.  Check-In is at the surgery registration desk.      Continue to take your home medications as you normally do up to and including the night before surgery with the exception of any blood thinning medications.      Blood Thinning Medications:  Please stop prescription blood thinning medications such as Apixaban (Eliquis); Clopidogrel (Plavix); Dabigatran (Pradaxa); Prasugrel (Effient); Rivaroxaban (Xarelto); Ticagrelor (Brilinta); Warfarin (Coumadin) only as directed by your surgeon and/or the prescribing physician    Some common examples of other medications that can thin your blood are: Aspirin, Ibuprofen (Advil, Motrin), Naproxen (Aleve), Meloxicam (Mobic), Celecoxib (Celebrex), Fish Oil, many Herbal Supplements.  These medications should usually be stopped at least 7 days prior to surgery.   Pt instructed to contact cardiologist on when to stop asa and plavix    Stop using OTC pain relievers containing Aspirin, Ibuprofen (Advil, Motrin) or Naproxen (Aleve) seven days prior to surgery.  It is OK to continue using Tylenol for pain the week prior to surgery.    Failure to stop certain medications may interfere with your scheduled surgery.    If you receive instructions from your surgeon regarding what medications to stop prior to surgery, please follow those specific instructions.      If You Have Diabetes:  Do not take any of your diabetic medications, (injectables or by mouth) the morning of surgery unless otherwise instructed by the doctor who manages your diabetes. If you are taking insulin, contact the doctor the manages your diabetes for instructions about any changes to your insulin dosages the day before surgery.        Please take the following medication(s) the day of surgery with a small sip of water:  Amlodipine,lexapro, and metoprolol    Please use your

## 2025-07-08 NOTE — TELEPHONE ENCOUNTER
Destiny Bueno is calling to request a refill on the following medication(s):    Last Visit Date (If Applicable):  5/23/2025    Next Visit Date:    9/26/2025    Medication Request:  Requested Prescriptions     Pending Prescriptions Disp Refills    escitalopram (LEXAPRO) 20 MG tablet [Pharmacy Med Name: ESCITALOPRAM OXALATE 20MG TABLET] 30 tablet 0     Sig: TAKE ONE (1) TABLET BY MOUTH ONCE A DAY    metoprolol tartrate (LOPRESSOR) 25 MG tablet [Pharmacy Med Name: METOPROLOL TARTRATE 25MG TABLET] 180 tablet 0     Sig: TAKE ONE (1) TABLET BY MOUTH  TWICE A DAY

## 2025-07-09 RX ORDER — METOPROLOL TARTRATE 25 MG/1
TABLET, FILM COATED ORAL
Qty: 180 TABLET | Refills: 0 | Status: SHIPPED | OUTPATIENT
Start: 2025-07-09

## 2025-07-09 RX ORDER — ESCITALOPRAM OXALATE 20 MG/1
TABLET ORAL
Qty: 90 TABLET | Refills: 1 | Status: SHIPPED | OUTPATIENT
Start: 2025-07-09

## 2025-07-11 ENCOUNTER — HOSPITAL ENCOUNTER (OUTPATIENT)
Dept: PREADMISSION TESTING | Age: 65
Discharge: HOME | End: 2025-07-15

## 2025-07-11 DIAGNOSIS — Z79.4 TYPE 2 DIABETES MELLITUS WITH HYPERGLYCEMIA, WITH LONG-TERM CURRENT USE OF INSULIN (HCC): ICD-10-CM

## 2025-07-11 DIAGNOSIS — E11.65 TYPE 2 DIABETES MELLITUS WITH HYPERGLYCEMIA, WITH LONG-TERM CURRENT USE OF INSULIN (HCC): ICD-10-CM

## 2025-07-13 RX ORDER — INSULIN GLARGINE 100 [IU]/ML
10 INJECTION, SOLUTION SUBCUTANEOUS NIGHTLY
Qty: 9 ML | Refills: 3 | Status: SHIPPED | OUTPATIENT
Start: 2025-07-13

## 2025-07-17 DIAGNOSIS — M54.50 LUMBAR BACK PAIN: ICD-10-CM

## 2025-07-17 NOTE — TELEPHONE ENCOUNTER
Destiny Bueno is calling to request a refill on the following medication(s):    Last Visit Date (If Applicable):  5/23/2025    Next Visit Date:    9/26/2025    Medication Request:  Requested Prescriptions     Pending Prescriptions Disp Refills    HYDROcodone-acetaminophen (NORCO) 5-325 MG per tablet [Pharmacy Med Name: HYDROCODONE BITARTRATE/ACETAMINOPHEN 5-325MG TABLET] 42 tablet 0     Sig: Take 1 tablet by mouth every 6 hours as needed for Pain for up to 7 days. Max Daily Amount: 4 tablets

## 2025-07-19 RX ORDER — HYDROCODONE BITARTRATE AND ACETAMINOPHEN 5; 325 MG/1; MG/1
1 TABLET ORAL EVERY 6 HOURS PRN
Qty: 42 TABLET | Refills: 0 | Status: SHIPPED | OUTPATIENT
Start: 2025-07-19 | End: 2025-07-26

## 2025-07-21 ENCOUNTER — TELEPHONE (OUTPATIENT)
Dept: FAMILY MEDICINE CLINIC | Age: 65
End: 2025-07-21

## 2025-07-21 DIAGNOSIS — M54.50 LUMBAR BACK PAIN: ICD-10-CM

## 2025-07-21 RX ORDER — HYDROCODONE BITARTRATE AND ACETAMINOPHEN 5; 325 MG/1; MG/1
1 TABLET ORAL
Qty: 42 TABLET | Refills: 0 | Status: SHIPPED | OUTPATIENT
Start: 2025-07-21 | End: 2025-07-28

## 2025-07-21 NOTE — TELEPHONE ENCOUNTER
[facility] pharmacy called stating that the [place] day supply of 7 days does not match quantity. Please advise.         [facility] - [place], [place] - [address] - P [phone] - F [phone]  [address] [place] [address]  Phone: [phone]  Fax: [phone]

## 2025-07-23 DIAGNOSIS — I10 ESSENTIAL HYPERTENSION: ICD-10-CM

## 2025-07-23 RX ORDER — AMLODIPINE BESYLATE 5 MG/1
5 TABLET ORAL DAILY
Qty: 30 TABLET | Refills: 5 | Status: SHIPPED | OUTPATIENT
Start: 2025-07-23

## 2025-07-23 NOTE — TELEPHONE ENCOUNTER
Destiny Bueno is calling to request a refill on the following medication(s):    Last Visit Date (If Applicable):  5/23/2025    Next Visit Date:    9/26/2025    Medication Request:  Requested Prescriptions     Pending Prescriptions Disp Refills    amLODIPine (NORVASC) 5 MG tablet [Pharmacy Med Name: AMLODIPINE BESYLATE 5MG TABLET] 30 tablet 0     Sig: Take ONE tablet by mouth daily

## 2025-07-26 DIAGNOSIS — E11.65 TYPE 2 DIABETES MELLITUS WITH HYPERGLYCEMIA, WITH LONG-TERM CURRENT USE OF INSULIN (HCC): ICD-10-CM

## 2025-07-26 DIAGNOSIS — R11.0 NAUSEA: ICD-10-CM

## 2025-07-26 DIAGNOSIS — Z79.4 TYPE 2 DIABETES MELLITUS WITH HYPERGLYCEMIA, WITH LONG-TERM CURRENT USE OF INSULIN (HCC): ICD-10-CM

## 2025-07-27 RX ORDER — ONDANSETRON 4 MG/1
4 TABLET, ORALLY DISINTEGRATING ORAL 3 TIMES DAILY PRN
Qty: 60 TABLET | Refills: 1 | Status: SHIPPED | OUTPATIENT
Start: 2025-07-27

## 2025-07-27 RX ORDER — INSULIN GLARGINE 100 [IU]/ML
17 INJECTION, SOLUTION SUBCUTANEOUS NIGHTLY
Qty: 12 ML | Refills: 5 | Status: SHIPPED | OUTPATIENT
Start: 2025-07-27

## 2025-07-27 RX ORDER — PEN NEEDLE, DIABETIC 29 G X1/2"
1 NEEDLE, DISPOSABLE MISCELLANEOUS 3 TIMES DAILY
Qty: 100 EACH | Refills: 11 | Status: SHIPPED | OUTPATIENT
Start: 2025-07-27

## 2025-07-28 ENCOUNTER — HOSPITAL ENCOUNTER (OUTPATIENT)
Age: 65
Setting detail: OUTPATIENT SURGERY
Discharge: HOME | End: 2025-07-28
Attending: STUDENT IN AN ORGANIZED HEALTH CARE EDUCATION/TRAINING PROGRAM | Admitting: STUDENT IN AN ORGANIZED HEALTH CARE EDUCATION/TRAINING PROGRAM
Payer: MEDICARE

## 2025-07-28 VITALS
HEART RATE: 60 BPM | DIASTOLIC BLOOD PRESSURE: 57 MMHG | OXYGEN SATURATION: 98 % | BODY MASS INDEX: 27.16 KG/M2 | WEIGHT: 163 LBS | SYSTOLIC BLOOD PRESSURE: 135 MMHG | HEIGHT: 65 IN | RESPIRATION RATE: 13 BRPM | TEMPERATURE: 98.4 F

## 2025-07-28 DIAGNOSIS — R94.39 ABNORMAL STRESS TEST: ICD-10-CM

## 2025-07-28 LAB
BUN BLD-MCNC: 14 MG/DL (ref 8–26)
CHLORIDE BLD-SCNC: 105 MMOL/L (ref 98–107)
ECHO BSA: 1.84 M2
EGFR, POC: >90 ML/MIN/1.73M2
GLUCOSE BLD-MCNC: 186 MG/DL (ref 74–100)
HCT VFR BLD AUTO: 43 % (ref 36–46)
PLATELET # BLD AUTO: 209 K/UL (ref 138–453)
POC CREATININE: 0.7 MG/DL (ref 0.51–1.19)
POC HEMOGLOBIN (CALC): 14.6 G/DL (ref 12–16)
POTASSIUM BLD-SCNC: 4.9 MMOL/L (ref 3.5–4.5)
SODIUM BLD-SCNC: 141 MMOL/L (ref 138–146)

## 2025-07-28 PROCEDURE — 93459 L HRT ART/GRFT ANGIO: CPT | Performed by: STUDENT IN AN ORGANIZED HEALTH CARE EDUCATION/TRAINING PROGRAM

## 2025-07-28 PROCEDURE — 84132 ASSAY OF SERUM POTASSIUM: CPT

## 2025-07-28 PROCEDURE — C1769 GUIDE WIRE: HCPCS | Performed by: STUDENT IN AN ORGANIZED HEALTH CARE EDUCATION/TRAINING PROGRAM

## 2025-07-28 PROCEDURE — 85049 AUTOMATED PLATELET COUNT: CPT

## 2025-07-28 PROCEDURE — 84520 ASSAY OF UREA NITROGEN: CPT

## 2025-07-28 PROCEDURE — 6360000002 HC RX W HCPCS: Performed by: STUDENT IN AN ORGANIZED HEALTH CARE EDUCATION/TRAINING PROGRAM

## 2025-07-28 PROCEDURE — 84295 ASSAY OF SERUM SODIUM: CPT

## 2025-07-28 PROCEDURE — C1760 CLOSURE DEV, VASC: HCPCS | Performed by: STUDENT IN AN ORGANIZED HEALTH CARE EDUCATION/TRAINING PROGRAM

## 2025-07-28 PROCEDURE — 82947 ASSAY GLUCOSE BLOOD QUANT: CPT

## 2025-07-28 PROCEDURE — 93455 CORONARY ART/GRFT ANGIO S&I: CPT | Performed by: STUDENT IN AN ORGANIZED HEALTH CARE EDUCATION/TRAINING PROGRAM

## 2025-07-28 PROCEDURE — C1892 INTRO/SHEATH,FIXED,PEEL-AWAY: HCPCS | Performed by: STUDENT IN AN ORGANIZED HEALTH CARE EDUCATION/TRAINING PROGRAM

## 2025-07-28 PROCEDURE — 82565 ASSAY OF CREATININE: CPT

## 2025-07-28 PROCEDURE — 85014 HEMATOCRIT: CPT

## 2025-07-28 PROCEDURE — 76937 US GUIDE VASCULAR ACCESS: CPT | Performed by: STUDENT IN AN ORGANIZED HEALTH CARE EDUCATION/TRAINING PROGRAM

## 2025-07-28 PROCEDURE — 6360000004 HC RX CONTRAST MEDICATION: Performed by: STUDENT IN AN ORGANIZED HEALTH CARE EDUCATION/TRAINING PROGRAM

## 2025-07-28 PROCEDURE — 99152 MOD SED SAME PHYS/QHP 5/>YRS: CPT | Performed by: STUDENT IN AN ORGANIZED HEALTH CARE EDUCATION/TRAINING PROGRAM

## 2025-07-28 PROCEDURE — 7100000010 HC PHASE II RECOVERY - FIRST 15 MIN: Performed by: STUDENT IN AN ORGANIZED HEALTH CARE EDUCATION/TRAINING PROGRAM

## 2025-07-28 PROCEDURE — 7100000011 HC PHASE II RECOVERY - ADDTL 15 MIN: Performed by: STUDENT IN AN ORGANIZED HEALTH CARE EDUCATION/TRAINING PROGRAM

## 2025-07-28 PROCEDURE — 2709999900 HC NON-CHARGEABLE SUPPLY: Performed by: STUDENT IN AN ORGANIZED HEALTH CARE EDUCATION/TRAINING PROGRAM

## 2025-07-28 PROCEDURE — 82435 ASSAY OF BLOOD CHLORIDE: CPT

## 2025-07-28 PROCEDURE — 99153 MOD SED SAME PHYS/QHP EA: CPT | Performed by: STUDENT IN AN ORGANIZED HEALTH CARE EDUCATION/TRAINING PROGRAM

## 2025-07-28 PROCEDURE — 2580000003 HC RX 258: Performed by: STUDENT IN AN ORGANIZED HEALTH CARE EDUCATION/TRAINING PROGRAM

## 2025-07-28 PROCEDURE — C1894 INTRO/SHEATH, NON-LASER: HCPCS | Performed by: STUDENT IN AN ORGANIZED HEALTH CARE EDUCATION/TRAINING PROGRAM

## 2025-07-28 PROCEDURE — 6370000000 HC RX 637 (ALT 250 FOR IP): Performed by: STUDENT IN AN ORGANIZED HEALTH CARE EDUCATION/TRAINING PROGRAM

## 2025-07-28 DEVICE — ANGIO-SEAL VIP VASCULAR CLOSURE DEVICE
Type: IMPLANTABLE DEVICE | Status: FUNCTIONAL
Brand: ANGIO-SEAL

## 2025-07-28 RX ORDER — SODIUM CHLORIDE 0.9 % (FLUSH) 0.9 %
5-40 SYRINGE (ML) INJECTION EVERY 12 HOURS SCHEDULED
Status: DISCONTINUED | OUTPATIENT
Start: 2025-07-28 | End: 2025-07-28 | Stop reason: HOSPADM

## 2025-07-28 RX ORDER — SODIUM CHLORIDE 0.9 % (FLUSH) 0.9 %
5-40 SYRINGE (ML) INJECTION PRN
Status: DISCONTINUED | OUTPATIENT
Start: 2025-07-28 | End: 2025-07-28 | Stop reason: HOSPADM

## 2025-07-28 RX ORDER — ACETAMINOPHEN 325 MG/1
650 TABLET ORAL EVERY 4 HOURS PRN
Status: DISCONTINUED | OUTPATIENT
Start: 2025-07-28 | End: 2025-07-28 | Stop reason: HOSPADM

## 2025-07-28 RX ORDER — SODIUM CHLORIDE 9 MG/ML
INJECTION, SOLUTION INTRAVENOUS PRN
Status: DISCONTINUED | OUTPATIENT
Start: 2025-07-28 | End: 2025-07-28 | Stop reason: HOSPADM

## 2025-07-28 RX ORDER — IOPAMIDOL 755 MG/ML
INJECTION, SOLUTION INTRAVASCULAR PRN
Status: DISCONTINUED | OUTPATIENT
Start: 2025-07-28 | End: 2025-07-28 | Stop reason: HOSPADM

## 2025-07-28 RX ORDER — ASPIRIN 325 MG
325 TABLET ORAL ONCE
Status: COMPLETED | OUTPATIENT
Start: 2025-07-28 | End: 2025-07-28

## 2025-07-28 RX ORDER — MIDAZOLAM HYDROCHLORIDE 1 MG/ML
INJECTION, SOLUTION INTRAMUSCULAR; INTRAVENOUS PRN
Status: DISCONTINUED | OUTPATIENT
Start: 2025-07-28 | End: 2025-07-28 | Stop reason: HOSPADM

## 2025-07-28 RX ORDER — 0.9 % SODIUM CHLORIDE 0.9 %
INTRAVENOUS SOLUTION INTRAVENOUS CONTINUOUS PRN
Status: COMPLETED | OUTPATIENT
Start: 2025-07-28 | End: 2025-07-28

## 2025-07-28 RX ORDER — SODIUM CHLORIDE 9 MG/ML
INJECTION, SOLUTION INTRAVENOUS CONTINUOUS
Status: DISCONTINUED | OUTPATIENT
Start: 2025-07-28 | End: 2025-07-28 | Stop reason: HOSPADM

## 2025-07-28 RX ORDER — FENTANYL CITRATE 50 UG/ML
INJECTION, SOLUTION INTRAMUSCULAR; INTRAVENOUS PRN
Status: DISCONTINUED | OUTPATIENT
Start: 2025-07-28 | End: 2025-07-28 | Stop reason: HOSPADM

## 2025-07-28 RX ADMIN — SODIUM CHLORIDE: 9 INJECTION, SOLUTION INTRAVENOUS at 08:09

## 2025-07-28 RX ADMIN — ASPIRIN 325 MG: 325 TABLET ORAL at 08:10
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
## 2025-07-28 NOTE — PROGRESS NOTES
Patient admitted, consent signed and questions answered. Call light to reach with side rails up 2 of 2. Bilateral Groin clipped with writer and Sarah Beth RN present.  RN at bedside with patient. History and physical to be updated. VS & Pulses obtained and documented. Will continue to monitor.  325mg Aspirin given at 0810.

## 2025-07-28 NOTE — PROGRESS NOTES
Patient to PCC room post  procedure. Assessment & VS obtained. Restrictions/Education reviewed with patient. Post procedure pathway initiated. Pt without complications. Groin site soft, dry & no hematoma noted.RN at bedside. Supine position with BLE straight. Pulses obtained and documented. Angioseal brochure educated on and given to patient. Will continue to monitor.    All discharge instructions reviewed, questions answered. Pulses obtained & documented.  Site clean dry and intact. No bleeding, hematoma, or bruising noted.  Pt ambulatory, IV removed. Pt discharged with all belongings.

## 2025-07-28 NOTE — DISCHARGE INSTRUCTIONS
Okay for surgery from cardiology standpoint.     DISCHARGE INSTRUCTIONS    Home Care :  Ok to remove band-aid in 24 hours then then shower.  Do not apply further band aids. Keep clean, dry and open to air.  Avoid power, lotion, hand  on or around site to prevent infection.  Do not soak in a pool, Hot Tub, or bath tub and for one week after the test to prevent infection.   If there is any bleeding at the site, apply firm pressure with your Fingers/hands until the bleeding stops.   If bleeding continues after 3 minutes or if there is any swelling/firm areas (Hematoma) at your puncture site, Call 911.   Drink plenty of fluids after the test. This will flush the x-ray dye from your system.   Return to your normal diet.    The sedative will make you sleepy. Rest until the effects have worn off.   Ask your doctor when you will be able to return to work.   Avoid lifting objects heavier than 8- 10 pounds (gallon of Milk) for 5-7 days.  Avoid Physically demanding activities, and sexual activity for 5-7 days.   Try to change positions frequently to prevent blood clots.    Medications:  Resume normal medicines. Use acetaminophen(Tylenol) for pain relief.  DO NOT STOP TAKING BLOOD THINNERS UNLESS TOLD TO BY YOUR CARDIOLOGIST  Do not take metformin (Glucophage) or glyburide and metformin (Glucovance) for 48 hours after the test.     Call Your Doctor's office If Any of the Following Occurs :  Signs of infection, including fever and chills ,Redness, swelling, increasing pain, excessive bleeding, or any discharge from the insertion site     CALL 911 if Any of the Following Occurs:  Drooping facial muscles, Changes in vision or speech   Change in sensation to affected leg, including numbness and/or tingling.   Discoloration of nail beds and/or coolness of affected limp.  Extreme sweating, nausea or vomiting   Dizziness or lightheadedness. Weakness or fainting  Rapid, irregular heartbeat, Palpitations, Chest Pain.

## 2025-07-29 ENCOUNTER — PATIENT MESSAGE (OUTPATIENT)
Dept: FAMILY MEDICINE CLINIC | Age: 65
End: 2025-07-29

## 2025-07-29 ENCOUNTER — TELEPHONE (OUTPATIENT)
Dept: FAMILY MEDICINE CLINIC | Age: 65
End: 2025-07-29

## 2025-07-29 DIAGNOSIS — M54.50 LUMBAR BACK PAIN: ICD-10-CM

## 2025-07-29 RX ORDER — HYDROCODONE BITARTRATE AND ACETAMINOPHEN 5; 325 MG/1; MG/1
1 TABLET ORAL
Qty: 42 TABLET | Refills: 0 | Status: CANCELLED | OUTPATIENT
Start: 2025-07-29 | End: 2025-08-05

## 2025-07-29 NOTE — TELEPHONE ENCOUNTER
Patient called has surgery scheduled on 8/18/25 is needing preop clearance, stating cardiology has cleared her and is just needing one from pcp . Please advise.               Princess  (f) [phone]

## 2025-07-29 NOTE — TELEPHONE ENCOUNTER
Destiny Bueno is calling to request a refill on the following medication(s):    Last Visit Date (If Applicable):  5/23/2025    Next Visit Date:    9/26/2025    Medication Request:  Requested Prescriptions      No prescriptions requested or ordered in this encounter

## 2025-07-29 NOTE — TELEPHONE ENCOUNTER
Ok to clear for surgery. One final fill. It appears she has had a stress test and heart cath that have delayed surgery, but I

## 2025-07-30 RX ORDER — HYDROCODONE BITARTRATE AND ACETAMINOPHEN 5; 325 MG/1; MG/1
1 TABLET ORAL
Qty: 42 TABLET | Refills: 0 | Status: SHIPPED | OUTPATIENT
Start: 2025-07-30 | End: 2025-08-06

## 2025-07-30 NOTE — TELEPHONE ENCOUNTER
Spoke to Destiny and explained this will be her last refill on pain medications. She understands.    She has been given clearance from Cardiology, waiting on her PCP

## 2025-07-30 NOTE — TELEPHONE ENCOUNTER
Letter drafted and faxed to [facility]. Please call patient and make sure she saw the mychart msg with insulin instructions for pre-op

## 2025-08-08 DIAGNOSIS — I21.4 NSTEMI (NON-ST ELEVATED MYOCARDIAL INFARCTION) (HCC): ICD-10-CM

## 2025-08-08 RX ORDER — LOSARTAN POTASSIUM 25 MG/1
25 TABLET ORAL DAILY
Qty: 100 TABLET | Refills: 1 | Status: SHIPPED | OUTPATIENT
Start: 2025-08-08

## 2025-08-08 RX ORDER — NITROGLYCERIN 0.4 MG/1
TABLET SUBLINGUAL
Qty: 25 TABLET | Refills: 1 | Status: SHIPPED | OUTPATIENT
Start: 2025-08-08

## 2025-08-11 ENCOUNTER — PATIENT MESSAGE (OUTPATIENT)
Dept: FAMILY MEDICINE CLINIC | Age: 65
End: 2025-08-11

## 2025-08-11 DIAGNOSIS — M54.50 LUMBAR BACK PAIN: ICD-10-CM

## 2025-08-15 RX ORDER — HYDROCODONE BITARTRATE AND ACETAMINOPHEN 5; 325 MG/1; MG/1
1 TABLET ORAL
Qty: 18 TABLET | Refills: 0 | Status: SHIPPED | OUTPATIENT
Start: 2025-08-15 | End: 2025-08-18

## 2025-08-18 ENCOUNTER — ANESTHESIA (OUTPATIENT)
Dept: OPERATING ROOM | Age: 65
End: 2025-08-18
Payer: MEDICARE

## 2025-08-18 ENCOUNTER — HOSPITAL ENCOUNTER (OUTPATIENT)
Age: 65
Discharge: HOME | End: 2025-08-19
Attending: ORTHOPAEDIC SURGERY | Admitting: ORTHOPAEDIC SURGERY
Payer: MEDICARE

## 2025-08-18 ENCOUNTER — APPOINTMENT (OUTPATIENT)
Dept: GENERAL RADIOLOGY | Age: 65
End: 2025-08-18
Attending: ORTHOPAEDIC SURGERY
Payer: MEDICARE

## 2025-08-18 ENCOUNTER — ANESTHESIA EVENT (OUTPATIENT)
Dept: OPERATING ROOM | Age: 65
End: 2025-08-18
Payer: MEDICARE

## 2025-08-18 DIAGNOSIS — M48.062 LUMBAR STENOSIS WITH NEUROGENIC CLAUDICATION: Primary | ICD-10-CM

## 2025-08-18 DIAGNOSIS — I10 ESSENTIAL HYPERTENSION: ICD-10-CM

## 2025-08-18 PROBLEM — Z79.4 TYPE 2 DIABETES MELLITUS WITHOUT COMPLICATION, WITH LONG-TERM CURRENT USE OF INSULIN (HCC): Status: ACTIVE | Noted: 2022-03-22

## 2025-08-18 PROBLEM — E78.2 MIXED HYPERLIPIDEMIA: Status: ACTIVE | Noted: 2025-08-18

## 2025-08-18 PROBLEM — F41.9 ANXIETY AND DEPRESSION: Status: ACTIVE | Noted: 2023-05-28

## 2025-08-18 LAB
GLUCOSE BLD-MCNC: 108 MG/DL (ref 65–105)
GLUCOSE BLD-MCNC: 125 MG/DL (ref 65–105)
GLUCOSE BLD-MCNC: 173 MG/DL (ref 65–105)
GLUCOSE BLD-MCNC: 181 MG/DL (ref 65–105)
GLUCOSE BLD-MCNC: 224 MG/DL (ref 65–105)

## 2025-08-18 PROCEDURE — 7100000001 HC PACU RECOVERY - ADDTL 15 MIN: Performed by: ORTHOPAEDIC SURGERY

## 2025-08-18 PROCEDURE — 2500000003 HC RX 250 WO HCPCS: Performed by: ORTHOPAEDIC SURGERY

## 2025-08-18 PROCEDURE — 99222 1ST HOSP IP/OBS MODERATE 55: CPT

## 2025-08-18 PROCEDURE — 3600000002 HC SURGERY LEVEL 2 BASE: Performed by: ORTHOPAEDIC SURGERY

## 2025-08-18 PROCEDURE — 6360000002 HC RX W HCPCS: Performed by: ORTHOPAEDIC SURGERY

## 2025-08-18 PROCEDURE — 6370000000 HC RX 637 (ALT 250 FOR IP): Performed by: ORTHOPAEDIC SURGERY

## 2025-08-18 PROCEDURE — 2580000003 HC RX 258: Performed by: ANESTHESIOLOGY

## 2025-08-18 PROCEDURE — 97530 THERAPEUTIC ACTIVITIES: CPT

## 2025-08-18 PROCEDURE — 3700000001 HC ADD 15 MINUTES (ANESTHESIA): Performed by: ORTHOPAEDIC SURGERY

## 2025-08-18 PROCEDURE — 97162 PT EVAL MOD COMPLEX 30 MIN: CPT

## 2025-08-18 PROCEDURE — 6370000000 HC RX 637 (ALT 250 FOR IP): Performed by: ANESTHESIOLOGY

## 2025-08-18 PROCEDURE — 82947 ASSAY GLUCOSE BLOOD QUANT: CPT

## 2025-08-18 PROCEDURE — 3600000012 HC SURGERY LEVEL 2 ADDTL 15MIN: Performed by: ORTHOPAEDIC SURGERY

## 2025-08-18 PROCEDURE — 6360000002 HC RX W HCPCS: Performed by: NURSE ANESTHETIST, CERTIFIED REGISTERED

## 2025-08-18 PROCEDURE — 2580000003 HC RX 258: Performed by: ORTHOPAEDIC SURGERY

## 2025-08-18 PROCEDURE — 7100000000 HC PACU RECOVERY - FIRST 15 MIN: Performed by: ORTHOPAEDIC SURGERY

## 2025-08-18 PROCEDURE — 2709999900 HC NON-CHARGEABLE SUPPLY: Performed by: ORTHOPAEDIC SURGERY

## 2025-08-18 PROCEDURE — 2720000010 HC SURG SUPPLY STERILE: Performed by: ORTHOPAEDIC SURGERY

## 2025-08-18 PROCEDURE — 6360000002 HC RX W HCPCS: Performed by: ANESTHESIOLOGY

## 2025-08-18 PROCEDURE — 3700000000 HC ANESTHESIA ATTENDED CARE: Performed by: ORTHOPAEDIC SURGERY

## 2025-08-18 PROCEDURE — 2500000003 HC RX 250 WO HCPCS: Performed by: NURSE ANESTHETIST, CERTIFIED REGISTERED

## 2025-08-18 RX ORDER — OXYCODONE AND ACETAMINOPHEN 5; 325 MG/1; MG/1
2 TABLET ORAL EVERY 4 HOURS PRN
Status: DISCONTINUED | OUTPATIENT
Start: 2025-08-18 | End: 2025-08-19 | Stop reason: HOSPADM

## 2025-08-18 RX ORDER — METOPROLOL TARTRATE 25 MG/1
25 TABLET, FILM COATED ORAL 2 TIMES DAILY
Status: DISCONTINUED | OUTPATIENT
Start: 2025-08-18 | End: 2025-08-19 | Stop reason: HOSPADM

## 2025-08-18 RX ORDER — PROCHLORPERAZINE EDISYLATE 5 MG/ML
10 INJECTION INTRAMUSCULAR; INTRAVENOUS
Status: DISCONTINUED | OUTPATIENT
Start: 2025-08-18 | End: 2025-08-18 | Stop reason: HOSPADM

## 2025-08-18 RX ORDER — SODIUM CHLORIDE, SODIUM LACTATE, POTASSIUM CHLORIDE, CALCIUM CHLORIDE 600; 310; 30; 20 MG/100ML; MG/100ML; MG/100ML; MG/100ML
INJECTION, SOLUTION INTRAVENOUS CONTINUOUS
Status: DISCONTINUED | OUTPATIENT
Start: 2025-08-18 | End: 2025-08-18 | Stop reason: HOSPADM

## 2025-08-18 RX ORDER — MORPHINE SULFATE 4 MG/ML
4 INJECTION, SOLUTION INTRAMUSCULAR; INTRAVENOUS
Status: DISCONTINUED | OUTPATIENT
Start: 2025-08-18 | End: 2025-08-19 | Stop reason: HOSPADM

## 2025-08-18 RX ORDER — ESCITALOPRAM OXALATE 10 MG/1
20 TABLET ORAL DAILY
Status: DISCONTINUED | OUTPATIENT
Start: 2025-08-18 | End: 2025-08-19 | Stop reason: HOSPADM

## 2025-08-18 RX ORDER — SODIUM CHLORIDE 0.9 % (FLUSH) 0.9 %
5-40 SYRINGE (ML) INJECTION PRN
Status: DISCONTINUED | OUTPATIENT
Start: 2025-08-18 | End: 2025-08-18 | Stop reason: HOSPADM

## 2025-08-18 RX ORDER — SODIUM CHLORIDE 0.9 % (FLUSH) 0.9 %
5-40 SYRINGE (ML) INJECTION PRN
Status: DISCONTINUED | OUTPATIENT
Start: 2025-08-18 | End: 2025-08-19 | Stop reason: HOSPADM

## 2025-08-18 RX ORDER — ONDANSETRON 2 MG/ML
4 INJECTION INTRAMUSCULAR; INTRAVENOUS EVERY 6 HOURS PRN
Status: DISCONTINUED | OUTPATIENT
Start: 2025-08-18 | End: 2025-08-19 | Stop reason: HOSPADM

## 2025-08-18 RX ORDER — SODIUM CHLORIDE 9 MG/ML
INJECTION, SOLUTION INTRAVENOUS PRN
Status: DISCONTINUED | OUTPATIENT
Start: 2025-08-18 | End: 2025-08-18 | Stop reason: HOSPADM

## 2025-08-18 RX ORDER — SODIUM CHLORIDE 9 MG/ML
INJECTION, SOLUTION INTRAVENOUS CONTINUOUS
Status: DISCONTINUED | OUTPATIENT
Start: 2025-08-18 | End: 2025-08-18 | Stop reason: HOSPADM

## 2025-08-18 RX ORDER — METOCLOPRAMIDE HYDROCHLORIDE 5 MG/ML
10 INJECTION INTRAMUSCULAR; INTRAVENOUS
Status: DISCONTINUED | OUTPATIENT
Start: 2025-08-18 | End: 2025-08-18 | Stop reason: HOSPADM

## 2025-08-18 RX ORDER — OXYCODONE AND ACETAMINOPHEN 5; 325 MG/1; MG/1
1 TABLET ORAL EVERY 4 HOURS PRN
Status: DISCONTINUED | OUTPATIENT
Start: 2025-08-18 | End: 2025-08-19 | Stop reason: HOSPADM

## 2025-08-18 RX ORDER — PROPOFOL 10 MG/ML
INJECTION, EMULSION INTRAVENOUS
Status: DISCONTINUED | OUTPATIENT
Start: 2025-08-18 | End: 2025-08-18 | Stop reason: SDUPTHER

## 2025-08-18 RX ORDER — HYDROXYZINE HYDROCHLORIDE 10 MG/1
10 TABLET, FILM COATED ORAL EVERY 8 HOURS PRN
Status: DISCONTINUED | OUTPATIENT
Start: 2025-08-18 | End: 2025-08-19 | Stop reason: HOSPADM

## 2025-08-18 RX ORDER — ONDANSETRON 2 MG/ML
INJECTION INTRAMUSCULAR; INTRAVENOUS
Status: DISCONTINUED | OUTPATIENT
Start: 2025-08-18 | End: 2025-08-18 | Stop reason: SDUPTHER

## 2025-08-18 RX ORDER — METHOCARBAMOL 750 MG/1
750 TABLET, FILM COATED ORAL EVERY 8 HOURS PRN
Status: DISCONTINUED | OUTPATIENT
Start: 2025-08-18 | End: 2025-08-19 | Stop reason: HOSPADM

## 2025-08-18 RX ORDER — DEXTROSE MONOHYDRATE 100 MG/ML
INJECTION, SOLUTION INTRAVENOUS CONTINUOUS PRN
Status: DISCONTINUED | OUTPATIENT
Start: 2025-08-18 | End: 2025-08-19 | Stop reason: HOSPADM

## 2025-08-18 RX ORDER — INSULIN GLARGINE 100 [IU]/ML
17 INJECTION, SOLUTION SUBCUTANEOUS NIGHTLY
Status: DISCONTINUED | OUTPATIENT
Start: 2025-08-18 | End: 2025-08-19 | Stop reason: HOSPADM

## 2025-08-18 RX ORDER — LOSARTAN POTASSIUM 25 MG/1
25 TABLET ORAL DAILY
Status: DISCONTINUED | OUTPATIENT
Start: 2025-08-18 | End: 2025-08-19 | Stop reason: HOSPADM

## 2025-08-18 RX ORDER — AMLODIPINE BESYLATE 5 MG/1
5 TABLET ORAL DAILY
Status: DISCONTINUED | OUTPATIENT
Start: 2025-08-19 | End: 2025-08-19 | Stop reason: HOSPADM

## 2025-08-18 RX ORDER — OXYCODONE HYDROCHLORIDE 5 MG/1
5 TABLET ORAL
Status: DISCONTINUED | OUTPATIENT
Start: 2025-08-18 | End: 2025-08-18 | Stop reason: HOSPADM

## 2025-08-18 RX ORDER — PHENYLEPHRINE HYDROCHLORIDE 10 MG/ML
INJECTION INTRAVENOUS
Status: DISCONTINUED | OUTPATIENT
Start: 2025-08-18 | End: 2025-08-18 | Stop reason: SDUPTHER

## 2025-08-18 RX ORDER — SODIUM CHLORIDE 9 MG/ML
INJECTION, SOLUTION INTRAVENOUS PRN
Status: DISCONTINUED | OUTPATIENT
Start: 2025-08-18 | End: 2025-08-19 | Stop reason: HOSPADM

## 2025-08-18 RX ORDER — VANCOMYCIN HYDROCHLORIDE 1 G/20ML
INJECTION, POWDER, LYOPHILIZED, FOR SOLUTION INTRAVENOUS PRN
Status: DISCONTINUED | OUTPATIENT
Start: 2025-08-18 | End: 2025-08-18 | Stop reason: ALTCHOICE

## 2025-08-18 RX ORDER — EZETIMIBE 10 MG/1
10 TABLET ORAL NIGHTLY
Status: DISCONTINUED | OUTPATIENT
Start: 2025-08-18 | End: 2025-08-19 | Stop reason: HOSPADM

## 2025-08-18 RX ORDER — SODIUM CHLORIDE 0.9 % (FLUSH) 0.9 %
5-40 SYRINGE (ML) INJECTION EVERY 12 HOURS SCHEDULED
Status: DISCONTINUED | OUTPATIENT
Start: 2025-08-18 | End: 2025-08-19 | Stop reason: HOSPADM

## 2025-08-18 RX ORDER — INSULIN LISPRO 100 [IU]/ML
0-8 INJECTION, SOLUTION INTRAVENOUS; SUBCUTANEOUS
Status: DISCONTINUED | OUTPATIENT
Start: 2025-08-18 | End: 2025-08-19 | Stop reason: HOSPADM

## 2025-08-18 RX ORDER — ROCURONIUM BROMIDE 10 MG/ML
INJECTION, SOLUTION INTRAVENOUS
Status: DISCONTINUED | OUTPATIENT
Start: 2025-08-18 | End: 2025-08-18 | Stop reason: SDUPTHER

## 2025-08-18 RX ORDER — DIPHENHYDRAMINE HYDROCHLORIDE 50 MG/ML
12.5 INJECTION, SOLUTION INTRAMUSCULAR; INTRAVENOUS
Status: DISCONTINUED | OUTPATIENT
Start: 2025-08-18 | End: 2025-08-18 | Stop reason: HOSPADM

## 2025-08-18 RX ORDER — POLYETHYLENE GLYCOL 3350 17 G/17G
17 POWDER, FOR SOLUTION ORAL DAILY
Status: DISCONTINUED | OUTPATIENT
Start: 2025-08-18 | End: 2025-08-19 | Stop reason: HOSPADM

## 2025-08-18 RX ORDER — MORPHINE SULFATE 2 MG/ML
2 INJECTION, SOLUTION INTRAMUSCULAR; INTRAVENOUS
Status: DISCONTINUED | OUTPATIENT
Start: 2025-08-18 | End: 2025-08-19 | Stop reason: HOSPADM

## 2025-08-18 RX ORDER — NITROGLYCERIN 0.4 MG/1
0.4 TABLET SUBLINGUAL PRN
Status: DISCONTINUED | OUTPATIENT
Start: 2025-08-18 | End: 2025-08-19 | Stop reason: HOSPADM

## 2025-08-18 RX ORDER — MEPERIDINE HYDROCHLORIDE 25 MG/ML
12.5 INJECTION INTRAMUSCULAR; INTRAVENOUS; SUBCUTANEOUS EVERY 5 MIN PRN
Status: DISCONTINUED | OUTPATIENT
Start: 2025-08-18 | End: 2025-08-18 | Stop reason: HOSPADM

## 2025-08-18 RX ORDER — ESMOLOL HYDROCHLORIDE 10 MG/ML
INJECTION INTRAVENOUS
Status: DISCONTINUED | OUTPATIENT
Start: 2025-08-18 | End: 2025-08-18 | Stop reason: SDUPTHER

## 2025-08-18 RX ORDER — ATORVASTATIN CALCIUM 40 MG/1
40 TABLET, FILM COATED ORAL NIGHTLY
Status: DISCONTINUED | OUTPATIENT
Start: 2025-08-18 | End: 2025-08-19 | Stop reason: HOSPADM

## 2025-08-18 RX ORDER — SODIUM CHLORIDE 0.9 % (FLUSH) 0.9 %
5-40 SYRINGE (ML) INJECTION EVERY 12 HOURS SCHEDULED
Status: DISCONTINUED | OUTPATIENT
Start: 2025-08-18 | End: 2025-08-18 | Stop reason: HOSPADM

## 2025-08-18 RX ORDER — LIDOCAINE HYDROCHLORIDE 10 MG/ML
1 INJECTION, SOLUTION EPIDURAL; INFILTRATION; INTRACAUDAL; PERINEURAL
Status: DISCONTINUED | OUTPATIENT
Start: 2025-08-19 | End: 2025-08-18 | Stop reason: HOSPADM

## 2025-08-18 RX ORDER — HYDROMORPHONE HYDROCHLORIDE 1 MG/ML
0.5 INJECTION, SOLUTION INTRAMUSCULAR; INTRAVENOUS; SUBCUTANEOUS EVERY 5 MIN PRN
Status: DISCONTINUED | OUTPATIENT
Start: 2025-08-18 | End: 2025-08-18 | Stop reason: HOSPADM

## 2025-08-18 RX ORDER — SENNA AND DOCUSATE SODIUM 50; 8.6 MG/1; MG/1
1 TABLET, FILM COATED ORAL 2 TIMES DAILY
Status: DISCONTINUED | OUTPATIENT
Start: 2025-08-18 | End: 2025-08-19 | Stop reason: HOSPADM

## 2025-08-18 RX ORDER — SODIUM CHLORIDE 9 MG/ML
INJECTION, SOLUTION INTRAVENOUS CONTINUOUS
Status: DISCONTINUED | OUTPATIENT
Start: 2025-08-18 | End: 2025-08-19 | Stop reason: HOSPADM

## 2025-08-18 RX ORDER — DEXAMETHASONE SODIUM PHOSPHATE 4 MG/ML
INJECTION, SOLUTION INTRA-ARTICULAR; INTRALESIONAL; INTRAMUSCULAR; INTRAVENOUS; SOFT TISSUE
Status: DISCONTINUED | OUTPATIENT
Start: 2025-08-18 | End: 2025-08-18 | Stop reason: SDUPTHER

## 2025-08-18 RX ORDER — FENTANYL CITRATE 50 UG/ML
25 INJECTION, SOLUTION INTRAMUSCULAR; INTRAVENOUS EVERY 5 MIN PRN
Status: DISCONTINUED | OUTPATIENT
Start: 2025-08-18 | End: 2025-08-18 | Stop reason: HOSPADM

## 2025-08-18 RX ORDER — BUPIVACAINE HYDROCHLORIDE AND EPINEPHRINE 5; 5 MG/ML; UG/ML
INJECTION, SOLUTION EPIDURAL; INTRACAUDAL; PERINEURAL PRN
Status: DISCONTINUED | OUTPATIENT
Start: 2025-08-18 | End: 2025-08-18 | Stop reason: ALTCHOICE

## 2025-08-18 RX ORDER — FENTANYL CITRATE 50 UG/ML
INJECTION, SOLUTION INTRAMUSCULAR; INTRAVENOUS
Status: DISCONTINUED | OUTPATIENT
Start: 2025-08-18 | End: 2025-08-18 | Stop reason: SDUPTHER

## 2025-08-18 RX ORDER — LIDOCAINE HYDROCHLORIDE 20 MG/ML
INJECTION, SOLUTION EPIDURAL; INFILTRATION; INTRACAUDAL; PERINEURAL
Status: DISCONTINUED | OUTPATIENT
Start: 2025-08-18 | End: 2025-08-18 | Stop reason: SDUPTHER

## 2025-08-18 RX ORDER — PROMETHAZINE HYDROCHLORIDE 12.5 MG/1
12.5 TABLET ORAL EVERY 6 HOURS PRN
Status: DISCONTINUED | OUTPATIENT
Start: 2025-08-18 | End: 2025-08-19 | Stop reason: HOSPADM

## 2025-08-18 RX ADMIN — ESCITALOPRAM OXALATE 20 MG: 10 TABLET ORAL at 23:01

## 2025-08-18 RX ADMIN — SODIUM CHLORIDE: 0.9 INJECTION, SOLUTION INTRAVENOUS at 23:56

## 2025-08-18 RX ADMIN — SUGAMMADEX 200 MG: 100 INJECTION, SOLUTION INTRAVENOUS at 14:25

## 2025-08-18 RX ADMIN — FENTANYL CITRATE 100 MCG: 50 INJECTION INTRAMUSCULAR; INTRAVENOUS at 12:34

## 2025-08-18 RX ADMIN — INSULIN GLARGINE 17 UNITS: 100 INJECTION, SOLUTION SUBCUTANEOUS at 22:42

## 2025-08-18 RX ADMIN — PHENYLEPHRINE HYDROCHLORIDE 200 MCG: 10 INJECTION INTRAVENOUS at 13:35

## 2025-08-18 RX ADMIN — Medication 2000 MG: at 12:50

## 2025-08-18 RX ADMIN — SODIUM CHLORIDE, POTASSIUM CHLORIDE, SODIUM LACTATE AND CALCIUM CHLORIDE: 600; 310; 30; 20 INJECTION, SOLUTION INTRAVENOUS at 10:31

## 2025-08-18 RX ADMIN — PROPOFOL 140 MG: 10 INJECTION, EMULSION INTRAVENOUS at 12:42

## 2025-08-18 RX ADMIN — ONDANSETRON 4 MG: 2 INJECTION, SOLUTION INTRAMUSCULAR; INTRAVENOUS at 23:09

## 2025-08-18 RX ADMIN — ROCURONIUM BROMIDE 20 MG: 50 INJECTION INTRAVENOUS at 13:42

## 2025-08-18 RX ADMIN — DEXAMETHASONE SODIUM PHOSPHATE 4 MG: 4 INJECTION, SOLUTION INTRAMUSCULAR; INTRAVENOUS at 12:59

## 2025-08-18 RX ADMIN — LIDOCAINE HYDROCHLORIDE 100 MG: 20 INJECTION, SOLUTION EPIDURAL; INFILTRATION; INTRACAUDAL; PERINEURAL at 12:42

## 2025-08-18 RX ADMIN — SODIUM CHLORIDE, POTASSIUM CHLORIDE, SODIUM LACTATE AND CALCIUM CHLORIDE: 600; 310; 30; 20 INJECTION, SOLUTION INTRAVENOUS at 13:30

## 2025-08-18 RX ADMIN — MORPHINE SULFATE 4 MG: 4 INJECTION, SOLUTION INTRAMUSCULAR; INTRAVENOUS at 22:54

## 2025-08-18 RX ADMIN — EZETIMIBE 10 MG: 10 TABLET ORAL at 22:40

## 2025-08-18 RX ADMIN — SODIUM CHLORIDE: 0.9 INJECTION, SOLUTION INTRAVENOUS at 16:10

## 2025-08-18 RX ADMIN — CEFAZOLIN 2000 MG: 10 INJECTION, POWDER, FOR SOLUTION INTRAVENOUS at 22:44

## 2025-08-18 RX ADMIN — PROPOFOL 60 MG: 10 INJECTION, EMULSION INTRAVENOUS at 12:44

## 2025-08-18 RX ADMIN — FENTANYL CITRATE 50 MCG: 50 INJECTION INTRAMUSCULAR; INTRAVENOUS at 12:51

## 2025-08-18 RX ADMIN — INSULIN LISPRO 2 UNITS: 100 INJECTION, SOLUTION INTRAVENOUS; SUBCUTANEOUS at 17:29

## 2025-08-18 RX ADMIN — METHOCARBAMOL 750 MG: 750 TABLET ORAL at 22:54

## 2025-08-18 RX ADMIN — OXYCODONE AND ACETAMINOPHEN 2 TABLET: 5; 325 TABLET ORAL at 21:10

## 2025-08-18 RX ADMIN — MORPHINE SULFATE 2 MG: 2 INJECTION, SOLUTION INTRAMUSCULAR; INTRAVENOUS at 17:07

## 2025-08-18 RX ADMIN — HYDROMORPHONE HYDROCHLORIDE 0.5 MG: 1 INJECTION, SOLUTION INTRAMUSCULAR; INTRAVENOUS; SUBCUTANEOUS at 14:53

## 2025-08-18 RX ADMIN — HYDROMORPHONE HYDROCHLORIDE 0.5 MG: 1 INJECTION, SOLUTION INTRAMUSCULAR; INTRAVENOUS; SUBCUTANEOUS at 14:45

## 2025-08-18 RX ADMIN — SENNOSIDES, DOCUSATE SODIUM 1 TABLET: 50; 8.6 TABLET, FILM COATED ORAL at 22:40

## 2025-08-18 RX ADMIN — ESMOLOL HYDROCHLORIDE 30 MG: 100 INJECTION, SOLUTION INTRAVENOUS at 12:48

## 2025-08-18 RX ADMIN — OXYCODONE AND ACETAMINOPHEN 2 TABLET: 5; 325 TABLET ORAL at 16:05

## 2025-08-18 RX ADMIN — ESMOLOL HYDROCHLORIDE 20 MG: 100 INJECTION, SOLUTION INTRAVENOUS at 14:20

## 2025-08-18 RX ADMIN — ROCURONIUM BROMIDE 50 MG: 50 INJECTION INTRAVENOUS at 12:43

## 2025-08-18 RX ADMIN — ESMOLOL HYDROCHLORIDE 20 MG: 100 INJECTION, SOLUTION INTRAVENOUS at 14:18

## 2025-08-18 RX ADMIN — ONDANSETRON 4 MG: 2 INJECTION, SOLUTION INTRAMUSCULAR; INTRAVENOUS at 12:59

## 2025-08-18 RX ADMIN — ATORVASTATIN CALCIUM 40 MG: 40 TABLET, FILM COATED ORAL at 22:40

## 2025-08-18 RX ADMIN — INSULIN HUMAN 6 UNITS: 100 INJECTION, SOLUTION PARENTERAL at 15:00

## 2025-08-18 RX ADMIN — FENTANYL CITRATE 50 MCG: 50 INJECTION INTRAMUSCULAR; INTRAVENOUS at 12:58

## 2025-08-18 ASSESSMENT — PAIN DESCRIPTION - ORIENTATION
ORIENTATION: LOWER
ORIENTATION: LOWER

## 2025-08-18 ASSESSMENT — ENCOUNTER SYMPTOMS
SORE THROAT: 0
ABDOMINAL PAIN: 0
DIARRHEA: 0
COUGH: 0
CONSTIPATION: 0
NAUSEA: 0
CHEST TIGHTNESS: 0
GASTROINTESTINAL NEGATIVE: 1
BACK PAIN: 1
ALLERGIC/IMMUNOLOGIC NEGATIVE: 1
EYES NEGATIVE: 1
VOMITING: 0
ABDOMINAL DISTENTION: 0
WHEEZING: 0
SHORTNESS OF BREATH: 0
RHINORRHEA: 0

## 2025-08-18 ASSESSMENT — PAIN DESCRIPTION - DESCRIPTORS
DESCRIPTORS: ACHING
DESCRIPTORS: ACHING;DISCOMFORT
DESCRIPTORS: PATIENT UNABLE TO DESCRIBE
DESCRIPTORS: PATIENT UNABLE TO DESCRIBE
DESCRIPTORS: SQUEEZING
DESCRIPTORS: DISCOMFORT

## 2025-08-18 ASSESSMENT — PAIN - FUNCTIONAL ASSESSMENT
PAIN_FUNCTIONAL_ASSESSMENT: 0-10
PAIN_FUNCTIONAL_ASSESSMENT: PREVENTS OR INTERFERES WITH MANY ACTIVE NOT PASSIVE ACTIVITIES
PAIN_FUNCTIONAL_ASSESSMENT: 0-10
PAIN_FUNCTIONAL_ASSESSMENT: PREVENTS OR INTERFERES SOME ACTIVE ACTIVITIES AND ADLS
PAIN_FUNCTIONAL_ASSESSMENT: 0-10

## 2025-08-18 ASSESSMENT — PAIN DESCRIPTION - LOCATION
LOCATION: BACK

## 2025-08-18 ASSESSMENT — PAIN SCALES - GENERAL
PAINLEVEL_OUTOF10: 6
PAINLEVEL_OUTOF10: 10
PAINLEVEL_OUTOF10: 10
PAINLEVEL_OUTOF10: 8
PAINLEVEL_OUTOF10: 10
PAINLEVEL_OUTOF10: 9
PAINLEVEL_OUTOF10: 4
PAINLEVEL_OUTOF10: 5
PAINLEVEL_OUTOF10: 4
PAINLEVEL_OUTOF10: 10
PAINLEVEL_OUTOF10: 4
PAINLEVEL_OUTOF10: 2
PAINLEVEL_OUTOF10: 6

## 2025-08-18 ASSESSMENT — PAIN SCALES - WONG BAKER
WONGBAKER_NUMERICALRESPONSE: HURTS EVEN MORE
WONGBAKER_NUMERICALRESPONSE: HURTS A LITTLE BIT
WONGBAKER_NUMERICALRESPONSE: HURTS A LITTLE BIT
WONGBAKER_NUMERICALRESPONSE: HURTS EVEN MORE
WONGBAKER_NUMERICALRESPONSE: HURTS A LITTLE BIT

## 2025-08-19 VITALS
OXYGEN SATURATION: 98 % | TEMPERATURE: 98.4 F | BODY MASS INDEX: 27.82 KG/M2 | RESPIRATION RATE: 18 BRPM | HEIGHT: 65 IN | SYSTOLIC BLOOD PRESSURE: 149 MMHG | DIASTOLIC BLOOD PRESSURE: 62 MMHG | WEIGHT: 167 LBS | HEART RATE: 76 BPM

## 2025-08-19 LAB
ANION GAP SERPL CALCULATED.3IONS-SCNC: 13 MMOL/L (ref 9–16)
BASOPHILS # BLD: 0 K/UL (ref 0–0.2)
BASOPHILS NFR BLD: 0 % (ref 0–2)
BUN SERPL-MCNC: 7 MG/DL (ref 8–23)
CALCIUM SERPL-MCNC: 9 MG/DL (ref 8.8–10.2)
CHLORIDE SERPL-SCNC: 106 MMOL/L (ref 98–107)
CO2 SERPL-SCNC: 24 MMOL/L (ref 20–31)
CREAT SERPL-MCNC: 0.6 MG/DL (ref 0.5–0.9)
EOSINOPHIL # BLD: 0 K/UL (ref 0–0.44)
EOSINOPHILS RELATIVE PERCENT: 0 % (ref 1–4)
ERYTHROCYTE [DISTWIDTH] IN BLOOD BY AUTOMATED COUNT: 12.2 % (ref 11.8–14.4)
GFR, ESTIMATED: >90 ML/MIN/1.73M2
GLUCOSE BLD-MCNC: 140 MG/DL (ref 65–105)
GLUCOSE SERPL-MCNC: 146 MG/DL (ref 82–115)
HCT VFR BLD AUTO: 36.1 % (ref 36.3–47.1)
HGB BLD-MCNC: 12.5 G/DL (ref 11.9–15.1)
IMM GRANULOCYTES # BLD AUTO: 0.14 K/UL (ref 0–0.3)
IMM GRANULOCYTES NFR BLD: 1 %
LYMPHOCYTES NFR BLD: 0.57 K/UL (ref 1.1–3.7)
LYMPHOCYTES RELATIVE PERCENT: 4 % (ref 24–43)
MCH RBC QN AUTO: 31.3 PG (ref 25.2–33.5)
MCHC RBC AUTO-ENTMCNC: 34.6 G/DL (ref 28.4–34.8)
MCV RBC AUTO: 90.3 FL (ref 82.6–102.9)
MONOCYTES NFR BLD: 0.57 K/UL (ref 0.1–1.2)
MONOCYTES NFR BLD: 4 % (ref 3–12)
NEUTROPHILS NFR BLD: 91 % (ref 36–65)
NEUTS SEG NFR BLD: 12.92 K/UL (ref 1.5–8.1)
NRBC BLD-RTO: 0 PER 100 WBC
PLATELET # BLD AUTO: 223 K/UL (ref 138–453)
PMV BLD AUTO: 11 FL (ref 8.1–13.5)
POTASSIUM SERPL-SCNC: 3.8 MMOL/L (ref 3.7–5.3)
RBC # BLD AUTO: 4 M/UL (ref 3.95–5.11)
SODIUM SERPL-SCNC: 143 MMOL/L (ref 136–145)
WBC OTHER # BLD: 14.2 K/UL (ref 3.5–11.3)

## 2025-08-19 PROCEDURE — 6360000002 HC RX W HCPCS: Performed by: ORTHOPAEDIC SURGERY

## 2025-08-19 PROCEDURE — 97535 SELF CARE MNGMENT TRAINING: CPT

## 2025-08-19 PROCEDURE — 82947 ASSAY GLUCOSE BLOOD QUANT: CPT

## 2025-08-19 PROCEDURE — 80048 BASIC METABOLIC PNL TOTAL CA: CPT

## 2025-08-19 PROCEDURE — 97166 OT EVAL MOD COMPLEX 45 MIN: CPT

## 2025-08-19 PROCEDURE — 97530 THERAPEUTIC ACTIVITIES: CPT

## 2025-08-19 PROCEDURE — 36415 COLL VENOUS BLD VENIPUNCTURE: CPT

## 2025-08-19 PROCEDURE — 2500000003 HC RX 250 WO HCPCS: Performed by: ORTHOPAEDIC SURGERY

## 2025-08-19 PROCEDURE — 99231 SBSQ HOSP IP/OBS SF/LOW 25: CPT | Performed by: INTERNAL MEDICINE

## 2025-08-19 PROCEDURE — 97116 GAIT TRAINING THERAPY: CPT

## 2025-08-19 PROCEDURE — 6370000000 HC RX 637 (ALT 250 FOR IP): Performed by: ORTHOPAEDIC SURGERY

## 2025-08-19 PROCEDURE — 85025 COMPLETE CBC W/AUTO DIFF WBC: CPT

## 2025-08-19 RX ORDER — OXYCODONE AND ACETAMINOPHEN 5; 325 MG/1; MG/1
1-2 TABLET ORAL EVERY 4 HOURS PRN
Qty: 60 TABLET | Refills: 0 | Status: SHIPPED | OUTPATIENT
Start: 2025-08-19 | End: 2025-08-26

## 2025-08-19 RX ORDER — METHOCARBAMOL 750 MG/1
750 TABLET, FILM COATED ORAL EVERY 8 HOURS PRN
Qty: 60 TABLET | Refills: 0 | Status: SHIPPED | OUTPATIENT
Start: 2025-08-19

## 2025-08-19 RX ORDER — SENNA AND DOCUSATE SODIUM 50; 8.6 MG/1; MG/1
1 TABLET, FILM COATED ORAL 2 TIMES DAILY
Qty: 60 TABLET | Refills: 0 | Status: SHIPPED | OUTPATIENT
Start: 2025-08-19

## 2025-08-19 RX ADMIN — ONDANSETRON 4 MG: 2 INJECTION, SOLUTION INTRAMUSCULAR; INTRAVENOUS at 04:53

## 2025-08-19 RX ADMIN — OXYCODONE AND ACETAMINOPHEN 2 TABLET: 5; 325 TABLET ORAL at 02:16

## 2025-08-19 RX ADMIN — OXYCODONE AND ACETAMINOPHEN 2 TABLET: 5; 325 TABLET ORAL at 12:43

## 2025-08-19 RX ADMIN — CEFAZOLIN 2000 MG: 10 INJECTION, POWDER, FOR SOLUTION INTRAVENOUS at 04:42

## 2025-08-19 RX ADMIN — SENNOSIDES, DOCUSATE SODIUM 1 TABLET: 50; 8.6 TABLET, FILM COATED ORAL at 08:30

## 2025-08-19 RX ADMIN — METHOCARBAMOL 750 MG: 750 TABLET ORAL at 10:08

## 2025-08-19 RX ADMIN — MORPHINE SULFATE 4 MG: 4 INJECTION, SOLUTION INTRAMUSCULAR; INTRAVENOUS at 04:40

## 2025-08-19 RX ADMIN — SODIUM CHLORIDE, PRESERVATIVE FREE 10 ML: 5 INJECTION INTRAVENOUS at 08:32

## 2025-08-19 RX ADMIN — ESCITALOPRAM OXALATE 20 MG: 10 TABLET ORAL at 08:30

## 2025-08-19 RX ADMIN — OXYCODONE AND ACETAMINOPHEN 2 TABLET: 5; 325 TABLET ORAL at 08:30

## 2025-08-19 RX ADMIN — POLYETHYLENE GLYCOL 3350 17 G: 17 POWDER, FOR SOLUTION ORAL at 08:32

## 2025-08-19 ASSESSMENT — PAIN SCALES - WONG BAKER
WONGBAKER_NUMERICALRESPONSE: HURTS A LITTLE BIT

## 2025-08-19 ASSESSMENT — PAIN SCALES - GENERAL
PAINLEVEL_OUTOF10: 9
PAINLEVEL_OUTOF10: 2
PAINLEVEL_OUTOF10: 4
PAINLEVEL_OUTOF10: 3
PAINLEVEL_OUTOF10: 5
PAINLEVEL_OUTOF10: 8
PAINLEVEL_OUTOF10: 2
PAINLEVEL_OUTOF10: 1
PAINLEVEL_OUTOF10: 2
PAINLEVEL_OUTOF10: 2
PAINLEVEL_OUTOF10: 9
PAINLEVEL_OUTOF10: 3
PAINLEVEL_OUTOF10: 3
PAINLEVEL_OUTOF10: 2
PAINLEVEL_OUTOF10: 3
PAINLEVEL_OUTOF10: 4
PAINLEVEL_OUTOF10: 8
PAINLEVEL_OUTOF10: 2
PAINLEVEL_OUTOF10: 3
PAINLEVEL_OUTOF10: 4
PAINLEVEL_OUTOF10: 4
PAINLEVEL_OUTOF10: 2

## 2025-08-19 ASSESSMENT — PAIN DESCRIPTION - DESCRIPTORS
DESCRIPTORS: ACHING;SHARP
DESCRIPTORS: ACHING;DISCOMFORT
DESCRIPTORS: ACHING;DISCOMFORT
DESCRIPTORS: ACHING;SHARP
DESCRIPTORS: ACHING;SHARP

## 2025-08-19 ASSESSMENT — PAIN - FUNCTIONAL ASSESSMENT
PAIN_FUNCTIONAL_ASSESSMENT: ACTIVITIES ARE NOT PREVENTED
PAIN_FUNCTIONAL_ASSESSMENT: ACTIVITIES ARE NOT PREVENTED
PAIN_FUNCTIONAL_ASSESSMENT: 0-10
PAIN_FUNCTIONAL_ASSESSMENT: ACTIVITIES ARE NOT PREVENTED
PAIN_FUNCTIONAL_ASSESSMENT: 0-10

## 2025-08-19 ASSESSMENT — PAIN DESCRIPTION - LOCATION
LOCATION: BACK

## 2025-08-19 ASSESSMENT — PAIN DESCRIPTION - ORIENTATION
ORIENTATION: LOWER
ORIENTATION: POSTERIOR
ORIENTATION: LOWER
ORIENTATION: LOWER

## 2025-08-19 ASSESSMENT — PAIN DESCRIPTION - PAIN TYPE: TYPE: SURGICAL PAIN

## 2025-08-20 RX ORDER — AMLODIPINE BESYLATE 5 MG/1
5 TABLET ORAL DAILY
Qty: 30 TABLET | Refills: 5 | Status: SHIPPED | OUTPATIENT
Start: 2025-08-20

## 2025-08-25 DIAGNOSIS — R11.0 NAUSEA: ICD-10-CM

## 2025-08-28 DIAGNOSIS — Z79.4 TYPE 2 DIABETES MELLITUS WITH HYPERGLYCEMIA, WITH LONG-TERM CURRENT USE OF INSULIN (HCC): ICD-10-CM

## 2025-08-28 DIAGNOSIS — E11.65 TYPE 2 DIABETES MELLITUS WITH HYPERGLYCEMIA, WITH LONG-TERM CURRENT USE OF INSULIN (HCC): ICD-10-CM

## 2025-08-29 RX ORDER — INSULIN GLARGINE 100 [IU]/ML
17 INJECTION, SOLUTION SUBCUTANEOUS 2 TIMES DAILY
Qty: 12 ML | Refills: 5 | Status: SHIPPED | OUTPATIENT
Start: 2025-08-29

## 2025-08-31 RX ORDER — ONDANSETRON 4 MG/1
4 TABLET, ORALLY DISINTEGRATING ORAL 3 TIMES DAILY PRN
Qty: 60 TABLET | Refills: 1 | Status: SHIPPED | OUTPATIENT
Start: 2025-08-31

## (undated) DEVICE — YANKAUER,FLEXIBLE HANDLE,REGLR CAPACITY: Brand: MEDLINE INDUSTRIES, INC.

## (undated) DEVICE — SUTURE VCRL + SZ 4-0 L18IN ABSRB UD P-3 3/8 CIR REV CUT NDL VCP494H

## (undated) DEVICE — SUTURE VICRYL + SZ 2-0 L36IN ABSRB UD L36MM CT-1 1/2 CIR VCP945H

## (undated) DEVICE — GOWN,SIRUS,NONRNF,SETINSLV,2XL,18/CS: Brand: MEDLINE

## (undated) DEVICE — INSUFFLATION TUBING SET WITH FILTER, FUNNEL CONNECTOR AND LUER LOCK: Brand: JOSNOE MEDICAL INC

## (undated) DEVICE — SUTURE PROL SZ 6-0 L18IN NONABSORBABLE BLU RB-2 L13MM 1/2 8714H

## (undated) DEVICE — SVMMC HND

## (undated) DEVICE — COVER,LIGHT HANDLE,FLX,2/PK: Brand: MEDLINE INDUSTRIES, INC.

## (undated) DEVICE — TUBING, SUCTION, 9/32" X 20', STRAIGHT: Brand: MEDLINE INDUSTRIES, INC.

## (undated) DEVICE — SPONGE LAP W18XL18IN WHT COT 4 PLY FLD STRUNG RADPQ DISP ST

## (undated) DEVICE — CANNULA PERFUSION 5.5IN 9FR AORTIC ROOT

## (undated) DEVICE — BANDAGE GAUZE BORDER 6X3 IN 4X1.5 IN ADH STRL

## (undated) DEVICE — ANGIOGRAPHIC CATHETER: Brand: EXPO™

## (undated) DEVICE — DRESSING,GAUZE,XEROFORM,CURAD,1"X8",ST: Brand: CURAD

## (undated) DEVICE — APPLICATOR MEDICATED 26 CC SOLUTION HI LT ORNG CHLORAPREP

## (undated) DEVICE — ELECTRODE PT RET AD L9FT HI MOIST COND ADH HYDRGEL CORDED

## (undated) DEVICE — PACK PROCEDURE SURG OPN HRT

## (undated) DEVICE — DRAPE C ARM W/ POLY STRP W42XL72IN FOR MOB XR

## (undated) DEVICE — SPLINT ORTH W4XL15IN PLSTR OF PARIS LO EXOTHERM SMOOTH

## (undated) DEVICE — PLATELET CONCENTRATION PACK PROC 14-20 ML SMARTPREP 2

## (undated) DEVICE — KIT BLWR MISTER 5P 15L W/ TBNG SET IRRIG MIST TO IMPROVE

## (undated) DEVICE — BLADE SAW W6.35XL32MM STRNM CUT STRNOTMY

## (undated) DEVICE — SUTURE VCRL SZ 2-0 L27IN ABSRB UD L22MM X-1 1/2 CIR REV CUT J459H

## (undated) DEVICE — BNDG,ELSTC,MATRIX,STRL,4"X5YD,LF,HOOK&LP: Brand: MEDLINE

## (undated) DEVICE — .: Brand: PERFECTCUT AORTOTOMY SYSTEM

## (undated) DEVICE — GLOVE SURG SZ 8 CRM LTX FREE POLYISOPRENE POLYMER BEAD ANTI

## (undated) DEVICE — GLOVE SURG SZ 65 L12IN FNGR THK79MIL GRN LTX FREE

## (undated) DEVICE — DRAPE SURG L W23XL17IN CLR POLYETH TRNSPAR TWL STERI-DRP

## (undated) DEVICE — SUTURE VCRL + SZ 4-0 L18IN ABSRB UD L19MM PS-2 3/8 CIR PRIM VCP496H

## (undated) DEVICE — BLADE ES L6IN ELASTOMERIC COAT EXT DURABLE BEND UPTO 90DEG

## (undated) DEVICE — WAX SURG 2.5GM HEMSTAT BNE BEESWAX PARAFFIN ISO PALMITATE

## (undated) DEVICE — BRA COMPR 3XL NYL LYCRA SPANDEX CURAD

## (undated) DEVICE — CATHETER ANGIO INFIN 038IN WLLM GRN

## (undated) DEVICE — GUIDEWIRE 35/260/FC/PTFE/3J: Brand: GUIDEWIRE

## (undated) DEVICE — GLOVE ORANGE PI 8 1/2   MSG9085

## (undated) DEVICE — TRAY SURG CUST CRD CATH TOLEDO

## (undated) DEVICE — CUFF REPROC TRNQT DPSB W/PLC RED 18IN

## (undated) DEVICE — BUR SURG DIA4MM RND W/ RIM GLDE TECHNOLOGY PRECIS

## (undated) DEVICE — ANGIOGRAPHIC CATHETER: Brand: IMPULSE™

## (undated) DEVICE — Z DISCONTINUED BY MEDLINE USE 2711682 TRAY SKIN PREP DRY W/ PREM GLV

## (undated) DEVICE — APPLICATOR MEDICATED 10.5 CC SOLUTION HI LT ORNG CHLORAPREP

## (undated) DEVICE — GLOVE SURG SZ 75 CRM LTX FREE POLYISOPRENE POLYMER BEAD ANTI

## (undated) DEVICE — NEPTUNE E-SEP SMOKE EVACUATION PENCIL, COATED, 70MM BLADE, PUSH BUTTON SWITCH: Brand: NEPTUNE E-SEP

## (undated) DEVICE — PADDING,UNDERCAST,COTTON, 3X4YD STERILE: Brand: MEDLINE

## (undated) DEVICE — IMPLANTABLE DEVICE
Type: IMPLANTABLE DEVICE | Site: HAND | Status: NON-FUNCTIONAL
Brand: MICROAIRE®
Removed: 2023-02-09

## (undated) DEVICE — SUTURE VCRL + SZ 4-0 L27IN ABSRB UD L26MM SH 1/2 CIR VCP415H

## (undated) DEVICE — DRAPE,U/ SHT,SPLIT,PLAS,STERIL: Brand: MEDLINE

## (undated) DEVICE — ADHESIVE SKIN CLOSURE TOP 36 CC HI VISC DERMBND MINI

## (undated) DEVICE — AGENT HEMSTAT W2XL4IN OXIDIZED REGENERATED CELOS ABSRB SFT

## (undated) DEVICE — GOWN,SIRUS,NONRNF,SETINSLV,XL,20/CS: Brand: MEDLINE

## (undated) DEVICE — CANNULA PERF L2IN BLNT TIP 2MM VES CLR RADPQ BODY FEM LUER

## (undated) DEVICE — DRAIN,WOUND,ROUND,24FR,5/16",FULL-FLUTED: Brand: MEDLINE

## (undated) DEVICE — CATHETER ANGIO 6FR L100CM DIA0.056IN FR4 CRV VASC ACCS EXPO

## (undated) DEVICE — MANIFOLD SUCT 4 PRT 2 CANSTR FLTR DISP NEPTUNE 2

## (undated) DEVICE — GLOVE SURG SZ 75 L12IN FNGR THK79MIL GRN LTX FREE

## (undated) DEVICE — SS SUTURE, 3 PER SLEEVE: Brand: MYO/WIRE II

## (undated) DEVICE — INTENDED FOR TISSUE SEPARATION, AND OTHER PROCEDURES THAT REQUIRE A SHARP SURGICAL BLADE TO PUNCTURE OR CUT.: Brand: BARD-PARKER ® CARBON RIB-BACK BLADES

## (undated) DEVICE — SUTURE VICRYL SZ 0 L36IN ABSRB UD L36MM CT-1 1/2 CIR J946H

## (undated) DEVICE — SUTURE PROL SZ 4-0 L36IN NONABSORBABLE BLU L26MM SH 1/2 CIR 8521H

## (undated) DEVICE — GLOVE SURG SZ 7 L12IN FNGR THK79MIL GRN LTX FREE

## (undated) DEVICE — SUTURE FIBERWIRE SZ 2 L38IN NONABSORBABLE BLU L36.6MM 1/2 AR7202

## (undated) DEVICE — SUTURE ETHIB EXCL BR GRN TAPR PT 2-0 30 X563H X563H

## (undated) DEVICE — COVER,MAYO STAND,STERILE: Brand: MEDLINE

## (undated) DEVICE — SUTURE PDS II SZ 0 L27IN ABSRB VLT L36MM CT-1 1/2 CIR Z340H

## (undated) DEVICE — PROTECTOR ULN NRV PUR FOAM HK LOOP STRP ANATOMICALLY

## (undated) DEVICE — SUTURE MONOCRYL + SZ 4-0 L27IN ABSRB UD L19MM PS-2 3/8 CIR MCP426H

## (undated) DEVICE — POSITIONER,HEAD,MULTIRING,36CS: Brand: MEDLINE

## (undated) DEVICE — CATHETER ANGIO 6FR L100CM DIA0.056IN AL1 CRV VASC ACCS EXPO

## (undated) DEVICE — TTL1LYR 16FR10ML 100%SIL TMPST TR: Brand: MEDLINE

## (undated) DEVICE — GLOVE ORANGE PI 8   MSG9080

## (undated) DEVICE — PACK PROCEDURE SURG OPN HRT ADD ON

## (undated) DEVICE — DRAPE SLUSH DISC W44XL66IN ST FOR RND BSIN HUSH SLUSH SYS

## (undated) DEVICE — BANDAGE,GAUZE,BULKEE II,4.5"X4.1YD,STRL: Brand: MEDLINE

## (undated) DEVICE — SURGIFOAM SPNG SZ 100

## (undated) DEVICE — FOGARTY - HYDRAGRIP SURGICAL - CLAMP INSERTS: Brand: FOGARTY HYDRAJAW

## (undated) DEVICE — BLADE OPHTH D5MM 15DEG GRN W/ RND KNURLED HNDL MICRO-SHARP

## (undated) DEVICE — STRIP,CLOSURE,WOUND,MEDI-STRIP,1/2X4: Brand: MEDLINE

## (undated) DEVICE — PLEDGET SURG W3.5XL7MM THK1.5MM WHT PTFE RECT FIRM TFE

## (undated) DEVICE — GOWN,AURORA,NONREINFORCED,LARGE: Brand: MEDLINE

## (undated) DEVICE — SUTURE PERMA-HAND SZ 0 L18IN NONABSORBABLE BLK CT-2 L26MM C027D

## (undated) DEVICE — GLOVE SURG SZ 85 L12IN FNGR THK79MIL GRN LTX FREE

## (undated) DEVICE — GLOVE SURG SZ 85 CRM LTX FREE POLYISOPRENE POLYMER BEAD ANTI

## (undated) DEVICE — Device

## (undated) DEVICE — RETRACTOR SURG INSRT SUT HLD OCTOBASE

## (undated) DEVICE — CATHETER,URETHRAL,REDRUBBER,STRL,18FR: Brand: MEDLINE

## (undated) DEVICE — LEAD PACE L475MM CHNL A OR V MYOCARDIAL STEROID ELUT SIL

## (undated) DEVICE — KIT MICRO INTRO 4FR STIFF 40CM NIGHTENALL TUNGSTEN 7SMT

## (undated) DEVICE — GLOVE ORANGE PI 7 1/2   MSG9075

## (undated) DEVICE — BLADE OPHTH ORNG GRINDLESS SMALLER ALTERNATIVE TO NO15 GEN

## (undated) DEVICE — CONNECTOR TBNG WHT PLAS SUCT STR 5IN1 LTWT W/ M CONN

## (undated) DEVICE — TOWEL,OR,DSP,ST,NATURAL,DLX,4/PK,20PK/CS: Brand: MEDLINE

## (undated) DEVICE — GARMENT,MEDLINE,DVT,INT,CALF,MED, GEN2: Brand: MEDLINE

## (undated) DEVICE — Device: Brand: VIRTUOSAPH PLUS WITH RADIAL INDICATION

## (undated) DEVICE — GLOVE SURG SZ 7.5 L11.73IN FNGR THK9.8MIL STRW LTX POLYMER

## (undated) DEVICE — Z DISCONTINUED NO SUB IDED DRAIN SURG 2 COLL PT TB FOR ATS BG OASIS

## (undated) DEVICE — C-ARM: Brand: UNBRANDED

## (undated) DEVICE — INTRODUCER SHTH 6FR L11CM 0.038IN STD SIDEPRT EXTN 3 W

## (undated) DEVICE — SUTURE MCRYL SZ 3-0 L27IN ABSRB UD L24MM PS-1 3/8 CIR PRIM Y936H

## (undated) DEVICE — THE STERILE LIGHT HANDLE COVER IS USED WITH STERIS SURGICAL LIGHTING AND VISUALIZATION SYSTEMS.

## (undated) DEVICE — SUTURE VICRYL + SZ 0 L27IN ABSRB UD L36MM CT-1 1/2 CIR VCPP41D

## (undated) DEVICE — BNDG,ELSTC,MATRIX,STRL,6"X5YD,LF,HOOK&LP: Brand: MEDLINE

## (undated) DEVICE — SUTURE VCRL + SZ 3-0 L27IN ABSRB WHT CT-1 1/2 CIR VCP258H

## (undated) DEVICE — DRAPE,REIN 53X77,STERILE: Brand: MEDLINE

## (undated) DEVICE — SUTURE PROL SZ 7-0 L24IN NONABSORBABLE BLU L8MM BV175-6 3/8 8735H

## (undated) DEVICE — TOWEL,OR,DSP,ST,BLUE,DLX,XR,4/PK,20PK/CS: Brand: MEDLINE